# Patient Record
Sex: FEMALE | Race: BLACK OR AFRICAN AMERICAN | Employment: OTHER
[De-identification: names, ages, dates, MRNs, and addresses within clinical notes are randomized per-mention and may not be internally consistent; named-entity substitution may affect disease eponyms.]

---

## 2017-01-10 ENCOUNTER — TELEPHONE (OUTPATIENT)
Dept: INTERNAL MEDICINE | Facility: CLINIC | Age: 57
End: 2017-01-10

## 2017-02-14 ENCOUNTER — OFFICE VISIT (OUTPATIENT)
Dept: INTERNAL MEDICINE | Facility: CLINIC | Age: 57
End: 2017-02-14

## 2017-02-14 ENCOUNTER — HOSPITAL ENCOUNTER (OUTPATIENT)
Age: 57
Discharge: HOME | End: 2017-02-14
Payer: MEDICARE

## 2017-02-14 VITALS
WEIGHT: 293 LBS | BODY MASS INDEX: 41.95 KG/M2 | DIASTOLIC BLOOD PRESSURE: 72 MMHG | HEIGHT: 70 IN | SYSTOLIC BLOOD PRESSURE: 136 MMHG | HEART RATE: 80 BPM

## 2017-02-14 DIAGNOSIS — I10 HYPERTENSION, ESSENTIAL, BENIGN: ICD-10-CM

## 2017-02-14 DIAGNOSIS — M48.061 SPINAL STENOSIS OF LUMBAR REGION: ICD-10-CM

## 2017-02-14 DIAGNOSIS — M48.061 SPINAL STENOSIS AT L4-L5 LEVEL: ICD-10-CM

## 2017-02-14 DIAGNOSIS — E78.5 DYSLIPIDEMIA: ICD-10-CM

## 2017-02-14 DIAGNOSIS — E78.00 PURE HYPERCHOLESTEROLEMIA: Primary | ICD-10-CM

## 2017-02-14 PROCEDURE — 99213 OFFICE O/P EST LOW 20 MIN: CPT | Performed by: INTERNAL MEDICINE

## 2017-02-14 PROCEDURE — G8427 DOCREV CUR MEDS BY ELIG CLIN: HCPCS | Performed by: INTERNAL MEDICINE

## 2017-02-14 PROCEDURE — G8417 CALC BMI ABV UP PARAM F/U: HCPCS | Performed by: INTERNAL MEDICINE

## 2017-02-14 PROCEDURE — 1036F TOBACCO NON-USER: CPT | Performed by: INTERNAL MEDICINE

## 2017-02-14 PROCEDURE — 3014F SCREEN MAMMO DOC REV: CPT | Performed by: INTERNAL MEDICINE

## 2017-02-14 PROCEDURE — G8484 FLU IMMUNIZE NO ADMIN: HCPCS | Performed by: INTERNAL MEDICINE

## 2017-02-14 PROCEDURE — 99212 OFFICE O/P EST SF 10 MIN: CPT | Performed by: INTERNAL MEDICINE

## 2017-02-14 PROCEDURE — 3017F COLORECTAL CA SCREEN DOC REV: CPT | Performed by: INTERNAL MEDICINE

## 2017-02-14 RX ORDER — HYDROCODONE BITARTRATE AND ACETAMINOPHEN 5; 325 MG/1; MG/1
1 TABLET ORAL 2 TIMES DAILY
Qty: 10 TABLET | Refills: 0 | Status: SHIPPED | OUTPATIENT
Start: 2017-02-14 | End: 2017-05-04 | Stop reason: SDUPTHER

## 2017-02-14 RX ORDER — HYDROCHLOROTHIAZIDE 25 MG/1
25 TABLET ORAL DAILY
Qty: 30 TABLET | Refills: 5 | Status: SHIPPED | OUTPATIENT
Start: 2017-02-14 | End: 2017-05-04 | Stop reason: SDUPTHER

## 2017-02-14 RX ORDER — WATER / MINERAL OIL / WHITE PETROLATUM 16 OZ
CREAM TOPICAL
Qty: 1 PACKAGE | Refills: 1 | Status: SHIPPED | OUTPATIENT
Start: 2017-02-14 | End: 2017-05-04 | Stop reason: ALTCHOICE

## 2017-02-14 RX ORDER — HYDROXYZINE HYDROCHLORIDE 25 MG/1
25 TABLET, FILM COATED ORAL EVERY 8 HOURS PRN
Qty: 12 TABLET | Refills: 0 | Status: SHIPPED | OUTPATIENT
Start: 2017-02-14 | End: 2017-05-04 | Stop reason: SDUPTHER

## 2017-02-14 RX ORDER — AMOXICILLIN 250 MG
2 CAPSULE ORAL DAILY
Qty: 60 TABLET | Refills: 2 | Status: SHIPPED | OUTPATIENT
Start: 2017-02-14 | End: 2017-05-04 | Stop reason: SDUPTHER

## 2017-02-14 RX ORDER — ATORVASTATIN CALCIUM 80 MG/1
80 TABLET, FILM COATED ORAL DAILY
Qty: 30 TABLET | Refills: 5 | Status: SHIPPED | OUTPATIENT
Start: 2017-02-14 | End: 2017-05-04 | Stop reason: SDUPTHER

## 2017-02-14 RX ORDER — IBUPROFEN 600 MG/1
600 TABLET ORAL EVERY 8 HOURS PRN
Qty: 120 TABLET | Refills: 3 | Status: SHIPPED | OUTPATIENT
Start: 2017-02-14 | End: 2017-05-04 | Stop reason: SDUPTHER

## 2017-02-14 RX ORDER — AMLODIPINE BESYLATE 5 MG/1
5 TABLET ORAL DAILY
Qty: 30 TABLET | Refills: 5 | Status: SHIPPED | OUTPATIENT
Start: 2017-02-14 | End: 2017-05-04 | Stop reason: SDUPTHER

## 2017-02-14 RX ORDER — ASPIRIN 81 MG/1
81 TABLET ORAL DAILY
Qty: 30 TABLET | Refills: 11 | Status: SHIPPED | OUTPATIENT
Start: 2017-02-14 | End: 2017-05-04 | Stop reason: SDUPTHER

## 2017-02-14 ASSESSMENT — ENCOUNTER SYMPTOMS
GASTROINTESTINAL NEGATIVE: 1
EYES NEGATIVE: 1
RESPIRATORY NEGATIVE: 1
ALLERGIC/IMMUNOLOGIC NEGATIVE: 1

## 2017-02-15 ENCOUNTER — CARE COORDINATION (OUTPATIENT)
Dept: CARE COORDINATION | Facility: CLINIC | Age: 57
End: 2017-02-15

## 2017-03-15 ENCOUNTER — TELEPHONE (OUTPATIENT)
Dept: INTERNAL MEDICINE | Age: 57
End: 2017-03-15

## 2017-03-15 DIAGNOSIS — N30.00 ACUTE CYSTITIS WITHOUT HEMATURIA: Primary | ICD-10-CM

## 2017-03-16 ENCOUNTER — HOSPITAL ENCOUNTER (OUTPATIENT)
Age: 57
Setting detail: SPECIMEN
Discharge: HOME | End: 2017-03-16
Payer: MEDICARE

## 2017-03-16 DIAGNOSIS — N30.00 ACUTE CYSTITIS WITHOUT HEMATURIA: ICD-10-CM

## 2017-03-16 LAB
-: NORMAL
AMORPHOUS: NORMAL
BACTERIA: NORMAL
BILIRUBIN URINE: NEGATIVE
CASTS UA: NORMAL /LPF (ref 0–8)
COLOR: YELLOW
COMMENT UA: ABNORMAL
CRYSTALS, UA: NORMAL /HPF
EPITHELIAL CELLS UA: NORMAL /HPF (ref 0–5)
GLUCOSE URINE: NEGATIVE
KETONES, URINE: NEGATIVE
LEUKOCYTE ESTERASE, URINE: ABNORMAL
MUCUS: NORMAL
NITRITE, URINE: NEGATIVE
OTHER OBSERVATIONS UA: NORMAL
PH UA: 5.5 (ref 5–8)
PROTEIN UA: NEGATIVE
RBC UA: NORMAL /HPF (ref 0–4)
RENAL EPITHELIAL, UA: NORMAL /HPF
SPECIFIC GRAVITY UA: 1.01 (ref 1–1.03)
TRICHOMONAS: NORMAL
TURBIDITY: CLEAR
URINE HGB: NEGATIVE
UROBILINOGEN, URINE: NORMAL
WBC UA: NORMAL /HPF (ref 0–5)
YEAST: NORMAL

## 2017-03-17 ENCOUNTER — HOSPITAL ENCOUNTER (EMERGENCY)
Age: 57
Discharge: HOME | End: 2017-03-17
Attending: EMERGENCY MEDICINE
Payer: MEDICARE

## 2017-03-17 VITALS
WEIGHT: 293 LBS | TEMPERATURE: 97.2 F | DIASTOLIC BLOOD PRESSURE: 88 MMHG | HEART RATE: 84 BPM | HEIGHT: 70 IN | SYSTOLIC BLOOD PRESSURE: 153 MMHG | OXYGEN SATURATION: 100 % | BODY MASS INDEX: 41.95 KG/M2 | RESPIRATION RATE: 16 BRPM

## 2017-03-17 DIAGNOSIS — N30.00 ACUTE CYSTITIS WITHOUT HEMATURIA: Primary | ICD-10-CM

## 2017-03-17 LAB
-: ABNORMAL
AMORPHOUS: ABNORMAL
BACTERIA: ABNORMAL
BILIRUBIN URINE: NEGATIVE
CASTS UA: ABNORMAL /LPF (ref 0–2)
COLOR: YELLOW
CRYSTALS, UA: ABNORMAL /HPF
EPITHELIAL CELLS UA: ABNORMAL /HPF (ref 0–5)
GLUCOSE URINE: NEGATIVE
KETONES, URINE: NEGATIVE
LEUKOCYTE ESTERASE, URINE: ABNORMAL
MUCUS: ABNORMAL
NITRITE, URINE: NEGATIVE
OTHER OBSERVATIONS UA: ABNORMAL
PH UA: 5.5 (ref 5–8)
PROTEIN UA: NEGATIVE
RBC UA: ABNORMAL /HPF (ref 0–2)
RENAL EPITHELIAL, UA: ABNORMAL /HPF
SPECIFIC GRAVITY UA: 1.03 (ref 1–1.03)
TRICHOMONAS: ABNORMAL
TURBIDITY: ABNORMAL
URINE HGB: ABNORMAL
UROBILINOGEN, URINE: NORMAL
WBC UA: ABNORMAL /HPF (ref 0–5)
YEAST: ABNORMAL

## 2017-03-17 PROCEDURE — G0382 LEV 3 HOSP TYPE B ED VISIT: HCPCS

## 2017-03-17 PROCEDURE — 81001 URINALYSIS AUTO W/SCOPE: CPT

## 2017-03-17 RX ORDER — NITROFURANTOIN 25; 75 MG/1; MG/1
100 CAPSULE ORAL 2 TIMES DAILY
Qty: 14 CAPSULE | Refills: 0 | Status: SHIPPED | OUTPATIENT
Start: 2017-03-17 | End: 2017-03-21

## 2017-03-17 ASSESSMENT — ENCOUNTER SYMPTOMS
ABDOMINAL DISTENTION: 0
DIARRHEA: 0
ABDOMINAL PAIN: 0
VOMITING: 0
COLOR CHANGE: 0
BACK PAIN: 0
SHORTNESS OF BREATH: 0
NAUSEA: 0
EYE PAIN: 0

## 2017-03-17 ASSESSMENT — PAIN DESCRIPTION - LOCATION: LOCATION: ABDOMEN

## 2017-03-17 ASSESSMENT — PAIN DESCRIPTION - PAIN TYPE: TYPE: ACUTE PAIN

## 2017-03-17 ASSESSMENT — PAIN SCALES - GENERAL: PAINLEVEL_OUTOF10: 8

## 2017-03-19 LAB
CULTURE: NORMAL
Lab: NORMAL
SPECIMEN DESCRIPTION: NORMAL
STATUS: NORMAL

## 2017-03-20 ENCOUNTER — TELEPHONE (OUTPATIENT)
Dept: INTERNAL MEDICINE | Age: 57
End: 2017-03-20

## 2017-04-05 ENCOUNTER — TELEPHONE (OUTPATIENT)
Dept: INTERNAL MEDICINE | Age: 57
End: 2017-04-05

## 2017-05-02 ENCOUNTER — HOSPITAL ENCOUNTER (EMERGENCY)
Age: 57
Discharge: HOME | End: 2017-05-02
Attending: EMERGENCY MEDICINE
Payer: MEDICARE

## 2017-05-02 VITALS
HEIGHT: 70 IN | WEIGHT: 293 LBS | BODY MASS INDEX: 41.95 KG/M2 | HEART RATE: 99 BPM | OXYGEN SATURATION: 96 % | DIASTOLIC BLOOD PRESSURE: 99 MMHG | RESPIRATION RATE: 18 BRPM | TEMPERATURE: 97.5 F | SYSTOLIC BLOOD PRESSURE: 184 MMHG

## 2017-05-02 DIAGNOSIS — R30.0 DYSURIA: Primary | ICD-10-CM

## 2017-05-02 LAB
-: NORMAL
AMORPHOUS: NORMAL
BACTERIA: NORMAL
BILIRUBIN URINE: NEGATIVE
CASTS UA: NORMAL /LPF (ref 0–8)
COLOR: YELLOW
COMMENT UA: ABNORMAL
CRYSTALS, UA: NORMAL /HPF
EPITHELIAL CELLS UA: NORMAL /HPF (ref 0–5)
GLUCOSE URINE: NEGATIVE
KETONES, URINE: NEGATIVE
LEUKOCYTE ESTERASE, URINE: ABNORMAL
MUCUS: NORMAL
NITRITE, URINE: NEGATIVE
OTHER OBSERVATIONS UA: NORMAL
PH UA: 7.5 (ref 5–8)
PROTEIN UA: NEGATIVE
RBC UA: NORMAL /HPF (ref 0–4)
RENAL EPITHELIAL, UA: NORMAL /HPF
SPECIFIC GRAVITY UA: 1.02 (ref 1–1.03)
TRICHOMONAS: NORMAL
TURBIDITY: CLEAR
URINE HGB: NEGATIVE
UROBILINOGEN, URINE: NORMAL
WBC UA: NORMAL /HPF (ref 0–5)
YEAST: NORMAL

## 2017-05-02 PROCEDURE — 81001 URINALYSIS AUTO W/SCOPE: CPT

## 2017-05-02 PROCEDURE — 99283 EMERGENCY DEPT VISIT LOW MDM: CPT

## 2017-05-02 PROCEDURE — 87086 URINE CULTURE/COLONY COUNT: CPT

## 2017-05-02 RX ORDER — NITROFURANTOIN 25; 75 MG/1; MG/1
100 CAPSULE ORAL 2 TIMES DAILY
Qty: 7 CAPSULE | Refills: 0 | Status: SHIPPED | OUTPATIENT
Start: 2017-05-02 | End: 2017-05-06

## 2017-05-02 ASSESSMENT — ENCOUNTER SYMPTOMS
COUGH: 0
COLOR CHANGE: 0
BACK PAIN: 0
NAUSEA: 0
VOMITING: 0
WHEEZING: 0

## 2017-05-02 ASSESSMENT — PAIN DESCRIPTION - FREQUENCY: FREQUENCY: CONTINUOUS

## 2017-05-02 ASSESSMENT — PAIN DESCRIPTION - DESCRIPTORS: DESCRIPTORS: ACHING;BURNING

## 2017-05-02 ASSESSMENT — PAIN DESCRIPTION - LOCATION: LOCATION: GROIN

## 2017-05-02 ASSESSMENT — PAIN SCALES - GENERAL: PAINLEVEL_OUTOF10: 6

## 2017-05-02 ASSESSMENT — PAIN DESCRIPTION - PAIN TYPE: TYPE: ACUTE PAIN

## 2017-05-03 ENCOUNTER — TELEPHONE (OUTPATIENT)
Dept: INTERNAL MEDICINE | Age: 57
End: 2017-05-03

## 2017-05-03 LAB
CULTURE: NORMAL
CULTURE: NORMAL
Lab: NORMAL
SPECIMEN DESCRIPTION: NORMAL
STATUS: NORMAL

## 2017-05-04 ENCOUNTER — OFFICE VISIT (OUTPATIENT)
Dept: INTERNAL MEDICINE | Age: 57
End: 2017-05-04
Payer: MEDICARE

## 2017-05-04 VITALS
WEIGHT: 293 LBS | SYSTOLIC BLOOD PRESSURE: 143 MMHG | BODY MASS INDEX: 41.95 KG/M2 | HEIGHT: 70 IN | HEART RATE: 74 BPM | DIASTOLIC BLOOD PRESSURE: 74 MMHG

## 2017-05-04 DIAGNOSIS — E78.5 DYSLIPIDEMIA: ICD-10-CM

## 2017-05-04 DIAGNOSIS — E78.00 PURE HYPERCHOLESTEROLEMIA: Primary | ICD-10-CM

## 2017-05-04 DIAGNOSIS — I10 HYPERTENSION, ESSENTIAL, BENIGN: ICD-10-CM

## 2017-05-04 DIAGNOSIS — N39.0 URINARY TRACT INFECTION WITHOUT HEMATURIA, SITE UNSPECIFIED: ICD-10-CM

## 2017-05-04 DIAGNOSIS — M48.061 SPINAL STENOSIS OF LUMBAR REGION: ICD-10-CM

## 2017-05-04 PROCEDURE — 3014F SCREEN MAMMO DOC REV: CPT | Performed by: INTERNAL MEDICINE

## 2017-05-04 PROCEDURE — G8427 DOCREV CUR MEDS BY ELIG CLIN: HCPCS | Performed by: INTERNAL MEDICINE

## 2017-05-04 PROCEDURE — 99213 OFFICE O/P EST LOW 20 MIN: CPT | Performed by: INTERNAL MEDICINE

## 2017-05-04 PROCEDURE — 1036F TOBACCO NON-USER: CPT | Performed by: INTERNAL MEDICINE

## 2017-05-04 PROCEDURE — G8417 CALC BMI ABV UP PARAM F/U: HCPCS | Performed by: INTERNAL MEDICINE

## 2017-05-04 PROCEDURE — 3017F COLORECTAL CA SCREEN DOC REV: CPT | Performed by: INTERNAL MEDICINE

## 2017-05-04 RX ORDER — IBUPROFEN 600 MG/1
600 TABLET ORAL EVERY 8 HOURS PRN
Qty: 120 TABLET | Refills: 3 | Status: SHIPPED | OUTPATIENT
Start: 2017-05-04 | End: 2017-11-01 | Stop reason: SDUPTHER

## 2017-05-04 RX ORDER — AMLODIPINE BESYLATE 10 MG/1
10 TABLET ORAL DAILY
Qty: 30 TABLET | Refills: 11 | Status: SHIPPED | OUTPATIENT
Start: 2017-05-04 | End: 2017-11-01 | Stop reason: SDUPTHER

## 2017-05-04 RX ORDER — HYDROCHLOROTHIAZIDE 25 MG/1
25 TABLET ORAL DAILY
Qty: 30 TABLET | Refills: 5 | Status: SHIPPED | OUTPATIENT
Start: 2017-05-04 | End: 2017-11-01 | Stop reason: SDUPTHER

## 2017-05-04 RX ORDER — HYDROXYZINE HYDROCHLORIDE 25 MG/1
25 TABLET, FILM COATED ORAL EVERY 8 HOURS PRN
Qty: 12 TABLET | Refills: 0 | Status: SHIPPED | OUTPATIENT
Start: 2017-05-04 | End: 2017-11-01 | Stop reason: SDUPTHER

## 2017-05-04 RX ORDER — AMOXICILLIN 250 MG
2 CAPSULE ORAL DAILY
Qty: 60 TABLET | Refills: 2 | Status: SHIPPED | OUTPATIENT
Start: 2017-05-04 | End: 2017-11-01 | Stop reason: SDUPTHER

## 2017-05-04 RX ORDER — ATORVASTATIN CALCIUM 80 MG/1
80 TABLET, FILM COATED ORAL DAILY
Qty: 30 TABLET | Refills: 5 | Status: SHIPPED | OUTPATIENT
Start: 2017-05-04 | End: 2017-05-25 | Stop reason: ALTCHOICE

## 2017-05-04 RX ORDER — HYDROCODONE BITARTRATE AND ACETAMINOPHEN 5; 325 MG/1; MG/1
1 TABLET ORAL 2 TIMES DAILY
Qty: 10 TABLET | Refills: 0 | Status: ON HOLD | OUTPATIENT
Start: 2017-05-04 | End: 2017-05-19

## 2017-05-04 RX ORDER — ASPIRIN 81 MG/1
81 TABLET ORAL DAILY
Qty: 30 TABLET | Refills: 11 | Status: SHIPPED | OUTPATIENT
Start: 2017-05-04 | End: 2017-11-01 | Stop reason: SDUPTHER

## 2017-05-04 ASSESSMENT — ENCOUNTER SYMPTOMS
GASTROINTESTINAL NEGATIVE: 1
RESPIRATORY NEGATIVE: 1
EYES NEGATIVE: 1

## 2017-05-16 ENCOUNTER — TELEPHONE (OUTPATIENT)
Dept: INTERNAL MEDICINE | Age: 57
End: 2017-05-16

## 2017-05-18 ENCOUNTER — HOSPITAL ENCOUNTER (OUTPATIENT)
Age: 57
Setting detail: OBSERVATION
Discharge: HOME | End: 2017-05-19
Attending: EMERGENCY MEDICINE | Admitting: EMERGENCY MEDICINE
Payer: MEDICARE

## 2017-05-18 DIAGNOSIS — M79.89 LEG SWELLING: Primary | ICD-10-CM

## 2017-05-18 LAB
ABSOLUTE EOS #: 0.1 K/UL (ref 0–0.4)
ABSOLUTE LYMPH #: 2 K/UL (ref 1–4.8)
ABSOLUTE MONO #: 0.2 K/UL (ref 0.1–1.2)
ANION GAP SERPL CALCULATED.3IONS-SCNC: 14 MMOL/L (ref 9–17)
BASOPHILS # BLD: 1 %
BASOPHILS ABSOLUTE: 0 K/UL (ref 0–0.2)
BUN BLDV-MCNC: 11 MG/DL (ref 6–20)
BUN/CREAT BLD: NORMAL (ref 9–20)
CALCIUM SERPL-MCNC: 10.1 MG/DL (ref 8.6–10.4)
CHLORIDE BLD-SCNC: 99 MMOL/L (ref 98–107)
CO2: 23 MMOL/L (ref 20–31)
CREAT SERPL-MCNC: 0.55 MG/DL (ref 0.5–0.9)
DIFFERENTIAL TYPE: ABNORMAL
EOSINOPHILS RELATIVE PERCENT: 2 %
GFR AFRICAN AMERICAN: >60 ML/MIN
GFR NON-AFRICAN AMERICAN: >60 ML/MIN
GFR SERPL CREATININE-BSD FRML MDRD: NORMAL ML/MIN/{1.73_M2}
GFR SERPL CREATININE-BSD FRML MDRD: NORMAL ML/MIN/{1.73_M2}
GLUCOSE BLD-MCNC: 78 MG/DL (ref 70–99)
HCT VFR BLD CALC: 38.7 % (ref 36–46)
HEMOGLOBIN: 12.9 G/DL (ref 12–16)
LYMPHOCYTES # BLD: 50 %
MCH RBC QN AUTO: 29.6 PG (ref 26–34)
MCHC RBC AUTO-ENTMCNC: 33.4 G/DL (ref 31–37)
MCV RBC AUTO: 88.6 FL (ref 80–100)
MONOCYTES # BLD: 6 %
PDW BLD-RTO: 15.6 % (ref 12.5–15.4)
PLATELET # BLD: 238 K/UL (ref 140–450)
PLATELET ESTIMATE: ABNORMAL
PMV BLD AUTO: 9.5 FL (ref 6–12)
POTASSIUM SERPL-SCNC: 3.7 MMOL/L (ref 3.7–5.3)
RBC # BLD: 4.37 M/UL (ref 4–5.2)
RBC # BLD: ABNORMAL 10*6/UL
SEG NEUTROPHILS: 41 %
SEGMENTED NEUTROPHILS ABSOLUTE COUNT: 1.7 K/UL (ref 1.8–7.7)
SODIUM BLD-SCNC: 136 MMOL/L (ref 135–144)
WBC # BLD: 4 K/UL (ref 3.5–11)
WBC # BLD: ABNORMAL 10*3/UL

## 2017-05-18 PROCEDURE — 80048 BASIC METABOLIC PNL TOTAL CA: CPT

## 2017-05-18 PROCEDURE — 6370000000 HC RX 637 (ALT 250 FOR IP): Performed by: EMERGENCY MEDICINE

## 2017-05-18 PROCEDURE — 96372 THER/PROPH/DIAG INJ SC/IM: CPT

## 2017-05-18 PROCEDURE — 99285 EMERGENCY DEPT VISIT HI MDM: CPT

## 2017-05-18 PROCEDURE — 6360000002 HC RX W HCPCS: Performed by: EMERGENCY MEDICINE

## 2017-05-18 PROCEDURE — G0378 HOSPITAL OBSERVATION PER HR: HCPCS

## 2017-05-18 PROCEDURE — 85025 COMPLETE CBC W/AUTO DIFF WBC: CPT

## 2017-05-18 PROCEDURE — 93005 ELECTROCARDIOGRAM TRACING: CPT

## 2017-05-18 RX ORDER — SODIUM CHLORIDE 0.9 % (FLUSH) 0.9 %
10 SYRINGE (ML) INJECTION PRN
Status: DISCONTINUED | OUTPATIENT
Start: 2017-05-18 | End: 2017-05-19 | Stop reason: HOSPADM

## 2017-05-18 RX ORDER — ATORVASTATIN CALCIUM 80 MG/1
80 TABLET, FILM COATED ORAL DAILY
Status: DISCONTINUED | OUTPATIENT
Start: 2017-05-19 | End: 2017-05-19 | Stop reason: HOSPADM

## 2017-05-18 RX ORDER — HYDROCHLOROTHIAZIDE 25 MG/1
25 TABLET ORAL DAILY
Status: DISCONTINUED | OUTPATIENT
Start: 2017-05-19 | End: 2017-05-19 | Stop reason: HOSPADM

## 2017-05-18 RX ORDER — OXYCODONE HYDROCHLORIDE AND ACETAMINOPHEN 5; 325 MG/1; MG/1
1 TABLET ORAL ONCE
Status: COMPLETED | OUTPATIENT
Start: 2017-05-18 | End: 2017-05-18

## 2017-05-18 RX ORDER — HYDROXYZINE HYDROCHLORIDE 25 MG/1
25 TABLET, FILM COATED ORAL EVERY 8 HOURS PRN
Status: DISCONTINUED | OUTPATIENT
Start: 2017-05-18 | End: 2017-05-19 | Stop reason: HOSPADM

## 2017-05-18 RX ORDER — SENNA AND DOCUSATE SODIUM 50; 8.6 MG/1; MG/1
2 TABLET, FILM COATED ORAL DAILY
Status: DISCONTINUED | OUTPATIENT
Start: 2017-05-19 | End: 2017-05-19 | Stop reason: HOSPADM

## 2017-05-18 RX ORDER — SODIUM CHLORIDE 0.9 % (FLUSH) 0.9 %
10 SYRINGE (ML) INJECTION EVERY 12 HOURS SCHEDULED
Status: DISCONTINUED | OUTPATIENT
Start: 2017-05-18 | End: 2017-05-19 | Stop reason: HOSPADM

## 2017-05-18 RX ORDER — ASPIRIN 81 MG/1
81 TABLET ORAL DAILY
Status: DISCONTINUED | OUTPATIENT
Start: 2017-05-19 | End: 2017-05-19 | Stop reason: HOSPADM

## 2017-05-18 RX ORDER — ACETAMINOPHEN 325 MG/1
650 TABLET ORAL EVERY 4 HOURS PRN
Status: DISCONTINUED | OUTPATIENT
Start: 2017-05-18 | End: 2017-05-19 | Stop reason: HOSPADM

## 2017-05-18 RX ORDER — AMLODIPINE BESYLATE 10 MG/1
10 TABLET ORAL DAILY
Status: DISCONTINUED | OUTPATIENT
Start: 2017-05-19 | End: 2017-05-19 | Stop reason: HOSPADM

## 2017-05-18 RX ADMIN — ENOXAPARIN SODIUM 135 MG: 150 INJECTION SUBCUTANEOUS at 01:35

## 2017-05-18 RX ADMIN — OXYCODONE HYDROCHLORIDE AND ACETAMINOPHEN 1 TABLET: 5; 325 TABLET ORAL at 22:03

## 2017-05-18 ASSESSMENT — PAIN SCALES - GENERAL: PAINLEVEL_OUTOF10: 8

## 2017-05-19 VITALS
WEIGHT: 293 LBS | BODY MASS INDEX: 41.95 KG/M2 | TEMPERATURE: 97.3 F | HEIGHT: 70 IN | RESPIRATION RATE: 17 BRPM | DIASTOLIC BLOOD PRESSURE: 81 MMHG | SYSTOLIC BLOOD PRESSURE: 139 MMHG | HEART RATE: 94 BPM | OXYGEN SATURATION: 99 %

## 2017-05-19 LAB
EKG ATRIAL RATE: 83 BPM
EKG ATRIAL RATE: 85 BPM
EKG P AXIS: 54 DEGREES
EKG P AXIS: 56 DEGREES
EKG P-R INTERVAL: 172 MS
EKG P-R INTERVAL: 200 MS
EKG Q-T INTERVAL: 402 MS
EKG Q-T INTERVAL: 422 MS
EKG QRS DURATION: 90 MS
EKG QRS DURATION: 92 MS
EKG QTC CALCULATION (BAZETT): 472 MS
EKG QTC CALCULATION (BAZETT): 502 MS
EKG R AXIS: -10 DEGREES
EKG R AXIS: 7 DEGREES
EKG T AXIS: 41 DEGREES
EKG T AXIS: 57 DEGREES
EKG VENTRICULAR RATE: 83 BPM
EKG VENTRICULAR RATE: 85 BPM

## 2017-05-19 PROCEDURE — 2580000003 HC RX 258: Performed by: EMERGENCY MEDICINE

## 2017-05-19 PROCEDURE — 93970 EXTREMITY STUDY: CPT

## 2017-05-19 PROCEDURE — G0378 HOSPITAL OBSERVATION PER HR: HCPCS

## 2017-05-19 PROCEDURE — 93005 ELECTROCARDIOGRAM TRACING: CPT

## 2017-05-19 PROCEDURE — 6370000000 HC RX 637 (ALT 250 FOR IP): Performed by: EMERGENCY MEDICINE

## 2017-05-19 RX ORDER — HYDROCODONE BITARTRATE AND ACETAMINOPHEN 5; 325 MG/1; MG/1
1 TABLET ORAL EVERY 6 HOURS PRN
Qty: 12 TABLET | Refills: 0 | Status: SHIPPED | OUTPATIENT
Start: 2017-05-19 | End: 2017-11-01 | Stop reason: SDUPTHER

## 2017-05-19 RX ADMIN — HYDROCHLOROTHIAZIDE 25 MG: 25 TABLET ORAL at 08:40

## 2017-05-19 RX ADMIN — ASPIRIN 81 MG: 81 TABLET, COATED ORAL at 08:40

## 2017-05-19 RX ADMIN — DOCUSATE SODIUM -SENNOSIDES 2 TABLET: 50; 8.6 TABLET, COATED ORAL at 08:40

## 2017-05-19 RX ADMIN — SODIUM CHLORIDE, PRESERVATIVE FREE 10 ML: 5 INJECTION INTRAVENOUS at 08:42

## 2017-05-19 RX ADMIN — AMLODIPINE BESYLATE 10 MG: 10 TABLET ORAL at 08:40

## 2017-05-19 ASSESSMENT — ENCOUNTER SYMPTOMS
BLOOD IN STOOL: 0
RHINORRHEA: 0
VOMITING: 0
PHOTOPHOBIA: 0
NAUSEA: 0
EYE PAIN: 0
ABDOMINAL PAIN: 0
SORE THROAT: 0
DIARRHEA: 0
SHORTNESS OF BREATH: 0
COUGH: 0

## 2017-05-20 ENCOUNTER — CARE COORDINATION (OUTPATIENT)
Dept: CASE MANAGEMENT | Age: 57
End: 2017-05-20

## 2017-05-25 ENCOUNTER — OFFICE VISIT (OUTPATIENT)
Dept: UROLOGY | Age: 57
End: 2017-05-25
Payer: MEDICARE

## 2017-05-25 VITALS
WEIGHT: 293 LBS | TEMPERATURE: 98 F | DIASTOLIC BLOOD PRESSURE: 91 MMHG | HEART RATE: 87 BPM | SYSTOLIC BLOOD PRESSURE: 144 MMHG | BODY MASS INDEX: 41.95 KG/M2 | HEIGHT: 70 IN

## 2017-05-25 DIAGNOSIS — N39.41 URGE INCONTINENCE: Primary | ICD-10-CM

## 2017-05-25 DIAGNOSIS — R30.0 DYSURIA: ICD-10-CM

## 2017-05-25 DIAGNOSIS — N39.0 URINARY TRACT INFECTION, SITE UNSPECIFIED: Primary | ICD-10-CM

## 2017-05-25 PROCEDURE — 3014F SCREEN MAMMO DOC REV: CPT | Performed by: SPECIALIST

## 2017-05-25 PROCEDURE — G8417 CALC BMI ABV UP PARAM F/U: HCPCS | Performed by: SPECIALIST

## 2017-05-25 PROCEDURE — 99213 OFFICE O/P EST LOW 20 MIN: CPT

## 2017-05-25 PROCEDURE — 99204 OFFICE O/P NEW MOD 45 MIN: CPT | Performed by: SPECIALIST

## 2017-05-25 PROCEDURE — 3017F COLORECTAL CA SCREEN DOC REV: CPT | Performed by: SPECIALIST

## 2017-05-25 PROCEDURE — 1036F TOBACCO NON-USER: CPT | Performed by: SPECIALIST

## 2017-05-25 PROCEDURE — 51798 US URINE CAPACITY MEASURE: CPT | Performed by: SPECIALIST

## 2017-05-25 PROCEDURE — G8427 DOCREV CUR MEDS BY ELIG CLIN: HCPCS | Performed by: SPECIALIST

## 2017-06-05 ENCOUNTER — HOSPITAL ENCOUNTER (OUTPATIENT)
Dept: PHYSICAL THERAPY | Age: 57
Setting detail: THERAPIES SERIES
Discharge: HOME | End: 2017-06-05
Payer: MEDICARE

## 2017-06-05 ASSESSMENT — PAIN SCALES - GENERAL: PAINLEVEL_OUTOF10: 9

## 2017-06-05 ASSESSMENT — PAIN DESCRIPTION - LOCATION: LOCATION: LEG

## 2017-06-05 ASSESSMENT — PAIN DESCRIPTION - PAIN TYPE: TYPE: CHRONIC PAIN

## 2017-06-05 ASSESSMENT — PAIN DESCRIPTION - ORIENTATION: ORIENTATION: LEFT;RIGHT

## 2017-06-07 PROCEDURE — G8978 MOBILITY CURRENT STATUS: HCPCS

## 2017-06-07 PROCEDURE — G8979 MOBILITY GOAL STATUS: HCPCS

## 2017-06-07 ASSESSMENT — PAIN DESCRIPTION - PAIN TYPE: TYPE: CHRONIC PAIN

## 2017-06-07 ASSESSMENT — PAIN DESCRIPTION - ORIENTATION: ORIENTATION: RIGHT;LEFT

## 2017-06-07 ASSESSMENT — PAIN DESCRIPTION - LOCATION: LOCATION: LEG

## 2017-06-07 ASSESSMENT — PAIN SCALES - GENERAL: PAINLEVEL_OUTOF10: 9

## 2017-06-12 ENCOUNTER — HOSPITAL ENCOUNTER (OUTPATIENT)
Dept: PHYSICAL THERAPY | Age: 57
Setting detail: THERAPIES SERIES
Discharge: HOME | End: 2017-06-12
Payer: MEDICARE

## 2017-06-12 PROCEDURE — 97113 AQUATIC THERAPY/EXERCISES: CPT

## 2017-06-12 ASSESSMENT — PAIN DESCRIPTION - ORIENTATION: ORIENTATION: LOWER

## 2017-06-12 ASSESSMENT — PAIN SCALES - GENERAL: PAINLEVEL_OUTOF10: 9

## 2017-06-12 ASSESSMENT — PAIN DESCRIPTION - LOCATION: LOCATION: BACK

## 2017-06-12 ASSESSMENT — PAIN DESCRIPTION - PAIN TYPE: TYPE: CHRONIC PAIN

## 2017-06-15 ENCOUNTER — HOSPITAL ENCOUNTER (OUTPATIENT)
Dept: PHYSICAL THERAPY | Age: 57
Setting detail: THERAPIES SERIES
Discharge: HOME | End: 2017-06-15
Payer: MEDICARE

## 2017-06-15 PROCEDURE — 97113 AQUATIC THERAPY/EXERCISES: CPT

## 2017-06-15 ASSESSMENT — PAIN SCALES - GENERAL: PAINLEVEL_OUTOF10: 8

## 2017-06-15 ASSESSMENT — PAIN DESCRIPTION - LOCATION: LOCATION: BACK

## 2017-06-15 ASSESSMENT — PAIN DESCRIPTION - PAIN TYPE: TYPE: CHRONIC PAIN

## 2017-06-15 ASSESSMENT — PAIN DESCRIPTION - ORIENTATION: ORIENTATION: LOWER

## 2017-06-15 ASSESSMENT — PAIN DESCRIPTION - DESCRIPTORS: DESCRIPTORS: ACHING;NUMBNESS;TIGHTNESS

## 2017-06-19 ENCOUNTER — HOSPITAL ENCOUNTER (OUTPATIENT)
Dept: PHYSICAL THERAPY | Age: 57
Setting detail: THERAPIES SERIES
Discharge: HOME | End: 2017-06-19
Payer: MEDICARE

## 2017-06-19 PROCEDURE — 97113 AQUATIC THERAPY/EXERCISES: CPT

## 2017-06-19 ASSESSMENT — PAIN DESCRIPTION - ORIENTATION: ORIENTATION: LOWER

## 2017-06-19 ASSESSMENT — PAIN DESCRIPTION - LOCATION: LOCATION: BACK

## 2017-06-19 ASSESSMENT — PAIN SCALES - GENERAL: PAINLEVEL_OUTOF10: 8

## 2017-06-19 ASSESSMENT — PAIN DESCRIPTION - PAIN TYPE: TYPE: CHRONIC PAIN

## 2017-06-20 ENCOUNTER — APPOINTMENT (OUTPATIENT)
Dept: PHYSICAL THERAPY | Age: 57
End: 2017-06-20
Payer: MEDICARE

## 2017-06-22 ENCOUNTER — HOSPITAL ENCOUNTER (OUTPATIENT)
Dept: PHYSICAL THERAPY | Age: 57
Setting detail: THERAPIES SERIES
Discharge: HOME | End: 2017-06-22
Payer: MEDICARE

## 2017-06-22 PROCEDURE — 97113 AQUATIC THERAPY/EXERCISES: CPT

## 2017-06-22 ASSESSMENT — PAIN DESCRIPTION - DESCRIPTORS: DESCRIPTORS: ACHING;CONSTANT;DISCOMFORT

## 2017-06-22 ASSESSMENT — PAIN DESCRIPTION - PAIN TYPE: TYPE: CHRONIC PAIN

## 2017-06-22 ASSESSMENT — PAIN SCALES - GENERAL: PAINLEVEL_OUTOF10: 8

## 2017-06-22 ASSESSMENT — PAIN DESCRIPTION - LOCATION: LOCATION: BACK

## 2017-06-22 ASSESSMENT — PAIN DESCRIPTION - ORIENTATION: ORIENTATION: LOWER

## 2017-06-26 ENCOUNTER — HOSPITAL ENCOUNTER (OUTPATIENT)
Dept: PHYSICAL THERAPY | Age: 57
Setting detail: THERAPIES SERIES
Discharge: HOME | End: 2017-06-26
Payer: MEDICARE

## 2017-06-29 ENCOUNTER — HOSPITAL ENCOUNTER (OUTPATIENT)
Dept: PHYSICAL THERAPY | Age: 57
Setting detail: THERAPIES SERIES
Discharge: HOME | End: 2017-06-29
Payer: MEDICARE

## 2017-09-06 ENCOUNTER — HOSPITAL ENCOUNTER (OUTPATIENT)
Dept: PHYSICAL THERAPY | Age: 57
Setting detail: THERAPIES SERIES
Discharge: HOME | End: 2017-09-06
Payer: MEDICARE

## 2017-09-06 PROCEDURE — 97162 PT EVAL MOD COMPLEX 30 MIN: CPT

## 2017-09-06 PROCEDURE — G8978 MOBILITY CURRENT STATUS: HCPCS

## 2017-09-06 PROCEDURE — G8979 MOBILITY GOAL STATUS: HCPCS

## 2017-09-06 ASSESSMENT — PAIN SCALES - GENERAL: PAINLEVEL_OUTOF10: 9

## 2017-09-06 ASSESSMENT — PAIN DESCRIPTION - LOCATION: LOCATION: LEG;BACK

## 2017-09-06 ASSESSMENT — PAIN DESCRIPTION - DESCRIPTORS: DESCRIPTORS: CONSTANT;STABBING

## 2017-09-06 ASSESSMENT — PAIN DESCRIPTION - PAIN TYPE: TYPE: CHRONIC PAIN

## 2017-09-06 ASSESSMENT — PAIN DESCRIPTION - ORIENTATION: ORIENTATION: LEFT;RIGHT;LOWER

## 2017-09-11 ENCOUNTER — HOSPITAL ENCOUNTER (OUTPATIENT)
Dept: PHYSICAL THERAPY | Age: 57
Setting detail: THERAPIES SERIES
End: 2017-09-11
Payer: MEDICARE

## 2017-09-11 ENCOUNTER — APPOINTMENT (OUTPATIENT)
Dept: PHYSICAL THERAPY | Age: 57
End: 2017-09-11
Payer: MEDICARE

## 2017-09-11 NOTE — PROGRESS NOTES
Physical Therapy  [address]   Outpatient Physical Therapy  Cancel/No Show Note    Date: 17    Patient Name: Melquiades Foster        MRN: [id number]  Account: [de-identified] : 1960      General Information:  Referral Date : 17  Diagnosis: lumbar disc disease  Onset Date: 16  PT Insurance Information: Medicare and Medicaid  Total # of Visits Approved: 12  Total # of Visits to Date: 1  Plan of Care/Certification Expiration Date: 10/04/17  No Show: 0  Canceled Appointment: 1      Comments: PATIENT CANCELLED AQUATICS THIS WEEK PER ; NO REASON GIVEN    Radha Eason PTA

## 2017-09-14 ENCOUNTER — APPOINTMENT (OUTPATIENT)
Dept: PHYSICAL THERAPY | Age: 57
End: 2017-09-14
Payer: MEDICARE

## 2017-09-18 ENCOUNTER — HOSPITAL ENCOUNTER (OUTPATIENT)
Dept: PHYSICAL THERAPY | Age: 57
Setting detail: THERAPIES SERIES
Discharge: HOME | End: 2017-09-18
Payer: MEDICARE

## 2017-09-19 ENCOUNTER — TELEPHONE (OUTPATIENT)
Dept: ADMINISTRATIVE | Age: 57
End: 2017-09-19

## 2017-09-25 ENCOUNTER — HOSPITAL ENCOUNTER (OUTPATIENT)
Dept: PHYSICAL THERAPY | Age: 57
Setting detail: THERAPIES SERIES
Discharge: HOME | End: 2017-09-25
Payer: MEDICARE

## 2017-09-25 PROCEDURE — 97113 AQUATIC THERAPY/EXERCISES: CPT

## 2017-09-25 ASSESSMENT — PAIN DESCRIPTION - LOCATION: LOCATION: BACK;LEG

## 2017-09-25 ASSESSMENT — PAIN SCALES - GENERAL: PAINLEVEL_OUTOF10: 8

## 2017-09-25 ASSESSMENT — PAIN DESCRIPTION - ORIENTATION: ORIENTATION: LOWER;LEFT;RIGHT

## 2017-09-25 ASSESSMENT — PAIN DESCRIPTION - PAIN TYPE: TYPE: CHRONIC PAIN

## 2017-09-25 ASSESSMENT — PAIN DESCRIPTION - DESCRIPTORS: DESCRIPTORS: CONSTANT;STABBING

## 2017-09-28 ENCOUNTER — APPOINTMENT (OUTPATIENT)
Dept: PHYSICAL THERAPY | Age: 57
End: 2017-09-28
Payer: MEDICARE

## 2017-09-28 ENCOUNTER — HOSPITAL ENCOUNTER (OUTPATIENT)
Dept: PHYSICAL THERAPY | Age: 57
Setting detail: THERAPIES SERIES
Discharge: HOME | End: 2017-09-28
Payer: MEDICARE

## 2017-09-28 PROCEDURE — 97113 AQUATIC THERAPY/EXERCISES: CPT

## 2017-09-28 ASSESSMENT — PAIN DESCRIPTION - ORIENTATION: ORIENTATION: LOWER;LEFT;RIGHT

## 2017-09-28 ASSESSMENT — PAIN DESCRIPTION - PAIN TYPE: TYPE: CHRONIC PAIN

## 2017-09-28 ASSESSMENT — PAIN DESCRIPTION - DESCRIPTORS: DESCRIPTORS: CONSTANT;STABBING

## 2017-09-28 ASSESSMENT — PAIN SCALES - GENERAL: PAINLEVEL_OUTOF10: 9

## 2017-09-28 ASSESSMENT — PAIN DESCRIPTION - LOCATION: LOCATION: BACK;LEG

## 2017-10-02 ENCOUNTER — HOSPITAL ENCOUNTER (OUTPATIENT)
Dept: PHYSICAL THERAPY | Age: 57
Setting detail: THERAPIES SERIES
Discharge: HOME | End: 2017-10-02
Payer: MEDICARE

## 2017-10-02 NOTE — PROGRESS NOTES
Physical Therapy  [address]   Outpatient Physical Therapy  Cancel/No Show Note    Date: 10/2/17    Patient Name: Christopher Oneil        MRN: [id number]  Account: [de-identified] : 1960      General Information:     Onset Date: 16  PT Insurance Information: Medicare and Medicaid  Total # of Visits Approved: 12  Total # of Visits to Date: 3  Plan of Care/Certification Expiration Date: 10/04/17  No Show: 1  Canceled Appointment: 3    Comments: Patient canceled her aquatics session for today's date- patient is ill    Electronically signed by Brian Najjar, PTA on 10/2/2017 at 5:44 PM

## 2017-10-03 ENCOUNTER — APPOINTMENT (OUTPATIENT)
Dept: PHYSICAL THERAPY | Age: 57
End: 2017-10-03
Payer: MEDICARE

## 2017-10-05 ENCOUNTER — HOSPITAL ENCOUNTER (OUTPATIENT)
Dept: PHYSICAL THERAPY | Age: 57
Setting detail: THERAPIES SERIES
Discharge: HOME | End: 2017-10-05
Payer: MEDICARE

## 2017-10-05 PROCEDURE — 97113 AQUATIC THERAPY/EXERCISES: CPT

## 2017-10-05 ASSESSMENT — PAIN DESCRIPTION - ORIENTATION: ORIENTATION: LOWER;LEFT;RIGHT

## 2017-10-05 ASSESSMENT — PAIN SCALES - GENERAL: PAINLEVEL_OUTOF10: 9

## 2017-10-05 ASSESSMENT — PAIN DESCRIPTION - LOCATION: LOCATION: BACK;LEG

## 2017-10-05 ASSESSMENT — PAIN DESCRIPTION - PAIN TYPE: TYPE: CHRONIC PAIN

## 2017-10-05 NOTE — PROGRESS NOTES
Physical Therapy  [address]   Outpatient Physical Therapy  [address] [facility]  Phone: [phone]  Fax: [phone]  Daily Progress Note    Date: 10/5/17    Patient Name: Melquiades Foster        MRN: [id number]  Account: [de-identified] : 1960      General Information:  Chart Reviewed: Yes  Response To Previous Treatment: Patient with no complaints from previous session. Family / Caregiver Present: No  Referral Date : 17  Diagnosis: lumbar disc disease  Follows Commands: Within Functional Limits  Onset Date: 16  PT Insurance Information: Medicare and Medicaid  Total # of Visits Approved: 12  Total # of Visits to Date: 4  No Show: 1  Canceled Appointment: 3    Subjective:  Subjective: Patient with complaints of increased pain and tightness across her lower back and in (B) LE's- (Left side more severe than right side)     Pain:  Patient Currently in Pain: Yes  Pain Assessment: 0-10  Pain Level: 9  Pain Type: Chronic pain  Pain Location: Back;Leg  Pain Orientation: Lower;Left;Right (Ride side not as severe as left)     Objective:      [facility] Exercise Log  Aquatic, Hip & DLS Program- Phase 1    Date of Eval:    2017                        Primary PT: BALDO  Diagnosis:  LBP  Things to Focus On (goals): POSTURE, WALKING TIME, CORE STRENGTH  Surgical Precautions:  Medical Precautions:  [] C-9 dates  [] Occ Med   [x] Medicare       Date 9/25/17 9/28/17 10/5/17     Visit # 2/12 3/12 4/12     Walk F/L/R 2 laps ea 2 laps ea 2 laps ea     Marching 2 laps 2 laps 2 laps     Squats 10x5\" 15x5\" 15x5\"     Step-Ups F/L  Low 15x Low 15x     Heel-toe raises 10x Hurts ankles  DC -- -- --   SLR F/L/R 10x 15x 15x     Knee/Flex/Ext 10x 15x 15x     F/L Lunges        Kickboard Ex. Small Small Small     Iso Abd. 10x5\" 10x5\" 10x5\"     Push-pull 10x 15x 15x     Paddling 10x 15x 15x             UE Ex:        Horiz Abd/Add   10x     Alt.  Flex/Ext   10x Push downs   10x     IR/ER (wipers)   10x     Abd/Add   10x             Deep water: Add    Cycling        Jacks        Cross-country                Stretches        Achllies 2x20\" 2x20\" 2x20\"     Hamstring 2x20\" 2x20\" 2x20\"             Cool down 2 laps 2 laps 2 laps     Pain Rating 8 9 9       Assessment:  Assessment: BACK WITH B LE RADICULOPATHY  REQUIRES PT FOLLOW UP: Yes  Activity Tolerance: Patient Tolerated treatment well  Comments: Despite high pain level, patient able to complete aquatic program per log without complaints of increased symptoms and asks to \"be pushed. \"   Added UE exercises in chest deep water challenging core strength     Plan:  Plan: Continue with current plan  Comment: Add deep water exercises as indicated per log as tolerated    Therapy Time:  Time In: 1444  Time Out: [address] [facility]  Minutes: 46  Timed Code Treatment Minutes: 32 Minutes (2 Aqua)    Electronically signed by Anastacia Carmona PTA on 10/5/2017 at 4:55 PM

## 2017-10-09 ENCOUNTER — HOSPITAL ENCOUNTER (OUTPATIENT)
Dept: PHYSICAL THERAPY | Age: 57
Setting detail: THERAPIES SERIES
Discharge: HOME | End: 2017-10-09
Payer: MEDICARE

## 2017-10-09 PROCEDURE — 97113 AQUATIC THERAPY/EXERCISES: CPT

## 2017-10-09 ASSESSMENT — PAIN DESCRIPTION - PAIN TYPE: TYPE: CHRONIC PAIN

## 2017-10-09 ASSESSMENT — PAIN DESCRIPTION - LOCATION: LOCATION: BACK;LEG

## 2017-10-09 ASSESSMENT — PAIN SCALES - GENERAL: PAINLEVEL_OUTOF10: 8

## 2017-10-09 ASSESSMENT — PAIN DESCRIPTION - ORIENTATION: ORIENTATION: LOWER;LEFT;RIGHT

## 2017-10-09 NOTE — PROGRESS NOTES
Physical Therapy  [address]   Outpatient Physical Therapy  [address] [facility]  Phone: [phone]  Fax: [phone]  Daily Progress Note    Date: 10/9/17    Patient Name: Jocelyne Nogueira        MRN: [id number]  Account: [de-identified] : 1960      General Information:  Chart Reviewed: Yes  Response To Previous Treatment: Patient with no complaints from previous session. Family / Caregiver Present: No  Referral Date : 17  Diagnosis: lumbar disc disease  Follows Commands: Within Functional Limits  Onset Date: 16  PT Insurance Information: Medicare and Medicaid  Total # of Visits Approved: 12  Total # of Visits to Date: 5  No Show: 1  Canceled Appointment: 3    Subjective:  Subjective: Patient with continued complaints of increased pain and tightness across her lower back and in (B) LE's- (Left side more severe than right side)     Pain:  Patient Currently in Pain: Yes  Pain Assessment: 0-10  Pain Level: 8  Pain Type: Chronic pain  Pain Location: Back;Leg  Pain Orientation: Lower;Left;Right (Ride sided not as severe)    Objective:      [address] [facility] Exercise Log  Aquatic, Hip & DLS Program- Phase 1    Date of Eval:    2017                        Primary PT: BALDO  Diagnosis:  LBP  Things to Focus On (goals): POSTURE, WALKING TIME, CORE STRENGTH  Surgical Precautions:  Medical Precautions:  [] C-9 dates  [] Occ Med   [x] Medicare       Date 9/25/17 9/28/17 10/5/17 10/9/17    Visit # 2/12 3/12 4/12 5/12    Walk F/L/R 2 laps ea 2 laps ea 2 laps ea 2 laps ea    Marching 2 laps 2 laps 2 laps 2 laps    Squats 10x5\" 15x5\" 15x5\" 15x5\"    Step-Ups F/L  Low 15x Low 15x Low 15x    Heel-toe raises 10x Hurts ankles  DC -- -- --   SLR F/L/R 10x 15x 15x 15x    Knee/Flex/Ext 10x 15x 15x 15x    F/L Lunges        Kickboard Ex.  Small Small Small Small    Iso Abd. 10x5\" 10x5\" 10x5\" 10x5\"    Push-pull 10x 15x 15x 15x    Paddling 10x 15x 15x 15x            UE Ex:        Horiz Abd/Add   10x 10x    Alt.  Flex/Ext   10x 10x    Push downs   10x 10x    IR/ER (wipers)   10x 10x    Abd/Add   10x 10x            Deep water:    1 noodle    Cycling    2 minutes    Jacks        Cross-country                Stretches        Achllies 2x20\" 2x20\" 2x20\" 2x20\"    Hamstring 2x20\" 2x20\" 2x20\" 2x20\"            Cool down 2 laps 2 laps 2 laps 2 laps    Pain Rating 8 9 9 8      Assessment:  Assessment: BACK WITH WITH B LE RADICULOPATHY  REQUIRES PT FOLLOW UP: Yes  Activity Tolerance: Patient Tolerated treatment well  Comments: Added two minutes of deep water cycling    Plan:  Plan: Continue with current plan    Therapy Time:  Time In: 3753  Time Out: 1737  Minutes: 63  Timed Code Treatment Minutes: 20 Minutes    Treatment Charges: Minutes Units   []  Ultrasound     []  Electrical-Stim     []  Iontophoresis     []  Traction     []  Massage       []  Eval     []  Gait     []  Ther Exercise       []  Manual Therapy       []  Ther Activities       [x]  Aquatics 20 1   []  Neuro Re-Ed       []  Other       Total Treatment Time:        Electronically signed by Nury Monk PTA on 10/9/2017 at 6:00 PM

## 2017-10-10 ENCOUNTER — APPOINTMENT (OUTPATIENT)
Dept: PHYSICAL THERAPY | Age: 57
End: 2017-10-10
Payer: MEDICARE

## 2017-10-16 ENCOUNTER — HOSPITAL ENCOUNTER (OUTPATIENT)
Dept: PHYSICAL THERAPY | Age: 57
Setting detail: THERAPIES SERIES
Discharge: HOME | End: 2017-10-16
Payer: MEDICARE

## 2017-10-17 ENCOUNTER — APPOINTMENT (OUTPATIENT)
Dept: PHYSICAL THERAPY | Age: 57
End: 2017-10-17
Payer: MEDICARE

## 2017-10-19 ENCOUNTER — HOSPITAL ENCOUNTER (OUTPATIENT)
Dept: PHYSICAL THERAPY | Age: 57
Setting detail: THERAPIES SERIES
Discharge: HOME | End: 2017-10-19
Payer: MEDICARE

## 2017-10-19 PROCEDURE — 97113 AQUATIC THERAPY/EXERCISES: CPT

## 2017-10-19 ASSESSMENT — PAIN DESCRIPTION - PAIN TYPE: TYPE: CHRONIC PAIN

## 2017-10-19 ASSESSMENT — PAIN DESCRIPTION - ORIENTATION: ORIENTATION: LOWER;RIGHT;LEFT

## 2017-10-19 ASSESSMENT — PAIN SCALES - GENERAL: PAINLEVEL_OUTOF10: 9

## 2017-10-19 ASSESSMENT — PAIN DESCRIPTION - LOCATION: LOCATION: BACK;LEG

## 2017-10-19 NOTE — PROGRESS NOTES
Physical Therapy  [address]   Outpatient Physical Therapy  [address] [facility]  Phone: [phone]  Fax: [phone]  Daily Progress Note    Date: 10/19/17    Patient Name: Shane John        MRN: [id number]  Account: [de-identified] : 1960      General Information:  Chart Reviewed: Yes  Response To Previous Treatment: Patient with no complaints from previous session. Family / Caregiver Present: No  Referral Date : 17  Diagnosis: lumbar disc disease  Follows Commands: Within Functional Limits  Onset Date: 16  PT Insurance Information: Medicare and Medicaid  Total # of Visits Approved: 12  Total # of Visits to Date: 6  No Show: 1  Canceled Appointment: 4    Subjective:  Subjective: Reports that she has been recently able to feel a \"bulge\" in her lower back (first time feeling this sensation)- most prevalent when side lying and lying supine. Also with complaints of (B) LE's from the groin to the feet feeling numb and \"full, like there is no circulation. \"       Pain:  Patient Currently in Pain: Yes  Pain Assessment: 0-10  Pain Level: 9  Pain Type: Chronic pain  Pain Location: Back;Leg  Pain Orientation: Lower;Right;Left (Left > Right)       Objective:      [address] [facility] Exercise Log  Aquatic, Hip & DLS Program- Phase 1    Date of Eval:    2017                        Primary PT: BALDO  Diagnosis:  LBP  Things to Focus On (goals): POSTURE, WALKING TIME, CORE STRENGTH  Surgical Precautions:  Medical Precautions:  [] C-9 dates  [] Occ Med   [x] Medicare       Date 9/28/17 10/5/17 10/9/17 10/19/17    Visit # 3/12 4/12 5/12 6/12    Walk F/L/R 2 laps ea 2 laps ea 2 laps ea 2 laps ea    Marching 2 laps 2 laps 2 laps 2 laps    Squats 15x5\" 15x5\" 15x5\" 15x5\"    Step-Ups F/L Low 15x Low 15x Low 15x Low 15x Tall   Heel-toe raises Hurts ankles  DC -- -- --    SLR F/L/R 15x 15x 15x 15x    Knee/Flex/Ext 15x 15x 15x 15x    F/L Lunges Kickboard Ex. Small Small Small Small    Iso Abd. 10x5\" 10x5\" 10x5\" 10x5\"    Push-pull 15x 15x 15x 15x    Paddling 15x 15x 15x 15x            UE Ex:        Horiz Abd/Add  10x 10x 10x    Alt.  Flex/Ext  10x 10x 10x    Push downs  10x 10x 10x    IR/ER (wipers)  10x 10x 10x    Abd/Add  10x 10x 10x            Deep water:   1 noodle 1 Noodle    Cycling   2 minutes 2 minutes    Jacks    1 minute    Cross-country                Stretches        Achllies 2x20\" 2x20\" 2x20\" 2x20\"    Hamstring 2x20\" 2x20\" 2x20\" 2x20\"            Cool down 2 laps 2 laps 2 laps 2 laps     Pain Rating 9 9 8 9      Comment:  Comments: Patient to see a surgeon tomorrow RE: back pain    Assessment:  Treatment Diagnosis: LUMBAR DEGENERATIVE CHANGES RESULTING IN DECREASED FUNCTIONAL MOBILITY  Prognosis: Fair  REQUIRES PT FOLLOW UP: Yes  Activity Tolerance: Patient Tolerated treatment well  Comments: Added one minute of deep water Jacks    Plan:  Plan: Continue with current plan  Progress to using tall box with step-ups    Therapy Time:  Time In: 1447  Time Out: 1545  Minutes: 58  Timed Code Treatment Minutes: 25 Minutes    Treatment Charges: Minutes Units   []  Ultrasound     []  Electrical-Stim     []  Iontophoresis     []  Traction     []  Massage       []  Eval     []  Gait     []  Ther Exercise       []  Manual Therapy       []  Ther Activities       [x]  Aquatics 25 2   []  Neuro Re-Ed       []  Other       Total Treatment Time:        Electronically signed by Demarcus FriALIX cortes on 10/19/2017 at 4:59 PM

## 2017-10-23 ENCOUNTER — HOSPITAL ENCOUNTER (OUTPATIENT)
Dept: PHYSICAL THERAPY | Age: 57
Setting detail: THERAPIES SERIES
Discharge: HOME | End: 2017-10-23
Payer: MEDICARE

## 2017-10-24 ENCOUNTER — APPOINTMENT (OUTPATIENT)
Dept: PHYSICAL THERAPY | Age: 57
End: 2017-10-24
Payer: MEDICARE

## 2017-10-26 ENCOUNTER — HOSPITAL ENCOUNTER (OUTPATIENT)
Dept: PHYSICAL THERAPY | Age: 57
Setting detail: THERAPIES SERIES
Discharge: HOME | End: 2017-10-26
Payer: MEDICARE

## 2017-10-26 PROCEDURE — 97113 AQUATIC THERAPY/EXERCISES: CPT

## 2017-10-26 ASSESSMENT — PAIN DESCRIPTION - LOCATION: LOCATION: BACK

## 2017-10-26 ASSESSMENT — PAIN DESCRIPTION - PAIN TYPE: TYPE: CHRONIC PAIN

## 2017-10-26 ASSESSMENT — PAIN SCALES - GENERAL: PAINLEVEL_OUTOF10: 8

## 2017-10-26 NOTE — PROGRESS NOTES
Physical Therapy  [address]   Outpatient Physical Therapy  [address] [facility]  Phone: [phone]  Fax: [phone]  Daily Progress Note    Date: 10/26/17    Patient Name: Christopher Ramirez        MRN: [id number]  Account: [de-identified] : 1960      General Information:  Chart Reviewed: Yes  Response To Previous Treatment: Patient with no complaints from previous session. Family / Caregiver Present: No  Referral Date : 17  Diagnosis: lumbar disc disease  Follows Commands: Within Functional Limits  Onset Date: 16  PT Insurance Information: Medicare and Medicaid  Total # of Visits Approved: 12  Total # of Visits to Date: 7  No Show: 1  Canceled Appointment: 5    Subjective:  Subjective: Reports feeling pretty good today, \"It is not too bad. It is about an 8, not a 9 or 10 like it usually is. \"  Patient saw Juan Casas on 10/20/17 and reports that she is not going to have any more surgeries, is to continue with Physical Therapy and begin going to a Pain Clinic      Pain:  Patient Currently in Pain: Yes  Pain Assessment: 0-10  Pain Level: 8  Pain Type: Chronic pain  Pain Location: Back    Objective:    [address] [facility] Exercise Log  Aquatic, Hip & DLS Program- Phase 1    Date of Eval:    2017                        Primary PT: BALDO  Diagnosis:  LBP  Things to Focus On (goals): POSTURE, WALKING TIME, CORE STRENGTH  Surgical Precautions:  Medical Precautions:  [] C-9 dates  [] Occ Med   [x] Medicare       Date 9/28/17 10/5/17 10/9/17 10/19/17 10/26/17   Visit # 3/12 4/12 5/12 6/12 7/12   Walk F/L/R 2 laps ea 2 laps ea 2 laps ea 2 laps ea 2 laps ea   Marching 2 laps 2 laps 2 laps 2 laps 2 laps    Squats 15x5\" 15x5\" 15x5\" 15x5\" 15x5\"   Step-Ups F/L Low 15x Low 15x Low 15x Low 15x Low 15x   Heel-toe raises Hurts ankles  DC -- -- -- --   SLR F/L/R 15x 15x 15x 15x 15x   Knee/Flex/Ext 15x 15x 15x 15x 15x   F/L Lunges        Kickboard Ex.  Small Small Small Small Medium   Iso Abd. 10x5\" 10x5\" 10x5\" 10x5\" 10x5\"   Push-pull 15x 15x 15x 15x 15x   Paddling 15x 15x 15x 15x 15x           UE Ex:        Horiz Abd/Add  10x 10x 10x 10x   Alt. Flex/Ext  10x 10x 10x 10x   Push downs  10x 10x 10x 10x   IR/ER (wipers)  10x 10x 10x 10x   Abd/Add  10x 10x 10x 10x           Deep water:   1 noodle 1 Noodle 1 Noodle   Cycling   2 minutes 2 minutes 2 minutes   Jacks     1 minute   Cross-country    1 minute 1 minute           Stretches        Achllies 2x20\" 2x20\" 2x20\" 2x20\" 2x20\"   Hamstring 2x20\" 2x20\" 2x20\" 2x20\" 2x20\"           Cool down 2 laps 2 laps 2 laps 2 laps  2 laps   Pain Rating 9 9 8 9 8     Assessment:  Assessment: BACK WITH B LE RADICULOPATHY  Treatment Diagnosis: LUMBAR DEGENERATIVE CHANGES RESULTING IN DECREASED FUNCTIONAL MOBILITY  Prognosis: Fair  REQUIRES PT FOLLOW UP: Yes  Activity Tolerance: Patient Tolerated treatment well  Comments: Added one minute of deep water Cross country.   Upon arrival to [place], patient appears to be standing more erect using her Rollator walker compared to usual.    Plan:  Plan: Continue with current plan (Add lunges)  Comment: Patient scheduled for re-eval with PT next Wednesday, 11/1/17    Therapy Time:  Time In: 9618  Time Out: 3462  Minutes: 55  Timed Code Treatment Minutes: 26 Minutes    Treatment Charges: Minutes Units   []  Ultrasound     []  Electrical-Stim     []  Iontophoresis     []  Traction     []  Massage       []  Eval     []  Gait     []  Ther Exercise       []  Manual Therapy       []  Ther Activities       [x]  Aquatics 26 2   []  Neuro Re-Ed       []  Other       Total Treatment Time:        Electronically signed by Alex Philippe PTA on 10/26/2017 at 3:51 PM

## 2017-11-01 ENCOUNTER — HOSPITAL ENCOUNTER (OUTPATIENT)
Dept: PHYSICAL THERAPY | Age: 57
Setting detail: THERAPIES SERIES
Discharge: HOME | End: 2017-11-01
Payer: MEDICARE

## 2017-11-01 ENCOUNTER — HOSPITAL ENCOUNTER (OUTPATIENT)
Age: 57
Setting detail: SPECIMEN
Discharge: HOME | End: 2017-11-01
Payer: MEDICARE

## 2017-11-01 ENCOUNTER — OFFICE VISIT (OUTPATIENT)
Dept: INTERNAL MEDICINE | Age: 57
End: 2017-11-01
Payer: MEDICARE

## 2017-11-01 VITALS
SYSTOLIC BLOOD PRESSURE: 126 MMHG | DIASTOLIC BLOOD PRESSURE: 73 MMHG | HEART RATE: 68 BPM | HEIGHT: 70 IN | WEIGHT: 293 LBS | BODY MASS INDEX: 41.95 KG/M2

## 2017-11-01 DIAGNOSIS — M48.061 SPINAL STENOSIS OF LUMBAR REGION, UNSPECIFIED WHETHER NEUROGENIC CLAUDICATION PRESENT: ICD-10-CM

## 2017-11-01 DIAGNOSIS — E66.01 MORBID OBESITY WITH BMI OF 45.0-49.9, ADULT (HCC): ICD-10-CM

## 2017-11-01 DIAGNOSIS — I10 HYPERTENSION, ESSENTIAL, BENIGN: Primary | ICD-10-CM

## 2017-11-01 DIAGNOSIS — N30.00 ACUTE CYSTITIS WITHOUT HEMATURIA: ICD-10-CM

## 2017-11-01 PROCEDURE — G8484 FLU IMMUNIZE NO ADMIN: HCPCS | Performed by: INTERNAL MEDICINE

## 2017-11-01 PROCEDURE — 97113 AQUATIC THERAPY/EXERCISES: CPT

## 2017-11-01 PROCEDURE — 1036F TOBACCO NON-USER: CPT | Performed by: INTERNAL MEDICINE

## 2017-11-01 PROCEDURE — G8417 CALC BMI ABV UP PARAM F/U: HCPCS | Performed by: INTERNAL MEDICINE

## 2017-11-01 PROCEDURE — 99213 OFFICE O/P EST LOW 20 MIN: CPT | Performed by: INTERNAL MEDICINE

## 2017-11-01 PROCEDURE — 99212 OFFICE O/P EST SF 10 MIN: CPT

## 2017-11-01 PROCEDURE — 3014F SCREEN MAMMO DOC REV: CPT | Performed by: INTERNAL MEDICINE

## 2017-11-01 PROCEDURE — G8427 DOCREV CUR MEDS BY ELIG CLIN: HCPCS | Performed by: INTERNAL MEDICINE

## 2017-11-01 PROCEDURE — G8979 MOBILITY GOAL STATUS: HCPCS

## 2017-11-01 PROCEDURE — G8978 MOBILITY CURRENT STATUS: HCPCS

## 2017-11-01 PROCEDURE — 3017F COLORECTAL CA SCREEN DOC REV: CPT | Performed by: INTERNAL MEDICINE

## 2017-11-01 RX ORDER — AMLODIPINE BESYLATE 10 MG/1
10 TABLET ORAL DAILY
Qty: 30 TABLET | Refills: 11 | Status: SHIPPED | OUTPATIENT
Start: 2017-11-01 | End: 2018-04-03

## 2017-11-01 RX ORDER — AMOXICILLIN 250 MG
2 CAPSULE ORAL DAILY
Qty: 60 TABLET | Refills: 2 | Status: SHIPPED | OUTPATIENT
Start: 2017-11-01 | End: 2018-05-22 | Stop reason: SDUPTHER

## 2017-11-01 RX ORDER — HYDROXYZINE HYDROCHLORIDE 25 MG/1
25 TABLET, FILM COATED ORAL EVERY 8 HOURS PRN
Qty: 12 TABLET | Refills: 2 | Status: SHIPPED | OUTPATIENT
Start: 2017-11-01 | End: 2018-04-09

## 2017-11-01 RX ORDER — HYDROCODONE BITARTRATE AND ACETAMINOPHEN 5; 325 MG/1; MG/1
1 TABLET ORAL EVERY 6 HOURS PRN
Qty: 10 TABLET | Refills: 0 | Status: SHIPPED | OUTPATIENT
Start: 2017-11-01 | End: 2018-04-09

## 2017-11-01 RX ORDER — HYDROCHLOROTHIAZIDE 25 MG/1
25 TABLET ORAL DAILY
Qty: 30 TABLET | Refills: 5 | Status: SHIPPED | OUTPATIENT
Start: 2017-11-01 | End: 2018-04-09 | Stop reason: SDUPTHER

## 2017-11-01 RX ORDER — ASPIRIN 81 MG/1
81 TABLET ORAL DAILY
Qty: 30 TABLET | Refills: 11 | Status: SHIPPED | OUTPATIENT
Start: 2017-11-01 | End: 2018-10-23 | Stop reason: DRUGHIGH

## 2017-11-01 RX ORDER — IBUPROFEN 600 MG/1
600 TABLET ORAL EVERY 8 HOURS PRN
Qty: 120 TABLET | Refills: 3 | Status: SHIPPED | OUTPATIENT
Start: 2017-11-01 | End: 2018-05-02

## 2017-11-01 ASSESSMENT — ENCOUNTER SYMPTOMS
GASTROINTESTINAL NEGATIVE: 1
RESPIRATORY NEGATIVE: 1
BACK PAIN: 1
EYES NEGATIVE: 1
ALLERGIC/IMMUNOLOGIC NEGATIVE: 1

## 2017-11-01 ASSESSMENT — PAIN DESCRIPTION - ORIENTATION: ORIENTATION: LOWER;LEFT;RIGHT

## 2017-11-01 ASSESSMENT — PAIN SCALES - GENERAL: PAINLEVEL_OUTOF10: 9

## 2017-11-01 ASSESSMENT — PAIN DESCRIPTION - LOCATION: LOCATION: BACK

## 2017-11-01 ASSESSMENT — PAIN DESCRIPTION - PAIN TYPE: TYPE: CHRONIC PAIN

## 2017-11-01 NOTE — PROGRESS NOTES
Visit Information    Have you changed or started any medications since your last visit including any over-the-counter medicines, vitamins, or herbal medicines? yes - advil   Are you having any side effects from any of your medications? -  no  Have you stopped taking any of your medications? Is so, why? -  no    Have you seen any other physician or provider since your last visit? Yes - Records Obtained  Have you had any other diagnostic tests since your last visit? No  Have you been seen in the emergency room and/or had an admission to a hospital since we last saw you? No  Have you had your routine dental cleaning in the past 6 months? no    Have you activated your [employer] account? If not, what are your barriers?  No: pt declined     Patient Care Team:  María Fortune MD as PCP - Mirela Cui MD as PCP - S Attributed Provider  Juan M Ortega MD (Dermatology)  True Gtz (Neurosurgery)    Medical History Review  Past Medical, Family, and Social History reviewed and does contribute to the patient presenting condition    Health Maintenance   Topic Date Due    Flu vaccine (1) 09/01/2017    Cervical cancer screen  10/14/2017    Colon Cancer Screen FIT/FOBT  10/21/2017    DTaP/Tdap/Td vaccine (1 - Tdap) 02/14/2018 (Originally 2/11/1978)    Hepatitis C screen  02/14/2018 (Originally 1960)    HIV screen  02/14/2018 (Originally 2/11/1974)    Breast cancer screen  12/04/2017    Lipid screen  07/06/2021
Anemia           Plan:      Urine culture  Med list  Ret 3 mo

## 2017-11-01 NOTE — PROGRESS NOTES
Physical Therapy  [address]   Outpatient Physical Therapy  [address] [facility]. [address]  Phone: [phone]  Fax: [phone]  Daily Progress Note    Date: 17    Patient Name: Aneta Uribe        MRN: [id number]  Account: [de-identified] : 1960      General Information:  Chart Reviewed: Yes  Response To Previous Treatment: Patient with no complaints from previous session. Family / Caregiver Present: No  Referral Date : 17  Diagnosis: lumbar disc disease  Follows Commands: Within Functional Limits  Onset Date: 16  PT Insurance Information: Medicare and Medicaid  Total # of Visits Approved: 12  Total # of Visits to Date: 8  No Show: 1  Canceled Appointment: 5    Subjective:  Subjective: Patient with complaints of increased pain across her lower back with left sided symptoms more prevalent than right sided symptoms    Pain:  Patient Currently in Pain: Yes  Pain Assessment: 0-10  Pain Level: 9  Pain Type: Chronic pain  Pain Location: Back  Pain Orientation: Lower;Left;Right (Left > Right)     Objective:    [address] [facility] Exercise Log  Aquatic, Hip & DLS Program- Phase 1    Date of Eval:    2017                        Primary PT: BALDO  Diagnosis:  LBP  Things to Focus On (goals): POSTURE, WALKING TIME, CORE STRENGTH  Surgical Precautions:  Medical Precautions:  [] C-9 dates  [] Occ Med   [x] Medicare       Date 10/5/17 10/9/17 10/19/17 10/26/17 11/1/17   Visit # 12    Walk F/L/R 2 laps ea 2 laps ea 2 laps ea 2 laps ea 2 laps ea   Marching 2 laps 2 laps 2 laps 2 laps  2 laps   Squats 15x5\" 15x5\" 15x5\" 15x5\" 15x5\"   Step-Ups F/L Low 15x Low 15x Low 15x Low 15x Low 15x   Heel-toe raises -- -- -- -- --   SLR F/L/R 15x 15x 15x 15x 15x   Knee/Flex/Ext 15x 15x 15x 15x 15x   F/L Lunges        Kickboard Ex.  Small Small Small Medium Large   Iso Abd. 10x5\" 10x5\" 10x5\" 10x5\" 10x5\"   Push-pull 15x 15x 15x 15x 15x   Paddling 15x 15x

## 2017-11-02 LAB
CULTURE: NORMAL
CULTURE: NORMAL
Lab: NORMAL
SPECIMEN DESCRIPTION: NORMAL
STATUS: NORMAL

## 2017-11-06 ENCOUNTER — APPOINTMENT (OUTPATIENT)
Dept: PHYSICAL THERAPY | Age: 57
End: 2017-11-06
Payer: MEDICARE

## 2017-11-06 ENCOUNTER — HOSPITAL ENCOUNTER (OUTPATIENT)
Dept: PHYSICAL THERAPY | Age: 57
Setting detail: THERAPIES SERIES
Discharge: HOME | End: 2017-11-06
Payer: MEDICARE

## 2017-11-06 PROCEDURE — 97113 AQUATIC THERAPY/EXERCISES: CPT

## 2017-11-06 ASSESSMENT — PAIN DESCRIPTION - ORIENTATION: ORIENTATION: LOWER;LEFT;RIGHT

## 2017-11-06 ASSESSMENT — PAIN SCALES - GENERAL: PAINLEVEL_OUTOF10: 9

## 2017-11-06 ASSESSMENT — PAIN DESCRIPTION - LOCATION: LOCATION: BACK

## 2017-11-06 ASSESSMENT — PAIN DESCRIPTION - PAIN TYPE: TYPE: CHRONIC PAIN

## 2017-11-09 ENCOUNTER — APPOINTMENT (OUTPATIENT)
Dept: PHYSICAL THERAPY | Age: 57
End: 2017-11-09
Payer: MEDICARE

## 2017-11-09 ENCOUNTER — HOSPITAL ENCOUNTER (OUTPATIENT)
Dept: PHYSICAL THERAPY | Age: 57
Setting detail: THERAPIES SERIES
Discharge: HOME | End: 2017-11-09
Payer: MEDICARE

## 2017-11-13 ENCOUNTER — HOSPITAL ENCOUNTER (OUTPATIENT)
Dept: PHYSICAL THERAPY | Age: 57
Setting detail: THERAPIES SERIES
Discharge: HOME | End: 2017-11-13
Payer: MEDICARE

## 2017-11-13 ENCOUNTER — APPOINTMENT (OUTPATIENT)
Dept: PHYSICAL THERAPY | Age: 57
End: 2017-11-13
Payer: MEDICARE

## 2017-11-13 PROCEDURE — 97113 AQUATIC THERAPY/EXERCISES: CPT

## 2017-11-13 ASSESSMENT — PAIN SCALES - GENERAL: PAINLEVEL_OUTOF10: 8

## 2017-11-13 ASSESSMENT — PAIN DESCRIPTION - PAIN TYPE: TYPE: CHRONIC PAIN

## 2017-11-13 ASSESSMENT — PAIN DESCRIPTION - LOCATION: LOCATION: BACK

## 2017-11-13 ASSESSMENT — PAIN DESCRIPTION - ORIENTATION: ORIENTATION: LOWER;LEFT;RIGHT

## 2017-11-13 NOTE — PROGRESS NOTES
15x 15x 15x 10x   Paddling 15x 15x 15x 15x 15x           UE Ex:        Horiz Abd/Add 10x 10x 10x 10x 10x   Alt.  Flex/Ext 10x 10x 10x 10x 10x   Push downs 10x 10x 10x 10x 10x   IR/ER (wipers) 10x 10x 10x 10x    Abd/Add 10x 10x 10x 10x 10x           Deep water: 1 Noodle 1 Noodle 1 Noodle 1 Noodle 1 Noodle   Cycling 2 minutes 2 minutes 2 minutes 3 minutes 3 minutes   Jacks  1 minute 1 minute 3 minutes 3 minutes   Cross-country 1 minute 1 minute 1 minute 3 minutes 3 minutes           Stretches        Achllies 2x20\" 2x20\" 2x20\" 2x20\" 2x20\"   Hamstring 2x20\" 2x20\" 2x20\" 2x20\" 2x20\"           Cool down 2 laps  2 laps 2 laps 2 laps 2 laps   Pain Rating 9 8 9 9 8     Assessment:  Assessment: BACK WITH B LE RADICULOPATHY  Treatment Diagnosis: LUMBAR DEGENERATIVE CHANGES RESULTING IN DECREASED FUNCTIONAL MOBILITY  Prognosis: Fair  REQUIRES PT FOLLOW UP: Yes  Activity Tolerance: Patient Tolerated treatment well  Comments: Tall box used for forward and lateral step-ups    Plan:  Plan: Continue with current plan  Comment: PT re-eval next visit    Therapy Time:  Time In: 9417  Time Out: 1540  Minutes: 65  Timed Code Treatment Minutes: 24 Minutes    Treatment Charges: Minutes Units   []  Ultrasound     []  Electrical-Stim     []  Iontophoresis     []  Traction     []  Massage       []  Eval     []  Gait     []  Ther Exercise       []  Manual Therapy       []  Ther Activities       [x]  Aquatics 24 2   []  Neuro Re-Ed       []  Other       Total Treatment Time: 24 2     Electronically signed by Ingris Enirquez PTA on 11/13/2017 at 6:39 PM

## 2017-11-16 ENCOUNTER — HOSPITAL ENCOUNTER (OUTPATIENT)
Dept: PHYSICAL THERAPY | Age: 57
Setting detail: THERAPIES SERIES
End: 2017-11-16
Payer: MEDICARE

## 2017-11-16 ENCOUNTER — HOSPITAL ENCOUNTER (OUTPATIENT)
Dept: PHYSICAL THERAPY | Age: 57
Setting detail: THERAPIES SERIES
Discharge: HOME | End: 2017-11-16
Payer: MEDICARE

## 2017-11-16 ENCOUNTER — APPOINTMENT (OUTPATIENT)
Dept: PHYSICAL THERAPY | Age: 57
End: 2017-11-16
Payer: MEDICARE

## 2017-11-16 PROCEDURE — 97113 AQUATIC THERAPY/EXERCISES: CPT

## 2017-11-16 ASSESSMENT — PAIN DESCRIPTION - ORIENTATION: ORIENTATION: LOWER

## 2017-11-16 ASSESSMENT — PAIN DESCRIPTION - LOCATION: LOCATION: BACK

## 2017-11-16 ASSESSMENT — PAIN SCALES - GENERAL: PAINLEVEL_OUTOF10: 8

## 2017-11-16 ASSESSMENT — PAIN DESCRIPTION - PAIN TYPE: TYPE: CHRONIC PAIN

## 2017-11-16 NOTE — PROGRESS NOTES
3'              Stretches        Achllies 2x20\" 2x20\"      Hamstring 2x20\" 2x20\"              Cool down 2 laps 2 Laps      Pain Rating 8 8        Assessment: Body structures, Functions, Activity limitations: Decreased functional mobility ; Decreased ROM; Decreased balance  Assessment: BACK WITH B LE RADICULOPATHY  Treatment Diagnosis: LUMBAR DEGENERATIVE CHANGES RESULTING IN DECREASED FUNCTIONAL MOBILITY  Prognosis: Fair  REQUIRES PT FOLLOW UP: Yes  Activity Tolerance: Patient Tolerated treatment well  Comments: Increased reps with exercise per log; emphasis on posture and core stability along with technique of all exercise    Plan:  Plan: Continue with current plan  Comment: Add lunges    Therapy Time:  Time In: 1430  Time Out: 1523  Minutes: 53  Timed Code Treatment Minutes: 40 Minutes    Treatment Charges: Minutes Units   []  Ultrasound     []  Electrical-Stim     []  Iontophoresis     []  Traction     []  Massage       []  Eval     []  Gait     []  Ther Exercise       []  Manual Therapy       []  Ther Activities       [x]  Aquatics 40 3   []  Neuro Re-Ed       []  Other       Total Treatment Time: 40 3       Gautam Machuca, ALIX

## 2017-11-20 ENCOUNTER — HOSPITAL ENCOUNTER (OUTPATIENT)
Dept: PHYSICAL THERAPY | Age: 57
Setting detail: THERAPIES SERIES
Discharge: HOME | End: 2017-11-20
Payer: MEDICARE

## 2017-11-20 NOTE — PROGRESS NOTES
Physical Therapy  [address]   Outpatient Physical Therapy  Cancel/No Show Note    Date: 17    Patient Name: Christopher Oneil        MRN: [id number]  Account: [de-identified] : 1960      General Information:  Onset Date: 16  PT Insurance Information: Medicare and Medicaid  Total # of Visits Approved: 22  Total # of Visits to Date: 11  Plan of Care/Certification Expiration Date: 17  No Show: 1  Canceled Appointment: 7    Comments: Patient canceled aquatics session for today's date per the - no reason given    Electronically signed by Brian Najjar, PTA on 2017 at 3:20 PM

## 2017-11-27 ENCOUNTER — HOSPITAL ENCOUNTER (OUTPATIENT)
Dept: PHYSICAL THERAPY | Age: 57
Setting detail: THERAPIES SERIES
Discharge: HOME | End: 2017-11-27
Payer: MEDICARE

## 2017-11-27 PROCEDURE — 97113 AQUATIC THERAPY/EXERCISES: CPT

## 2017-11-27 ASSESSMENT — PAIN DESCRIPTION - LOCATION: LOCATION: BACK

## 2017-11-27 ASSESSMENT — PAIN DESCRIPTION - PAIN TYPE: TYPE: CHRONIC PAIN

## 2017-11-27 ASSESSMENT — PAIN DESCRIPTION - ORIENTATION: ORIENTATION: LOWER

## 2017-11-27 ASSESSMENT — PAIN SCALES - GENERAL: PAINLEVEL_OUTOF10: 10

## 2017-11-27 NOTE — PROGRESS NOTES
[address]   Outpatient Physical Therapy  [address] [facility]  Phone: [phone]  Fax: [phone]  Daily Progress Note    Date: 17    Patient Name: Logan Ge        MRN: [id number]  Account: [de-identified] : 1960      General Information:  Referral Date : 17  Diagnosis: lumbar disc disease  Onset Date: 16  PT Insurance Information: Medicare and Medicaid  Total # of Visits Approved: 22  Total # of Visits to Date: 12  Plan of Care/Certification Expiration Date: 17  No Show: 1  Canceled Appointment: 7  Progress Note Counter: 4/10 G-Codes; Re-eval done 17    Subjective:  Subjective: Very sore today- can tell she has not been to pool in awhile- Holiday last week. Entire L LE is painful. Also notes increased pain/soreness after last visit- feels tall box with step ups was too much     Pain:  Patient Currently in Pain: Yes  Pain Assessment: 0-10  Pain Level: 10  Pain Type: Chronic pain  Pain Location: Back  Pain Orientation: Lower  Pain Radiating Towards: Buttock- down B LE's to feet L>R       Objective:  [address] [facility] Exercise Log  Aquatic, Hip & DLS Program- Phase 1    Date of Eval:    2017                        Primary PT: BALDO  Diagnosis:  LBP  Things to Focus On (goals): POSTURE, WALKING TIME, CORE STRENGTH  Surgical Precautions:  Medical Precautions:  [] C-9 dates  [] Occ Med   [x] Medicare     Date 17     Visit # 10/12 11/22 12/22     Walk F/L/R 2 laps ea 2 Laps 2 Laps     Marching 2 laps 2 Laps 2 Laps     Squats 15x5\" 15x5\" 15x5\"     Step-Ups F/L Tall 10x Tall 15x Low 10x     SLR F/L/R 15x 15x 15x     Knee/Flex/Ext 15x 15x 15x     F/L Lunges    Add    Kickboard Ex. Large Lg Lg     Iso Abd. 10x5\" 10x5\" 15x5\"     Push-pull 10x 10x 15x     Paddling 15x 10x 15x             UE Ex:        Horiz Abd/Add 10x 15x 15x     Alt.  Flex/Ext 10x 15x 15x     Push downs 10x 15x 15x     IR/ER (myranda)

## 2017-11-30 ENCOUNTER — HOSPITAL ENCOUNTER (OUTPATIENT)
Dept: PHYSICAL THERAPY | Age: 57
Setting detail: THERAPIES SERIES
End: 2017-11-30
Payer: MEDICARE

## 2017-11-30 ENCOUNTER — HOSPITAL ENCOUNTER (OUTPATIENT)
Dept: PHYSICAL THERAPY | Age: 57
Setting detail: THERAPIES SERIES
Discharge: HOME | End: 2017-11-30
Payer: MEDICARE

## 2017-12-13 ENCOUNTER — HOSPITAL ENCOUNTER (OUTPATIENT)
Dept: PHYSICAL THERAPY | Age: 57
Setting detail: THERAPIES SERIES
Discharge: HOME | End: 2017-12-13
Payer: MEDICARE

## 2017-12-13 NOTE — PROGRESS NOTES
[address]   Outpatient Physical Therapy  Cancel/No Show Note    Date: 17    Patient Name: Logan Ge        MRN: [id number]  Account: [de-identified] : 1960      General Information:  Referral Date : (P) 17  Diagnosis: (P) lumbar disc disease  Onset Date: (P) 16  PT Insurance Information: (P) Medicare and Medicaid  Total # of Visits Approved: (P) 22  Plan of Care/Certification Expiration Date: (P) 17  No Show: (P) 1  Canceled Appointment: (P) 9 (Patient called to cancel no reason given)  Progress Note Counter: (P) 4/10 G-Codes;  Re-eval done 17      Anil Diaz, PT

## 2017-12-27 ENCOUNTER — HOSPITAL ENCOUNTER (OUTPATIENT)
Dept: PHYSICAL THERAPY | Age: 57
Setting detail: THERAPIES SERIES
Discharge: HOME | End: 2017-12-27
Payer: MEDICARE

## 2018-04-03 ENCOUNTER — HOSPITAL ENCOUNTER (OUTPATIENT)
Dept: PAIN MANAGEMENT | Age: 58
Discharge: HOME | End: 2018-04-03
Payer: MEDICARE

## 2018-04-03 VITALS
SYSTOLIC BLOOD PRESSURE: 144 MMHG | RESPIRATION RATE: 14 BRPM | BODY MASS INDEX: 41.95 KG/M2 | DIASTOLIC BLOOD PRESSURE: 100 MMHG | HEIGHT: 70 IN | HEART RATE: 85 BPM | TEMPERATURE: 98.6 F | WEIGHT: 293 LBS

## 2018-04-03 DIAGNOSIS — M96.1 FAILED BACK SYNDROME: Primary | Chronic | ICD-10-CM

## 2018-04-03 DIAGNOSIS — M48.061 SPINAL STENOSIS AT L4-L5 LEVEL: ICD-10-CM

## 2018-04-03 DIAGNOSIS — E66.01 MORBID OBESITY WITH BMI OF 50.0-59.9, ADULT (HCC): Chronic | ICD-10-CM

## 2018-04-03 PROCEDURE — 99203 OFFICE O/P NEW LOW 30 MIN: CPT

## 2018-04-03 PROCEDURE — 80307 DRUG TEST PRSMV CHEM ANLYZR: CPT

## 2018-04-03 PROCEDURE — 99214 OFFICE O/P EST MOD 30 MIN: CPT | Performed by: ANESTHESIOLOGY

## 2018-04-03 RX ORDER — OXYCODONE HYDROCHLORIDE AND ACETAMINOPHEN 5; 325 MG/1; MG/1
1 TABLET ORAL DAILY PRN
Qty: 30 TABLET | Refills: 0 | Status: SHIPPED | OUTPATIENT
Start: 2018-04-03 | End: 2018-05-02 | Stop reason: SDUPTHER

## 2018-04-03 ASSESSMENT — ENCOUNTER SYMPTOMS
HEARTBURN: 0
SHORTNESS OF BREATH: 0
NAUSEA: 0
DOUBLE VISION: 0
BLURRED VISION: 0
COUGH: 0

## 2018-04-03 ASSESSMENT — PAIN SCALES - GENERAL: PAINLEVEL_OUTOF10: 9

## 2018-04-03 ASSESSMENT — PAIN DESCRIPTION - LOCATION: LOCATION: LEG

## 2018-04-03 ASSESSMENT — PAIN DESCRIPTION - PAIN TYPE: TYPE: CHRONIC PAIN

## 2018-04-03 ASSESSMENT — PAIN DESCRIPTION - ORIENTATION: ORIENTATION: LEFT;RIGHT

## 2018-04-07 LAB
6-ACETYLMORPHINE, UR: NOT DETECTED
7-AMINOCLONAZEPAM, URINE: NOT DETECTED
ALPHA-OH-ALPRAZ, URINE: NOT DETECTED
ALPRAZOLAM, URINE: NOT DETECTED
AMPHETAMINES, URINE: NOT DETECTED
BARBITURATES, URINE: NOT DETECTED
BENZOYLECGONINE, UR: NOT DETECTED
BUPRENORPHINE URINE: NOT DETECTED
CARISOPRODOL, UR: NOT DETECTED
CLONAZEPAM, URINE: NOT DETECTED
CODEINE, URINE: NOT DETECTED
CREATININE URINE: 130.5 MG/DL (ref 20–400)
DIAZEPAM, URINE: NOT DETECTED
DRUGS EXPECTED, UR: NORMAL
EER HI RES INTERP UR: NORMAL
ETHYL GLUCURONIDE UR: NOT DETECTED
FENTANYL URINE: NOT DETECTED
HYDROCODONE, URINE: NOT DETECTED
HYDROMORPHONE, URINE: NOT DETECTED
LORAZEPAM, URINE: NOT DETECTED
MARIJUANA METAB, UR: NOT DETECTED
MDA, UR: NOT DETECTED
MDEA, EVE, UR: NOT DETECTED
MDMA URINE: NOT DETECTED
MEPERIDINE METAB, UR: NOT DETECTED
METHADONE, URINE: NOT DETECTED
METHAMPHETAMINE, URINE: NOT DETECTED
METHYLPHENIDATE: NOT DETECTED
MIDAZOLAM, URINE: NOT DETECTED
MORPHINE URINE: NOT DETECTED
NORBUPRENORPHINE, URINE: NOT DETECTED
NORDIAZEPAM, URINE: NOT DETECTED
NORFENTANYL, URINE: NOT DETECTED
NORHYDROCODONE, URINE: NOT DETECTED
NOROXYCODONE, URINE: NOT DETECTED
NOROXYMORPHONE, URINE: NOT DETECTED
OXAZEPAM, URINE: NOT DETECTED
OXYCODONE URINE: NOT DETECTED
OXYMORPHONE, URINE: NOT DETECTED
PAIN MANAGEMENT DRUG PANEL INTERP, URINE: NORMAL
PAIN MGT DRUG PANEL, HI RES, UR: NORMAL
PCP,URINE: NOT DETECTED
PHENTERMINE, UR: NOT DETECTED
PROPOXYPHENE, URINE: NOT DETECTED
TAPENTADOL, URINE: NOT DETECTED
TAPENTADOL-O-SULFATE, URINE: NOT DETECTED
TEMAZEPAM, URINE: NOT DETECTED
TRAMADOL, URINE: NOT DETECTED
ZOLPIDEM, URINE: NOT DETECTED

## 2018-04-09 ENCOUNTER — OFFICE VISIT (OUTPATIENT)
Dept: INTERNAL MEDICINE | Age: 58
End: 2018-04-09
Payer: MEDICARE

## 2018-04-09 VITALS
HEIGHT: 70 IN | SYSTOLIC BLOOD PRESSURE: 139 MMHG | BODY MASS INDEX: 41.95 KG/M2 | WEIGHT: 293 LBS | DIASTOLIC BLOOD PRESSURE: 83 MMHG | HEART RATE: 79 BPM

## 2018-04-09 DIAGNOSIS — Z11.59 NEED FOR HEPATITIS C SCREENING TEST: ICD-10-CM

## 2018-04-09 DIAGNOSIS — E66.01 MORBID OBESITY DUE TO EXCESS CALORIES (HCC): Chronic | ICD-10-CM

## 2018-04-09 DIAGNOSIS — Z12.31 ENCOUNTER FOR SCREENING MAMMOGRAM FOR BREAST CANCER: ICD-10-CM

## 2018-04-09 DIAGNOSIS — I10 HYPERTENSION, ESSENTIAL, BENIGN: Primary | ICD-10-CM

## 2018-04-09 DIAGNOSIS — E66.01 MORBID OBESITY WITH BMI OF 45.0-49.9, ADULT (HCC): ICD-10-CM

## 2018-04-09 DIAGNOSIS — Z11.4 SCREENING FOR HIV (HUMAN IMMUNODEFICIENCY VIRUS): ICD-10-CM

## 2018-04-09 DIAGNOSIS — M51.16 LUMBAR DISC HERNIATION WITH RADICULOPATHY: ICD-10-CM

## 2018-04-09 DIAGNOSIS — Z23 NEED FOR PROPHYLACTIC VACCINATION AGAINST DIPHTHERIA-TETANUS-PERTUSSIS (DTP): ICD-10-CM

## 2018-04-09 PROBLEM — M96.1 FAILED BACK SYNDROME: Chronic | Status: RESOLVED | Noted: 2018-04-03 | Resolved: 2018-04-09

## 2018-04-09 PROCEDURE — G8427 DOCREV CUR MEDS BY ELIG CLIN: HCPCS | Performed by: INTERNAL MEDICINE

## 2018-04-09 PROCEDURE — 99214 OFFICE O/P EST MOD 30 MIN: CPT | Performed by: INTERNAL MEDICINE

## 2018-04-09 PROCEDURE — 3017F COLORECTAL CA SCREEN DOC REV: CPT | Performed by: INTERNAL MEDICINE

## 2018-04-09 PROCEDURE — G8417 CALC BMI ABV UP PARAM F/U: HCPCS | Performed by: INTERNAL MEDICINE

## 2018-04-09 PROCEDURE — 3014F SCREEN MAMMO DOC REV: CPT | Performed by: INTERNAL MEDICINE

## 2018-04-09 PROCEDURE — 1036F TOBACCO NON-USER: CPT | Performed by: INTERNAL MEDICINE

## 2018-04-09 RX ORDER — HYDROCHLOROTHIAZIDE 25 MG/1
25 TABLET ORAL DAILY
Qty: 30 TABLET | Refills: 5 | Status: SHIPPED | OUTPATIENT
Start: 2018-04-09 | End: 2018-10-23 | Stop reason: SDUPTHER

## 2018-04-09 ASSESSMENT — PATIENT HEALTH QUESTIONNAIRE - PHQ9
SUM OF ALL RESPONSES TO PHQ QUESTIONS 1-9: 0
2. FEELING DOWN, DEPRESSED OR HOPELESS: 0
1. LITTLE INTEREST OR PLEASURE IN DOING THINGS: 0
SUM OF ALL RESPONSES TO PHQ9 QUESTIONS 1 & 2: 0

## 2018-04-09 ASSESSMENT — ENCOUNTER SYMPTOMS
BLURRED VISION: 0
HEARTBURN: 0
HEMOPTYSIS: 0
DOUBLE VISION: 0
ABDOMINAL PAIN: 0
NAUSEA: 0
COUGH: 0

## 2018-04-10 ENCOUNTER — HOSPITAL ENCOUNTER (OUTPATIENT)
Dept: MRI IMAGING | Age: 58
Discharge: HOME | End: 2018-04-12
Payer: MEDICARE

## 2018-04-10 DIAGNOSIS — M96.1 FAILED BACK SYNDROME: Chronic | ICD-10-CM

## 2018-04-10 LAB
GFR NON-AFRICAN AMERICAN: 58 ML/MIN
GFR SERPL CREATININE-BSD FRML MDRD: >60 ML/MIN
GFR SERPL CREATININE-BSD FRML MDRD: ABNORMAL ML/MIN/{1.73_M2}
POC CREATININE: 0.98 MG/DL (ref 0.51–1.19)

## 2018-04-10 PROCEDURE — 72158 MRI LUMBAR SPINE W/O & W/DYE: CPT

## 2018-04-10 PROCEDURE — 82565 ASSAY OF CREATININE: CPT

## 2018-04-10 PROCEDURE — A9579 GAD-BASE MR CONTRAST NOS,1ML: HCPCS | Performed by: ANESTHESIOLOGY

## 2018-04-10 PROCEDURE — 6360000004 HC RX CONTRAST MEDICATION: Performed by: ANESTHESIOLOGY

## 2018-04-10 RX ORDER — SODIUM CHLORIDE 0.9 % (FLUSH) 0.9 %
10 SYRINGE (ML) INJECTION PRN
Status: DISCONTINUED | OUTPATIENT
Start: 2018-04-10 | End: 2018-04-13 | Stop reason: HOSPADM

## 2018-04-10 RX ADMIN — GADOTERIDOL 20 ML: 279.3 INJECTION, SOLUTION INTRAVENOUS at 09:15

## 2018-04-26 ENCOUNTER — HOSPITAL ENCOUNTER (OUTPATIENT)
Dept: GENERAL RADIOLOGY | Age: 58
Discharge: HOME | End: 2018-04-28
Payer: MEDICARE

## 2018-04-26 ENCOUNTER — HOSPITAL ENCOUNTER (OUTPATIENT)
Age: 58
Discharge: HOME | End: 2018-04-28
Payer: MEDICARE

## 2018-04-26 ENCOUNTER — HOSPITAL ENCOUNTER (OUTPATIENT)
Age: 58
Discharge: HOME | End: 2018-04-26
Payer: MEDICARE

## 2018-04-26 DIAGNOSIS — M96.1 FAILED BACK SYNDROME: Chronic | ICD-10-CM

## 2018-04-26 DIAGNOSIS — Z11.59 NEED FOR HEPATITIS C SCREENING TEST: ICD-10-CM

## 2018-04-26 DIAGNOSIS — Z11.4 SCREENING FOR HIV (HUMAN IMMUNODEFICIENCY VIRUS): ICD-10-CM

## 2018-04-26 DIAGNOSIS — I10 HYPERTENSION, ESSENTIAL, BENIGN: ICD-10-CM

## 2018-04-26 LAB
ANION GAP SERPL CALCULATED.3IONS-SCNC: 13 MMOL/L (ref 9–17)
BUN BLDV-MCNC: 16 MG/DL (ref 6–20)
BUN/CREAT BLD: NORMAL (ref 9–20)
CALCIUM SERPL-MCNC: 9.4 MG/DL (ref 8.6–10.4)
CHLORIDE BLD-SCNC: 104 MMOL/L (ref 98–107)
CO2: 25 MMOL/L (ref 20–31)
CREAT SERPL-MCNC: 0.57 MG/DL (ref 0.5–0.9)
GFR AFRICAN AMERICAN: >60 ML/MIN
GFR NON-AFRICAN AMERICAN: >60 ML/MIN
GFR SERPL CREATININE-BSD FRML MDRD: NORMAL ML/MIN/{1.73_M2}
GFR SERPL CREATININE-BSD FRML MDRD: NORMAL ML/MIN/{1.73_M2}
GLUCOSE BLD-MCNC: 82 MG/DL (ref 70–99)
HIV AG/AB: NONREACTIVE
POTASSIUM SERPL-SCNC: 3.8 MMOL/L (ref 3.7–5.3)
SODIUM BLD-SCNC: 142 MMOL/L (ref 135–144)

## 2018-04-26 PROCEDURE — 80048 BASIC METABOLIC PNL TOTAL CA: CPT

## 2018-04-26 PROCEDURE — 72100 X-RAY EXAM L-S SPINE 2/3 VWS: CPT

## 2018-04-26 PROCEDURE — 87902 NFCT AGT GNTYP ALYS HEP C: CPT

## 2018-04-26 PROCEDURE — 87389 HIV-1 AG W/HIV-1&-2 AB AG IA: CPT

## 2018-05-01 LAB
HCV QUANTITATIVE: NORMAL
HEPATITIS C GENOTYPE: NORMAL

## 2018-05-02 ENCOUNTER — HOSPITAL ENCOUNTER (OUTPATIENT)
Dept: PAIN MANAGEMENT | Age: 58
Discharge: HOME | End: 2018-05-02
Payer: MEDICARE

## 2018-05-02 VITALS
TEMPERATURE: 97.9 F | HEART RATE: 78 BPM | SYSTOLIC BLOOD PRESSURE: 133 MMHG | DIASTOLIC BLOOD PRESSURE: 77 MMHG | RESPIRATION RATE: 14 BRPM

## 2018-05-02 DIAGNOSIS — Z51.81 MEDICATION MONITORING ENCOUNTER: Chronic | ICD-10-CM

## 2018-05-02 DIAGNOSIS — M96.1 FAILED BACK SYNDROME: Chronic | ICD-10-CM

## 2018-05-02 DIAGNOSIS — M48.061 SPINAL STENOSIS AT L4-L5 LEVEL: Primary | ICD-10-CM

## 2018-05-02 PROCEDURE — 99213 OFFICE O/P EST LOW 20 MIN: CPT | Performed by: NURSE PRACTITIONER

## 2018-05-02 PROCEDURE — 99214 OFFICE O/P EST MOD 30 MIN: CPT

## 2018-05-02 RX ORDER — OXYCODONE HYDROCHLORIDE AND ACETAMINOPHEN 5; 325 MG/1; MG/1
1 TABLET ORAL DAILY PRN
Qty: 30 TABLET | Refills: 0 | Status: SHIPPED | OUTPATIENT
Start: 2018-05-03 | End: 2018-05-22 | Stop reason: DRUGHIGH

## 2018-05-02 ASSESSMENT — ENCOUNTER SYMPTOMS
BOWEL INCONTINENCE: 0
COUGH: 0
CONSTIPATION: 0
SHORTNESS OF BREATH: 0
BACK PAIN: 1

## 2018-05-07 ENCOUNTER — HOSPITAL ENCOUNTER (OUTPATIENT)
Dept: PHYSICAL THERAPY | Age: 58
Setting detail: THERAPIES SERIES
Discharge: HOME | End: 2018-05-07
Payer: MEDICARE

## 2018-05-07 PROCEDURE — G8978 MOBILITY CURRENT STATUS: HCPCS

## 2018-05-07 PROCEDURE — 97162 PT EVAL MOD COMPLEX 30 MIN: CPT

## 2018-05-07 PROCEDURE — G8979 MOBILITY GOAL STATUS: HCPCS

## 2018-05-07 PROCEDURE — 97110 THERAPEUTIC EXERCISES: CPT

## 2018-05-07 ASSESSMENT — PAIN DESCRIPTION - PAIN TYPE: TYPE: CHRONIC PAIN

## 2018-05-07 ASSESSMENT — PAIN DESCRIPTION - ONSET: ONSET: ON-GOING

## 2018-05-07 ASSESSMENT — PAIN DESCRIPTION - ORIENTATION: ORIENTATION: RIGHT;LEFT;LOWER

## 2018-05-07 ASSESSMENT — PAIN DESCRIPTION - LOCATION: LOCATION: BACK;BUTTOCKS;FOOT

## 2018-05-07 ASSESSMENT — PAIN DESCRIPTION - FREQUENCY: FREQUENCY: CONTINUOUS

## 2018-05-07 ASSESSMENT — PAIN DESCRIPTION - PROGRESSION: CLINICAL_PROGRESSION: GRADUALLY WORSENING

## 2018-05-07 ASSESSMENT — PAIN SCALES - GENERAL: PAINLEVEL_OUTOF10: 9

## 2018-05-15 ENCOUNTER — HOSPITAL ENCOUNTER (OUTPATIENT)
Dept: PHYSICAL THERAPY | Age: 58
Setting detail: THERAPIES SERIES
Discharge: HOME | End: 2018-05-15
Payer: MEDICARE

## 2018-05-15 PROCEDURE — 97113 AQUATIC THERAPY/EXERCISES: CPT

## 2018-05-15 ASSESSMENT — PAIN DESCRIPTION - PAIN TYPE: TYPE: CHRONIC PAIN

## 2018-05-15 ASSESSMENT — PAIN DESCRIPTION - LOCATION: LOCATION: BACK

## 2018-05-15 ASSESSMENT — PAIN DESCRIPTION - ORIENTATION: ORIENTATION: LOWER;LEFT

## 2018-05-15 ASSESSMENT — PAIN SCALES - GENERAL: PAINLEVEL_OUTOF10: 10

## 2018-05-17 ENCOUNTER — APPOINTMENT (OUTPATIENT)
Dept: PHYSICAL THERAPY | Age: 58
End: 2018-05-17
Payer: MEDICARE

## 2018-05-18 ENCOUNTER — HOSPITAL ENCOUNTER (OUTPATIENT)
Dept: PHYSICAL THERAPY | Age: 58
Setting detail: THERAPIES SERIES
Discharge: HOME | End: 2018-05-18
Payer: MEDICARE

## 2018-05-18 PROCEDURE — 97113 AQUATIC THERAPY/EXERCISES: CPT

## 2018-05-18 ASSESSMENT — PAIN DESCRIPTION - LOCATION: LOCATION: BACK

## 2018-05-18 ASSESSMENT — PAIN DESCRIPTION - PAIN TYPE: TYPE: CHRONIC PAIN

## 2018-05-18 ASSESSMENT — PAIN DESCRIPTION - FREQUENCY: FREQUENCY: CONTINUOUS

## 2018-05-18 ASSESSMENT — PAIN DESCRIPTION - DESCRIPTORS: DESCRIPTORS: CONSTANT;ACHING

## 2018-05-18 ASSESSMENT — PAIN DESCRIPTION - ORIENTATION: ORIENTATION: LOWER;LEFT

## 2018-05-18 ASSESSMENT — PAIN SCALES - GENERAL: PAINLEVEL_OUTOF10: 9

## 2018-05-21 ENCOUNTER — HOSPITAL ENCOUNTER (OUTPATIENT)
Dept: PHYSICAL THERAPY | Age: 58
Setting detail: THERAPIES SERIES
Discharge: HOME | End: 2018-05-21
Payer: MEDICARE

## 2018-05-21 PROCEDURE — 97113 AQUATIC THERAPY/EXERCISES: CPT

## 2018-05-21 ASSESSMENT — PAIN DESCRIPTION - LOCATION: LOCATION: BACK

## 2018-05-21 ASSESSMENT — PAIN DESCRIPTION - PAIN TYPE: TYPE: CHRONIC PAIN

## 2018-05-21 ASSESSMENT — PAIN DESCRIPTION - ORIENTATION: ORIENTATION: LOWER

## 2018-05-22 ENCOUNTER — HOSPITAL ENCOUNTER (OUTPATIENT)
Dept: PAIN MANAGEMENT | Age: 58
Discharge: HOME | End: 2018-05-22
Payer: MEDICARE

## 2018-05-22 VITALS
SYSTOLIC BLOOD PRESSURE: 136 MMHG | TEMPERATURE: 97.7 F | RESPIRATION RATE: 16 BRPM | HEIGHT: 70 IN | HEART RATE: 75 BPM | DIASTOLIC BLOOD PRESSURE: 92 MMHG | WEIGHT: 293 LBS | BODY MASS INDEX: 41.95 KG/M2

## 2018-05-22 DIAGNOSIS — M96.1 POSTLAMINECTOMY SYNDROME, LUMBAR REGION: Primary | ICD-10-CM

## 2018-05-22 DIAGNOSIS — M48.061 SPINAL STENOSIS OF LUMBAR REGION, UNSPECIFIED WHETHER NEUROGENIC CLAUDICATION PRESENT: ICD-10-CM

## 2018-05-22 PROCEDURE — 99213 OFFICE O/P EST LOW 20 MIN: CPT | Performed by: ANESTHESIOLOGY

## 2018-05-22 PROCEDURE — 99214 OFFICE O/P EST MOD 30 MIN: CPT

## 2018-05-22 PROCEDURE — 99213 OFFICE O/P EST LOW 20 MIN: CPT

## 2018-05-22 RX ORDER — OXYCODONE HYDROCHLORIDE AND ACETAMINOPHEN 5; 325 MG/1; MG/1
1 TABLET ORAL 2 TIMES DAILY PRN
COMMUNITY
End: 2018-05-22 | Stop reason: SDUPTHER

## 2018-05-22 RX ORDER — OXYCODONE HYDROCHLORIDE AND ACETAMINOPHEN 5; 325 MG/1; MG/1
1 TABLET ORAL 2 TIMES DAILY PRN
Qty: 60 TABLET | Refills: 0 | Status: SHIPPED | OUTPATIENT
Start: 2018-05-26 | End: 2018-06-20 | Stop reason: ALTCHOICE

## 2018-05-22 RX ORDER — AMOXICILLIN 250 MG
2 CAPSULE ORAL DAILY
Qty: 60 TABLET | Refills: 2 | Status: SHIPPED | OUTPATIENT
Start: 2018-05-22 | End: 2018-10-23 | Stop reason: ALTCHOICE

## 2018-05-22 ASSESSMENT — PAIN DESCRIPTION - PAIN TYPE: TYPE: CHRONIC PAIN

## 2018-05-22 ASSESSMENT — PAIN DESCRIPTION - FREQUENCY: FREQUENCY: CONTINUOUS

## 2018-05-22 ASSESSMENT — PAIN DESCRIPTION - LOCATION: LOCATION: BACK;LEG

## 2018-05-22 ASSESSMENT — PAIN DESCRIPTION - ONSET: ONSET: PROGRESSIVE

## 2018-05-22 ASSESSMENT — PAIN SCALES - GENERAL: PAINLEVEL_OUTOF10: 10

## 2018-05-22 ASSESSMENT — PAIN DESCRIPTION - ORIENTATION: ORIENTATION: RIGHT;LEFT

## 2018-05-22 ASSESSMENT — PAIN DESCRIPTION - PROGRESSION: CLINICAL_PROGRESSION: GRADUALLY WORSENING

## 2018-05-24 ENCOUNTER — HOSPITAL ENCOUNTER (OUTPATIENT)
Dept: PHYSICAL THERAPY | Age: 58
Setting detail: THERAPIES SERIES
Discharge: HOME | End: 2018-05-24
Payer: MEDICARE

## 2018-05-24 PROCEDURE — 97113 AQUATIC THERAPY/EXERCISES: CPT

## 2018-05-24 ASSESSMENT — PAIN DESCRIPTION - ORIENTATION: ORIENTATION: LOWER

## 2018-05-24 ASSESSMENT — PAIN DESCRIPTION - PAIN TYPE: TYPE: CHRONIC PAIN

## 2018-05-24 ASSESSMENT — PAIN SCALES - GENERAL: PAINLEVEL_OUTOF10: 9

## 2018-05-24 ASSESSMENT — PAIN DESCRIPTION - LOCATION: LOCATION: BACK

## 2018-05-29 ENCOUNTER — HOSPITAL ENCOUNTER (OUTPATIENT)
Dept: PHYSICAL THERAPY | Age: 58
Setting detail: THERAPIES SERIES
Discharge: HOME | End: 2018-05-29
Payer: MEDICARE

## 2018-05-29 PROCEDURE — G8978 MOBILITY CURRENT STATUS: HCPCS

## 2018-05-29 PROCEDURE — G8979 MOBILITY GOAL STATUS: HCPCS

## 2018-05-29 PROCEDURE — 97110 THERAPEUTIC EXERCISES: CPT

## 2018-05-29 ASSESSMENT — PAIN DESCRIPTION - DESCRIPTORS: DESCRIPTORS: CONSTANT;ACHING;TIGHTNESS

## 2018-05-29 ASSESSMENT — PAIN SCALES - GENERAL: PAINLEVEL_OUTOF10: 9

## 2018-05-29 ASSESSMENT — PAIN DESCRIPTION - ORIENTATION: ORIENTATION: LOWER

## 2018-05-29 ASSESSMENT — PAIN DESCRIPTION - LOCATION: LOCATION: BACK

## 2018-05-29 ASSESSMENT — PAIN DESCRIPTION - PROGRESSION: CLINICAL_PROGRESSION: GRADUALLY IMPROVING

## 2018-05-29 ASSESSMENT — PAIN DESCRIPTION - PAIN TYPE: TYPE: CHRONIC PAIN

## 2018-06-04 ENCOUNTER — APPOINTMENT (OUTPATIENT)
Dept: PHYSICAL THERAPY | Age: 58
End: 2018-06-04
Payer: MEDICARE

## 2018-06-04 ENCOUNTER — HOSPITAL ENCOUNTER (OUTPATIENT)
Dept: PHYSICAL THERAPY | Age: 58
Setting detail: THERAPIES SERIES
Discharge: HOME | End: 2018-06-04
Payer: MEDICARE

## 2018-06-04 PROCEDURE — 97113 AQUATIC THERAPY/EXERCISES: CPT

## 2018-06-04 ASSESSMENT — PAIN SCALES - GENERAL: PAINLEVEL_OUTOF10: 9

## 2018-06-04 ASSESSMENT — PAIN DESCRIPTION - PAIN TYPE: TYPE: CHRONIC PAIN

## 2018-06-04 ASSESSMENT — PAIN DESCRIPTION - LOCATION: LOCATION: BACK

## 2018-06-04 ASSESSMENT — PAIN DESCRIPTION - ORIENTATION: ORIENTATION: LOWER

## 2018-06-07 ENCOUNTER — APPOINTMENT (OUTPATIENT)
Dept: PHYSICAL THERAPY | Age: 58
End: 2018-06-07
Payer: MEDICARE

## 2018-06-07 ENCOUNTER — HOSPITAL ENCOUNTER (OUTPATIENT)
Dept: PHYSICAL THERAPY | Age: 58
Setting detail: THERAPIES SERIES
Discharge: HOME | End: 2018-06-07
Payer: MEDICARE

## 2018-06-11 ENCOUNTER — APPOINTMENT (OUTPATIENT)
Dept: PHYSICAL THERAPY | Age: 58
End: 2018-06-11
Payer: MEDICARE

## 2018-06-11 ENCOUNTER — HOSPITAL ENCOUNTER (OUTPATIENT)
Dept: PHYSICAL THERAPY | Age: 58
Setting detail: THERAPIES SERIES
Discharge: HOME | End: 2018-06-11
Payer: MEDICARE

## 2018-06-11 PROCEDURE — 97113 AQUATIC THERAPY/EXERCISES: CPT

## 2018-06-11 ASSESSMENT — PAIN DESCRIPTION - LOCATION: LOCATION: BACK

## 2018-06-11 ASSESSMENT — PAIN DESCRIPTION - PAIN TYPE: TYPE: CHRONIC PAIN

## 2018-06-11 ASSESSMENT — PAIN DESCRIPTION - ORIENTATION: ORIENTATION: LOWER

## 2018-06-11 ASSESSMENT — PAIN SCALES - GENERAL: PAINLEVEL_OUTOF10: 9

## 2018-06-12 ENCOUNTER — TELEPHONE (OUTPATIENT)
Dept: INTERNAL MEDICINE | Age: 58
End: 2018-06-12

## 2018-06-14 ENCOUNTER — HOSPITAL ENCOUNTER (OUTPATIENT)
Dept: PHYSICAL THERAPY | Age: 58
Setting detail: THERAPIES SERIES
Discharge: HOME | End: 2018-06-14
Payer: MEDICARE

## 2018-06-14 ENCOUNTER — APPOINTMENT (OUTPATIENT)
Dept: PHYSICAL THERAPY | Age: 58
End: 2018-06-14
Payer: MEDICARE

## 2018-06-18 ENCOUNTER — HOSPITAL ENCOUNTER (OUTPATIENT)
Dept: PHYSICAL THERAPY | Age: 58
Setting detail: THERAPIES SERIES
Discharge: HOME | End: 2018-06-18
Payer: MEDICARE

## 2018-06-18 ENCOUNTER — APPOINTMENT (OUTPATIENT)
Dept: PHYSICAL THERAPY | Age: 58
End: 2018-06-18
Payer: MEDICARE

## 2018-06-18 PROCEDURE — 97113 AQUATIC THERAPY/EXERCISES: CPT

## 2018-06-18 ASSESSMENT — PAIN DESCRIPTION - PAIN TYPE: TYPE: CHRONIC PAIN

## 2018-06-18 ASSESSMENT — PAIN DESCRIPTION - LOCATION: LOCATION: BACK

## 2018-06-18 ASSESSMENT — PAIN SCALES - GENERAL: PAINLEVEL_OUTOF10: 8

## 2018-06-18 ASSESSMENT — PAIN DESCRIPTION - ORIENTATION: ORIENTATION: LOWER

## 2018-06-20 ENCOUNTER — HOSPITAL ENCOUNTER (OUTPATIENT)
Dept: PAIN MANAGEMENT | Age: 58
Discharge: HOME | End: 2018-06-20
Payer: MEDICARE

## 2018-06-20 VITALS
SYSTOLIC BLOOD PRESSURE: 175 MMHG | HEART RATE: 80 BPM | OXYGEN SATURATION: 98 % | DIASTOLIC BLOOD PRESSURE: 77 MMHG | WEIGHT: 293 LBS | BODY MASS INDEX: 41.95 KG/M2 | TEMPERATURE: 96.8 F | HEIGHT: 70 IN | RESPIRATION RATE: 18 BRPM

## 2018-06-20 DIAGNOSIS — M48.061 SPINAL STENOSIS AT L4-L5 LEVEL: ICD-10-CM

## 2018-06-20 DIAGNOSIS — M48.061 SPINAL STENOSIS, LUMBAR REGION, WITHOUT NEUROGENIC CLAUDICATION: Primary | ICD-10-CM

## 2018-06-20 PROCEDURE — 99214 OFFICE O/P EST MOD 30 MIN: CPT | Performed by: ANESTHESIOLOGY

## 2018-06-20 PROCEDURE — 99214 OFFICE O/P EST MOD 30 MIN: CPT

## 2018-06-20 RX ORDER — TRAMADOL HYDROCHLORIDE 50 MG/1
50 TABLET ORAL EVERY 8 HOURS PRN
COMMUNITY
Start: 2018-06-20 | End: 2018-06-20 | Stop reason: SDUPTHER

## 2018-06-20 RX ORDER — TIZANIDINE 4 MG/1
4 TABLET ORAL NIGHTLY PRN
Qty: 30 TABLET | Refills: 0 | Status: SHIPPED | OUTPATIENT
Start: 2018-06-20 | End: 2018-07-25 | Stop reason: SDUPTHER

## 2018-06-20 RX ORDER — TRAMADOL HYDROCHLORIDE 50 MG/1
50 TABLET ORAL EVERY 8 HOURS PRN
Qty: 90 TABLET | Refills: 0 | Status: SHIPPED | OUTPATIENT
Start: 2018-06-20 | End: 2018-07-25 | Stop reason: SDUPTHER

## 2018-06-20 RX ORDER — GABAPENTIN 300 MG/1
300 CAPSULE ORAL 3 TIMES DAILY
Qty: 90 CAPSULE | Refills: 0 | Status: SHIPPED | OUTPATIENT
Start: 2018-06-20 | End: 2018-07-25

## 2018-06-20 RX ORDER — GABAPENTIN 300 MG/1
300 CAPSULE ORAL 3 TIMES DAILY
COMMUNITY
Start: 2018-06-20 | End: 2018-06-20 | Stop reason: SDUPTHER

## 2018-06-20 RX ORDER — TIZANIDINE 4 MG/1
4 TABLET ORAL NIGHTLY
COMMUNITY
End: 2018-06-20 | Stop reason: SDUPTHER

## 2018-06-20 ASSESSMENT — PAIN DESCRIPTION - ONSET: ONSET: ON-GOING

## 2018-06-20 ASSESSMENT — PAIN DESCRIPTION - ORIENTATION: ORIENTATION: RIGHT;LEFT

## 2018-06-20 ASSESSMENT — PAIN DESCRIPTION - FREQUENCY: FREQUENCY: CONTINUOUS

## 2018-06-20 ASSESSMENT — PAIN DESCRIPTION - LOCATION: LOCATION: BUTTOCKS;LEG;FOOT

## 2018-06-20 ASSESSMENT — PAIN DESCRIPTION - PROGRESSION: CLINICAL_PROGRESSION: GRADUALLY WORSENING

## 2018-06-20 ASSESSMENT — PAIN DESCRIPTION - PAIN TYPE: TYPE: CHRONIC PAIN

## 2018-06-20 ASSESSMENT — PAIN SCALES - GENERAL: PAINLEVEL_OUTOF10: 10

## 2018-06-21 ENCOUNTER — HOSPITAL ENCOUNTER (OUTPATIENT)
Dept: PHYSICAL THERAPY | Age: 58
Setting detail: THERAPIES SERIES
End: 2018-06-21
Payer: MEDICARE

## 2018-06-21 ENCOUNTER — HOSPITAL ENCOUNTER (OUTPATIENT)
Dept: PHYSICAL THERAPY | Age: 58
Setting detail: THERAPIES SERIES
Discharge: HOME | End: 2018-06-21
Payer: MEDICARE

## 2018-06-21 PROBLEM — Q66.50 CONGENITAL PES PLANUS: Status: RESOLVED | Noted: 2018-06-21 | Resolved: 2018-06-21

## 2018-06-21 PROBLEM — M25.562 CHRONIC PAIN OF LEFT KNEE: Status: RESOLVED | Noted: 2018-06-21 | Resolved: 2018-06-21

## 2018-06-21 PROBLEM — E66.01 MORBID OBESITY WITH BMI OF 50.0-59.9, ADULT (HCC): Status: ACTIVE | Noted: 2018-04-03

## 2018-06-21 PROBLEM — N28.1 ACQUIRED CYSTIC KIDNEY DISEASE: Status: ACTIVE | Noted: 2018-06-21

## 2018-06-21 PROBLEM — G89.29 CHRONIC PAIN OF LEFT KNEE: Status: RESOLVED | Noted: 2018-06-21 | Resolved: 2018-06-21

## 2018-06-21 PROBLEM — M25.562 CHRONIC PAIN OF LEFT KNEE: Status: ACTIVE | Noted: 2018-06-21

## 2018-06-21 PROBLEM — M96.1 FAILED BACK SYNDROME: Chronic | Status: RESOLVED | Noted: 2018-04-03 | Resolved: 2018-06-21

## 2018-06-21 PROBLEM — N28.1 ACQUIRED CYSTIC KIDNEY DISEASE: Status: RESOLVED | Noted: 2018-06-21 | Resolved: 2018-06-21

## 2018-06-21 PROBLEM — Q66.50 CONGENITAL PES PLANUS: Status: ACTIVE | Noted: 2018-06-21

## 2018-06-21 PROBLEM — G89.29 CHRONIC PAIN OF LEFT KNEE: Status: ACTIVE | Noted: 2018-06-21

## 2018-06-21 PROBLEM — J45.909 ASTHMA: Status: ACTIVE | Noted: 2017-01-04

## 2018-06-21 PROBLEM — J45.909 ASTHMA: Status: RESOLVED | Noted: 2017-01-04 | Resolved: 2018-06-21

## 2018-06-21 PROBLEM — M50.30 DEGENERATION OF CERVICAL INTERVERTEBRAL DISC: Status: ACTIVE | Noted: 2018-06-21

## 2018-06-21 PROBLEM — M50.30 DEGENERATION OF CERVICAL INTERVERTEBRAL DISC: Status: RESOLVED | Noted: 2018-06-21 | Resolved: 2018-06-21

## 2018-06-21 PROBLEM — Z51.81 MEDICATION MONITORING ENCOUNTER: Chronic | Status: RESOLVED | Noted: 2018-05-02 | Resolved: 2018-06-21

## 2018-06-21 ASSESSMENT — PAIN DESCRIPTION - PAIN TYPE: TYPE: CHRONIC PAIN

## 2018-06-21 ASSESSMENT — PAIN SCALES - GENERAL: PAINLEVEL_OUTOF10: 10

## 2018-06-21 ASSESSMENT — PAIN DESCRIPTION - LOCATION: LOCATION: BACK

## 2018-06-21 ASSESSMENT — PAIN DESCRIPTION - ORIENTATION: ORIENTATION: LOWER

## 2018-06-25 ENCOUNTER — APPOINTMENT (OUTPATIENT)
Dept: PHYSICAL THERAPY | Age: 58
End: 2018-06-25
Payer: MEDICARE

## 2018-06-25 ENCOUNTER — HOSPITAL ENCOUNTER (OUTPATIENT)
Dept: PHYSICAL THERAPY | Age: 58
Setting detail: THERAPIES SERIES
Discharge: HOME | End: 2018-06-25
Payer: MEDICARE

## 2018-06-25 PROCEDURE — 97113 AQUATIC THERAPY/EXERCISES: CPT

## 2018-06-25 ASSESSMENT — PAIN DESCRIPTION - PAIN TYPE: TYPE: CHRONIC PAIN

## 2018-06-25 ASSESSMENT — PAIN DESCRIPTION - LOCATION: LOCATION: LEG

## 2018-06-25 ASSESSMENT — PAIN DESCRIPTION - ORIENTATION: ORIENTATION: LEFT

## 2018-06-25 ASSESSMENT — PAIN SCALES - GENERAL: PAINLEVEL_OUTOF10: 9

## 2018-06-28 ENCOUNTER — HOSPITAL ENCOUNTER (OUTPATIENT)
Dept: PHYSICAL THERAPY | Age: 58
Setting detail: THERAPIES SERIES
Discharge: HOME | End: 2018-06-28
Payer: MEDICARE

## 2018-06-28 ENCOUNTER — APPOINTMENT (OUTPATIENT)
Dept: PHYSICAL THERAPY | Age: 58
End: 2018-06-28
Payer: MEDICARE

## 2018-06-28 PROCEDURE — G8979 MOBILITY GOAL STATUS: HCPCS

## 2018-06-28 PROCEDURE — G0283 ELEC STIM OTHER THAN WOUND: HCPCS

## 2018-06-28 PROCEDURE — 97110 THERAPEUTIC EXERCISES: CPT

## 2018-06-28 PROCEDURE — G8978 MOBILITY CURRENT STATUS: HCPCS

## 2018-06-28 ASSESSMENT — PAIN DESCRIPTION - PROGRESSION: CLINICAL_PROGRESSION: GRADUALLY IMPROVING

## 2018-06-28 ASSESSMENT — PAIN DESCRIPTION - PAIN TYPE: TYPE: CHRONIC PAIN

## 2018-06-28 ASSESSMENT — PAIN SCALES - GENERAL: PAINLEVEL_OUTOF10: 9

## 2018-06-28 ASSESSMENT — PAIN DESCRIPTION - ORIENTATION: ORIENTATION: LEFT

## 2018-06-28 ASSESSMENT — PAIN DESCRIPTION - LOCATION: LOCATION: BACK;LEG

## 2018-07-02 ENCOUNTER — HOSPITAL ENCOUNTER (OUTPATIENT)
Dept: PHYSICAL THERAPY | Age: 58
Setting detail: THERAPIES SERIES
Discharge: HOME | End: 2018-07-02
Payer: MEDICARE

## 2018-07-02 NOTE — PROGRESS NOTES
Physical Therapy  [address] [place] [address]   Outpatient Physical Therapy  Cancel/No Show Note    Date: 18    Patient Name: Jacqui Perera        MRN: [id number]  Account: [de-identified] : 1960      General Information:  Referring Practitioner: Dr. Lisa Holland  Referral Date : 18  Diagnosis: Degenerative disc disease Lumbar/ thoracic  Follows Commands: Within Functional Limits  Onset Date: 16  PT Insurance Information: Medicare  Total # of Visits Approved: 12  Total # of Visits to Date: 11  No Show: 0  Canceled Appointment: 2        Comments: Patient cancelled appointment today due to being too sore.     Francois Sheppard, PTA

## 2018-07-09 ENCOUNTER — HOSPITAL ENCOUNTER (OUTPATIENT)
Dept: PHYSICAL THERAPY | Age: 58
Setting detail: THERAPIES SERIES
Discharge: HOME | End: 2018-07-09
Payer: MEDICARE

## 2018-07-09 PROCEDURE — 97113 AQUATIC THERAPY/EXERCISES: CPT

## 2018-07-09 ASSESSMENT — PAIN SCALES - GENERAL: PAINLEVEL_OUTOF10: 9

## 2018-07-09 ASSESSMENT — PAIN DESCRIPTION - LOCATION: LOCATION: BACK;LEG

## 2018-07-09 ASSESSMENT — PAIN DESCRIPTION - PAIN TYPE: TYPE: CHRONIC PAIN

## 2018-07-09 ASSESSMENT — PAIN DESCRIPTION - ORIENTATION: ORIENTATION: LEFT

## 2018-07-09 NOTE — PROGRESS NOTES
Physical Therapy  [address] [facility]   Outpatient Physical Therapy  [address] [facility]. [address]  Phone: [phone]  Fax: [phone]  Daily Progress Note    Date: 18    Patient Name: Anna Pierce        MRN: [id number]  Account: [de-identified] : 1960      General Information:  Chart Reviewed: Yes  Response To Previous Treatment: Patient with no complaints from previous session. Referring Practitioner: Dr. Mohan Villanueva  Referral Date : 18  Diagnosis: Degenerative disc disease Lumbar/ thoracic  Follows Commands: Within Functional Limits  Onset Date: 16  PT Insurance Information: Medicare  Total # of Visits Approved: 12  Total # of Visits to Date: 12  No Show: 1  Canceled Appointment: 2    Subjective:  Subjective: No new symptoms reported this date. Continues to have pain across her lower back, with symptoms going down (B) LE's, worse on the left. Still with difficulty walking, standing, and bending. Pain:  Patient Currently in Pain: Yes  Pain Assessment: 0-10  Pain Level: 9  Pain Type: Chronic pain  Pain Location: Back;Leg  Pain Orientation: Left ((R) )       Objective:  [facility] Exercise Log  Aquatic, Hip & DLS Program- Phase 1     Date of Eval:   18                             Primary PT:GIOVANI  Diagnosis:DDD of lumbar/ thoracic  Things to Focus On (goals):  Improve gait/ mobility/ strength  Surgical Precautions:Multiple back procedures  Medical Precautions:  [] C-9 dates  [] Occ Med   [x] Medicare         Date 18   Visit #     Walk F/L/R  2 Laps 2 Laps 2 Laps 2 laps ea 2 laps ea   Marching  2 Laps 2 Laps 2 Laps 2 laps  2 laps   Squats 12x5\"  15x5\" 15x5\" 15x5\" 15x5\"   Step-Ups F/L Low 12x  Low 12x Low 15x Low 15x ea Low 15x ea   F Step Down  Low 10x Low 15x Low 15x Low 15x   Heel-toe raises  12x 15x 15x 15x 15x   SLR F/L/R  12x 15x 15x 15x ea 15x ea Knee/Flex/Ext  12x 15x 15x 15x 15x   F/L Lunges         Kickboard Ex.  Med Med Med Medium Medium   Iso Abd.  12x5\" 12x5\" 10x5\" 10x5\" 10x5\"   Push-pull  12x 12x 15x 15x 15x   Paddling  12x 12x 15x 15x 15x             UE Format     Count/Time Count/Time   Horiz Abd/Add 12x 15x 1' 1' (17) 1' (17)   IR/ER  12x 15x 1' 1' 1'   Alt Flex/Ext  12x 15x 1' 1' (17) 1' (17)   Alt Press Down  12x 15x 1' 1' 1'   Abd/Add  12x 15x 1' 1' (20) 1' (17)             Stretches         Achllies 2x20\"  2x20\" 2x20\" 2x20\" 2x20\"   Hamstring  2x20\" 2x20\" 2x20\" 2x20\" 2x20\"             Deep Water 1 Noodle  1 Noodle 1 Noodle 1 Noodle 1 Noodle   Hang         Cycling  3' 3' 3' 3 minutes 3 minutes   Mayra Rom  1' 1' 1' 1 minute 1 minute   X-Country  1' 1' 1' 1 minute 1 minute             Breast Stroke  2 Laps 2 Laps 2 Laps 2 laps 2 laps   Pain Rating  9 9 8 9 (L) & 6 (R) 9 (L) & 6 (R)       Comment:  Comments: Patient brought in new script (to ) for 2-3 visits a week for 4-6 weeks    Assessment: Body structures, Functions, Activity limitations: Decreased functional mobility ; Decreased ADL status; Decreased ROM; Decreased strength  Treatment Diagnosis: Pain, difficulty walking, decreased ROM, strength and function of low back  Prognosis: Good  REQUIRES PT FOLLOW UP: Yes  Activity Tolerance: Patient Tolerated treatment well    Plan:  Plan: Continue with current plan    Therapy Time:  Time In: 1300  Time Out: 1350  Minutes: 50  Timed Code Treatment Minutes: 35 Minutes    Treatment Charges: Minutes Units   []  Ultrasound     []  Electrical-Stim     []  Iontophoresis     []  Traction     []  Massage       []  Eval     []  Gait     []  Vasopneumatic Device     []  Ther Exercise       []  Manual Therapy       []  Ther Activities       [x]  Aquatics 35 2   []  Neuro Re-Ed       []  Other       Total Treatment Time: 35 2     Electronically signed by Shonda Oates PTA on 7/9/2018 at 4:00 PM

## 2018-07-12 ENCOUNTER — APPOINTMENT (OUTPATIENT)
Dept: PHYSICAL THERAPY | Age: 58
End: 2018-07-12
Payer: MEDICARE

## 2018-07-12 ENCOUNTER — HOSPITAL ENCOUNTER (OUTPATIENT)
Dept: PHYSICAL THERAPY | Age: 58
Setting detail: THERAPIES SERIES
Discharge: HOME | End: 2018-07-12
Payer: MEDICARE

## 2018-07-12 PROCEDURE — 97113 AQUATIC THERAPY/EXERCISES: CPT

## 2018-07-12 ASSESSMENT — PAIN DESCRIPTION - LOCATION: LOCATION: BACK;LEG

## 2018-07-12 ASSESSMENT — PAIN DESCRIPTION - PAIN TYPE: TYPE: CHRONIC PAIN

## 2018-07-12 ASSESSMENT — PAIN DESCRIPTION - ORIENTATION: ORIENTATION: LEFT

## 2018-07-12 ASSESSMENT — PAIN SCALES - GENERAL: PAINLEVEL_OUTOF10: 8

## 2018-07-12 NOTE — PROGRESS NOTES
Physical Therapy  [address] [place] [address]   Outpatient Physical Therapy  Cancel/No Show Note    Date: 18    Patient Name: Chase Palacios        MRN: [id number]  Account: [de-identified] : 1960      General Information:  Chart Reviewed: Yes  Response To Previous Treatment: Patient with no complaints from previous session.   Referring Practitioner: Dr. Afia Hutchinson  Referral Date : 18  Diagnosis: Degenerative disc disease Lumbar/ thoracic  Follows Commands: Within Functional Limits  Onset Date: 16  PT Insurance Information: Medicare  Total # of Visits to Date: 13  No Show: 1  Canceled Appointment: 2    Subjective: Decreased pain noted this date   Comments: Patient brought in new script (to ) for 2-3 vists a week for 4-6 weeks

## 2018-07-16 ENCOUNTER — HOSPITAL ENCOUNTER (OUTPATIENT)
Dept: PHYSICAL THERAPY | Age: 58
Setting detail: THERAPIES SERIES
Discharge: HOME | End: 2018-07-16
Payer: MEDICARE

## 2018-07-16 NOTE — PROGRESS NOTES
[address]   Outpatient Physical Therapy  Cancel/No Show Note    Date: 18    Patient Name: Fabiola Balderas        MRN: [id number]  Account: [de-identified] : 1960      General Information:  Referring Practitioner: Dr. Bobo Ernandez  Referral Date : 18  Diagnosis: Degenerative disc disease Lumbar/ thoracic  Onset Date: 16  PT Insurance Information: Medicare  Total # of Visits Approved: 12 (ok for another 12 vs)  Total # of Visits to Date: 13  No Show: 1  Canceled Appointment: 3    Subjective: Canceled today, upset why she's scheduled in the gym, just wants pool only.         Anton Gil PT Electronically signed by Anton Gil PT on 2018 at 4:16 PM

## 2018-07-19 ENCOUNTER — HOSPITAL ENCOUNTER (OUTPATIENT)
Dept: PHYSICAL THERAPY | Age: 58
Setting detail: THERAPIES SERIES
Discharge: HOME | End: 2018-07-19
Payer: MEDICARE

## 2018-07-19 PROCEDURE — 97113 AQUATIC THERAPY/EXERCISES: CPT

## 2018-07-19 ASSESSMENT — PAIN DESCRIPTION - PAIN TYPE: TYPE: CHRONIC PAIN

## 2018-07-19 ASSESSMENT — PAIN SCALES - GENERAL: PAINLEVEL_OUTOF10: 8

## 2018-07-19 ASSESSMENT — PAIN DESCRIPTION - ORIENTATION: ORIENTATION: LOWER;LEFT

## 2018-07-19 ASSESSMENT — PAIN DESCRIPTION - LOCATION: LOCATION: BACK;LEG

## 2018-07-19 NOTE — PROGRESS NOTES
Total Treatment Time: 32 2     Electronically signed by Lissy Dominguez PTA on 7/19/2018 at 2:59 PM

## 2018-07-25 ENCOUNTER — HOSPITAL ENCOUNTER (OUTPATIENT)
Dept: PAIN MANAGEMENT | Age: 58
Discharge: HOME | End: 2018-07-25
Payer: MEDICARE

## 2018-07-25 VITALS
HEART RATE: 87 BPM | RESPIRATION RATE: 18 BRPM | TEMPERATURE: 98 F | SYSTOLIC BLOOD PRESSURE: 161 MMHG | DIASTOLIC BLOOD PRESSURE: 100 MMHG

## 2018-07-25 DIAGNOSIS — M48.061 SPINAL STENOSIS, LUMBAR REGION, WITHOUT NEUROGENIC CLAUDICATION: Primary | ICD-10-CM

## 2018-07-25 DIAGNOSIS — M48.061 SPINAL STENOSIS AT L4-L5 LEVEL: ICD-10-CM

## 2018-07-25 PROCEDURE — 99214 OFFICE O/P EST MOD 30 MIN: CPT

## 2018-07-25 PROCEDURE — 99214 OFFICE O/P EST MOD 30 MIN: CPT | Performed by: NURSE PRACTITIONER

## 2018-07-25 RX ORDER — TRAMADOL HYDROCHLORIDE 50 MG/1
50 TABLET ORAL EVERY 8 HOURS PRN
Qty: 90 TABLET | Refills: 0 | Status: SHIPPED | OUTPATIENT
Start: 2018-07-31 | End: 2018-08-30

## 2018-07-25 RX ORDER — TIZANIDINE 4 MG/1
4 TABLET ORAL NIGHTLY PRN
Qty: 30 TABLET | Refills: 0 | Status: SHIPPED | OUTPATIENT
Start: 2018-07-25 | End: 2019-02-07 | Stop reason: SDUPTHER

## 2018-07-25 RX ORDER — TRAMADOL HYDROCHLORIDE 50 MG/1
50 TABLET ORAL EVERY 8 HOURS PRN
COMMUNITY
End: 2018-10-23 | Stop reason: ALTCHOICE

## 2018-07-25 ASSESSMENT — PAIN SCALES - GENERAL: PAINLEVEL_OUTOF10: 10

## 2018-07-25 ASSESSMENT — ENCOUNTER SYMPTOMS
COUGH: 0
SHORTNESS OF BREATH: 0
CONSTIPATION: 0
BACK PAIN: 1

## 2018-07-25 ASSESSMENT — PAIN DESCRIPTION - ONSET: ONSET: ON-GOING

## 2018-07-25 ASSESSMENT — PAIN DESCRIPTION - ORIENTATION: ORIENTATION: RIGHT;LEFT

## 2018-07-25 ASSESSMENT — PAIN DESCRIPTION - PAIN TYPE: TYPE: CHRONIC PAIN

## 2018-07-25 ASSESSMENT — PAIN DESCRIPTION - LOCATION: LOCATION: LEG

## 2018-07-25 ASSESSMENT — PAIN DESCRIPTION - PROGRESSION: CLINICAL_PROGRESSION: GRADUALLY WORSENING

## 2018-07-25 ASSESSMENT — PAIN DESCRIPTION - DESCRIPTORS: DESCRIPTORS: NUMBNESS;TIGHTNESS

## 2018-07-25 ASSESSMENT — PAIN DESCRIPTION - FREQUENCY: FREQUENCY: CONTINUOUS

## 2018-07-25 NOTE — PROGRESS NOTES
3    Family History   Problem Relation Age of Onset    Ovarian Cancer Mother     Heart Disease Father     Hypertension Father        Social History     Social History    Marital status: Single     Spouse name: N/A    Number of children: N/A    Years of education: N/A     Occupational History    Not on file. Social History Main Topics    Smoking status: Former Smoker     Years: 30.00     Types: Cigarettes    Smokeless tobacco: Never Used    Alcohol use No    Drug use: No    Sexual activity: No     Other Topics Concern    Not on file     Social History Narrative    No narrative on file       Review of Systems:  Review of Systems   Constitution: Negative for chills and fever. Cardiovascular: Negative for chest pain and irregular heartbeat. Respiratory: Negative for cough and shortness of breath. Musculoskeletal: Positive for back pain. Gastrointestinal: Negative for constipation. Neurological: Negative for disturbances in coordination and loss of balance. Physical Exam:  BP (!) 161/100   Pulse 87   Temp 98 °F (36.7 °C)   Resp 18   LMP 09/07/2007     Physical Exam   Constitutional: She is oriented to person, place, and time and well-developed, well-nourished, and in no distress. HENT:   Head: Normocephalic. Eyes: EOM are normal.   Neck: Normal range of motion. Pulmonary/Chest: Effort normal.   Musculoskeletal: Normal range of motion. Lumbar back: She exhibits tenderness and pain. Neurological: She is alert and oriented to person, place, and time. Skin: Skin is warm and dry. Psychiatric: Affect normal.       Record/Diagnostics Review:    MRI Lumbar 2018 -     1. Extensive postsurgical changes of the lower thoracic and lumbar spine.   2. Interval development of a 9 mm focal right paracentral disc protrusion at  L5-S1 severely effacing the right lateral recess, posteriorly displacing the  right S1 nerve roots and resulting in moderate to severe spinal

## 2018-07-30 ENCOUNTER — HOSPITAL ENCOUNTER (OUTPATIENT)
Dept: PHYSICAL THERAPY | Age: 58
Setting detail: THERAPIES SERIES
Discharge: HOME | End: 2018-07-30
Payer: MEDICARE

## 2018-07-30 PROCEDURE — 97113 AQUATIC THERAPY/EXERCISES: CPT

## 2018-07-30 ASSESSMENT — PAIN DESCRIPTION - ORIENTATION
ORIENTATION_2: LEFT
ORIENTATION: LOWER

## 2018-07-30 ASSESSMENT — PAIN DESCRIPTION - PAIN TYPE
TYPE: CHRONIC PAIN
TYPE_2: CHRONIC PAIN

## 2018-07-30 ASSESSMENT — PAIN DESCRIPTION - INTENSITY: RATING_2: 9

## 2018-07-30 ASSESSMENT — PAIN SCALES - GENERAL: PAINLEVEL_OUTOF10: 8

## 2018-07-30 ASSESSMENT — PAIN DESCRIPTION - LOCATION
LOCATION: BACK
LOCATION_2: LEG

## 2018-07-30 NOTE — PROGRESS NOTES
Physical Therapy  [facility]   Outpatient Physical Therapy  [address] [facility]. [address]  Phone: [phone]  Fax: [phone]  Daily Progress Note    Date: 18    Patient Name: Brinda Núñez        MRN: [id number]  Account: [de-identified] : 1960      General Information:  Chart Reviewed: Yes  Response To Previous Treatment: Patient with no complaints from previous session. Referring Practitioner: Dr. Rasheeda Weems  Referral Date : 18  Diagnosis: Degenerative disc disease Lumbar/ thoracic  Follows Commands: Within Functional Limits  Onset Date: 16  PT Insurance Information: Medicare  Total # of Visits Approved: 24  Total # of Visits to Date: 15  No Show: 2  Canceled Appointment: 3    Subjective:  Subjective: States pain is \"not too bad\" today. Reports missing aquatics last week due to having a UTI, causing increased LE swelling and increased difficulty with ambulation. Pain:  Patient Currently in Pain: Yes  Pain Assessment: 0-10  Pain Level: 8  Pain Type: Chronic pain  Pain Location: Back  Pain Orientation: Lower  Multiple Pain Sites: Yes  Pain Rating 2: 9 (8.5-9)  Pain Type 2: Chronic Pain  Pain Location 2: Leg  Pain Orientation 2: Left (7 (R))    Objective:  [address]  [facility] Exercise Log  Aquatic, Hip & DLS Program- Phase 1     Date of Eval:   18                             Primary PT:GIOVANI  Diagnosis:DDD of lumbar/ thoracic  Things to Focus On (goals):  Improve gait/ mobility/ strength  Surgical Precautions:Multiple back procedures  Medical Precautions:  [] C-9 dates  [] Occ Med   [x] Medicare         Date 18   Visit # 12/12 13/24 14/24 15/24   Walk F/L/R 2 laps ea 2 laps ea 2 laps ea 2 laps ea   Marching  2 laps 2 laps 2 laps 2 laps   Squats 15x5\" 15x5\" 15x5\" 15x5\"   Step-Ups F/L Low 15x ea Low 15x ea Low 15x ea Low 15x ea   F Step Down Low 15x Low 15x Low 15x Low 15x   Heel-toe raises 15x 15x 15x 15x   SLR F/L/R 15x ea 15x ea 15x ea 15x ea   Knee/Flex/Ext 15x 15x 15x 15x   F/L Lunges   Low 10x  *Need rev. Low 10x  *Review   Kickboard Ex. Medium Medium Medium Medium   Iso Abd. 10x5\" 10x5\" 10x5\" 10x5\"   Push-pull 15x 15x 15x 15x   Paddling 15x 15x 15x 15x           UE Format Count/Time Count/Time Count/Time Count/Time   Horiz Abd/Add 1' (17) 1' (19) 1 minute 1' (19)   IR/ER 1' 1' 1 minute 1 '   Alt Flex/Ext 1' (17) 1' (25) 1 minute 1' (22)   Alt Press Down 1' 1' 1 minute 1'   Abd/Add 1' (17) 1' (20) 1 minute 1 minute           Stretches       Achllies 2x20\" 2x20\" 2x20\" 2x20\"   Hamstring 2x20\" 2x20\" 2x20\" 2x20\"           Deep Water 1 Noodle 1 Noodle 1 Noodle 1 Noodle   Hang       Cycling 3 minutes 3 minutes 3 minutes 3 minutes   Jacks 1 minute 1 minute 1 minute 1 minute   X-Country 1 minute 1 minute 1 minute 1 minute           Breast Stroke 2 laps 2 laps 2 laps 2 laps   Pain Rating 9 (L) & 6 (R) 8(L) & 6 (R) 8 (L) & 6 (R) 8.5-9 (L)   & 7 (R)     Assessment: Body structures, Functions, Activity limitations: Decreased functional mobility ; Decreased ADL status; Decreased ROM; Decreased strength  Treatment Diagnosis: Pain, difficulty walking, decreased ROM, strength and function of low back  Prognosis: Good  REQUIRES PT FOLLOW UP: Yes  Activity Tolerance: Patient Tolerated treatment well    Plan:  Plan: Continue with current plan (Increase WBing with exercises in pool as able)    Therapy Time:  Time In: 1305  Time Out: 1352  Minutes: 47  Timed Code Treatment Minutes: 29 Minutes    Treatment Charges: Minutes Units   []  Ultrasound     []  Electrical-Stim     []  Iontophoresis     []  Traction     []  Massage       []  Eval     []  Gait     []  Vasopneumatic Device     []  Ther Exercise       []  Manual Therapy       []  Ther Activities       [x]  Aquatics 29 2   []  Neuro Re-Ed       []  Other       Total Treatment Time: 29 2     Electronically signed by Marcia Cole PTA on 7/30/2018 at 2:14 PM

## 2018-08-06 ENCOUNTER — HOSPITAL ENCOUNTER (OUTPATIENT)
Dept: PHYSICAL THERAPY | Age: 58
Setting detail: THERAPIES SERIES
Discharge: HOME | End: 2018-08-06
Payer: MEDICARE

## 2018-08-06 PROCEDURE — 97113 AQUATIC THERAPY/EXERCISES: CPT

## 2018-08-06 ASSESSMENT — PAIN DESCRIPTION - INTENSITY: RATING_2: 9

## 2018-08-06 ASSESSMENT — PAIN DESCRIPTION - ORIENTATION
ORIENTATION_2: LEFT
ORIENTATION: LOWER

## 2018-08-06 ASSESSMENT — PAIN DESCRIPTION - PAIN TYPE
TYPE_2: CHRONIC PAIN
TYPE: CHRONIC PAIN

## 2018-08-06 ASSESSMENT — PAIN DESCRIPTION - DESCRIPTORS: DESCRIPTORS: DISCOMFORT;TIGHTNESS

## 2018-08-06 ASSESSMENT — PAIN SCALES - GENERAL: PAINLEVEL_OUTOF10: 3

## 2018-08-06 ASSESSMENT — PAIN DESCRIPTION - LOCATION
LOCATION: BACK
LOCATION_2: LEG

## 2018-08-09 ENCOUNTER — HOSPITAL ENCOUNTER (OUTPATIENT)
Dept: PHYSICAL THERAPY | Age: 58
Setting detail: THERAPIES SERIES
Discharge: HOME | End: 2018-08-09
Payer: MEDICARE

## 2018-08-09 PROCEDURE — 97113 AQUATIC THERAPY/EXERCISES: CPT

## 2018-08-09 ASSESSMENT — PAIN DESCRIPTION - INTENSITY: RATING_2: 6

## 2018-08-09 ASSESSMENT — PAIN SCALES - GENERAL: PAINLEVEL_OUTOF10: 8

## 2018-08-09 ASSESSMENT — PAIN DESCRIPTION - ORIENTATION
ORIENTATION_2: RIGHT
ORIENTATION: LEFT

## 2018-08-09 ASSESSMENT — PAIN DESCRIPTION - PAIN TYPE
TYPE_2: CHRONIC PAIN
TYPE: CHRONIC PAIN

## 2018-08-09 ASSESSMENT — PAIN DESCRIPTION - LOCATION
LOCATION: LEG
LOCATION_2: LEG

## 2018-08-09 NOTE — PROGRESS NOTES
Paddling 15x 15x 15x              UE Format Count/Time Count/Time Count/Time     Horiz Abd/Add 1' (19) 1' (19) 1' (21)     IR/ER 1 ' 1'  1'     Alt Flex/Ext 1' (25) 1' (26) 1' (19)     Alt Press Down 1' 1' 1'     Abd/Add 1 minute 1' (23) 1' (  )              Stretches        Achllies 2x20\" 2x20\" 2x20\"     Hamstring 2x20\" 2x20\" 2x20\"              Deep Water 1 Noodle 1 Noodle 1 Noodle     Hang        Cycling 3 minutes 3 minutes 2'     Jacks 1 minute 1 minute 2'     X-Country 1 minute 1 minute 2'              Breast Stroke 2 laps 2 laps 2 Laps     Pain Rating 8.5-9 (L)   & 7 (R) 9/10(L)   7/10 (R) 6-8         Assessment: Body structures, Functions, Activity limitations: Decreased functional mobility ; Decreased ADL status; Decreased ROM; Decreased strength  Treatment Diagnosis: Pain, difficulty walking, decreased ROM, strength and function of low back  Prognosis: Good  REQUIRES PT FOLLOW UP: Yes  Activity Tolerance: Patient Tolerated treatment well  Comments: Increased reps and speed to tolerance; encouraged technique and proper posture throughout exercise    Plan:  Plan: Continue with current plan (Add ankle weights to LE exercise)    Therapy Time:  Time In: 1404  Time Out: 1459  Minutes: 55  Timed Code Treatment Minutes: 39 Minutes    Treatment Charges: Minutes Units   []  Ultrasound     []  Electrical-Stim     []  Iontophoresis     []  Traction     []  Massage       []  Eval     []  Gait     []  Vasopneumatic Device     []  Ther Exercise       []  Manual Therapy       []  Ther Activities       [x]  Aquatics 39 3   []  Neuro Re-Ed       []  Other       [facility]

## 2018-08-13 ENCOUNTER — HOSPITAL ENCOUNTER (OUTPATIENT)
Dept: PHYSICAL THERAPY | Age: 58
Setting detail: THERAPIES SERIES
Discharge: HOME | End: 2018-08-13
Payer: MEDICARE

## 2018-08-13 PROCEDURE — 97113 AQUATIC THERAPY/EXERCISES: CPT

## 2018-08-13 ASSESSMENT — PAIN DESCRIPTION - LOCATION
LOCATION: LEG
LOCATION_2: LEG

## 2018-08-13 ASSESSMENT — PAIN DESCRIPTION - PAIN TYPE
TYPE: CHRONIC PAIN
TYPE_2: CHRONIC PAIN

## 2018-08-13 ASSESSMENT — PAIN SCALES - GENERAL: PAINLEVEL_OUTOF10: 8

## 2018-08-13 ASSESSMENT — PAIN DESCRIPTION - INTENSITY: RATING_2: 6

## 2018-08-13 ASSESSMENT — PAIN DESCRIPTION - ORIENTATION
ORIENTATION_2: RIGHT
ORIENTATION: LEFT

## 2018-08-16 ENCOUNTER — HOSPITAL ENCOUNTER (OUTPATIENT)
Dept: PHYSICAL THERAPY | Age: 58
Setting detail: THERAPIES SERIES
Discharge: HOME | End: 2018-08-16
Payer: MEDICARE

## 2018-08-23 ENCOUNTER — HOSPITAL ENCOUNTER (OUTPATIENT)
Dept: PHYSICAL THERAPY | Age: 58
Setting detail: THERAPIES SERIES
End: 2018-08-23
Payer: MEDICARE

## 2018-08-23 ENCOUNTER — HOSPITAL ENCOUNTER (OUTPATIENT)
Dept: PHYSICAL THERAPY | Age: 58
Setting detail: THERAPIES SERIES
Discharge: HOME | End: 2018-08-23
Payer: MEDICARE

## 2018-08-23 NOTE — PROGRESS NOTES
Physical Therapy  [address]    Outpatient Physical Therapy  Cancel/No Show Note    Date: 18    Patient Name: Dick Moran        MRN: [id number]  Account: [de-identified] : 1960      General Information:  Referring Practitioner: Dr. Kay Eastman  Referral Date : 18  Diagnosis: Degenerative disc disease Lumbar/ thoracic  Onset Date: 16  PT Insurance Information: Medicare  Total # of Visits Approved: 24  Total # of Visits to Date: 18  No Show: 4  Canceled Appointment: 4  Progress Note Counter: 7/10 G-Codes;  Re-eval done 18      Comments: CANCELLED ALL FUTURE VISITS DUE TO FAMILY EMERGENCY PER FRONT [address] [facility]

## 2018-09-18 ENCOUNTER — HOSPITAL ENCOUNTER (OUTPATIENT)
Dept: PAIN MANAGEMENT | Age: 58
Discharge: HOME | End: 2018-09-18
Payer: MEDICARE

## 2018-09-18 VITALS
SYSTOLIC BLOOD PRESSURE: 137 MMHG | RESPIRATION RATE: 18 BRPM | BODY MASS INDEX: 41.95 KG/M2 | HEART RATE: 78 BPM | TEMPERATURE: 97.5 F | DIASTOLIC BLOOD PRESSURE: 60 MMHG | WEIGHT: 293 LBS | HEIGHT: 70 IN

## 2018-09-18 DIAGNOSIS — M96.1 FAILED BACK SURGICAL SYNDROME: Chronic | ICD-10-CM

## 2018-09-18 DIAGNOSIS — E66.01 MORBID OBESITY WITH BMI OF 50.0-59.9, ADULT (HCC): Primary | ICD-10-CM

## 2018-09-18 PROCEDURE — 99214 OFFICE O/P EST MOD 30 MIN: CPT

## 2018-09-18 PROCEDURE — 99214 OFFICE O/P EST MOD 30 MIN: CPT | Performed by: ANESTHESIOLOGY

## 2018-09-18 RX ORDER — OXYCODONE HYDROCHLORIDE AND ACETAMINOPHEN 5; 325 MG/1; MG/1
1 TABLET ORAL EVERY 4 HOURS PRN
Qty: 30 TABLET | Refills: 0 | Status: SHIPPED | OUTPATIENT
Start: 2018-09-18 | End: 2018-10-18

## 2018-09-18 RX ORDER — OXYCODONE HYDROCHLORIDE AND ACETAMINOPHEN 5; 325 MG/1; MG/1
1 TABLET ORAL EVERY 4 HOURS PRN
COMMUNITY
End: 2018-09-18 | Stop reason: SDUPTHER

## 2018-09-18 ASSESSMENT — ENCOUNTER SYMPTOMS
COUGH: 0
CONSTIPATION: 0
SHORTNESS OF BREATH: 1
EYES NEGATIVE: 1

## 2018-09-18 ASSESSMENT — PAIN SCALES - GENERAL: PAINLEVEL_OUTOF10: 10

## 2018-09-18 ASSESSMENT — PAIN DESCRIPTION - ORIENTATION: ORIENTATION: LEFT;RIGHT

## 2018-09-18 ASSESSMENT — PAIN DESCRIPTION - ONSET: ONSET: ON-GOING

## 2018-09-18 ASSESSMENT — PAIN DESCRIPTION - PAIN TYPE: TYPE: CHRONIC PAIN

## 2018-09-18 ASSESSMENT — PAIN DESCRIPTION - FREQUENCY: FREQUENCY: CONTINUOUS

## 2018-09-18 ASSESSMENT — PAIN DESCRIPTION - PROGRESSION: CLINICAL_PROGRESSION: GRADUALLY WORSENING

## 2018-09-18 NOTE — PROGRESS NOTES
tablet by mouth daily 30 tablet 5    aspirin EC 81 MG EC tablet Take 1 tablet by mouth daily (Patient taking differently: Take 81 mg by mouth daily ) 30 tablet 11    Multiple Vitamin (THERAPEUTIC MULTIVITAMIN PO) Take 1 tablet by mouth      vitamin C (VITAMIN C) 500 MG tablet Take 1 tablet by mouth daily 30 tablet 3     No current facility-administered medications for this encounter. Allergies  Lisinopril; Azithromycin; Ciprofloxacin; Keflex [cephalexin]; Penicillins; Aztreonam; and Bactrim [sulfamethoxazole-trimethoprim]    Family History  family history includes Heart Disease in her father; Hypertension in her father; Ovarian Cancer in her mother. Social History  Social History     Social History    Marital status: Single     Spouse name: N/A    Number of children: N/A    Years of education: N/A     Social History Main Topics    Smoking status: Former Smoker     Years: 30.00     Types: Cigarettes    Smokeless tobacco: Never Used    Alcohol use No    Drug use: No    Sexual activity: No     Other Topics Concern    None     Social History Narrative    None      reports that she does not use drugs. REVIEW OF SYSTEMS:  Review of Systems   Constitutional: Negative for chills and fever. HENT: Negative. Eyes: Negative. Respiratory: Positive for shortness of breath. Negative for cough. Cardiovascular: Negative for chest pain. Gastrointestinal: Negative for constipation. Neurological: Negative for headaches. Endo/Heme/Allergies: Does not bruise/bleed easily. Objective:  Vital signs: (most recent): Blood pressure 137/60, pulse 78, temperature 97.5 °F (36.4 °C), temperature source Oral, resp. rate 18, height 5' 10\" (1.778 m), weight (!) 330 lb (149.7 kg), last menstrual period 09/07/2007, currently breastfeeding. No fever. Assessment & Plan     This is a 48-year-old woman with morbid obesity, BMI more than 50.   She is seen  for a history of lower back pain with findings include multilevel postoperative changes in his spine with degenerative changes and facet arthropathy at multiple levels  Disc herniation at L5-S1 level predominantly right side with displacement of right S1 nerve root  Left-sided L3-L4 level foraminal narrowing    EMG nerve conduction study reviewed and discussed with patient    I will recommend to try lumbar epidural steroid injection  Will order oxycodone 5 mg when necessary for pain maximum 1 tablet a day  This will be an interim treatment until we are able to manage pain with interventional approach    If epidural injection fails to alleviate the pain patient will be a candidate for trial of spinal cord stimulation      Controlled Substances Monitoring:     RX Monitoring 9/18/2018   Attestation The Prescription Monitoring Report for this patient was reviewed today. Documentation No signs of potential drug abuse or diversion identified. ;Possible medication side effects, risk of tolerance/dependence & alternative treatments discussed. Medication Contracts Existing medication contract. This note was created using voice recognition software. There may be inaccuracies of transcription  that are inadvertently overlooked prior to the signature. There is any questions about the transcription please contact me.

## 2018-09-20 ENCOUNTER — HOSPITAL ENCOUNTER (OUTPATIENT)
Dept: PHYSICAL THERAPY | Age: 58
Setting detail: THERAPIES SERIES
End: 2018-09-20
Payer: MEDICARE

## 2018-09-20 ENCOUNTER — APPOINTMENT (OUTPATIENT)
Dept: PHYSICAL THERAPY | Age: 58
End: 2018-09-20
Payer: MEDICARE

## 2018-09-28 ENCOUNTER — HOSPITAL ENCOUNTER (OUTPATIENT)
Dept: PHYSICAL THERAPY | Age: 58
Setting detail: THERAPIES SERIES
Discharge: HOME | End: 2018-09-28
Payer: MEDICARE

## 2018-09-28 PROCEDURE — 97162 PT EVAL MOD COMPLEX 30 MIN: CPT

## 2018-09-28 PROCEDURE — G0283 ELEC STIM OTHER THAN WOUND: HCPCS

## 2018-09-28 PROCEDURE — G8978 MOBILITY CURRENT STATUS: HCPCS

## 2018-09-28 PROCEDURE — G8979 MOBILITY GOAL STATUS: HCPCS

## 2018-09-28 ASSESSMENT — PAIN SCALES - GENERAL: PAINLEVEL_OUTOF10: 10

## 2018-09-28 ASSESSMENT — PAIN DESCRIPTION - PAIN TYPE: TYPE: CHRONIC PAIN

## 2018-09-28 ASSESSMENT — PAIN DESCRIPTION - FREQUENCY: FREQUENCY: CONTINUOUS

## 2018-09-28 ASSESSMENT — PAIN DESCRIPTION - ORIENTATION: ORIENTATION: RIGHT;LEFT;LOWER

## 2018-09-28 ASSESSMENT — PAIN DESCRIPTION - PROGRESSION: CLINICAL_PROGRESSION: GRADUALLY WORSENING

## 2018-09-28 ASSESSMENT — PAIN DESCRIPTION - LOCATION: LOCATION: BACK;HIP;LEG

## 2018-09-28 NOTE — PROGRESS NOTES
History  Social/Functional History  Lives With: Spouse  Type of Home: Apartment (handicapped accessibility)  Home Access: Ramped entrance  [address]: Grab bars in shower; Shower chair  Home Equipment: Roll About  ADL Assistance: Independent (takes longer to complete task)  Homemaking Responsibilities: Yes (able to do chores but has to sit down for break)  Active : Yes  Occupation: On disability  Objective   AROM RLE (degrees)  R SLR: 0 /40   R Hip Flexion 0-125: 0 / 90  AROM LLE (degrees)  L SLR: 0 / 30 with pain  L Hip Flexion 0-125: 0 / 80  Spine  Lumbar: Flexion: 50   Extension: 24) Sidebend: R 34 L 30     Strength RLE  Strength RLE: WFL  Strength LLE  L Hip Flexion: 3/5;3+/5  L Hip ABduction: 3+/5;4-/5  L Knee Flexion: 4/5  L Knee Extension: 4/5;3+/5  L Ankle Dorsiflexion: 4/5;4+/5  Strength Other  Other: Lower abdominals 3-/5    Bed mobility  Rolling to Left: Independent  Rolling to Right: Independent  Supine to Sit: Independent  Sit to Supine: Independent     Ambulation 1  Surface: level tile  Device: Rollator  Assistance: Independent  Quality of Gait: Good gait pattern with rollator, difficulty with getting up/down from chair, noted slight intoeing on R foot  Comments: Timed up and go - 26 sec - with rollator     Exercises  Exercise 10: Supine, Heat/ IFC, quadpolar, B LB, 15'   Assessment   Conditions Requiring Skilled Therapeutic Intervention  Body structures, Functions, Activity limitations: Decreased functional mobility ; Decreased ADL status; Decreased ROM; Decreased strength;Decreased balance  Assessment: Other problems: Pain, difficulty walking. Noted loss of balance backward when evaluating trunk ROM. Will benefit from Aquatics initially, progressing to landbased therapy. Reported relief from heat/ IFC after treatment.   Treatment Diagnosis: Pain, difficulty walking, decreased ROM, strength and function of low back  Prognosis: Good  Decision Making: Medium Complexity  History: Multiple back surgeries, L knee problem  Exam: Pain, palpation, ROM, timed up and go , gait, MMT, bed mobility, provocative tests, OPTIMAL  Clinical Presentation: EVOLVING  Patient Education: Discussed  treatment plan and insurance limitations. Reviewed HEP and pool regulations. Barriers to Learning: None  REQUIRES PT FOLLOW UP: Yes  Treatment Initiated : Heat/ IFC, 15'  Discharge Recommendations: Continue to assess pending progress  Activity Tolerance  Activity Tolerance: Patient limited by pain   Plan   Plan  Times per week: Script: 2-3x/wk for 4-6 weeks, prefers 2x/wk  Specific instructions for Next Treatment: Initiate Aquatics  Current Treatment Recommendations: Strengthening, ROM, Balance Training, Gait Training, Patient/Caregiver Education & Training, Modalities, Home Exercise Program, Aquatics  Plan Comment: Initiate Aquatics, progressing to landbased treatment as tolerated. G-Code  PT G-Codes  Functional Assessment Tool Used: Timed up and go  Score: 26 sec = 40-60% impaired  Functional Limitation: Mobility: Walking and moving around  Mobility: Walking and Moving Around Current Status (): At least 40 percent but less than 60 percent impaired, limited or restricted  Mobility: Walking and Moving Around Goal Status (): At least 1 percent but less than 20 percent impaired, limited or restricted  Goals  Short term goals  Time Frame for Short term goals: 6 visits  Short term goal 1: Decrease pain to 5-6/10 to be able to tolerate daily chores with less difficulty   Short term goal 2: Improve ROM by 5-10 degrees to improve changes in position  Short term goal 4:  Independent with HEP   Long term goals  Time Frame for Long term goals : 12 visits  Long term goal 1: Improve strength of abdominals and LLE by 1/2 grade to be able to tolerate prolonged standing/ walking   Long term goal 2: Improve timed up and go by 10 sec to decrease fall risk   Long term goal 3: Improve function in OPTIMAL: 8/3 from 10/3,  for balancing (4), walking long distance (5),bending/ stoping (4); 3/3 = best score  Patient Goals   Patient goals : \" less pain, improve mobility and stability \"     Therapy Time   Individual Concurrent Group Co-treatment   Time In 1015         Time Out 1115         Minutes 60         Timed Code Treatment Minutes: 5 Minutes     Treatment Charges: Minutes Units   []  Ultrasound     [x]  Electrical-Stim/ heat 15 1   []  Iontophoresis     []  Traction     []  Massage       [x]  Eval 40 1   []  Gait     [x]  Ther Exercise 5  0    []  Manual Therapy       []  Ther Activities       []  Aquatics     []  Vasopneumatic Device     []  Neuro Re-Ed       []  Other       Total Treatment Time: 61 2        Denise Khoury PT Electronically signed by Tammy Cheema PT on 9/28/2018 at 2:54 PM       Physician Signature:________________________________Date:__________________  By signing above, I have reviewed this plan of care and certify a need for medically   necessary rehabilitation services

## 2018-09-28 NOTE — PROGRESS NOTES
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 Iso Abd. Push-pull        Paddling                Balance                        Stretches        Achllies        Hamstring                                .                 Pain Rating

## 2018-10-01 ENCOUNTER — HOSPITAL ENCOUNTER (OUTPATIENT)
Dept: PHYSICAL THERAPY | Age: 58
Setting detail: THERAPIES SERIES
Discharge: HOME | End: 2018-10-01
Payer: MEDICARE

## 2018-10-03 ENCOUNTER — HOSPITAL ENCOUNTER (OUTPATIENT)
Dept: PHYSICAL THERAPY | Age: 58
Setting detail: THERAPIES SERIES
Discharge: HOME | End: 2018-10-03
Payer: MEDICARE

## 2018-10-03 PROCEDURE — 97113 AQUATIC THERAPY/EXERCISES: CPT

## 2018-10-03 ASSESSMENT — PAIN DESCRIPTION - LOCATION: LOCATION: LEG

## 2018-10-03 ASSESSMENT — PAIN DESCRIPTION - DIRECTION: RADIATING_TOWARDS: LEFT > RIGHT

## 2018-10-03 ASSESSMENT — PAIN DESCRIPTION - ORIENTATION: ORIENTATION: LEFT;RIGHT

## 2018-10-03 ASSESSMENT — PAIN SCALES - GENERAL: PAINLEVEL_OUTOF10: 9

## 2018-10-03 ASSESSMENT — PAIN DESCRIPTION - PAIN TYPE: TYPE: CHRONIC PAIN

## 2018-10-08 ENCOUNTER — HOSPITAL ENCOUNTER (OUTPATIENT)
Dept: PHYSICAL THERAPY | Age: 58
Setting detail: THERAPIES SERIES
Discharge: HOME | End: 2018-10-08
Payer: MEDICARE

## 2018-10-15 ENCOUNTER — HOSPITAL ENCOUNTER (OUTPATIENT)
Dept: PHYSICAL THERAPY | Age: 58
Setting detail: THERAPIES SERIES
Discharge: HOME | End: 2018-10-15
Payer: MEDICARE

## 2018-10-15 PROCEDURE — 97113 AQUATIC THERAPY/EXERCISES: CPT

## 2018-10-15 ASSESSMENT — PAIN DESCRIPTION - LOCATION: LOCATION: LEG

## 2018-10-15 ASSESSMENT — PAIN DESCRIPTION - PAIN TYPE: TYPE: CHRONIC PAIN

## 2018-10-15 ASSESSMENT — PAIN DESCRIPTION - DESCRIPTORS: DESCRIPTORS: CONSTANT

## 2018-10-15 ASSESSMENT — PAIN SCALES - GENERAL: PAINLEVEL_OUTOF10: 9

## 2018-10-15 ASSESSMENT — PAIN DESCRIPTION - ORIENTATION: ORIENTATION: LEFT;RIGHT

## 2018-10-15 ASSESSMENT — PAIN DESCRIPTION - DIRECTION: RADIATING_TOWARDS: LEFT > RIGHT

## 2018-10-18 ENCOUNTER — HOSPITAL ENCOUNTER (OUTPATIENT)
Dept: PHYSICAL THERAPY | Age: 58
Setting detail: THERAPIES SERIES
End: 2018-10-18
Payer: MEDICARE

## 2018-10-23 ENCOUNTER — APPOINTMENT (OUTPATIENT)
Dept: PHYSICAL THERAPY | Age: 58
End: 2018-10-23
Payer: MEDICARE

## 2018-10-23 ENCOUNTER — OFFICE VISIT (OUTPATIENT)
Dept: INTERNAL MEDICINE | Age: 58
End: 2018-10-23
Payer: MEDICARE

## 2018-10-23 ENCOUNTER — HOSPITAL ENCOUNTER (OUTPATIENT)
Age: 58
Setting detail: SPECIMEN
Discharge: HOME | End: 2018-10-23
Payer: MEDICARE

## 2018-10-23 VITALS
BODY MASS INDEX: 41.95 KG/M2 | OXYGEN SATURATION: 99 % | SYSTOLIC BLOOD PRESSURE: 118 MMHG | HEART RATE: 71 BPM | HEIGHT: 70 IN | WEIGHT: 293 LBS | DIASTOLIC BLOOD PRESSURE: 70 MMHG

## 2018-10-23 DIAGNOSIS — I10 ESSENTIAL HYPERTENSION: ICD-10-CM

## 2018-10-23 DIAGNOSIS — M96.1 FAILED BACK SURGICAL SYNDROME: Chronic | ICD-10-CM

## 2018-10-23 DIAGNOSIS — E78.00 PURE HYPERCHOLESTEROLEMIA: ICD-10-CM

## 2018-10-23 DIAGNOSIS — Z12.11 SCREENING FOR COLON CANCER: ICD-10-CM

## 2018-10-23 DIAGNOSIS — E66.01 MORBID OBESITY DUE TO EXCESS CALORIES (HCC): Chronic | ICD-10-CM

## 2018-10-23 DIAGNOSIS — I10 ESSENTIAL HYPERTENSION: Primary | ICD-10-CM

## 2018-10-23 DIAGNOSIS — E53.8 VITAMIN B12 DEFICIENCY: ICD-10-CM

## 2018-10-23 DIAGNOSIS — Z12.39 BREAST CANCER SCREENING: ICD-10-CM

## 2018-10-23 DIAGNOSIS — R09.89 BILATERAL CAROTID BRUITS: ICD-10-CM

## 2018-10-23 DIAGNOSIS — I87.2 VENOUS INSUFFICIENCY OF BOTH LOWER EXTREMITIES: ICD-10-CM

## 2018-10-23 DIAGNOSIS — R73.02 IGT (IMPAIRED GLUCOSE TOLERANCE): ICD-10-CM

## 2018-10-23 LAB
ALBUMIN SERPL-MCNC: 4.3 G/DL (ref 3.5–5.2)
ALBUMIN/GLOBULIN RATIO: 1.3 (ref 1–2.5)
ALP BLD-CCNC: 117 U/L (ref 35–104)
ALT SERPL-CCNC: 21 U/L (ref 5–33)
ANION GAP SERPL CALCULATED.3IONS-SCNC: 15 MMOL/L (ref 9–17)
AST SERPL-CCNC: 24 U/L
BILIRUB SERPL-MCNC: 0.18 MG/DL (ref 0.3–1.2)
BILIRUBIN DIRECT: <0.08 MG/DL
BILIRUBIN, INDIRECT: ABNORMAL MG/DL (ref 0–1)
BUN BLDV-MCNC: 13 MG/DL (ref 6–20)
BUN/CREAT BLD: NORMAL (ref 9–20)
CALCIUM SERPL-MCNC: 10.1 MG/DL (ref 8.6–10.4)
CHLORIDE BLD-SCNC: 105 MMOL/L (ref 98–107)
CHOLESTEROL/HDL RATIO: 3.3
CHOLESTEROL: 336 MG/DL
CO2: 23 MMOL/L (ref 20–31)
CREAT SERPL-MCNC: 0.73 MG/DL (ref 0.5–0.9)
FOLATE: 12.2 NG/ML
GFR AFRICAN AMERICAN: >60 ML/MIN
GFR NON-AFRICAN AMERICAN: >60 ML/MIN
GFR SERPL CREATININE-BSD FRML MDRD: NORMAL ML/MIN/{1.73_M2}
GFR SERPL CREATININE-BSD FRML MDRD: NORMAL ML/MIN/{1.73_M2}
GLOBULIN: ABNORMAL G/DL (ref 1.5–3.8)
GLUCOSE BLD-MCNC: 89 MG/DL (ref 70–99)
HCT VFR BLD CALC: 41.4 % (ref 36.3–47.1)
HDLC SERPL-MCNC: 102 MG/DL
HEMOGLOBIN: 13.2 G/DL (ref 11.9–15.1)
LDL CHOLESTEROL: 222 MG/DL (ref 0–130)
MCH RBC QN AUTO: 30.2 PG (ref 25.2–33.5)
MCHC RBC AUTO-ENTMCNC: 31.9 G/DL (ref 28.4–34.8)
MCV RBC AUTO: 94.7 FL (ref 82.6–102.9)
NRBC AUTOMATED: 0 PER 100 WBC
PDW BLD-RTO: 14.7 % (ref 11.8–14.4)
PLATELET # BLD: 254 K/UL (ref 138–453)
PMV BLD AUTO: 11.5 FL (ref 8.1–13.5)
POTASSIUM SERPL-SCNC: 4.3 MMOL/L (ref 3.7–5.3)
RBC # BLD: 4.37 M/UL (ref 3.95–5.11)
SODIUM BLD-SCNC: 143 MMOL/L (ref 135–144)
TOTAL PROTEIN: 7.6 G/DL (ref 6.4–8.3)
TRIGL SERPL-MCNC: 59 MG/DL
TSH SERPL DL<=0.05 MIU/L-ACNC: 2.17 MIU/L (ref 0.3–5)
VITAMIN B-12: 637 PG/ML (ref 232–1245)
VLDLC SERPL CALC-MCNC: ABNORMAL MG/DL (ref 1–30)
WBC # BLD: 4.3 K/UL (ref 3.5–11.3)

## 2018-10-23 PROCEDURE — 99214 OFFICE O/P EST MOD 30 MIN: CPT | Performed by: INTERNAL MEDICINE

## 2018-10-23 PROCEDURE — 3017F COLORECTAL CA SCREEN DOC REV: CPT | Performed by: INTERNAL MEDICINE

## 2018-10-23 PROCEDURE — 80076 HEPATIC FUNCTION PANEL: CPT

## 2018-10-23 PROCEDURE — 80048 BASIC METABOLIC PNL TOTAL CA: CPT

## 2018-10-23 PROCEDURE — 82746 ASSAY OF FOLIC ACID SERUM: CPT

## 2018-10-23 PROCEDURE — 80061 LIPID PANEL: CPT

## 2018-10-23 PROCEDURE — 85027 COMPLETE CBC AUTOMATED: CPT

## 2018-10-23 PROCEDURE — 1036F TOBACCO NON-USER: CPT | Performed by: INTERNAL MEDICINE

## 2018-10-23 PROCEDURE — 99211 OFF/OP EST MAY X REQ PHY/QHP: CPT | Performed by: INTERNAL MEDICINE

## 2018-10-23 PROCEDURE — G8417 CALC BMI ABV UP PARAM F/U: HCPCS | Performed by: INTERNAL MEDICINE

## 2018-10-23 PROCEDURE — G8484 FLU IMMUNIZE NO ADMIN: HCPCS | Performed by: INTERNAL MEDICINE

## 2018-10-23 PROCEDURE — 36415 COLL VENOUS BLD VENIPUNCTURE: CPT

## 2018-10-23 PROCEDURE — G8427 DOCREV CUR MEDS BY ELIG CLIN: HCPCS | Performed by: INTERNAL MEDICINE

## 2018-10-23 PROCEDURE — 82607 VITAMIN B-12: CPT

## 2018-10-23 PROCEDURE — 84443 ASSAY THYROID STIM HORMONE: CPT

## 2018-10-23 PROCEDURE — 83036 HEMOGLOBIN GLYCOSYLATED A1C: CPT

## 2018-10-23 RX ORDER — OXYCODONE HYDROCHLORIDE AND ACETAMINOPHEN 5; 325 MG/1; MG/1
1 TABLET ORAL EVERY 4 HOURS PRN
COMMUNITY
End: 2018-10-31 | Stop reason: SDUPTHER

## 2018-10-23 RX ORDER — HYDROCHLOROTHIAZIDE 25 MG/1
25 TABLET ORAL DAILY
Qty: 30 TABLET | Refills: 5 | Status: SHIPPED | OUTPATIENT
Start: 2018-10-23 | End: 2019-02-26 | Stop reason: SDUPTHER

## 2018-10-23 RX ORDER — TIZANIDINE 4 MG/1
4 TABLET ORAL EVERY 6 HOURS PRN
COMMUNITY
End: 2019-02-07 | Stop reason: ALTCHOICE

## 2018-10-23 RX ORDER — IBUPROFEN 600 MG/1
600 TABLET ORAL EVERY 6 HOURS PRN
COMMUNITY
End: 2018-10-31 | Stop reason: ALTCHOICE

## 2018-10-23 ASSESSMENT — ENCOUNTER SYMPTOMS: BACK PAIN: 1

## 2018-10-23 NOTE — PATIENT INSTRUCTIONS
Return To Clinic Thursday 1/31/19 @ 1:00. After Visit Summary  given and reviewed. It is very important for your care that you keep your appointment. If for some reason you are unable to keep your appointment it is equally important that you call our office at [phone] to cancel your appointment and reschedule. Failure to do so may result in your termination from our practice.     -Bloodwork orders given to patient, they will have them done before their next visit. -Mammogram ordered for patient--scheduling information given to patient to call and set up an appt  -iFit test and instructions given to pt, pt will bring test back to office once completed. --Alfredo Floyd

## 2018-10-23 NOTE — PROGRESS NOTES
[facility]    Progress Note    Date of patient's visit: 10/23/2018    Patient's Name:  Rochelle Quiñones    YOB: 1960            Patient Care Team:  Bobby Silver MD as PCP - General (Internal Medicine)  Nadine Diego MD as PCP - S Attributed Provider  Christopher Scott MD (Dermatology)  Galina Tompkins (Neurosurgery)    REASON FOR VISIT: Routine outpatient follow     Chief Complaint   Patient presents with    Hypertension    Leg Pain     Pt c/o having bilateral leg pain more in the L than the R     Other     Pt establish care at [facility] in [place] was seen twice but states that she is not going to be seen there anymore. Nadine Diego MD   [facility] Maintenance     pt refused vaccines, mammogram reprinted          HISTORY OF PRESENT ILLNESS:    History was obtained from the patient. Rochelle Quiñones is a 60 y.o. is here for Follow-up and medication refills. She saw Jensen Comer in April but then transfered care to family medicine but back now to our clinic. She has history of hypertension, hyperlipidemia, morbid obesity, chronic low back pain and chronic lower extremity edema. She's had venous ablation surgery done in the past.  She is following up with [place] clinic and with Dr. Dudley Ceja office. She was also noted to have carotid bruits in the past but the carotid ultrasound earlier this year was negative. She says she continues to have a lot of pain in her legs and numbness. She is scheduled to have another venous ultrasound and arterial scan done this month though in the past all her scans have been negative. She is not a smoker. She has chronic low back pain. She's had 3 spinal surgeries in [place]. Currently she follows up with pain management. She also follows up with NS at [facility]. She has redo surgery for thoracolumbar decompression for thoracic myelopathy.  She goes to PT>    She has !  +--------------------------------------++--------+-----+----+--------+-----+  ! Ankle PT                              !!157     !0.93 !    !153     !0.91 !  +--------------------------------------++--------+-----+----+--------+-----+  ! Ankle DP                              !!171     !1.01 !    !175     !1.04 !  +--------------------------------------++--------+-----+----+--------+-----+  ! Great Toe                             !!141     !0.83 !    !134     !0.79 !  +--------------------------------------++--------+-----+----+--------+-----+      - Brachial Pressure:Right: 153. Left:169.      - MELONIE:Right: 1.01. Left: 1.04. MRI Lumbar spine  Impression   1. Extensive postsurgical changes of the lower thoracic and lumbar spine. 2. Interval development of a 9 mm focal right paracentral disc protrusion at   L5-S1 severely effacing the right lateral recess, posteriorly displacing the   right S1 nerve roots and resulting in moderate to severe spinal canal   stenosis. 3. Additional multilevel degenerative changes of the lower thoracic and   lumbar spine, as described above. 4. Redemonstration of focal myelomalacia within the lower thoracic spinal   cord at T11-12, unchanged from the previous exam.           Echo 20015  CONCLUSIONS    Summary  Technically very difficult and somewhat limited study due to body habitus. Global left ventricular systolic function is normal.  Mild left ventricular hypertrophy. Estimated ejection fraction is 50-55%. No significant valvular regurgitation or stenosis seen.     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 HENT: Negative for congestion, rhinorrhea and sinus pain. Respiratory: Negative for cough, shortness of breath and wheezing. Cardiovascular: Positive for leg swelling. Negative for chest pain and palpitations. Gastrointestinal: Negative for abdominal pain, blood in stool, constipation and nausea. Endocrine: Negative for polydipsia and polyuria. Genitourinary: Negative for dysuria, hematuria and urgency. Musculoskeletal: Positive for arthralgias and back pain. Negative for gait problem and myalgias. Allergic/Immunologic: Negative for environmental allergies and immunocompromised state. Neurological: Positive for numbness. Negative for dizziness, weakness, light-headedness and headaches. Hematological: Negative for adenopathy. Does not bruise/bleed easily. PHYSICAL EXAM:     Vitals:    10/23/18 1519   BP: 118/70   Site: Right Upper Arm   Position: Sitting   Cuff Size: Large Adult   Pulse: 71   SpO2: 99%   Weight: (!) 341 lb (154.7 kg)   Height: 5' 10\" (1.778 m)     Body mass index is 48.93 kg/m². BP Readings from Last 3 Encounters:   10/23/18 118/70   09/18/18 137/60   07/25/18 (!) 161/100        Wt Readings from Last 3 Encounters:   10/23/18 (!) 341 lb (154.7 kg)   09/18/18 (!) 330 lb (149.7 kg)   06/20/18 (!) 330 lb (149.7 kg)       Physical Exam      HENT: Normocephalic, Atraumatic, Neck- Normal range of motion, No tenderness, Supple. Eyes:  PERRL, EOMI, Conjunctiva normal, No discharge. Respiratory:  Normal breath sounds, No respiratory distress, No wheezin  Cardiovascular:  Normal heart rate, Normal rhythm, No murmurs  GI:  Bowel sounds normal, Soft, No tenderness  :   No CVA tenderness. Musculoskeletal:  Intact distal pulses, 1+ edema, No tenderness  Integument:  Warm, Dry, No erythema, No rash. Lymphatic:  No lymphadenopathy noted.    Neurologic:  Alert & oriented x 3, Normal motor function, Normal sensory function, No

## 2018-10-24 ENCOUNTER — HOSPITAL ENCOUNTER (OUTPATIENT)
Dept: PHYSICAL THERAPY | Age: 58
Setting detail: THERAPIES SERIES
Discharge: HOME | End: 2018-10-24
Payer: MEDICARE

## 2018-10-24 LAB
ESTIMATED AVERAGE GLUCOSE: 123 MG/DL
HBA1C MFR BLD: 5.9 % (ref 4–6)

## 2018-10-24 PROCEDURE — 97113 AQUATIC THERAPY/EXERCISES: CPT

## 2018-10-24 ASSESSMENT — PAIN SCALES - GENERAL: PAINLEVEL_OUTOF10: 9

## 2018-10-24 ASSESSMENT — PAIN DESCRIPTION - DESCRIPTORS: DESCRIPTORS: CONSTANT

## 2018-10-24 ASSESSMENT — PAIN DESCRIPTION - PAIN TYPE: TYPE: CHRONIC PAIN

## 2018-10-24 ASSESSMENT — PAIN DESCRIPTION - LOCATION: LOCATION: LEG

## 2018-10-24 ASSESSMENT — PAIN DESCRIPTION - ORIENTATION: ORIENTATION: LEFT;RIGHT

## 2018-10-25 DIAGNOSIS — E78.00 PURE HYPERCHOLESTEROLEMIA: Primary | ICD-10-CM

## 2018-10-25 RX ORDER — LOVASTATIN 20 MG/1
20 TABLET ORAL DAILY
Qty: 30 TABLET | Refills: 2 | Status: SHIPPED | OUTPATIENT
Start: 2018-10-25 | End: 2019-02-26 | Stop reason: SDUPTHER

## 2018-10-28 ASSESSMENT — ENCOUNTER SYMPTOMS
ABDOMINAL PAIN: 0
COUGH: 0
WHEEZING: 0
CONSTIPATION: 0
NAUSEA: 0
SHORTNESS OF BREATH: 0
BLOOD IN STOOL: 0
SINUS PAIN: 0
RHINORRHEA: 0

## 2018-10-29 ENCOUNTER — HOSPITAL ENCOUNTER (OUTPATIENT)
Dept: PHYSICAL THERAPY | Age: 58
Setting detail: THERAPIES SERIES
Discharge: HOME | End: 2018-10-29
Payer: MEDICARE

## 2018-10-29 PROCEDURE — 97113 AQUATIC THERAPY/EXERCISES: CPT

## 2018-10-29 ASSESSMENT — PAIN DESCRIPTION - DESCRIPTORS: DESCRIPTORS: CONSTANT;TIGHTNESS

## 2018-10-29 ASSESSMENT — PAIN SCALES - GENERAL: PAINLEVEL_OUTOF10: 8

## 2018-10-29 ASSESSMENT — PAIN DESCRIPTION - ORIENTATION: ORIENTATION: LEFT;RIGHT

## 2018-10-29 ASSESSMENT — PAIN DESCRIPTION - PAIN TYPE: TYPE: CHRONIC PAIN

## 2018-10-29 ASSESSMENT — PAIN DESCRIPTION - LOCATION: LOCATION: LEG

## 2018-10-31 ENCOUNTER — HOSPITAL ENCOUNTER (OUTPATIENT)
Dept: PAIN MANAGEMENT | Age: 58
Discharge: HOME | End: 2018-10-31
Payer: MEDICARE

## 2018-10-31 DIAGNOSIS — M48.061 SPINAL STENOSIS, LUMBAR REGION, WITHOUT NEUROGENIC CLAUDICATION: Primary | ICD-10-CM

## 2018-10-31 DIAGNOSIS — M48.061 SPINAL STENOSIS AT L4-L5 LEVEL: ICD-10-CM

## 2018-10-31 DIAGNOSIS — M96.1 FAILED BACK SURGICAL SYNDROME: ICD-10-CM

## 2018-10-31 PROCEDURE — 99214 OFFICE O/P EST MOD 30 MIN: CPT

## 2018-10-31 PROCEDURE — 99214 OFFICE O/P EST MOD 30 MIN: CPT | Performed by: NURSE PRACTITIONER

## 2018-10-31 RX ORDER — OXYCODONE HYDROCHLORIDE AND ACETAMINOPHEN 5; 325 MG/1; MG/1
1 TABLET ORAL DAILY PRN
Qty: 30 TABLET | Refills: 0 | Status: SHIPPED | OUTPATIENT
Start: 2018-10-31 | End: 2019-02-05 | Stop reason: SDUPTHER

## 2018-10-31 RX ORDER — AMITRIPTYLINE HYDROCHLORIDE 10 MG/1
10 TABLET, FILM COATED ORAL NIGHTLY
Qty: 30 TABLET | Refills: 0 | Status: SHIPPED | OUTPATIENT
Start: 2018-10-31 | End: 2019-04-23 | Stop reason: ALTCHOICE

## 2018-10-31 RX ORDER — MELOXICAM 15 MG/1
15 TABLET ORAL DAILY
Qty: 30 TABLET | Refills: 3 | Status: SHIPPED | OUTPATIENT
Start: 2018-10-31 | End: 2019-02-05 | Stop reason: SINTOL

## 2018-10-31 ASSESSMENT — ENCOUNTER SYMPTOMS
COUGH: 0
BACK PAIN: 1
CONSTIPATION: 0
SHORTNESS OF BREATH: 0

## 2018-11-01 ENCOUNTER — HOSPITAL ENCOUNTER (OUTPATIENT)
Dept: PHYSICAL THERAPY | Age: 58
Setting detail: THERAPIES SERIES
Discharge: HOME | End: 2018-11-01
Payer: MEDICARE

## 2018-11-01 PROCEDURE — 97113 AQUATIC THERAPY/EXERCISES: CPT

## 2018-11-01 ASSESSMENT — PAIN DESCRIPTION - PAIN TYPE: TYPE: CHRONIC PAIN

## 2018-11-01 ASSESSMENT — PAIN DESCRIPTION - LOCATION: LOCATION: LEG

## 2018-11-01 ASSESSMENT — PAIN DESCRIPTION - ORIENTATION: ORIENTATION: LEFT

## 2018-11-01 ASSESSMENT — PAIN SCALES - GENERAL: PAINLEVEL_OUTOF10: 8

## 2018-11-01 NOTE — PROGRESS NOTES
Stretches        Achllies 2x20\" 2x20\"      Hamstring 2x20\" 2x20\"              Cool down 2 laps  2 Laps      Pain Rating L LE: 8  R LE: 6 6-8        Comment:  Comments: Patient 12' late for appt    Assessment:  Assessment: Other problems: Pain, difficulty walking. Noted loss of balance backward when evaluating trunk ROM. Will benefit from Aquatics initially, progressing to landbased therapy. Reported relief from heat/ IFC after treatment.   Treatment Diagnosis: Pain, difficulty walking, decreased ROM, strength and function of low back  Prognosis: Good  REQUIRES PT FOLLOW UP: Yes  Activity Tolerance: Patient Tolerated treatment well;Patient limited by pain  Comments: Increased reps and speed to tolerance; emphasis on technique with all exercise    Plan:  Plan: Continue with current plan (Add lunges and progress deep water)    Therapy Time:  Time In: 1316  Time Out: 1400  Minutes: 44  Timed Code Treatment Minutes: 40 Minutes    Treatment Charges: Minutes Units   []  Ultrasound     []  Electrical-Stim     []  Iontophoresis     []  Traction     []  Massage       []  Eval     []  Gait     []  Vasopneumatic Device     []  Ther Exercise       []  Manual Therapy       []  Ther Activities       [x]  Aquatics 40 3   []  Neuro Re-Ed       []  Other       Total Treatment Time: [facility], PTA

## 2018-11-05 ENCOUNTER — HOSPITAL ENCOUNTER (OUTPATIENT)
Dept: PHYSICAL THERAPY | Age: 58
Setting detail: THERAPIES SERIES
Discharge: HOME | End: 2018-11-05
Payer: MEDICARE

## 2018-11-06 DIAGNOSIS — M25.562 ACUTE PAIN OF LEFT KNEE: Primary | ICD-10-CM

## 2018-11-07 RX ORDER — HYDROGEN PEROXIDE 2.65 ML/100ML
LIQUID ORAL; TOPICAL
Qty: 30 TABLET | Refills: 11 | Status: SHIPPED | OUTPATIENT
Start: 2018-11-07 | End: 2019-02-05 | Stop reason: DRUGHIGH

## 2018-11-08 ENCOUNTER — HOSPITAL ENCOUNTER (OUTPATIENT)
Dept: PHYSICAL THERAPY | Age: 58
Setting detail: THERAPIES SERIES
Discharge: HOME | End: 2018-11-08
Payer: MEDICARE

## 2018-11-08 PROCEDURE — 97113 AQUATIC THERAPY/EXERCISES: CPT

## 2018-11-08 PROCEDURE — G8979 MOBILITY GOAL STATUS: HCPCS

## 2018-11-08 PROCEDURE — 97110 THERAPEUTIC EXERCISES: CPT

## 2018-11-08 PROCEDURE — G8978 MOBILITY CURRENT STATUS: HCPCS

## 2018-11-08 ASSESSMENT — PAIN DESCRIPTION - PAIN TYPE
TYPE: CHRONIC PAIN
TYPE: CHRONIC PAIN

## 2018-11-08 ASSESSMENT — PAIN DESCRIPTION - ORIENTATION
ORIENTATION: LEFT
ORIENTATION: LEFT;RIGHT

## 2018-11-08 ASSESSMENT — PAIN DESCRIPTION - DESCRIPTORS: DESCRIPTORS: TINGLING;TIGHTNESS;NUMBNESS

## 2018-11-08 ASSESSMENT — PAIN DESCRIPTION - PROGRESSION: CLINICAL_PROGRESSION: GRADUALLY IMPROVING

## 2018-11-08 ASSESSMENT — PAIN DESCRIPTION - LOCATION: LOCATION: LEG

## 2018-11-08 ASSESSMENT — PAIN SCALES - GENERAL
PAINLEVEL_OUTOF10: 8
PAINLEVEL_OUTOF10: 8

## 2018-11-12 ENCOUNTER — HOSPITAL ENCOUNTER (OUTPATIENT)
Dept: PHYSICAL THERAPY | Age: 58
Setting detail: THERAPIES SERIES
Discharge: HOME | End: 2018-11-12
Payer: MEDICARE

## 2018-11-12 ENCOUNTER — TELEPHONE (OUTPATIENT)
Dept: INTERNAL MEDICINE | Age: 58
End: 2018-11-12

## 2018-11-12 NOTE — TELEPHONE ENCOUNTER
Pt given OC-Light Hemoccult test kit 10/23/18; it has not been returned within  2 weeks. PC to pt; Pt states needs another test and would like one mailed to her. Parish Roldan

## 2018-11-12 NOTE — LETTER
[address]  [address] [facility] [address] [facility]  Phone: [phone]  Fax: [phone]    Araseli Adame MD        November 30, 2018    [address]  [address] [facility]  [address]      Dear Dee:    During your last appointment with us on 10/23/18 with Dr. Luis Ramírez, you were given the OC-Light hemoccult test kit to provide a sample of your stool in lieu of scheduling a colonoscopy. The kit has not yet been returned to us. Please follow the instructions on the envelope provided to you with the kit and return it to the clinic either by mail or in person at your earliest convenience. We are on the 2nd floor. Keep in mind that once a sample has been collected, it is good for up to 15 days in room temperature, or 30 days refrigerated. If you have any questions or concerns, please don't hesitate to call.     Sincerely,        Araseli Adame MD

## 2018-11-12 NOTE — PROGRESS NOTES
Physical Therapy  [facility]   Outpatient Physical Therapy  Cancel/No Show Note    Date: 18    Patient Name: Anisa Martinez        MRN: [id number]  Account: [de-identified] : 1960      General Information:  Referring Practitioner: Dr. Rajendra Osborn  Referral Date : 18  Diagnosis: Lumbar disc herniation with radiculopathy / lumbar disc disease  Onset Date: 16  PT Insurance Information: Medicare  Total # of Visits Approved: 12  Total # of Visits to Date: 7  No Show: 0  Canceled Appointment: 5      Comments: Canceled aquatics session for today's date per the - no reason given    Electronically signed by Rosalee Liang PTA on 2018 at 1:40 PM

## 2018-11-19 ENCOUNTER — APPOINTMENT (OUTPATIENT)
Dept: PHYSICAL THERAPY | Age: 58
End: 2018-11-19
Payer: MEDICARE

## 2018-11-21 ENCOUNTER — HOSPITAL ENCOUNTER (OUTPATIENT)
Dept: PHYSICAL THERAPY | Age: 58
Setting detail: THERAPIES SERIES
Discharge: HOME | End: 2018-11-21
Payer: MEDICARE

## 2018-11-21 PROCEDURE — 97113 AQUATIC THERAPY/EXERCISES: CPT

## 2018-11-21 ASSESSMENT — PAIN DESCRIPTION - LOCATION: LOCATION: LEG;BUTTOCKS;HIP

## 2018-11-21 ASSESSMENT — PAIN DESCRIPTION - ORIENTATION: ORIENTATION: LEFT

## 2018-11-21 ASSESSMENT — PAIN SCALES - GENERAL: PAINLEVEL_OUTOF10: 9

## 2018-11-21 ASSESSMENT — PAIN DESCRIPTION - PAIN TYPE: TYPE: CHRONIC PAIN

## 2018-11-26 ENCOUNTER — HOSPITAL ENCOUNTER (OUTPATIENT)
Dept: PHYSICAL THERAPY | Age: 58
Setting detail: THERAPIES SERIES
Discharge: HOME | End: 2018-11-26
Payer: MEDICARE

## 2018-11-27 ENCOUNTER — TELEPHONE (OUTPATIENT)
Dept: INTERNAL MEDICINE | Age: 58
End: 2018-11-27

## 2018-11-29 ENCOUNTER — APPOINTMENT (OUTPATIENT)
Dept: PHYSICAL THERAPY | Age: 58
End: 2018-11-29
Payer: MEDICARE

## 2018-12-07 ENCOUNTER — OFFICE VISIT (OUTPATIENT)
Dept: ORTHOPEDIC SURGERY | Age: 58
End: 2018-12-07
Payer: MEDICARE

## 2018-12-07 VITALS — BODY MASS INDEX: 41.95 KG/M2 | WEIGHT: 293 LBS | HEIGHT: 70 IN

## 2018-12-07 DIAGNOSIS — M17.12 ARTHRITIS OF LEFT KNEE: Primary | ICD-10-CM

## 2018-12-07 PROCEDURE — G8427 DOCREV CUR MEDS BY ELIG CLIN: HCPCS | Performed by: ORTHOPAEDIC SURGERY

## 2018-12-07 PROCEDURE — 3017F COLORECTAL CA SCREEN DOC REV: CPT | Performed by: ORTHOPAEDIC SURGERY

## 2018-12-07 PROCEDURE — G8417 CALC BMI ABV UP PARAM F/U: HCPCS | Performed by: ORTHOPAEDIC SURGERY

## 2018-12-07 PROCEDURE — 20610 DRAIN/INJ JOINT/BURSA W/O US: CPT | Performed by: ORTHOPAEDIC SURGERY

## 2018-12-07 PROCEDURE — 1036F TOBACCO NON-USER: CPT | Performed by: ORTHOPAEDIC SURGERY

## 2018-12-07 PROCEDURE — 99203 OFFICE O/P NEW LOW 30 MIN: CPT | Performed by: ORTHOPAEDIC SURGERY

## 2018-12-07 PROCEDURE — G8484 FLU IMMUNIZE NO ADMIN: HCPCS | Performed by: ORTHOPAEDIC SURGERY

## 2018-12-07 RX ORDER — MELOXICAM 15 MG/1
15 TABLET ORAL DAILY
Qty: 30 TABLET | Refills: 3 | Status: CANCELLED | OUTPATIENT
Start: 2018-12-07

## 2018-12-07 ASSESSMENT — ENCOUNTER SYMPTOMS
CONSTIPATION: 0
NAUSEA: 0
COUGH: 0
DIARRHEA: 0

## 2018-12-07 NOTE — PROGRESS NOTES
made by me. I have also reviewed documentation completed by clinical staff.     Maite Alexander DO, [facility]  12/16/2018 4:32 PM    This note is created with the assistance of a speech recognition program.  While intending to generate a document that actually reflects the content of the visit, the document can still have some errors including those of syntax and sound a like substitutions which may escape proof reading.  In such instances, actual meaning can be extrapolated by contextual diversion      Electronically signed by Yovanny Fitch DO, FAOAO on 12/16/2018 at 4:31 PM

## 2018-12-17 ENCOUNTER — HOSPITAL ENCOUNTER (OUTPATIENT)
Dept: PHYSICAL THERAPY | Age: 58
Setting detail: THERAPIES SERIES
Discharge: HOME | End: 2018-12-17
Payer: MEDICARE

## 2018-12-19 ENCOUNTER — HOSPITAL ENCOUNTER (OUTPATIENT)
Dept: PHYSICAL THERAPY | Age: 58
Setting detail: THERAPIES SERIES
Discharge: HOME | End: 2018-12-19
Payer: MEDICARE

## 2019-01-03 RX ORDER — IBUPROFEN 600 MG/1
TABLET ORAL
Qty: 120 TABLET | Refills: 3 | Status: SHIPPED | OUTPATIENT
Start: 2019-01-03 | End: 2019-02-05 | Stop reason: ALTCHOICE

## 2019-01-25 ENCOUNTER — HOSPITAL ENCOUNTER (OUTPATIENT)
Age: 59
Setting detail: SPECIMEN
Discharge: HOME | End: 2019-01-25
Payer: MEDICARE

## 2019-01-30 DIAGNOSIS — Z12.11 SCREENING FOR COLON CANCER: ICD-10-CM

## 2019-01-30 LAB
CONTROL: NORMAL
HEMOCCULT STL QL: NORMAL

## 2019-01-30 PROCEDURE — 82274 ASSAY TEST FOR BLOOD FECAL: CPT

## 2019-02-05 ENCOUNTER — HOSPITAL ENCOUNTER (OUTPATIENT)
Dept: PAIN MANAGEMENT | Age: 59
Discharge: HOME | End: 2019-02-05
Payer: MEDICARE

## 2019-02-05 VITALS
SYSTOLIC BLOOD PRESSURE: 128 MMHG | DIASTOLIC BLOOD PRESSURE: 6 MMHG | RESPIRATION RATE: 18 BRPM | TEMPERATURE: 97.8 F | HEART RATE: 76 BPM

## 2019-02-05 DIAGNOSIS — M48.061 SPINAL STENOSIS AT L4-L5 LEVEL: ICD-10-CM

## 2019-02-05 DIAGNOSIS — M48.061 SPINAL STENOSIS, LUMBAR REGION, WITHOUT NEUROGENIC CLAUDICATION: ICD-10-CM

## 2019-02-05 DIAGNOSIS — Z51.81 MEDICATION MONITORING ENCOUNTER: Chronic | ICD-10-CM

## 2019-02-05 DIAGNOSIS — M96.1 FAILED BACK SURGICAL SYNDROME: Primary | ICD-10-CM

## 2019-02-05 PROCEDURE — 99214 OFFICE O/P EST MOD 30 MIN: CPT | Performed by: NURSE PRACTITIONER

## 2019-02-05 PROCEDURE — 99215 OFFICE O/P EST HI 40 MIN: CPT

## 2019-02-05 PROCEDURE — 80307 DRUG TEST PRSMV CHEM ANLYZR: CPT

## 2019-02-05 RX ORDER — CELECOXIB 100 MG/1
100 CAPSULE ORAL DAILY
Qty: 60 CAPSULE | Refills: 0 | Status: SHIPPED | OUTPATIENT
Start: 2019-02-05 | End: 2019-04-23 | Stop reason: ALTCHOICE

## 2019-02-05 RX ORDER — OXYCODONE HYDROCHLORIDE AND ACETAMINOPHEN 5; 325 MG/1; MG/1
1 TABLET ORAL DAILY PRN
Qty: 30 TABLET | Refills: 0 | Status: SHIPPED | OUTPATIENT
Start: 2019-02-05 | End: 2019-02-26 | Stop reason: SDUPTHER

## 2019-02-05 ASSESSMENT — ENCOUNTER SYMPTOMS
CONSTIPATION: 1
SHORTNESS OF BREATH: 0
BACK PAIN: 1
COUGH: 0

## 2019-02-07 RX ORDER — TIZANIDINE 4 MG/1
4 TABLET ORAL NIGHTLY PRN
Qty: 30 TABLET | Refills: 0 | Status: SHIPPED | OUTPATIENT
Start: 2019-02-07 | End: 2019-04-23 | Stop reason: SDUPTHER

## 2019-02-09 LAB
6-ACETYLMORPHINE, UR: NOT DETECTED
7-AMINOCLONAZEPAM, URINE: NOT DETECTED
ALPHA-OH-ALPRAZ, URINE: NOT DETECTED
ALPRAZOLAM, URINE: NOT DETECTED
AMPHETAMINES, URINE: NOT DETECTED
BARBITURATES, URINE: NOT DETECTED
BENZOYLECGONINE, UR: NOT DETECTED
BUPRENORPHINE URINE: NOT DETECTED
CARISOPRODOL, UR: NOT DETECTED
CLONAZEPAM, URINE: NOT DETECTED
CODEINE, URINE: NOT DETECTED
CREATININE URINE: 216.2 MG/DL (ref 20–400)
DIAZEPAM, URINE: NOT DETECTED
EER PAIN MGT DRUG PANEL, HIGH RES/EMIT U: NORMAL
ETHYL GLUCURONIDE UR: NOT DETECTED
FENTANYL URINE: NOT DETECTED
HYDROCODONE, URINE: NOT DETECTED
HYDROMORPHONE, URINE: NOT DETECTED
LORAZEPAM, URINE: NOT DETECTED
MARIJUANA METAB, UR: NOT DETECTED
MDA, UR: NOT DETECTED
MDEA, EVE, UR: NOT DETECTED
MDMA URINE: NOT DETECTED
MEPERIDINE METAB, UR: NOT DETECTED
METHADONE, URINE: NOT DETECTED
METHAMPHETAMINE, URINE: NOT DETECTED
METHYLPHENIDATE: NOT DETECTED
MIDAZOLAM, URINE: NOT DETECTED
MORPHINE URINE: NOT DETECTED
NORBUPRENORPHINE, URINE: NOT DETECTED
NORDIAZEPAM, URINE: NOT DETECTED
NORFENTANYL, URINE: NOT DETECTED
NORHYDROCODONE, URINE: NOT DETECTED
NOROXYCODONE, URINE: PRESENT
NOROXYMORPHONE, URINE: NOT DETECTED
OXAZEPAM, URINE: NOT DETECTED
OXYCODONE URINE: PRESENT
OXYMORPHONE, URINE: NOT DETECTED
PAIN MGT DRUG PANEL, HI RES, UR: NORMAL
PCP,URINE: NOT DETECTED
PHENTERMINE, UR: NOT DETECTED
PROPOXYPHENE, URINE: NOT DETECTED
TAPENTADOL, URINE: NOT DETECTED
TAPENTADOL-O-SULFATE, URINE: NOT DETECTED
TEMAZEPAM, URINE: NOT DETECTED
TRAMADOL, URINE: NOT DETECTED
ZOLPIDEM, URINE: NOT DETECTED

## 2019-02-11 ENCOUNTER — APPOINTMENT (OUTPATIENT)
Dept: PHYSICAL THERAPY | Age: 59
End: 2019-02-11
Payer: MEDICARE

## 2019-02-11 ENCOUNTER — HOSPITAL ENCOUNTER (OUTPATIENT)
Dept: PHYSICAL THERAPY | Age: 59
Setting detail: THERAPIES SERIES
End: 2019-02-11
Payer: MEDICARE

## 2019-02-19 ENCOUNTER — TELEPHONE (OUTPATIENT)
Dept: INTERNAL MEDICINE | Age: 59
End: 2019-02-19

## 2019-02-26 ENCOUNTER — HOSPITAL ENCOUNTER (OUTPATIENT)
Age: 59
Discharge: HOME | End: 2019-02-26
Payer: MEDICARE

## 2019-02-26 ENCOUNTER — HOSPITAL ENCOUNTER (OUTPATIENT)
Dept: PAIN MANAGEMENT | Age: 59
Discharge: HOME | End: 2019-02-26
Payer: MEDICARE

## 2019-02-26 VITALS
SYSTOLIC BLOOD PRESSURE: 144 MMHG | TEMPERATURE: 97.2 F | DIASTOLIC BLOOD PRESSURE: 83 MMHG | RESPIRATION RATE: 20 BRPM | BODY MASS INDEX: 41.95 KG/M2 | HEIGHT: 70 IN | WEIGHT: 293 LBS | HEART RATE: 88 BPM

## 2019-02-26 DIAGNOSIS — Z01.818 PREOPERATIVE TESTING: ICD-10-CM

## 2019-02-26 DIAGNOSIS — M48.061 SPINAL STENOSIS, LUMBAR REGION, WITHOUT NEUROGENIC CLAUDICATION: ICD-10-CM

## 2019-02-26 DIAGNOSIS — E78.00 PURE HYPERCHOLESTEROLEMIA: ICD-10-CM

## 2019-02-26 DIAGNOSIS — M96.1 FAILED BACK SURGICAL SYNDROME: ICD-10-CM

## 2019-02-26 DIAGNOSIS — M96.1 FAILED BACK SURGICAL SYNDROME: Primary | ICD-10-CM

## 2019-02-26 DIAGNOSIS — M48.061 SPINAL STENOSIS AT L4-L5 LEVEL: ICD-10-CM

## 2019-02-26 DIAGNOSIS — I10 ESSENTIAL HYPERTENSION: ICD-10-CM

## 2019-02-26 LAB
ANION GAP SERPL CALCULATED.3IONS-SCNC: 13 MMOL/L (ref 9–17)
BUN BLDV-MCNC: 19 MG/DL (ref 8–23)
BUN/CREAT BLD: ABNORMAL (ref 9–20)
CALCIUM SERPL-MCNC: 9.5 MG/DL (ref 8.6–10.4)
CHLORIDE BLD-SCNC: 108 MMOL/L (ref 98–107)
CO2: 21 MMOL/L (ref 20–31)
CREAT SERPL-MCNC: 0.65 MG/DL (ref 0.5–0.9)
GFR AFRICAN AMERICAN: >60 ML/MIN
GFR NON-AFRICAN AMERICAN: >60 ML/MIN
GFR SERPL CREATININE-BSD FRML MDRD: ABNORMAL ML/MIN/{1.73_M2}
GFR SERPL CREATININE-BSD FRML MDRD: ABNORMAL ML/MIN/{1.73_M2}
GLUCOSE BLD-MCNC: 88 MG/DL (ref 70–99)
HCT VFR BLD CALC: 40.5 % (ref 36.3–47.1)
HEMOGLOBIN: 13.3 G/DL (ref 11.9–15.1)
INR BLD: 1
MCH RBC QN AUTO: 30.9 PG (ref 25.2–33.5)
MCHC RBC AUTO-ENTMCNC: 32.8 G/DL (ref 28.4–34.8)
MCV RBC AUTO: 94 FL (ref 82.6–102.9)
NRBC AUTOMATED: 0 PER 100 WBC
PDW BLD-RTO: 15 % (ref 11.8–14.4)
PLATELET # BLD: 235 K/UL (ref 138–453)
PMV BLD AUTO: 11.6 FL (ref 8.1–13.5)
POTASSIUM SERPL-SCNC: 3.9 MMOL/L (ref 3.7–5.3)
PROTHROMBIN TIME: 10.2 SEC (ref 9–12)
RBC # BLD: 4.31 M/UL (ref 3.95–5.11)
SODIUM BLD-SCNC: 142 MMOL/L (ref 135–144)
WBC # BLD: 5 K/UL (ref 3.5–11.3)

## 2019-02-26 PROCEDURE — 85027 COMPLETE CBC AUTOMATED: CPT

## 2019-02-26 PROCEDURE — 36415 COLL VENOUS BLD VENIPUNCTURE: CPT

## 2019-02-26 PROCEDURE — 80048 BASIC METABOLIC PNL TOTAL CA: CPT

## 2019-02-26 PROCEDURE — 99214 OFFICE O/P EST MOD 30 MIN: CPT

## 2019-02-26 PROCEDURE — 99215 OFFICE O/P EST HI 40 MIN: CPT | Performed by: ANESTHESIOLOGY

## 2019-02-26 PROCEDURE — 85610 PROTHROMBIN TIME: CPT

## 2019-02-26 PROCEDURE — 81003 URINALYSIS AUTO W/O SCOPE: CPT

## 2019-02-26 RX ORDER — HYDROCHLOROTHIAZIDE 25 MG/1
25 TABLET ORAL DAILY
Qty: 30 TABLET | Refills: 5 | Status: SHIPPED | OUTPATIENT
Start: 2019-02-26 | End: 2019-05-01 | Stop reason: SDUPTHER

## 2019-02-26 RX ORDER — OXYCODONE HYDROCHLORIDE AND ACETAMINOPHEN 5; 325 MG/1; MG/1
1 TABLET ORAL DAILY PRN
Qty: 30 TABLET | Refills: 0 | Status: SHIPPED | OUTPATIENT
Start: 2019-03-07 | End: 2019-02-26 | Stop reason: SDUPTHER

## 2019-02-26 RX ORDER — LOVASTATIN 20 MG/1
20 TABLET ORAL DAILY
Qty: 30 TABLET | Refills: 2 | Status: SHIPPED | OUTPATIENT
Start: 2019-02-26 | End: 2019-05-01 | Stop reason: SINTOL

## 2019-02-26 RX ORDER — OXYCODONE HYDROCHLORIDE AND ACETAMINOPHEN 5; 325 MG/1; MG/1
1 TABLET ORAL DAILY PRN
Qty: 30 TABLET | Refills: 0 | Status: SHIPPED | OUTPATIENT
Start: 2019-03-07 | End: 2019-04-23 | Stop reason: SDUPTHER

## 2019-02-26 ASSESSMENT — PAIN DESCRIPTION - ONSET: ONSET: PROGRESSIVE

## 2019-02-26 ASSESSMENT — PAIN DESCRIPTION - FREQUENCY: FREQUENCY: CONTINUOUS

## 2019-02-26 ASSESSMENT — PAIN DESCRIPTION - PROGRESSION: CLINICAL_PROGRESSION: GRADUALLY WORSENING

## 2019-02-26 ASSESSMENT — PAIN SCALES - GENERAL: PAINLEVEL_OUTOF10: 10

## 2019-02-26 ASSESSMENT — PAIN DESCRIPTION - ORIENTATION: ORIENTATION: RIGHT;LEFT

## 2019-02-26 ASSESSMENT — PAIN DESCRIPTION - LOCATION: LOCATION: LEG;BUTTOCKS

## 2019-02-26 ASSESSMENT — PAIN DESCRIPTION - PAIN TYPE: TYPE: CHRONIC PAIN

## 2019-02-26 ASSESSMENT — ENCOUNTER SYMPTOMS: BACK PAIN: 1

## 2019-02-27 ENCOUNTER — HOSPITAL ENCOUNTER (OUTPATIENT)
Dept: PHYSICAL THERAPY | Age: 59
Setting detail: THERAPIES SERIES
Discharge: HOME | End: 2019-02-27
Payer: MEDICARE

## 2019-02-27 ENCOUNTER — APPOINTMENT (OUTPATIENT)
Dept: PHYSICAL THERAPY | Age: 59
End: 2019-02-27
Payer: MEDICARE

## 2019-02-27 LAB
BILIRUBIN URINE: NEGATIVE
COLOR: YELLOW
COMMENT UA: NORMAL
GLUCOSE URINE: NEGATIVE
KETONES, URINE: NEGATIVE
LEUKOCYTE ESTERASE, URINE: NEGATIVE
NITRITE, URINE: NEGATIVE
PH UA: 5.5 (ref 5–8)
PROTEIN UA: NEGATIVE
SPECIFIC GRAVITY UA: 1.01 (ref 1–1.03)
TURBIDITY: CLEAR
URINE HGB: NEGATIVE
UROBILINOGEN, URINE: NORMAL

## 2019-02-27 PROCEDURE — 97113 AQUATIC THERAPY/EXERCISES: CPT

## 2019-02-27 PROCEDURE — 97162 PT EVAL MOD COMPLEX 30 MIN: CPT

## 2019-02-27 ASSESSMENT — PAIN SCALES - GENERAL
PAINLEVEL_OUTOF10: 10
PAINLEVEL_OUTOF10: 10

## 2019-02-27 ASSESSMENT — PAIN DESCRIPTION - ORIENTATION
ORIENTATION: LEFT
ORIENTATION: LOWER;LEFT

## 2019-02-27 ASSESSMENT — PAIN DESCRIPTION - DESCRIPTORS: DESCRIPTORS: CONSTANT

## 2019-02-27 ASSESSMENT — PAIN DESCRIPTION - PAIN TYPE
TYPE: CHRONIC PAIN
TYPE: CHRONIC PAIN

## 2019-02-27 ASSESSMENT — PAIN DESCRIPTION - PROGRESSION: CLINICAL_PROGRESSION: GRADUALLY WORSENING

## 2019-02-27 ASSESSMENT — PAIN DESCRIPTION - ONSET: ONSET: ON-GOING

## 2019-02-27 ASSESSMENT — PAIN DESCRIPTION - LOCATION
LOCATION: BACK;LEG
LOCATION: BACK;HIP;LEG

## 2019-02-27 ASSESSMENT — PAIN DESCRIPTION - FREQUENCY: FREQUENCY: CONTINUOUS

## 2019-03-04 ENCOUNTER — HOSPITAL ENCOUNTER (OUTPATIENT)
Dept: PHYSICAL THERAPY | Age: 59
Setting detail: THERAPIES SERIES
Discharge: HOME | End: 2019-03-04
Payer: MEDICARE

## 2019-03-07 ENCOUNTER — HOSPITAL ENCOUNTER (OUTPATIENT)
Dept: PHYSICAL THERAPY | Age: 59
Setting detail: THERAPIES SERIES
Discharge: HOME | End: 2019-03-07
Payer: MEDICARE

## 2019-03-07 PROCEDURE — 97113 AQUATIC THERAPY/EXERCISES: CPT

## 2019-03-07 ASSESSMENT — PAIN DESCRIPTION - PAIN TYPE: TYPE: CHRONIC PAIN

## 2019-03-07 ASSESSMENT — PAIN DESCRIPTION - LOCATION: LOCATION: LEG

## 2019-03-07 ASSESSMENT — PAIN DESCRIPTION - ORIENTATION: ORIENTATION: LEFT

## 2019-03-07 ASSESSMENT — PAIN DESCRIPTION - DESCRIPTORS: DESCRIPTORS: CONSTANT

## 2019-03-07 ASSESSMENT — PAIN SCALES - GENERAL: PAINLEVEL_OUTOF10: 10

## 2019-03-14 ENCOUNTER — HOSPITAL ENCOUNTER (OUTPATIENT)
Dept: PHYSICAL THERAPY | Age: 59
Setting detail: THERAPIES SERIES
Discharge: HOME | End: 2019-03-14
Payer: MEDICARE

## 2019-03-28 ENCOUNTER — TELEPHONE (OUTPATIENT)
Dept: INTERNAL MEDICINE | Age: 59
End: 2019-03-28

## 2019-03-28 DIAGNOSIS — M48.061 SPINAL STENOSIS AT L4-L5 LEVEL: ICD-10-CM

## 2019-03-28 DIAGNOSIS — E66.01 MORBID OBESITY DUE TO EXCESS CALORIES (HCC): Primary | Chronic | ICD-10-CM

## 2019-04-03 ENCOUNTER — HOSPITAL ENCOUNTER (OUTPATIENT)
Dept: PHYSICAL THERAPY | Age: 59
Setting detail: THERAPIES SERIES
Discharge: HOME | End: 2019-04-03
Payer: MEDICARE

## 2019-04-03 PROCEDURE — 97113 AQUATIC THERAPY/EXERCISES: CPT

## 2019-04-03 ASSESSMENT — PAIN SCALES - GENERAL: PAINLEVEL_OUTOF10: 10

## 2019-04-03 ASSESSMENT — PAIN DESCRIPTION - ORIENTATION: ORIENTATION: LEFT;RIGHT

## 2019-04-03 ASSESSMENT — PAIN DESCRIPTION - PAIN TYPE: TYPE: CHRONIC PAIN

## 2019-04-03 ASSESSMENT — PAIN DESCRIPTION - LOCATION: LOCATION: KNEE

## 2019-04-03 NOTE — PROGRESS NOTES
12x     IR/ER  12x 12x     Push Downs  12x 12x             Deep Water  1 Noodle 2 Noodle     Cycling  2 minutes 2 minutes     Jacks  2 minutes 2 minutes                     Stretches        Achllies 2x20\" 2x20\" 2x20\"     Hamstring 2x20\" 2x20\" 2x20\"             Cool down 2 laps 2 laps 2 laps     Pain Rating 10 L LE  7 R LE 10 L LE  6 R LE 10 L LE  7 R LE       Comment:  Comments: Return to Aquatics- patient's last aquatics session was 3/7/19 due to having a \"bad cold\"     Assessment: Body structures, Functions, Activity limitations: Decreased functional mobility ; Decreased ADL status; Decreased ROM; Decreased strength;Decreased balance; Increased Pain  Treatment Diagnosis: Pain, difficulty walking, decreased ROM, strength and function of low back  Prognosis: Good  REQUIRES PT FOLLOW UP: Yes  Discharge Recommendations: Continue to assess pending progress  Activity Tolerance: Patient Tolerated treatment well;Patient limited by pain  Comments: Resumed aquatics program per log without issue    Plan:  Plan: Continue with current plan    Therapy Time:  Time In: 1420  Time Out: 1500  Minutes: 40  Timed Code Treatment Minutes: 25 Minutes    Treatment Charges: Minutes Units   []  Ultrasound     []  Electrical-Stim     []  Iontophoresis     []  Traction     []  Massage       []  Eval     []  Gait     []  Vasopneumatic Device     []  Ther Exercise       []  Manual Therapy       []  Ther Activities       [x]  Aquatics 25 2   []  Neuro Re-Ed       []  Other       Total Treatment Time: 25 2     Electronically signed by Asif Saxena PTA on 4/3/2019 at 6:43 PM

## 2019-04-04 ENCOUNTER — HOSPITAL ENCOUNTER (EMERGENCY)
Age: 59
Discharge: HOME | End: 2019-04-04
Attending: EMERGENCY MEDICINE
Payer: MEDICARE

## 2019-04-04 VITALS
HEART RATE: 88 BPM | WEIGHT: 293 LBS | BODY MASS INDEX: 46.63 KG/M2 | OXYGEN SATURATION: 98 % | RESPIRATION RATE: 18 BRPM | SYSTOLIC BLOOD PRESSURE: 143 MMHG | TEMPERATURE: 98 F | DIASTOLIC BLOOD PRESSURE: 102 MMHG

## 2019-04-04 DIAGNOSIS — N39.0 URINARY TRACT INFECTION WITHOUT HEMATURIA, SITE UNSPECIFIED: Primary | ICD-10-CM

## 2019-04-04 DIAGNOSIS — R01.1 HEART MURMUR: ICD-10-CM

## 2019-04-04 LAB
-: ABNORMAL
AMORPHOUS: ABNORMAL
BACTERIA: ABNORMAL
BILIRUBIN URINE: ABNORMAL
CASTS UA: ABNORMAL /LPF (ref 0–2)
COLOR: ABNORMAL
COMMENT UA: ABNORMAL
CRYSTALS, UA: ABNORMAL /HPF
CRYSTALS, UA: ABNORMAL /HPF
EPITHELIAL CELLS UA: ABNORMAL /HPF (ref 0–5)
GLUCOSE URINE: NEGATIVE
KETONES, URINE: ABNORMAL
LEUKOCYTE ESTERASE, URINE: ABNORMAL
MUCUS: ABNORMAL
NITRITE, URINE: POSITIVE
OTHER OBSERVATIONS UA: ABNORMAL
PH UA: 5 (ref 5–8)
PROTEIN UA: NEGATIVE
RBC UA: ABNORMAL /HPF (ref 0–2)
RENAL EPITHELIAL, UA: ABNORMAL /HPF
SPECIFIC GRAVITY UA: 1.03 (ref 1–1.03)
TRICHOMONAS: ABNORMAL
TURBIDITY: ABNORMAL
URINE HGB: NEGATIVE
UROBILINOGEN, URINE: NORMAL
WBC UA: ABNORMAL /HPF (ref 0–5)
YEAST: ABNORMAL

## 2019-04-04 PROCEDURE — 87077 CULTURE AEROBIC IDENTIFY: CPT

## 2019-04-04 PROCEDURE — 87186 SC STD MICRODIL/AGAR DIL: CPT

## 2019-04-04 PROCEDURE — 99283 EMERGENCY DEPT VISIT LOW MDM: CPT

## 2019-04-04 PROCEDURE — 87086 URINE CULTURE/COLONY COUNT: CPT

## 2019-04-04 PROCEDURE — 81001 URINALYSIS AUTO W/SCOPE: CPT

## 2019-04-04 RX ORDER — NITROFURANTOIN 25; 75 MG/1; MG/1
100 CAPSULE ORAL 2 TIMES DAILY
Qty: 10 CAPSULE | Refills: 0 | Status: SHIPPED | OUTPATIENT
Start: 2019-04-04 | End: 2019-04-09

## 2019-04-04 ASSESSMENT — ENCOUNTER SYMPTOMS
NAUSEA: 0
VOMITING: 0
ABDOMINAL PAIN: 0
DIARRHEA: 0
SHORTNESS OF BREATH: 0
BACK PAIN: 1
COUGH: 0
CHEST TIGHTNESS: 0

## 2019-04-04 NOTE — ED PROVIDER NOTES
History    Not on file   Social Needs    Financial resource strain: Not on file    Food insecurity:     Worry: Not on file     Inability: Not on file    Transportation needs:     Medical: Not on file     Non-medical: Not on file   Tobacco Use    Smoking status: Former Smoker     Packs/day: 0.10     Years: 30.00     Pack years: 3.00     Types: Cigarettes     Last attempt to quit: 10/23/1988     Years since quittin.4    Smokeless tobacco: Never Used   Substance and Sexual Activity    Alcohol use: No     Alcohol/week: 0.0 oz    Drug use: No    Sexual activity: Never   Lifestyle    Physical activity:     Days per week: Not on file     Minutes per session: Not on file    Stress: Not on file   Relationships    Social connections:     Talks on phone: Not on file     Gets together: Not on file     Attends [employer] service: Not on file     Active member of club or organization: Not on file     Attends meetings of clubs or organizations: Not on file     Relationship status: Not on file    Intimate partner violence:     Fear of current or ex partner: Not on file     Emotionally abused: Not on file     Physically abused: Not on file     Forced sexual activity: Not on file   Other Topics Concern    Not on file   Social History Narrative    Not on file       Patient advised to stop smoking or to avoid tobacco use. Family History   Problem Relation Age of Onset    Ovarian Cancer Mother     Heart Disease Father     Hypertension Father         Allergies:  Lisinopril; Azithromycin; Ciprofloxacin; Keflex [cephalexin]; Mobic [meloxicam]; Penicillins; Aztreonam; and Bactrim [sulfamethoxazole-trimethoprim]    HomeMedications:  Prior to Admission medications    Medication Sig Start Date End Date Taking?  Authorizing Provider   nitrofurantoin, macrocrystal-monohydrate, (MACROBID) 100 MG capsule Take 1 capsule by mouth 2 times daily for 5 days 19 Yes Pepe Chan, DO   lovastatin (MEVACOR) 20 MG tablet Take 1 tablet by mouth daily 2/26/19  Yes Elina Shabazz MD   hydrochlorothiazide (HYDRODIURIL) 25 MG tablet Take 1 tablet by mouth daily 2/26/19  Yes Elina Shabazz MD   oxyCODONE-acetaminophen (PERCOCET) 5-325 MG per tablet Take 1 tablet by mouth daily as needed for Pain for up to 30 days. . 3/7/19 4/6/19 Yes Eduardo Cervantes MD   amitriptyline (ELAVIL) 10 MG tablet Take 1 tablet by mouth nightly 10/31/18 4/4/19 Yes KELLI An - CNP   aspirin 81 MG tablet Take 162 mg by mouth daily   Yes Historical Provider, MD   Multiple Vitamin (THERAPEUTIC MULTIVITAMIN PO) Take 1 tablet by mouth   Yes Historical Provider, MD   vitamin C (VITAMIN C) 500 MG tablet Take 1 tablet by mouth daily 11/4/15  Yes Joel Davis MD   Handicap [place] [address] [facility] by Does not apply route Requires a disability parking placard for the following reasons:  She cannot walk 200 feet without stopping to rest.  Duration of need: 5 year 3/29/19   Matheus Lucio MD   celecoxib (CELEBREX) 100 MG capsule Take 1 capsule by mouth daily 2/5/19   Sunday Vega APRN - CNP       REVIEW OF SYSTEMS    (2-9 systems for level 4, 10 or more for level 5)      Review of Systems   Constitutional: Negative for chills and fever. Respiratory: Negative for cough, chest tightness and shortness of breath. Cardiovascular: Negative for chest pain and leg swelling. Gastrointestinal: Negative for abdominal pain, diarrhea, nausea and vomiting. Genitourinary: Positive for dysuria, frequency and urgency. Negative for hematuria, vaginal bleeding, vaginal discharge and vaginal pain. Musculoskeletal: Positive for back pain. Negative for arthralgias. Skin: Negative for rash and wound. Allergic/Immunologic: Negative for food allergies and immunocompromised state. Neurological: Negative for dizziness, syncope, weakness and headaches. Psychiatric/Behavioral: Negative for suicidal ideas. The patient is not nervous/anxious.         PHYSICAL EXAM (up to 7 for level 4, 8or more for level 5)      INITIAL VITALS:   BP (!) 143/102   Pulse 88   Temp 98 °F (36.7 °C) (Oral)   Resp 18   Wt (!) 325 lb (147.4 kg)   LMP 09/07/2007   SpO2 98%   BMI 46.63 kg/m²     Physical Exam   Constitutional: She is oriented to person, place, and time. She appears well-developed and well-nourished. No distress. HENT:   Head: Normocephalic and atraumatic. Right Ear: External ear normal.   Left Ear: External ear normal.   Eyes: Conjunctivae are normal. Right eye exhibits no discharge. Left eye exhibits no discharge. Neck: Normal range of motion. Neck supple. No JVD present. No tracheal deviation present. Cardiovascular: Normal rate and regular rhythm. Exam reveals no gallop and no friction rub. Murmur heard. Pulmonary/Chest: Effort normal and breath sounds normal. No stridor. No respiratory distress. She exhibits no tenderness. Abdominal: Soft. Bowel sounds are normal. She exhibits no distension and no mass. There is no tenderness. Musculoskeletal: Normal range of motion. She exhibits no edema, tenderness or deformity. Neurological: She is alert and oriented to person, place, and time. Skin: Skin is warm, dry and intact. No rash noted. She is not diaphoretic. Psychiatric: She has a normal mood and affect. Her speech is normal. Judgment normal.   Nursing note and vitals reviewed.       DIFFERENTIAL  DIAGNOSIS     PLAN (LABS / IMAGING / EKG):  Orders Placed This Encounter   Procedures    Urine Culture    Urinalysis Reflex to Culture    Microscopic Urinalysis       MEDICATIONS ORDERED:  Orders Placed This Encounter   Medications    nitrofurantoin, macrocrystal-monohydrate, (MACROBID) 100 MG capsule     Sig: Take 1 capsule by mouth 2 times daily for 5 days     Dispense:  10 capsule     Refill:  0       DIAGNOSTIC RESULTS / EMERGENCY DEPARTMENT COURSE / MDM     LABS:  Results for orders placed or performed during the hospital encounter of 04/04/19 Urinalysis Reflex to Culture   Result Value Ref Range    Color, UA DARK YELLOW (A) YELLOW    Turbidity UA CLOUDY (A) CLEAR    Glucose, Ur NEGATIVE NEGATIVE    Bilirubin Urine NEGATIVE  Verified by ictotest. (A) NEGATIVE    Ketones, Urine TRACE (A) NEGATIVE    Specific Gravity, UA 1.029 1.005 - 1.030    Urine Hgb NEGATIVE NEGATIVE    pH, UA 5.0 5.0 - 8.0    Protein, UA NEGATIVE NEGATIVE    Urobilinogen, Urine Normal Normal    Nitrite, Urine POSITIVE (A) NEGATIVE    Leukocyte Esterase, Urine TRACE (A) NEGATIVE    Urinalysis Comments Culture ordered based on defined criteria. Microscopic Urinalysis   Result Value Ref Range    -          WBC, UA 2 TO 5 0 - 5 /HPF    RBC, UA None 0 - 2 /HPF    Casts UA NOT REPORTED 0 - 2 /LPF    Crystals UA MANY (A) None /HPF    Crystals UA CALCIUM OXALATE (A) None /HPF    Epithelial Cells UA 0 TO 2 0 - 5 /HPF    Renal Epithelial, Urine NOT REPORTED 0 /HPF    Bacteria, UA NOT REPORTED None    Mucus, UA 3+ (A) None    Trichomonas, UA NOT REPORTED None    Amorphous, UA NOT REPORTED None    Other Observations UA NOT REPORTED NOT REQ. Yeast, UA NOT REPORTED None       IMPRESSION: Patient is returning tract infection, given patient's complaints, previous episodes of UTI. Low suspicion for pyelonephritis given lack of CVA tenderness, low suspicion for acute intra-abdominal process given benign abdominal exam.  Patient also has new onset murmur Y for follow-up with her primary care physician for this. Plan for urinalysis, antibiotics if indicated and discharge. RADIOLOGY:  No results found. EKG  None    All EKG's are interpreted by the [facility] Physician who either signs or Co-signs this chart in the absence of a cardiologist.    [address] [place] [address]:  Patient seen and evaluated by myself and attending. Urinalysis concerning for urinary tract infection, we'll start on Macrobid given patient's multiple allergies.   We'll have her follow-up with her PCP for new

## 2019-04-04 NOTE — ED PROVIDER NOTES
physical exam, and MDM). Additional findings are as noted. For APC cases I have personally evaluated and examined the patient in conjunction with the APC and agree with the treatment plan and disposition of the patient as recorded by the APC.     Shannon Obrien MD  Attending Emergency  Physician        Deyanira Bro MD  04/04/19 [id number]

## 2019-04-06 LAB
CULTURE: ABNORMAL
Lab: ABNORMAL
SPECIMEN DESCRIPTION: ABNORMAL

## 2019-04-08 ENCOUNTER — TELEPHONE (OUTPATIENT)
Dept: PHARMACY | Age: 59
End: 2019-04-08

## 2019-04-08 ENCOUNTER — HOSPITAL ENCOUNTER (OUTPATIENT)
Dept: PHYSICAL THERAPY | Age: 59
Setting detail: THERAPIES SERIES
Discharge: HOME | End: 2019-04-08
Payer: MEDICARE

## 2019-04-08 NOTE — PROGRESS NOTES
At the time of Richard Rubio visit to [facility] on 4/4/2019, a urine culture was obtained. It was positive for Acinetobacter Lwoffi. Spoke to patient at 1:30 pm on 4/8/2019 regarding the need to stop taking the nitrofurantoin due to inappropriate therapy and start taking ciprofloxacin 250 mg BID for 7 days. Medication called into [address] [place] [address] pharmacy on behalf of Dr. Attila De La Cruz. Instructed patient to  prescription and start taking new antibiotic.   Douglas Carlos, Pharm D Candidate, 4/8/2019 3:12 PM

## 2019-04-08 NOTE — PROGRESS NOTES
Physical Therapy  [address]   Outpatient Physical Therapy  Cancel/No Show Note    Date: 19    Patient Name: Justo Whitfield        MRN: [id number]  Account: [de-identified] : 1960      General Information:  Referring Practitioner: Dr. Yair Zamudio  Referral Date : 19  Diagnosis: Low back pain  Onset Date: 16  PT Insurance Information: Medicare/ Medicaid  Total # of Visits Approved: 12  Total # of Visits to Date: 3  No Show: 4  Canceled Appointment: 3        Comments: CANCELLED AQUATICS DUE TO UTI    Diann Pimentel, PTA

## 2019-04-15 ENCOUNTER — HOSPITAL ENCOUNTER (EMERGENCY)
Age: 59
Discharge: LEFT BEFORE DISCHARGE | End: 2019-04-15
Attending: EMERGENCY MEDICINE
Payer: MEDICARE

## 2019-04-15 VITALS
HEART RATE: 79 BPM | DIASTOLIC BLOOD PRESSURE: 79 MMHG | OXYGEN SATURATION: 100 % | RESPIRATION RATE: 18 BRPM | SYSTOLIC BLOOD PRESSURE: 160 MMHG | TEMPERATURE: 97 F | BODY MASS INDEX: 41.02 KG/M2 | WEIGHT: 293 LBS | HEIGHT: 71 IN

## 2019-04-15 DIAGNOSIS — R30.0 DYSURIA: Primary | ICD-10-CM

## 2019-04-15 LAB
-: NORMAL
AMORPHOUS: NORMAL
BACTERIA: NORMAL
BILIRUBIN URINE: NEGATIVE
CASTS UA: NORMAL /LPF (ref 0–8)
COLOR: YELLOW
CRYSTALS, UA: NORMAL /HPF
EPITHELIAL CELLS UA: NORMAL /HPF (ref 0–5)
GLUCOSE URINE: NEGATIVE
KETONES, URINE: NEGATIVE
LEUKOCYTE ESTERASE, URINE: NEGATIVE
MUCUS: NORMAL
NITRITE, URINE: NEGATIVE
OTHER OBSERVATIONS UA: NORMAL
PH UA: 5 (ref 5–8)
PROTEIN UA: NEGATIVE
RBC UA: NORMAL /HPF (ref 0–4)
RENAL EPITHELIAL, UA: NORMAL /HPF
SPECIFIC GRAVITY UA: 1.02 (ref 1–1.03)
TRICHOMONAS: NORMAL
TURBIDITY: CLEAR
URINE HGB: NEGATIVE
UROBILINOGEN, URINE: NORMAL
WBC UA: NORMAL /HPF (ref 0–5)
YEAST: NORMAL

## 2019-04-15 PROCEDURE — 99283 EMERGENCY DEPT VISIT LOW MDM: CPT

## 2019-04-15 PROCEDURE — 81001 URINALYSIS AUTO W/SCOPE: CPT

## 2019-04-15 ASSESSMENT — PAIN DESCRIPTION - PAIN TYPE: TYPE: ACUTE PAIN

## 2019-04-15 ASSESSMENT — PAIN DESCRIPTION - LOCATION: LOCATION: BACK

## 2019-04-15 ASSESSMENT — ENCOUNTER SYMPTOMS
SHORTNESS OF BREATH: 0
ABDOMINAL PAIN: 0

## 2019-04-15 ASSESSMENT — PAIN SCALES - GENERAL: PAINLEVEL_OUTOF10: 6

## 2019-04-15 NOTE — ED PROVIDER NOTES
PATIENT PRESENTING INDICATING SHE HAD BEEN CONTACTED BY MEDICAL CARE PROVIDER ADVISING HER TO COME TO ED FOR REEVALUATION. RECENTLY DX'ED/TX''ED FOR UTI WITH RX FOR NITROFURANTOIN. ADVISED THAT URINE CULTURE INDICATED BACTERIA NOT SUSCEPTIBLE TO NITROFURANTOIN. PATIENT DENIES HEMATURIA, FLANK PAIN, FEVER, CHILLS, URINARY FREQUENCY, VAG DISCHARGE. REPORTS SHE TOOK HER MEDICATION AS RX'ED. REVIEW OF RECORD INDICATED GROWTH OF ACINETOBACTER WITHOUT TESTING OF NITROFURANTOIN SUSCEPTIBILITY. PATIENT REPORTEDLY DENIED ANY VULVOVAGINAL COMPLAINTS AND DECLINED EVALUATION FOR SAME. UA UNREMARKABLE. PATIENT NOT IN EXAM ROOM.    PATIENT WAS NOT EVALUATED BY Anton Mcintyre MD  04/15/19 [id number]

## 2019-04-15 NOTE — ED PROVIDER NOTES
[facility]  ED  Emergency Department Encounter  Emergency Medicine Resident     Patient Name: Dara Mendez  MRN: [id number]  [place]: 1960  Date of evaluation: 4/15/19  PCP:  Dagoberto Griffith MD    [address] [place] [address]       Chief Complaint   Patient presents with    Urinary Tract Infection       HISTORY OF PRESENT ILLNESS  (Location/Symptom, Timing/Onset, Context/Setting, Quality, Duration, Modifying Factors, Severity.)      Dara Mendez is a 60 y.o. female who presents with resolving dysuria. Patient was diagnosed with a UTI on 4/4/2019 and took a full course of nitrofurantoin. Patient reports that she had very bad urgency and frequency prior to taking his course of nitrofurantoin, and these symptoms have completely resolved. Patient reports she still has very mild dysuria left. Patient was called by a doctor and was told that her UTI was not sensistive to nitrofurantoin based on the culture and sensitivities, therefore she was started on cipro after finishing her course of nitrofurantoin. Patient reports she has had some tingling in her legs from the cipro therefore she stopped taking the abx. Her doctor told her to come to the ED today for IV abx for treatment of her UTI since she had to stop the cipro. PAST MEDICAL / SURGICAL / SOCIAL / FAMILY HISTORY      has a past medical history of Acquired cystic kidney disease, Anemia, Arthritis, Asthma, Bilateral leg and foot pain, Degeneration of cervical intervertebral disc, Depression, Failed back syndrome, History of recurrent UTI (urinary tract infection), Hyperlipidemia, Hypertension, Lumbar disc herniation with radiculopathy, Obesity, Spinal stenosis, and Wound infection after surgery. has a past surgical history that includes Foot surgery (Bilateral); Gallbladder surgery; Lumbar spine surgery (1986); Lumbar spine surgery (4/2012); Nerve Block (10/26/2015); Nerve Block (12/22/15);  Spine surgery (07/24/2016); back surgery; and Provider   Handicap Placard MISC by Does not apply route Requires a disability parking placard for the following reasons:  She cannot walk 200 feet without stopping to rest.  Duration of need: 5 year 3/29/19   Anastasia Wakefield MD   lovastatin (MEVACOR) 20 MG tablet Take 1 tablet by mouth daily 2/26/19   Dagoberto Griffith MD   hydrochlorothiazide (HYDRODIURIL) 25 MG tablet Take 1 tablet by mouth daily 2/26/19   Dagoberto Griffith MD   celecoxib (CELEBREX) 100 MG capsule Take 1 capsule by mouth daily 2/5/19   KELLI Ramsey - CNP   amitriptyline (ELAVIL) 10 MG tablet Take 1 tablet by mouth nightly 10/31/18 4/4/19  KELLI Packer CNP   aspirin 81 MG tablet Take 162 mg by mouth daily    Historical Provider, MD   Multiple Vitamin (THERAPEUTIC MULTIVITAMIN PO) Take 1 tablet by mouth    Historical Provider, MD   vitamin C (VITAMIN C) 500 MG tablet Take 1 tablet by mouth daily 11/4/15   Aleksandr Granados MD       REVIEW OF SYSTEMS    (2-9 systems for level 4, 10 or more for level 5)      Review of Systems   Constitutional: Negative for chills and fever. Respiratory: Negative for shortness of breath. Cardiovascular: Negative for chest pain. Gastrointestinal: Negative for abdominal pain. Genitourinary: Positive for dysuria (improved compared to prior). Negative for frequency and urgency. PHYSICAL EXAM   (up to 7 for level 4, 8 or more for level 5)      INITIAL VITALS:   BP (!) 160/79   Pulse 79   Temp 97 °F (36.1 °C) (Oral)   Resp 18   Ht 5' 11\" (1.803 m)   Wt (!) 350 lb (158.8 kg)   LMP 09/07/2007   SpO2 100%   BMI 48.82 kg/m²     Physical Exam   Constitutional: She is oriented to person, place, and time. She appears well-developed and well-nourished. No distress. HENT:   Head: Normocephalic and atraumatic. Eyes: Right eye exhibits no discharge. Left eye exhibits no discharge. Neck: No tracheal deviation present. Cardiovascular: Normal rate, regular rhythm and normal heart sounds. Pulmonary/Chest: Effort normal and breath sounds normal. No stridor. No respiratory distress. Musculoskeletal: Normal range of motion. She exhibits no deformity. Neurological: She is alert and oriented to person, place, and time. Skin: Skin is warm and dry. She is not diaphoretic. Psychiatric: She has a normal mood and affect. Her behavior is normal.       WORK-UP     PLAN (LABS / IMAGING /EKG):  Orders Placed This Encounter   Procedures    Urinalysis with Microscopic       MEDICATIONS ORDERED:  No orders of the defined types were placed in this encounter. DIAGNOSTIC RESULTS / EMERGENCY DEPARTMENT COURSE /MDM / DIFFERENTIAL DIAGNOSIS     LABS:  Results for orders placed or performed during the hospital encounter of 04/15/19   Urinalysis with Microscopic   Result Value Ref Range    Color, UA YELLOW YELLOW    Turbidity UA CLEAR CLEAR    Glucose, Ur NEGATIVE NEGATIVE    Bilirubin Urine NEGATIVE NEGATIVE    Ketones, Urine NEGATIVE NEGATIVE    Specific Gravity, UA 1.023 1.005 - 1.030    Urine Hgb NEGATIVE NEGATIVE    pH, UA 5.0 5.0 - 8.0    Protein, UA NEGATIVE NEGATIVE    Urobilinogen, Urine Normal Normal    Nitrite, Urine NEGATIVE NEGATIVE    Leukocyte Esterase, Urine NEGATIVE NEGATIVE    -          WBC, UA None 0 - 5 /HPF    RBC, UA 2 TO 5 0 - 4 /HPF    Casts UA  0 - 8 /LPF    Crystals UA NOT REPORTED None /HPF    Epithelial Cells UA 0 TO 2 0 - 5 /HPF    Renal Epithelial, Urine NOT REPORTED 0 /HPF    Bacteria, UA NOT REPORTED None    Mucus, UA NOT REPORTED None    Trichomonas, UA NOT REPORTED None    Amorphous, UA NOT REPORTED None    Other Observations UA NOT REPORTED NOT REQ.     Yeast, UA NOT REPORTED None       RADIOLOGY:  None    EKG  None    All EKG's are interpreted by the Emergency Department Physician who either signs or Co-signs this chart in the absence of a cardiologist.    DIFFERENTIAL DIAGNOSIS:  UTI, yeast infection, BV    EMERGENCY DEPARTMENT COURSE & MDM:  60 y.o. female presents with a

## 2019-04-15 NOTE — ED NOTES
Pt with c/o urinary urgency and dysuria. Pt with recent UTI on 4/4 and was given nitrofurantoin and cipro. Pt still having symptoms. Pt ambulatory to er room 6 with walker.       Rohini Holland RN  04/15/19 2108

## 2019-04-22 ENCOUNTER — HOSPITAL ENCOUNTER (OUTPATIENT)
Dept: PHYSICAL THERAPY | Age: 59
Setting detail: THERAPIES SERIES
Discharge: HOME | End: 2019-04-22
Payer: MEDICARE

## 2019-04-22 PROCEDURE — 97113 AQUATIC THERAPY/EXERCISES: CPT

## 2019-04-22 ASSESSMENT — PAIN SCALES - GENERAL: PAINLEVEL_OUTOF10: 9

## 2019-04-22 ASSESSMENT — PAIN DESCRIPTION - LOCATION: LOCATION: KNEE;LEG

## 2019-04-22 ASSESSMENT — PAIN DESCRIPTION - ORIENTATION: ORIENTATION: LEFT;RIGHT

## 2019-04-22 ASSESSMENT — PAIN DESCRIPTION - PAIN TYPE: TYPE: CHRONIC PAIN

## 2019-04-22 NOTE — PROGRESS NOTES
Physical Therapy  [address]   Outpatient Physical Therapy  [address]  Phone: [phone]  Fax: [phone]  Daily Progress Note    Date: 19    Patient Name: Nilo Jo        MRN: [id number]  Account: [de-identified] : 1960      General Information:  Chart Reviewed: Yes  Patient assessed for rehabilitation services?: Yes  Response To Previous Treatment: Patient with no complaints from previous session. Family / Caregiver Present: No  Referring Practitioner: Dr. Gabrielle Silva  Referral Date : 19  Diagnosis: Low back pain  Follows Commands: Within Functional Limits  Onset Date: 16  PT Insurance Information: Medicare/ Medicaid  Total # of Visits Approved: 12  Total # of Visits to Date: 4  No Show: 4  Canceled Appointment: 3    Subjective:  Subjective: Continues with pain in B knees. LB feels okay     Pain:  Patient Currently in Pain: Yes  Pain Assessment: 0-10  Pain Level: 9  Pain Type: Chronic pain  Pain Location: Knee;Leg  Pain Orientation: Left;Right(6/10 R knee)       Objective:  [facility] Exercise Log  Aquatic, Hip & DLS Program- Phase 1    Date of Eval:       19                         Primary PT:GIOVANI  Diagnosis: Low back pain  Things to Focus On (goals): Improve mobility, strength of LLE  Surgical Precautions:  Medical Precautions:  [] C-9 dates  [] Occ Med   [x] Medicare     Date 2/27/19 3/7/19 4/3/19 4/22/19    Visit # 1/12 2/12 3/12 4/12    Walk F/L/R 2 laps ea 2 laps ea 2 Laps ea 2 laps ea    Marching 2 laps 2 laps 2 Laps 2 Laps    Squats 10x5\" 12x5\" 12x5\" 12x5\"    Step-Ups F/L        Heel-toe raises 10x 12x 12x 12x    SLR F/L/R 10x ea 12x ea 12x ea 12x ea    Knee/Flex/Ext 10x 12x 12x 12x    F/L Lunges        Kickboard Ex.  Large Large Large Large    Iso Abd. 10x5\" 12x5\" 12x5\" 12x5\"    Push-pull 10x 12x 12x 12x    Paddling 10x 12x 12x 12x            UE EX        Alt Flex/Ext  12x 12x 12x ABD/ADD  12x 12x 12x    Horiz ABD/ADD  12x 12x 12x    IR/ER  12x 12x 12x    Push Downs  12x 12x 12x            Deep Water  1 Noodle 2 Noodle 2 Noodle    Cycling  2 minutes 2 minutes 2 minutes    Jacks  2 minutes 2 minutes 2 minutes                    Stretches        Achllies 2x20\" 2x20\" 2x20\" 2x20\"    Hamstring 2x20\" 2x20\" 2x20\" 2x20\"            Cool down 2 laps 2 laps 2 laps 2 Laps    Pain Rating 10 L LE  7 R LE 10 L LE  6 R LE 10 L LE  7 R LE 9 L LE  6 R LE      Comment:  Comments: Return to aquatics after missing aquatics for 2 weeks due to having a UTI and being on antibiotics    Assessment: Body structures, Functions, Activity limitations: Decreased functional mobility ; Decreased ADL status; Decreased ROM; Decreased strength;Decreased balance; Increased Pain  Treatment Diagnosis: Pain, difficulty walking, decreased ROM, strength and function of low back  Prognosis: Good  REQUIRES PT FOLLOW UP: Yes  Discharge Recommendations: Continue to assess pending progress  Activity Tolerance: Patient Tolerated treatment well;Patient limited by pain    Plan:  Plan: Continue with current plan    Therapy Time:  Time In: 1530  Time Out: [address]  Minutes: 34  Timed Code Treatment Minutes: 25 Minutes    Treatment Charges: Minutes Units   []  Ultrasound     []  Electrical-Stim     []  Iontophoresis     []  Traction     []  Massage       []  Eval     []  Gait     []  Vasopneumatic Device     []  Ther Exercise       []  Manual Therapy       []  Ther Activities       [x]  Aquatics 25 2   []  Neuro Re-Ed       []  Other       Total Treatment Time: 25 2     Electronically signed by Angella Devlin PTA on 4/22/2019 at 6:48 PM

## 2019-04-23 ENCOUNTER — HOSPITAL ENCOUNTER (OUTPATIENT)
Dept: PAIN MANAGEMENT | Age: 59
Discharge: HOME | End: 2019-04-23
Payer: MEDICARE

## 2019-04-23 VITALS
TEMPERATURE: 98.1 F | HEART RATE: 77 BPM | RESPIRATION RATE: 16 BRPM | DIASTOLIC BLOOD PRESSURE: 86 MMHG | SYSTOLIC BLOOD PRESSURE: 137 MMHG

## 2019-04-23 DIAGNOSIS — M96.1 FAILED BACK SURGICAL SYNDROME: Chronic | ICD-10-CM

## 2019-04-23 DIAGNOSIS — M48.061 SPINAL STENOSIS, LUMBAR REGION, WITHOUT NEUROGENIC CLAUDICATION: ICD-10-CM

## 2019-04-23 DIAGNOSIS — M48.061 SPINAL STENOSIS AT L4-L5 LEVEL: ICD-10-CM

## 2019-04-23 DIAGNOSIS — Z51.81 MEDICATION MONITORING ENCOUNTER: Chronic | ICD-10-CM

## 2019-04-23 DIAGNOSIS — E66.01 MORBID OBESITY DUE TO EXCESS CALORIES (HCC): Primary | Chronic | ICD-10-CM

## 2019-04-23 PROCEDURE — 99214 OFFICE O/P EST MOD 30 MIN: CPT

## 2019-04-23 PROCEDURE — 99214 OFFICE O/P EST MOD 30 MIN: CPT | Performed by: NURSE PRACTITIONER

## 2019-04-23 RX ORDER — TIZANIDINE 4 MG/1
4 TABLET ORAL NIGHTLY PRN
Qty: 30 TABLET | Refills: 0 | Status: SHIPPED | OUTPATIENT
Start: 2019-04-23 | End: 2019-06-11 | Stop reason: SDUPTHER

## 2019-04-23 RX ORDER — DULOXETIN HYDROCHLORIDE 30 MG/1
30 CAPSULE, DELAYED RELEASE ORAL DAILY
COMMUNITY
End: 2019-04-23 | Stop reason: SDUPTHER

## 2019-04-23 RX ORDER — OXYCODONE HYDROCHLORIDE AND ACETAMINOPHEN 5; 325 MG/1; MG/1
1 TABLET ORAL DAILY PRN
Qty: 30 TABLET | Refills: 0 | Status: SHIPPED | OUTPATIENT
Start: 2019-04-23 | End: 2019-06-11 | Stop reason: SDUPTHER

## 2019-04-23 RX ORDER — DULOXETIN HYDROCHLORIDE 30 MG/1
30 CAPSULE, DELAYED RELEASE ORAL DAILY
Qty: 30 CAPSULE | Refills: 0 | Status: SHIPPED | OUTPATIENT
Start: 2019-04-23 | End: 2019-06-11 | Stop reason: SDUPTHER

## 2019-04-23 ASSESSMENT — ENCOUNTER SYMPTOMS
COUGH: 0
CONSTIPATION: 0
BACK PAIN: 1
SHORTNESS OF BREATH: 0

## 2019-04-23 NOTE — PROGRESS NOTES
Patient is here today to review medication contract. Chief Complaint: back pain    PMH: Patient has had back pain for many years with no known injury. She had three lumbar surgeries but pain continues. She had an ADELITA about three years ago with no benefit. She finished aquatic PT with moderate benefit. She states her pain was not well managed on tramadol and Dr. Ryan Garcia switched her back to percocet 5 mg QD. Pt c/o BLE pain and was told it is related to back, arterial and venous testing was negative. She has stopped taking gabapentin due to weight gain. Started Elavil and this did not help her pain. She is requesting Cymbalta because her sister is taking it and has had relief. Back Pain   This is a chronic problem. The current episode started more than 1 year ago. The problem occurs constantly. The problem is unchanged. The pain is present in the gluteal. The quality of the pain is described as burning (tight numb tingling). The pain radiates to the left foot and right thigh. The pain is at a severity of 9/10. The pain is severe. The pain is the same all the time. The symptoms are aggravated by sitting and bending (walking). Stiffness is present all day. Pertinent negatives include no chest pain or fever. Risk factors include history of osteoporosis. She has tried analgesics, muscle relaxant and heat (aquatics) for the symptoms. The treatment provided moderate relief. Patient denies any new neurological symptoms. No bowel or bladder incontinence, no weakness, and no falling. Pill count: appropriate    Morphine equivalent:7.5    Controlled Substances Monitoring:     RX Monitoring 4/23/2019   Attestation The Prescription Monitoring Report for this patient was reviewed today. Chronic Pain Routine Monitoring Possible medication side effects, risk of tolerance/dependence & alternative treatments discussed. ;Obtaining appropriate analgesic effect of treatment. ;No signs of potential drug abuse or diversion identified: otherwise, see note documentation       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    Anemia 10/5/2016    Arthritis     Asthma 1/4/2017    Bilateral leg and foot pain 02/2019    left much worse    Degeneration of cervical intervertebral disc 6/21/2018    Depression 2/2/2016    Failed back syndrome 4/3/2018    History of recurrent UTI (urinary tract infection)     Hyperlipidemia     Hypertension     Lumbar disc herniation with radiculopathy 6/4/2013    Obesity     Spinal stenosis     Wound infection after surgery        Past Surgical History:   Procedure Laterality Date    BACK SURGERY      FOOT SURGERY Bilateral     heel spurs, plantar fascia release    GALLBLADDER SURGERY      LUMBAR SPINE SURGERY  1986    [facility] LUMBAR SPINE SURGERY  4/2012    Rhina Kendall   [facility] NERVE BLOCK  10/26/2015    caudal# 1 decadron 7.5 mg    NERVE BLOCK  12/22/15    caudal #2  celestone 9mg    SPINE SURGERY  07/24/2016    3 places     VEIN SURGERY Bilateral 03/2017       Allergies   Allergen Reactions    Lisinopril Hives and Anaphylaxis     seizures  seizures  seizures    Azithromycin Swelling    Ciprofloxacin Swelling    Keflex [Cephalexin] Swelling    Mobic [Meloxicam]      Muscle cramping/spasm    Penicillins Other (See Comments)    Aztreonam Swelling and Rash    Bactrim [Sulfamethoxazole-Trimethoprim] Rash         Current Outpatient Medications:     lovastatin (MEVACOR) 20 MG tablet, Take 1 tablet by mouth daily, Disp: 30 tablet, Rfl: 2    hydrochlorothiazide (HYDRODIURIL) 25 MG tablet, Take 1 tablet by mouth daily, Disp: 30 tablet, Rfl: 5    celecoxib (CELEBREX) 100 MG capsule, Take 1 capsule by mouth daily, Disp: 60 capsule, Rfl: 0    amitriptyline (ELAVIL) 10 MG tablet, Take 1 tablet by mouth nightly, Disp: 30 tablet, Rfl: 0    aspirin 81 MG tablet, Take 162 mg by mouth daily, Disp: , Rfl:     Multiple Vitamin (THERAPEUTIC MULTIVITAMIN PO), Take 1 tablet by mouth, Disp: , Rfl:     vitamin C (VITAMIN C) 500 MG tablet, Take 1 tablet by mouth daily, Disp: 30 tablet, Rfl: 3    Handicap Placard MISC, by Does not apply route Requires a disability parking placard for the following reasons: She cannot walk 200 feet without stopping to rest. Duration of need: 5 year, Disp: 1 each, Rfl: 0    Family History   Problem Relation Age of Onset    Ovarian Cancer Mother     Heart Disease Father     Hypertension Father        Social History     Socioeconomic History    Marital status: Single     Spouse name: Not on file    Number of children: Not on file    Years of education: Not on file    Highest education level: Not on file   Occupational History    Not on file   Social Needs    Financial resource strain: Not on file    Food insecurity:     Worry: Not on file     Inability: Not on file    Transportation needs:     Medical: Not on file     Non-medical: Not on file   Tobacco Use    Smoking status: Former Smoker     Packs/day: 0.10     Years: 30.00     Pack years: 3.00     Types: Cigarettes     Last attempt to quit: 10/23/1988     Years since quittin.5    Smokeless tobacco: Never Used   Substance and Sexual Activity    Alcohol use: No     Alcohol/week: 0.0 oz    Drug use: No    Sexual activity: Never   Lifestyle    Physical activity:     Days per week: Not on file     Minutes per session: Not on file    Stress: Not on file   Relationships    Social connections:     Talks on phone: Not on file     Gets together: Not on file     Attends Congregational service: Not on file     Active member of club or organization: Not on file     Attends meetings of clubs or organizations: Not on file     Relationship status: Not on file    Intimate partner violence:     Fear of current or ex partner: Not on file     Emotionally abused: Not on file     Physically abused: Not on file     Forced sexual activity: Not on file   Other Topics Concern    Not on file

## 2019-04-25 ENCOUNTER — HOSPITAL ENCOUNTER (OUTPATIENT)
Dept: PHYSICAL THERAPY | Age: 59
Setting detail: THERAPIES SERIES
Discharge: HOME | End: 2019-04-25
Payer: MEDICARE

## 2019-04-25 PROCEDURE — 97113 AQUATIC THERAPY/EXERCISES: CPT

## 2019-04-25 ASSESSMENT — PAIN DESCRIPTION - INTENSITY: RATING_2: 6

## 2019-04-25 ASSESSMENT — PAIN DESCRIPTION - ORIENTATION
ORIENTATION: LEFT
ORIENTATION_2: RIGHT

## 2019-04-25 ASSESSMENT — PAIN DESCRIPTION - LOCATION
LOCATION_2: LEG
LOCATION: LEG

## 2019-04-25 ASSESSMENT — PAIN DESCRIPTION - PAIN TYPE
TYPE: CHRONIC PAIN
TYPE_2: CHRONIC PAIN

## 2019-04-25 ASSESSMENT — PAIN SCALES - GENERAL: PAINLEVEL_OUTOF10: 8

## 2019-04-25 NOTE — PROGRESS NOTES
[address] [place] [address]   Outpatient Physical Therapy  [address] [place] [address]. [address]  Phone: [phone]  Fax: [phone]  Daily Progress Note    Date: 19    Patient Name: Claire Carvajal        MRN: [id number]  Account: [de-identified] : 1960      General Information:  Chart Reviewed: Yes  Patient assessed for rehabilitation services?: Yes  Response To Previous Treatment: Patient with no complaints from previous session. Family / Caregiver Present: No  Referring Practitioner: Dr. Torrie Treadwell  Referral Date : 19  Diagnosis: Low back pain  Follows Commands: Within Functional Limits  Onset Date: 16  PT Insurance Information: Medicare/ Medicaid  Total # of Visits Approved: 12  Total # of Visits to Date: 5  No Show: 4  Canceled Appointment: 3    Subjective:  Subjective: Pain in B LE from Buttocks to toes, 8/10 on L and 6/10 on R     Pain:  Patient Currently in Pain: Yes  Pain Assessment: 0-10  Pain Level: 8  Pain Type: Chronic pain  Pain Location: Leg  Pain Orientation: Left  Pain Rating 2: 6  Pain Type 2: Chronic Pain  Pain Location 2: Leg  Pain Orientation 2: Right    Objective:      Date 2/27/19 3/7/19 4/3/19 4/22/19   4/25/19   Visit # 1/12 2/12 3/12 4/12    5/12   Walk F/L/R 2 laps ea 2 laps ea 2 Laps ea 2 laps ea   2 laps ea   Marching 2 laps 2 laps 2 Laps 2 Laps   2 laps   Squats 10x5\" 12x5\" 12x5\" 12x5\"    12x5\"   Step-Ups F/L             Heel-toe raises 10x 12x 12x 12x     12x   SLR F/L/R 10x ea 12x ea 12x ea 12x ea  12x ea   Knee/Flex/Ext 10x 12x 12x 12x  12x   F/L Lunges             Kickboard Ex.  Large Large Large Large  Large   Iso Abd. 10x5\" 12x5\" 12x5\" 12x5\"  12x5\"   Push-pull 10x 12x 12x 12x  12x   Paddling 10x 12x 12x 12x  12x                 UE EX             Alt Flex/Ext   12x 12x 12x  12x   ABD/ADD   12x 12x 12x  12x   Horiz ABD/ADD   12x 12x 12x  12x   IR/ER   12x 12x 12x  12x   Push Downs   12x 12x 12x  12x                 Deep Water   1 Noodle 2 Noodle 2 Noodle

## 2019-04-29 ENCOUNTER — APPOINTMENT (OUTPATIENT)
Dept: PHYSICAL THERAPY | Age: 59
End: 2019-04-29
Payer: MEDICARE

## 2019-04-30 ENCOUNTER — HOSPITAL ENCOUNTER (OUTPATIENT)
Dept: PHYSICAL THERAPY | Age: 59
Setting detail: THERAPIES SERIES
Discharge: HOME | End: 2019-04-30
Payer: MEDICARE

## 2019-04-30 PROCEDURE — 97113 AQUATIC THERAPY/EXERCISES: CPT

## 2019-04-30 ASSESSMENT — PAIN DESCRIPTION - LOCATION: LOCATION: LEG

## 2019-04-30 ASSESSMENT — PAIN DESCRIPTION - ORIENTATION: ORIENTATION: LEFT

## 2019-04-30 ASSESSMENT — PAIN DESCRIPTION - PAIN TYPE: TYPE: CHRONIC PAIN

## 2019-04-30 ASSESSMENT — PAIN SCALES - GENERAL: PAINLEVEL_OUTOF10: 8

## 2019-04-30 NOTE — PROGRESS NOTES
[address]   Outpatient Physical Therapy  [address] [facility]. [address]  Phone: [phone]  Fax: [phone]  Daily Progress Note    Date: 19    Patient Name: Pat Irby        MRN: [id number]  Account: [de-identified] : 1960      General Information:  Referring Practitioner: Dr. Juan Nuñez  Referral Date : 19  Diagnosis: Low back pain  Onset Date: 16  PT Insurance Information: Medicare/ Medicaid  Total # of Visits Approved: 12  Total # of Visits to Date: 6  No Show: 4  Canceled Appointment: 3    Subjective:  Subjective: States the pool has helped her feel looser. Can tell when she is away from pool     Pain:  Patient Currently in Pain: Yes  Pain Assessment: 0-10  Pain Level: 8  Pain Type: Chronic pain  Pain Location: Leg  Pain Orientation: Left(610 R LE)  Pain Radiating Towards: From hip to ankle/foot       Objective:     [facility] Exercise Log  Aquatic, Hip & DLS Program- Phase 1     Date of Eval:       19                         Primary PT:GIOVANI  Diagnosis: Low back pain  Things to Focus On (goals): Improve mobility, strength of LLE  Surgical Precautions:  Medical Precautions:  [] C-9 dates  [] Occ Med   [x] Medicare         Date 19     Visit #      Walk F/L/R 2 laps ea   2 laps ea 2 Laps     Marching 2 Laps   2 laps 2 Laps     Squats 12x5\"    12x5\" 12x5\"     Step-Ups F/L/R     Low 10x     Heel-toe raises 12x     12x 12x     SLR F/L/R 12x ea  12x ea 12x     Knee/Flex/Ext 12x  12x 12x     F/L Lunges          Kickboard Ex.  Large  Large Lg     Iso Abd. 12x5\"  12x5\" 12x5\"     Push-pull 12x  12x 12x     Paddling 12x  12x 12x                UE EX     2 Small Balls     Alt Flex/Ext 12x  12x 12x     ABD/ADD 12x  12x 12x     Horiz ABD/ADD 12x  12x 12x     IR/ER 12x  12x 12x     Push Downs 12x  12x 12x                Deep Water 2 Noodle  2 Noodle 2 Noodles     Cycling 2 minutes  2 minutes 2'     Jacks 2 minutes  2 minutes 2'     X-Country     1'                Stretches          Achllies 2x20\"  2x20\" 2x20\"     Hamstring 2x20\"  2x20\" 2x20\"                Cool down 2 Laps  2 Laps 2 Laps Breast Stroke     Pain Rating 9 L LE  6 R LE 8 L LE  6 R LE 8 L LE 6 R LE         Assessment: Body structures, Functions, Activity limitations: Decreased functional mobility ; Decreased ADL status; Decreased ROM; Decreased strength;Decreased balance; Increased Pain  Treatment Diagnosis: Pain, difficulty walking, decreased ROM, strength and function of low back  Prognosis: Good  REQUIRES PT FOLLOW UP: Yes  Activity Tolerance: Patient Tolerated treatment well;Patient limited by pain  Comments: Added buoyancy with UE format to challenge stabilizers and also added step ups without issue.  Also progressed deep water aerobics    Plan:  Plan: Continue with current plan(PT re-assess next visit)    Therapy Time:  Time In: 1407  Time Out: 1447  Minutes: 40  Timed Code Treatment Minutes: 40 Minutes    Treatment Charges: Minutes Units   []  Ultrasound     []  Electrical-Stim     []  Iontophoresis     []  Traction     []  Massage       []  Eval     []  Gait     []  Vasopneumatic Device     []  Ther Exercise       []  Manual Therapy       []  Ther Activities       [x]  Aquatics 40 3   []  Neuro Re-Ed       []  Other       Total Treatment Time: 40 3       Dayne Marc PTA

## 2019-05-01 ENCOUNTER — OFFICE VISIT (OUTPATIENT)
Dept: INTERNAL MEDICINE | Age: 59
End: 2019-05-01
Payer: MEDICARE

## 2019-05-01 VITALS
DIASTOLIC BLOOD PRESSURE: 81 MMHG | HEIGHT: 71 IN | SYSTOLIC BLOOD PRESSURE: 136 MMHG | WEIGHT: 293 LBS | HEART RATE: 78 BPM | BODY MASS INDEX: 41.02 KG/M2

## 2019-05-01 DIAGNOSIS — I87.2 VENOUS INSUFFICIENCY OF BOTH LOWER EXTREMITIES: ICD-10-CM

## 2019-05-01 DIAGNOSIS — M48.061 SPINAL STENOSIS, LUMBAR REGION, WITHOUT NEUROGENIC CLAUDICATION: ICD-10-CM

## 2019-05-01 DIAGNOSIS — I10 ESSENTIAL HYPERTENSION: Primary | ICD-10-CM

## 2019-05-01 DIAGNOSIS — R01.1 HEART MURMUR: ICD-10-CM

## 2019-05-01 DIAGNOSIS — E66.01 MORBID OBESITY DUE TO EXCESS CALORIES (HCC): ICD-10-CM

## 2019-05-01 DIAGNOSIS — R73.02 IGT (IMPAIRED GLUCOSE TOLERANCE): ICD-10-CM

## 2019-05-01 DIAGNOSIS — E78.00 PURE HYPERCHOLESTEROLEMIA: ICD-10-CM

## 2019-05-01 PROCEDURE — 99211 OFF/OP EST MAY X REQ PHY/QHP: CPT | Performed by: INTERNAL MEDICINE

## 2019-05-01 PROCEDURE — G8427 DOCREV CUR MEDS BY ELIG CLIN: HCPCS | Performed by: INTERNAL MEDICINE

## 2019-05-01 PROCEDURE — 99213 OFFICE O/P EST LOW 20 MIN: CPT | Performed by: INTERNAL MEDICINE

## 2019-05-01 PROCEDURE — G8417 CALC BMI ABV UP PARAM F/U: HCPCS | Performed by: INTERNAL MEDICINE

## 2019-05-01 PROCEDURE — 1036F TOBACCO NON-USER: CPT | Performed by: INTERNAL MEDICINE

## 2019-05-01 PROCEDURE — 3017F COLORECTAL CA SCREEN DOC REV: CPT | Performed by: INTERNAL MEDICINE

## 2019-05-01 RX ORDER — OXYCODONE HYDROCHLORIDE AND ACETAMINOPHEN 5; 325 MG/1; MG/1
1 TABLET ORAL DAILY PRN
Qty: 30 TABLET | Refills: 0 | Status: CANCELLED | OUTPATIENT
Start: 2019-05-01 | End: 2019-05-31

## 2019-05-01 RX ORDER — DULOXETIN HYDROCHLORIDE 30 MG/1
30 CAPSULE, DELAYED RELEASE ORAL DAILY
Qty: 30 CAPSULE | Refills: 3 | Status: CANCELLED | OUTPATIENT
Start: 2019-05-01 | End: 2019-05-31

## 2019-05-01 RX ORDER — HYDROCHLOROTHIAZIDE 25 MG/1
25 TABLET ORAL DAILY
Qty: 30 TABLET | Refills: 3 | Status: SHIPPED | OUTPATIENT
Start: 2019-05-01 | End: 2020-03-09

## 2019-05-01 RX ORDER — ASCORBIC ACID 500 MG
500 TABLET ORAL DAILY
Qty: 30 TABLET | Refills: 3 | Status: SHIPPED | OUTPATIENT
Start: 2019-05-01

## 2019-05-01 RX ORDER — ATORVASTATIN CALCIUM 40 MG/1
40 TABLET, FILM COATED ORAL DAILY
Qty: 30 TABLET | Refills: 3 | Status: ON HOLD | OUTPATIENT
Start: 2019-05-01 | End: 2020-01-12

## 2019-05-01 RX ORDER — TIZANIDINE 4 MG/1
4 TABLET ORAL NIGHTLY PRN
Qty: 30 TABLET | Refills: 3 | Status: CANCELLED | OUTPATIENT
Start: 2019-05-01 | End: 2019-05-31

## 2019-05-01 ASSESSMENT — PATIENT HEALTH QUESTIONNAIRE - PHQ9
SUM OF ALL RESPONSES TO PHQ QUESTIONS 1-9: 2
1. LITTLE INTEREST OR PLEASURE IN DOING THINGS: 1
SUM OF ALL RESPONSES TO PHQ QUESTIONS 1-9: 2
SUM OF ALL RESPONSES TO PHQ9 QUESTIONS 1 & 2: 2
2. FEELING DOWN, DEPRESSED OR HOPELESS: 1

## 2019-05-01 NOTE — PROGRESS NOTES
Visit Information    Have you changed or started any medications since your last visit including any over-the-counter medicines, vitamins, or herbal medicines? no   Are you having any side effects from any of your medications? -  no  Have you stopped taking any of your medications? Is so, why? -  no    Have you seen any other physician or provider since your last visit? Yes - Records Obtained  Have you had any other diagnostic tests since your last visit? Yes - Records Obtained  Have you been seen in the emergency room and/or had an admission to a hospital since we last saw you? Yes  Have you had your routine dental cleaning in the past 6 months? no    Have you activated your [facility] account? If not, what are your barriers?  No:      Patient Care Team:  Jean Paul Massey MD as PCP - General (Internal Medicine)  Sarthak Irwin MD as PCP - S Attributed Provider  Christophre Valladares MD (Dermatology)  Demario Rivera (Neurosurgery)    Medical History Review  Past Medical, Family, and Social History reviewed and does contribute to the patient presenting condition    Health Maintenance   Topic Date Due    DTaP/Tdap/Td vaccine (1 - Tdap) 02/11/1978    Shingles Vaccine (1 of 2) 02/11/2009    Cervical cancer screen  10/14/2017    Breast cancer screen  12/04/2017    Flu vaccine (Season Ended) 09/01/2019    A1C test (Diabetic or Prediabetic)  10/23/2019    Colon Cancer Screen FIT/FOBT  01/25/2020    Potassium monitoring  02/26/2020    Creatinine monitoring  02/26/2020    Lipid screen  10/23/2023    Hepatitis C screen  Completed    HIV screen  Completed    Pneumococcal 0-64 years Vaccine  Aged Out

## 2019-05-01 NOTE — PROGRESS NOTES
[facility]    Progress Note    Date of patient's visit: 5/1/2019    Patient's Name:  Samina Azevedo    YOB: 1960            Patient Care Team:  Arlen Najera MD as PCP - General (Internal Medicine)  Zully Peña MD as PCP - [facility] Attributed Provider  Ben Snow MD (Dermatology)  Giselle Rae (Neurosurgery)    REASON FOR VISIT: Routine outpatient follow     Chief Complaint   Patient presents with    Hypertension    Health Maintenance     vaccines declined     Heart Murmur     Pt states that she was seen at Er last week for UTI while being check out they told her that she has heart mumur--she would like to discuss this         HISTORY OF PRESENT ILLNESS:    History was obtained from the patient. Samina Azevedo is a 60 y.o. is here for follow-up and medication refills. She has a history of hypertension, hyperlipidemia, morbid obesity, chronic low back pain and lower extremity edema. She also follows up with vascular surgery. She follows up with pain management. She started aqua therapy which is helping a lot. She was recently in the emergency room for possible UTI and she says she was told she may have a heart murmur. An echo in the past has been normal with no valve disorder. She is denying shortness of breath or chest pain or palpitations. She would like another echo done though. She denies history of sleep apnea. Echo 2015  CONCLUSIONS    Summary  Technically very difficult and somewhat limited study due to body habitus. Global left ventricular systolic function is normal.  Mild left ventricular hypertrophy. Estimated ejection fraction is 50-55%. No significant valvular regurgitation or stenosis seen.        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    Obesity     Spinal stenosis     Wound infection after surgery        Past Surgical History:   Procedure Laterality Date    BACK SURGERY      FOOT SURGERY Bilateral     heel spurs, plantar fascia release    GALLBLADDER SURGERY      LUMBAR [facility]INE [facility]  [facility]   Tarri Morales LUMBAR SPINE SURGERY  4/2012    [facility]   Tarri Morales NERVE BLOCK  10/26/2015    caudal# 1 decadron 7.5 mg    NERVE BLOCK  12/22/15    caudal #2  celestone 9mg    SPINE SURGERY  07/24/2016    3 places     VEIN SURGERY Bilateral 03/2017         ALLERGIES      Allergies   Allergen Reactions    Lisinopril Hives and Anaphylaxis     seizures  seizures  seizures    Azithromycin Swelling    Ciprofloxacin Swelling    Keflex [Cephalexin] Swelling    Mobic [Meloxicam]      Muscle cramping/spasm    Penicillins Other (See Comments)    Aztreonam Swelling and Rash    Bactrim [Sulfamethoxazole-Trimethoprim] Rash       MEDICATIONS:      Current Outpatient Medications on File Prior to Visit   Medication Sig Dispense Refill    DULoxetine (CYMBALTA) 30 MG extended release capsule Take 1 capsule by mouth daily 30 capsule 0    oxyCODONE-acetaminophen (PERCOCET) 5-325 MG per tablet Take 1 tablet by mouth daily as needed for Pain for up to 30 days.  30 tablet 0    tiZANidine (ZANAFLEX) 4 MG tablet Take 1 tablet by mouth nightly as needed (MUSCLE SPASMS) 30 tablet 0    naloxegol (MOVANTIK) 25 MG TABS tablet Take 1 tablet by mouth every morning 30 tablet 0    lovastatin (MEVACOR) 20 MG tablet Take 1 tablet by mouth daily 30 tablet 2    hydrochlorothiazide (HYDRODIURIL) 25 MG tablet Take 1 tablet by mouth daily 30 tablet 5    aspirin 81 MG tablet Take 162 mg by mouth daily      Multiple Vitamin (THERAPEUTIC MULTIVITAMIN PO) Take 1 tablet by mouth      vitamin C (VITAMIN C) 500 MG tablet Take 1 tablet by mouth daily 30 tablet 3     No current facility-administered medications on file prior to visit. SOCIAL HISTORY    Reviewed and no change from previous record. Anival Groves  reports that she quit smoking about 30 years ago. Her smoking use included cigarettes. She has a 3.00 pack-year smoking history. She has never used smokeless tobacco.    FAMILY HISTORY:    Reviewed and No change from previous visit    HEALTH MAINTENANCE DUE:      Health Maintenance Due   Topic Date Due    DTaP/Tdap/Td vaccine (1 - Tdap) 02/11/1978    Shingles Vaccine (1 of 2) 02/11/2009    Cervical cancer screen  10/14/2017    Breast cancer screen  12/04/2017       REVIEW OF SYSTEMS:    12 point review of symptoms completed and found to be normal except noted in the HPI    Review of Systems   Constitutional: Negative for chills, fatigue, fever and unexpected weight change. HENT: Negative for congestion, rhinorrhea and sinus pain. Respiratory: Negative for cough, shortness of breath and wheezing. Cardiovascular: Positive for leg swelling. Negative for chest pain and palpitations. Gastrointestinal: Negative for abdominal pain, blood in stool, constipation and nausea. Endocrine: Negative for polydipsia and polyuria. Genitourinary: Negative for dysuria, hematuria and urgency. Musculoskeletal: Positive for arthralgias and back pain. Negative for gait problem and myalgias. Allergic/Immunologic: Negative for environmental allergies and immunocompromised state. Neurological: Positive for numbness. Negative for dizziness, weakness, light-headedness and headaches. Hematological: Negative for adenopathy. Does not bruise/bleed easily.        PHYSICAL EXAM:     Vitals:    05/01/19 1348   BP: 136/81   Site: Left Lower Arm   Position: Sitting   Cuff Size: Small Adult   Pulse: 78   Weight: (!) 335 lb (152 kg)   Height: 5' 10/23/2018      URINEANALYSIS: No results found for: LABURIN  ASSESSMENT AND PLAN:    1. Essential hypertension    - hydrochlorothiazide (HYDRODIURIL) 25 MG tablet; Take 1 tablet by mouth daily  Dispense: 30 tablet; Refill: 3    2. Spinal stenosis, lumbar region, without neurogenic claudication  Continue PT  Weight loss      3. Pure hypercholesterolemia    - atorvastatin (LIPITOR) 40 MG tablet; Take 1 tablet by mouth daily  Dispense: 30 tablet; Refill: 3    4. IGT (impaired glucose tolerance)  monitor    5. Heart murmur    - ECHO Complete 2D W Doppler W Color; Future    6. Venous insufficiency of both lower extremities  Compression socks      7. Morbid obesity due to excess calories ([address]  doet changes          FOLLOW UP AND INSTRUCTIONS:   Return in about 3 months (around 8/1/2019). 1. Marie Jaramillo received counseling on the following healthy behaviors: nutrition, exercise and medication adherence    2. Reviewed prior labs and health maintenance. 3. Discussed use, benefit, and side effects of prescribed medications. Barriers to medication compliance addressed. All patient questions answered. Pt voiced understanding.          Marina Benjamin  Attending Physician, [address] [place] [address], Internal Medicine Residency Program  [address] [place] [address]  5/1/2019, 2:25 PM

## 2019-05-01 NOTE — PATIENT INSTRUCTIONS
-No schedule for PCP-- pt added to wait list for 3 month f/u--will contact pt once schedule is open.    -Your doctor has ordered a ECHO for you, please contact our scheduling department at [phone] to set up an appointment to have this completed before your next visit.     -Your doctor has ordered an Mammogram for you, please contact our scheduling department at [phone] to set up an appointment. --Yosef Robins

## 2019-05-02 ENCOUNTER — APPOINTMENT (OUTPATIENT)
Dept: PHYSICAL THERAPY | Age: 59
End: 2019-05-02
Payer: MEDICARE

## 2019-05-06 ENCOUNTER — APPOINTMENT (OUTPATIENT)
Dept: PHYSICAL THERAPY | Age: 59
End: 2019-05-06
Payer: MEDICARE

## 2019-05-08 ENCOUNTER — HOSPITAL ENCOUNTER (OUTPATIENT)
Dept: PHYSICAL THERAPY | Age: 59
Setting detail: THERAPIES SERIES
Discharge: HOME | End: 2019-05-08
Payer: MEDICARE

## 2019-05-08 PROCEDURE — 97113 AQUATIC THERAPY/EXERCISES: CPT

## 2019-05-08 PROCEDURE — 97110 THERAPEUTIC EXERCISES: CPT

## 2019-05-08 ASSESSMENT — PAIN DESCRIPTION - ORIENTATION
ORIENTATION: LEFT;RIGHT
ORIENTATION: LEFT;RIGHT

## 2019-05-08 ASSESSMENT — PAIN DESCRIPTION - PAIN TYPE
TYPE: CHRONIC PAIN
TYPE: CHRONIC PAIN

## 2019-05-08 ASSESSMENT — PAIN SCALES - GENERAL
PAINLEVEL_OUTOF10: 8
PAINLEVEL_OUTOF10: 8

## 2019-05-08 ASSESSMENT — PAIN DESCRIPTION - LOCATION: LOCATION: BUTTOCKS;HIP;LEG

## 2019-05-08 ASSESSMENT — PAIN DESCRIPTION - PROGRESSION: CLINICAL_PROGRESSION: GRADUALLY IMPROVING

## 2019-05-08 NOTE — PROGRESS NOTES
Physical Therapy  Progress Note  Date: 2019  Patient Name: Thelma Booth  MRN: [id number]     :   1960    Subjective:   General  Referring Practitioner: Dr. Erin Mistry/ Dr. Sumit Montgomery  PT Visit Information  Onset Date: 16  PT Insurance Information: Medicare/ Medicaid  Total # of Visits Approved: 12  Total # of Visits to Date: 7  No Show: 4  Canceled Appointment: 3  Subjective  Subjective: Reports low back feeling slightly better, states unable to come regularly for therapy due to family medical issues  Pain Screening  Patient Currently in Pain: Yes  Pain Assessment  Pain Assessment: 0-10  Pain Level: 8(8/10 - left side; 6/10 - R side)  Pain Type: Chronic pain  Pain Location: Buttocks;Hip;Leg  Pain Orientation: Left;Right  Pain Descriptors: Constant;Tightness;Tingling;Numbness;Burning  Clinical Progression: Gradually improving  Vital Signs  Patient Currently in Pain: Yes       Treatment Activities:    Ambulation 1  Surface: level tile  Device: Rollator  Assistance: Independent  Quality of Gait: Without the rollator, patient with slow aline, knees in genu valgus and noted intoeing of B feet  Comments: Timed up and go - 20 sec - with rollator ( NOW 16 sec ) ; without rollator - 25 sec ( NOW 18 sec )   AROM RLE (degrees)  R SLR: 0 /50 ( NOW 60 )  R Hip Flexion 0-125: 0 / 90  AROM LLE (degrees)  L SLR: 0 / 30 with pain ( NOW 54 )  L Hip Flexion 0-125: 0 / 90  Spine  Lumbar: Flexion: 50 ( NOW 60 )    Extension: 20 ( NOW 30 )   Sidebend: R 40  L 20 ( NOW 30 )  Strength RLE  Strength RLE: WFL  Strength LLE  Comment: L hip extensor 3-/5  L Hip Flexion: 3+/5;4/5  L Hip ABduction: 4/5;3+/5  L Knee Flexion: 4/5  L Knee Extension: 4/5;4+/5  L Ankle Dorsiflexion: 4+/5  Strength Other  Other: Lower abdominals 3-/5    Assessment:   Conditions Requiring Skilled Therapeutic Intervention  Body structures, Functions, Activity limitations: Decreased functional mobility ; Decreased ADL status; Decreased ROM;Decreased strength;Decreased balance; Increased Pain  Assessment: Other problem: Difficulty walking. Noted able to move and change in position with more ease. Improved ROM and strength of BLE's. Meeting 2/4 STG, 0/3 LTG but is making gains. Treatment Diagnosis: Pain, difficulty walking, decreased ROM, strength and function of low back  Prognosis: Good  Patient Education: Reviewed HEP and regular attendance in therapy and progression to landbased therapy  REQUIRES PT FOLLOW UP: Yes    Goals:  Short term goals  Time Frame for Short term goals: 6 visits  Short term goal 1: Decrease pain to 5-6/10 to be able to tolerate daily chores with less difficulty - ONGOING  Short term goal 2: Improve ROM by 5-10 degrees to improve changes in position - MET  Short term goal 3: Improve timed up and go by 5- 10 sec to decrease fall risk - PARTLY MET  Short term goal 4:  Independent with HEP - MET  Long term goals  Time Frame for Long term goals : 12 visits  Long term goal 1: Improve strength of abdominals and LLE by 1/2 grade to be able to tolerate prolonged standing/ walking - PARTLY MET  Long term goal 2: Improve timed up and go by 10 sec to decrease fall risk - PARTLY MET  Long term goal 3: Improve function in OPTIMAL: 6/3 from 11/3,  for balancing (4), walking long distance (4),bending/ stooping (3); 3/3 = best score  - ONGOING ( NOW 10/3 - 4/4/2 RESPECTIVELY )  Patient Goals   Patient goals : \" less pain, improve mobility and  functioning stability \"  Plan:    Plan  Times per week: Script: 2-3x/wk for 4-6 weeks, prefers 2x/wk  Current Treatment Recommendations: Strengthening, Aquatics, Patient/Caregiver Education & Training, ROM, Home Exercise Program, Gait Training  Plan Comment: Progress as tolerated, prefers Aquatics, has 5 remaining visits and reassess if appropriate for discharge or progress to landbased therapy  Timed Code Treatment Minutes: 20 Minutes     Therapy Time   Individual Concurrent Group Co-treatment   Time

## 2019-05-08 NOTE — PROGRESS NOTES
Abd. 12x5\"  12x5\" 12x5\" 13x5\"    Push-pull 12x  12x 12x 13x    Paddling 12x  12x 12x 13x               UE EX     2 Small Balls 2 Small Balls    Alt Flex/Ext 12x  12x 12x 13x    ABD/ADD 12x  12x 12x 13x    Horiz ABD/ADD 12x  12x 12x 13x    IR/ER 12x  12x 12x 13x    Push Downs 12x  12x 12x 13x               Deep Water 2 Noodle  2 Noodle 2 Noodles 2 Noodles    Cycling 2 minutes  2 minutes 2' 2'    Jacks 2 minutes  2 minutes 2' 2'    X-Country     1' 1'               Stretches          Achllies 2x20\"  2x20\" 2x20\" 2x20\"    Hamstring 2x20\"  2x20\" 2x20\" 2x20\"               Cool down 2 Laps  2 Laps 2 Laps Breast Stroke 2 Laps Breast Stroke    Pain Rating 9 L LE  6 R LE 8 L LE  6 R LE 8 L LE 6 R LE 8 L LE  6 R LE      Assessment: Body structures, Functions, Activity limitations: Decreased functional mobility ; Decreased ADL status; Decreased ROM; Decreased strength;Decreased balance; Increased Pain  Treatment Diagnosis: Pain, difficulty walking, decreased ROM, strength and function of low back  Prognosis: Good  REQUIRES PT FOLLOW UP: Yes  Activity Tolerance: Patient Tolerated treatment well;Patient limited by pain    Plan:  Plan: Continue with current plan    Therapy Time:  Time In: 1431  Time Out: 1520  Minutes: 49  Timed Code Treatment Minutes: 24 Minutes    Treatment Charges: Minutes Units   []  Ultrasound     []  Electrical-Stim     []  Iontophoresis     []  Traction     []  Massage       []  Eval     []  Gait     []  Vasopneumatic Device     []  Ther Exercise       []  Manual Therapy       []  Ther Activities       [x]  Aquatics 24 2   []  Neuro Re-Ed       []  Other       Total Treatment Time: 24 2     Electronically signed by Sukhdeep Bernardo PTA on 5/8/2019 at 4:38 PM

## 2019-05-13 ENCOUNTER — HOSPITAL ENCOUNTER (OUTPATIENT)
Dept: PHYSICAL THERAPY | Age: 59
Setting detail: THERAPIES SERIES
Discharge: HOME | End: 2019-05-13
Payer: MEDICARE

## 2019-05-13 NOTE — PROGRESS NOTES
Physical Therapy  [address]   Outpatient Physical Therapy  Cancel/No Show Note    Date: 19    Patient Name: Ariana Garcia        MRN: [id number]  Account: [de-identified] : 1960      General Information:  Referring Practitioner: Dr. Pallavi Mistry/ Dr. Erin Ruiz  Referral Date : 19  Diagnosis: Low back pain  Onset Date: 16  PT Insurance Information: Medicare/ Medicaid  Total # of Visits Approved: 12  Total # of Visits to Date: 7  No Show: 4  Canceled Appointment: 4        Comments: Patient canceled aquatics session for today's date per the  due to transportation issues    Electronically signed by Stuart Sahni PTA on 2019 at 6:42 PM

## 2019-05-16 ENCOUNTER — HOSPITAL ENCOUNTER (OUTPATIENT)
Dept: PHYSICAL THERAPY | Age: 59
Setting detail: THERAPIES SERIES
Discharge: HOME | End: 2019-05-16
Payer: MEDICARE

## 2019-05-16 PROCEDURE — 97113 AQUATIC THERAPY/EXERCISES: CPT

## 2019-05-16 ASSESSMENT — PAIN SCALES - GENERAL: PAINLEVEL_OUTOF10: 8

## 2019-05-16 ASSESSMENT — PAIN DESCRIPTION - ORIENTATION: ORIENTATION: LEFT;RIGHT

## 2019-05-16 ASSESSMENT — PAIN DESCRIPTION - PAIN TYPE: TYPE: CHRONIC PAIN

## 2019-05-16 NOTE — PROGRESS NOTES
Physical Therapy  [address]   Outpatient Physical Therapy  [address] [facility]  Phone: [phone]  Fax: [phone]  Daily Progress Note    Date: 19    Patient Name: Deng Jones        MRN: [id number]  Account: [de-identified] : 1960      General Information:  Chart Reviewed: Yes  Patient assessed for rehabilitation services?: Yes  Response To Previous Treatment: Patient with no complaints from previous session. Family / Caregiver Present: No  Referring Practitioner: Dr. Dulce Mistry/ Dr. Navdeep Villarreal  Referral Date : 19  Diagnosis: Low back pain  Follows Commands: Within Functional Limits  Other (Comment): To see Pain Management 18  Onset Date: 16  PT Insurance Information: Medicare/ Medicaid  Total # of Visits Approved: 12  Total # of Visits to Date: 8  No Show: 4  Canceled Appointment: 4    Subjective:  Subjective: Pain in B LEs (L > R), across her lower back and \"all the way around to both groin areas. \"     Pain:  Patient Currently in Pain: Yes  Pain Assessment: 0-10  Pain Level: 8(8/10 L; 6/10 R)  Pain Type: Chronic pain  Pain Location: Back;Buttocks;Hip;Leg;Knee;Groin  Pain Orientation: Left;Right       Objective:  [address] [facility] Exercise Log  Aquatic, Hip & DLS Program- Phase 1     Date of Eval:       19                         Primary PT:GIOVANI  Diagnosis: Low back pain  Things to Focus On (goals):  Improve mobility, strength of LLE  Surgical Precautions:  Medical Precautions:  [] C-9 dates  [] Occ Med   [x] Medicare         Date 19   Visit #    Walk F/L/R 2 laps ea   2 laps ea 2 Laps 2 Laps ea 2 Laps ea   Marching 2 Laps   2 laps 2 Laps 2 Laps 2 laps   Squats 12x5\"    12x5\" 12x5\" 13x5\" 13x5\"   Step-Ups F/L/R     Low 10x Low 10x ea Low 13x ea   Heel-toe raises 12x     12x 12x 13x 13x   SLR F/L/R 12x ea  12x ea 12x 13x 13x ea   Knee/Flex/Ext 12x

## 2019-05-20 ENCOUNTER — HOSPITAL ENCOUNTER (OUTPATIENT)
Dept: PHYSICAL THERAPY | Age: 59
Setting detail: THERAPIES SERIES
Discharge: HOME | End: 2019-05-20
Payer: MEDICARE

## 2019-05-20 PROCEDURE — 97113 AQUATIC THERAPY/EXERCISES: CPT

## 2019-05-20 ASSESSMENT — PAIN DESCRIPTION - ORIENTATION: ORIENTATION: LEFT;RIGHT

## 2019-05-20 ASSESSMENT — PAIN SCALES - GENERAL: PAINLEVEL_OUTOF10: 8

## 2019-05-20 ASSESSMENT — PAIN DESCRIPTION - PAIN TYPE: TYPE: CHRONIC PAIN

## 2019-05-20 NOTE — PROGRESS NOTES
Physical Therapy  [address]   Outpatient Physical Therapy  [address] [facility]. [address]  Phone: [phone]  Fax: [phone]  Daily Progress Note    Date: 19    Patient Name: Nany Rolle        MRN: [id number]  Account: [de-identified] : 1960      General Information:  Chart Reviewed: Yes  Patient assessed for rehabilitation services?: Yes  Response To Previous Treatment: Patient with no complaints from previous session. Family / Caregiver Present: No  Referring Practitioner: Dr. Valente Mistry/ Dr. Jose C Francois  Referral Date : 19  Diagnosis: Low back pain  Follows Commands: Within Functional Limits  Other (Comment): To see Pain Management 18  Onset Date: 16  PT Insurance Information: Medicare/ Medicaid  Total # of Visits Approved: 12  Total # of Visits to Date: 9  No Show: 4  Canceled Appointment: 4    Subjective:  Subjective: Continues with pain in B LE's. No complaints of lower back pain this date     Pain:  Patient Currently in Pain: Yes  Pain Assessment: 0-10  Pain Level: 8(8.5 L LE/ 6 R LE)  Pain Type: Chronic pain  Pain Location: Buttocks;Hip;Knee;Leg  Pain Orientation: Left;Right(L > R)       Objective:  [address] [facility] Exercise Log  Aquatic, Hip & DLS Program- Phase 1     Date of Eval:       19                         Primary PT:GIOVANI  Diagnosis: Low back pain  Things to Focus On (goals):  Improve mobility, strength of LLE  Surgical Precautions:  Medical Precautions:  [] C-9 dates  [] Occ Med   [x] Medicare         Date   19   Visit #       Walk F/L/R   2 laps ea 2 Laps 2 Laps ea 2 Laps ea 2 laps ea   Marching   2 laps 2 Laps 2 Laps 2 laps 2 Laps   Squats    12x5\" 12x5\" 13x5\" 13x5\" 13x5\"   Step-Ups F/L/R   Low 10x Low 10x ea Low 13x ea Low 13x ea   Heel-toe raises     12x 12x 13x 13x 13x    SLR F/L/R  12x ea 12x 13x 13x ea 13x ea   Knee/Flex/Ext  12x 12x 13x 13x 13x   F/L Lunges         Kickboard Ex.  Large Lg Large Large Large   Iso Abd.  12x5\" 12x5\" 13x5\" 13x5\" 13x5\"   Push-pull  12x 12x 13x 13x 13x   Paddling  12x 12x 13x 13x 13x             UE EX   2 Small Balls 2 Small Balls 2 Small Balls 2 Small Balls   Alt Flex/Ext  12x 12x 13x 13x 13x   ABD/ADD  12x 12x 13x 13x 13x   Horiz ABD/ADD  12x 12x 13x 13x 13x   IR/ER  12x 12x 13x 13x 13x   Push Downs  12x 12x 13x 13x 13x             Deep Water  2 Noodle 2 Noodles 2 Noodles 2 Noodles 2 Noodles   Cycling  2 minutes 2' 2' 2' 2'   Jacks  2 minutes 2' 2' 2' 2'   X-Country   1' 1' 2' 2'             Stretches         Achllies  2x20\" 2x20\" 2x20\" 2x20\" 2x20\"   Hamstring  2x20\" 2x20\" 2x20\" 2x20\" 2x20\"             Cool down  2 Laps 2 Laps Breast Stroke 2 Laps Breast Stroke 2 Laps Breast Stroke 2 Laps Breast Stroke   Pain Rating 8 L LE  6 R LE 8 L LE 6 R LE 8 L LE  6 R LE 8 L LE  6 R LE 8.5 L LE  6 R LE     Assessment: Body structures, Functions, Activity limitations: Decreased functional mobility ; Decreased ADL status; Decreased ROM; Decreased strength;Decreased balance; Increased Pain  Treatment Diagnosis: Pain, difficulty walking, decreased ROM, strength and function of low back  Prognosis: Good  REQUIRES PT FOLLOW UP: Yes  Activity Tolerance: Patient Tolerated treatment well    Plan:  Plan: Continue with current plan    Therapy Time:  Time In: 1409  Time Out: 1456  Minutes: 47  Timed Code Treatment Minutes: 23 Minutes    Treatment Charges: Minutes Units   []  Ultrasound     []  Electrical-Stim     []  Iontophoresis     []  Traction     []  Massage       []  Eval     []  Gait     []  Vasopneumatic Device     []  Ther Exercise       []  Manual Therapy       []  Ther Activities       [x]  Aquatics 23 2   []  Neuro Re-Ed       []  Other       Total Treatment Time: 23 2     Electronically signed by Zenaida Larsen PTA on 5/20/2019 at 4:13 PM

## 2019-05-23 ENCOUNTER — HOSPITAL ENCOUNTER (OUTPATIENT)
Dept: PHYSICAL THERAPY | Age: 59
Setting detail: THERAPIES SERIES
Discharge: HOME | End: 2019-05-23
Payer: MEDICARE

## 2019-05-23 PROCEDURE — 97113 AQUATIC THERAPY/EXERCISES: CPT

## 2019-05-23 PROCEDURE — 97110 THERAPEUTIC EXERCISES: CPT

## 2019-05-23 ASSESSMENT — PAIN DESCRIPTION - LOCATION
LOCATION: LEG;KNEE
LOCATION: LEG;KNEE

## 2019-05-23 ASSESSMENT — PAIN SCALES - GENERAL
PAINLEVEL_OUTOF10: 9
PAINLEVEL_OUTOF10: 8

## 2019-05-23 ASSESSMENT — PAIN DESCRIPTION - PROGRESSION: CLINICAL_PROGRESSION: GRADUALLY IMPROVING

## 2019-05-23 ASSESSMENT — PAIN DESCRIPTION - ORIENTATION
ORIENTATION: RIGHT;LEFT
ORIENTATION: RIGHT;LEFT

## 2019-05-23 ASSESSMENT — PAIN DESCRIPTION - PAIN TYPE
TYPE: CHRONIC PAIN
TYPE: CHRONIC PAIN

## 2019-05-23 NOTE — PROGRESS NOTES
Physical Therapy  [address]   Outpatient Physical Therapy  [address] [facility]. [address]  Phone: [phone]  Fax: [phone]  Daily Progress Note    Date: 19    Patient Name: Karol Orosco        MRN: [id number]  Account: [de-identified] : 1960      General Information:  Chart Reviewed: Yes  Patient assessed for rehabilitation services?: Yes  Response To Previous Treatment: Patient with no complaints from previous session. Family / Caregiver Present: No  Referring Practitioner: Dr. Avinash Mistry/ Dr. Hogan Mail  Referral Date : 19  Diagnosis: Low back pain  Follows Commands: Within Functional Limits  Other (Comment): To see Pain Management 19 (rescheduled from 19)  Onset Date: 16  PT Insurance Information: Medicare/ Medicaid  Total # of Visits Approved: 12  Total # of Visits to Date: 10  No Show: 4  Canceled Appointment: 4    Subjective:  Subjective: No new symptoms/complaints reported this date. Continues with pain in B LEs. Pain:  Patient Currently in Pain: Yes  Pain Assessment: 0-10  Pain Level: 8(8/10 L LE; 6/10 R LE)  Pain Type: Chronic pain  Pain Location: Leg;Knee  Pain Orientation: Right;Left(L > R)       Objective:  [address] [facility] Exercise Log  Aquatic, Hip & DLS Program- Phase 1     Date of Eval:       19                         Primary PT:GIOVANI  Diagnosis: Low back pain  Things to Focus On (goals):  Improve mobility, strength of LLE  Surgical Precautions:  Medical Precautions:  [] C-9 dates  [] Occ Med   [x] Medicare         Date 19   Visit # 7/12 8/12 9/12 10/12   Walk F/L/R 2 Laps ea 2 Laps ea 2 laps ea 2 laps ea   Marching 2 Laps 2 laps 2 Laps 2 laps   Squats 13x5\" 13x5\" 13x5\" 13x5\"   Step-Ups F/L/R Low 10x ea Low 13x ea Low 13x ea Low 13x ea   Heel-toe raises 13x 13x 13x  13x   SLR F/L/R 13x 13x ea 13x ea 13x ea   Knee/Flex/Ext 13x 13x 13x 13x   F/L Lunges       Kickboard

## 2019-05-23 NOTE — PROGRESS NOTES
Physical Therapy  Progress Note  Date: 2019  Patient Name: Garrett David  MRN: [id number]     :   1960    Subjective:   General  Referring Practitioner: Dr. Sulema Mistry/ Dr. Karie Fothergill  PT Visit Information  Onset Date: 16  PT Insurance Information: Medicare/ Medicaid  Total # of Visits Approved: 12  Total # of Visits to Date: 10  No Show: 4  Canceled Appointment: 4  Subjective  Subjective: No low back pain today , pain mostly on the whole LLE with numbness/ tightness  Pain Screening  Patient Currently in Pain: Yes  Pain Assessment  Pain Assessment: 0-10  Pain Level: 9(9/10 LLE; 6/10 RLE)  Pain Type: Chronic pain  Pain Location: Leg;Knee  Pain Orientation: Right;Left  Clinical Progression: Gradually improving  Vital Signs  Patient Currently in Pain: Yes       Treatment Activities:    Ambulation 1  Surface: level tile  Device: Rollator  Assistance: Independent  Quality of Gait: Without the rollator, patient with slow aline, knees in genu valgus and noted intoeing of B feet  Comments: Timed up and go - 20 sec - with rollator ( NOW 15 sec ) ; without rollator - 25 sec ( NOW 18 sec )   AROM RLE (degrees)  R SLR: 0 /50 ( NOW 60 )  AROM LLE (degrees)  L SLR: 0 / 30 with pain ( NOW 50 )  Spine  Lumbar: Flexion: 50 ( NOW 60 )    Extension: 20 ( NOW 30 )   Sidebend: R 40  L 20 ( NOW 30 )  Special Tests: Pain with B SKTC/ SLR, worse on left  ( NOW  ( -  ) low back manuevers )  Strength RLE  Strength RLE: WFL  Strength LLE  Comment: L hip extensor 3-/5 ( NOW 3+/5 in available range )  L Hip Flexion: 4/5  L Hip ABduction: 4/5  L Knee Flexion: 4/5;4+/5  L Knee Extension: 4+/5  L Ankle Dorsiflexion: 4+/5  Strength Other  Other: Lower abdominals 3-/5 ( NOW 3/5 )  Assessment:   Conditions Requiring Skilled Therapeutic Intervention  Body structures, Functions, Activity limitations: Decreased functional mobility ; Decreased ADL status; Decreased ROM; Decreased strength;Decreased balance; Increased Pain  Assessment: Other problem: Difficulty walking. Now able to sit to stand from chair without difficulty, move in bed, roll over without difficulty. Improved strength of LLE, main complaint is still pain on BLE's, worse on L. Meeting 2/4 STG, 0/3 LTG but has made gains towards unmet goals. Treatment Diagnosis: Pain, difficulty walking, decreased ROM, strength and function of low back  Prognosis: Good  Patient Education: Discussed importance of carry over of HEP, reviewed  proper body mechanics  REQUIRES PT FOLLOW UP: Yes    Goals:  Short term goals  Time Frame for Short term goals: 6 visits  Short term goal 1: Decrease pain to 5-6/10 to be able to tolerate daily chores with less difficulty - ONGOING  Short term goal 2: Improve ROM by 5-10 degrees to improve changes in position - MET  Short term goal 3: Improve timed up and go by 5- 10 sec to decrease fall risk - PARTLY MET  Short term goal 4: Independent with HEP - MET  Long term goals  Time Frame for Long term goals : 12 visits  Long term goal 1: Improve strength of abdominals and LLE by 1/2 grade to be able to tolerate prolonged standing/ walking - PARTLY MET  Long term goal 2: Improve timed up and go by 10 sec to decrease fall risk - PARTLY MET  Long term goal 3: Improve function in OPTIMAL: 6/3 from 11/3,  for balancing (4), walking long distance (4),bending/ stooping (3); 3/3 = best score  - PARTLY MET ( NOW 9/3 - 4/3/2 RESPECTIVELY )  Patient Goals   Patient goals : \" less pain, improve mobility and  functioning stability \"    Plan:    Plan  Times per week: Script: 2-3x/wk for 4-6 weeks, prefers 2x/wk  Current Treatment Recommendations: Strengthening, Patient/Caregiver Education & Training, Aquatics, ROM, Gait Training, Home Exercise Program  Plan Comment:  To complete remaining 2 visits in Aquatics and plan for discharge after, continue with HEP and patient has an appointment with Pain Management  Timed Code Treatment Minutes: 15 Minutes     Therapy Time   Individual Concurrent Group Co-treatment   Time In 1345         Time Out 1400         Minutes 15         Timed Code Treatment Minutes: 15 Minutes     Treatment Charges: Minutes Units   []  Ultrasound     []  Electrical-Stim     []  Iontophoresis     []  Traction     []  Massage       []  Eval     []  Gait     [x]  Ther Exercise 15  1    []  Manual Therapy       []  Ther Activities       []  Aquatics     []  Vasopneumatic Device     []  Neuro Re-Ed       []  Other       Total Treatment Time: 13  1       Ma  Reva Sanchez, PT Electronically signed by Sidney Higgins PT on 5/23/2019 at 2:16 PM

## 2019-05-29 ENCOUNTER — TELEPHONE (OUTPATIENT)
Dept: INTERNAL MEDICINE | Age: 59
End: 2019-05-29

## 2019-05-30 ENCOUNTER — HOSPITAL ENCOUNTER (OUTPATIENT)
Dept: PHYSICAL THERAPY | Age: 59
Setting detail: THERAPIES SERIES
Discharge: HOME | End: 2019-05-30
Payer: MEDICARE

## 2019-05-30 PROCEDURE — 97113 AQUATIC THERAPY/EXERCISES: CPT

## 2019-05-30 ASSESSMENT — PAIN DESCRIPTION - ORIENTATION: ORIENTATION: RIGHT;LEFT

## 2019-05-30 ASSESSMENT — PAIN DESCRIPTION - PAIN TYPE: TYPE: CHRONIC PAIN

## 2019-05-30 ASSESSMENT — PAIN DESCRIPTION - LOCATION: LOCATION: LEG;KNEE

## 2019-05-30 ASSESSMENT — PAIN SCALES - GENERAL: PAINLEVEL_OUTOF10: 8

## 2019-05-30 NOTE — TELEPHONE ENCOUNTER
Patient returned call to office. States she plans on going to a new PT but does not know who she will be going to yet.   Advised her to call office when she decides and we can place referral.

## 2019-06-03 ENCOUNTER — HOSPITAL ENCOUNTER (OUTPATIENT)
Dept: PHYSICAL THERAPY | Age: 59
Setting detail: THERAPIES SERIES
Discharge: HOME | End: 2019-06-03
Payer: MEDICARE

## 2019-06-03 PROCEDURE — 97113 AQUATIC THERAPY/EXERCISES: CPT

## 2019-06-03 ASSESSMENT — PAIN DESCRIPTION - ORIENTATION: ORIENTATION: LEFT;RIGHT

## 2019-06-03 ASSESSMENT — PAIN DESCRIPTION - PAIN TYPE: TYPE: CHRONIC PAIN

## 2019-06-03 ASSESSMENT — PAIN SCALES - GENERAL: PAINLEVEL_OUTOF10: 7

## 2019-06-03 ASSESSMENT — PAIN DESCRIPTION - LOCATION: LOCATION: LEG;KNEE

## 2019-06-03 NOTE — PROGRESS NOTES
Physical Therapy  [address]   Outpatient Physical Therapy  [address] [facility]. [address]  Phone: [phone]  Fax: [phone]  Daily Progress Note    Date: 6/3/19    Patient Name: Thelma Booth        MRN: [id number]  Account: [de-identified] : 1960      General Information:  Chart Reviewed: Yes  Patient assessed for rehabilitation services?: Yes  Response To Previous Treatment: Patient with no complaints from previous session. Family / Caregiver Present: No  Referring Practitioner: Dr. Erin Mistry/ Dr. Sumit Montgomery  Referral Date : 19  Diagnosis: Low back pain  Follows Commands: Within Functional Limits  Other (Comment): To see Pain Management 19 (rescheduled from 19)  Onset Date: 16  PT Insurance Information: Medicare/ Medicaid  Total # of Visits Approved: 12  Total # of Visits to Date: 12  No Show: 4  Canceled Appointment: 4    Subjective:  Subjective: Feeling good today. Reports that she is touring the [facility] tomorrow so that she can hopefully continue aquatics program independently     Pain:  Patient Currently in Pain: Yes  Pain Assessment: 0-10  Pain Level: 7(7/10 L LE; 5/10 R LE)  Pain Type: Chronic pain  Pain Location: Leg;Knee  Pain Orientation: Left;Right(L>R)       Objective:  [facility] Exercise Log  Aquatic, Hip & DLS Program- Phase 1     Date of Eval:       19                         Primary PT:GIOVANI  Diagnosis: Low back pain  Things to Focus On (goals):  Improve mobility, strength of LLE  Surgical Precautions:  Medical Precautions:  [] C-9 dates  [] Occ Med   [x] Medicare         Date 5/20/19 5/23/19 5/30/19 6/3/19   Visit # 9/12 10/12 11/12 12/12   Walk F/L/R 2 laps ea 2 laps ea 2 Laps ea 2 laps ea   Marching 2 Laps 2 laps 2 Laps 2 Laps   Squats 13x5\" 13x5\" 13x5\" 13x5\"   Step-Ups F/L/R Low 13x ea Low 13x ea Low 13x ea Low 13x ea   Heel-toe raises 13x  13x 13x 13x    SLR F/L/R 13x ea 13x ea 13x ea 13x ea   Knee/Flex/Ext 13x 13x 13x 13x   F/L Lunges       Kickboard Ex. Large Large Large Large   Iso Abd. 13x5\" 13x5\" 13x5\" 13x5\"   Push-pull 13x 13x 13x 13x   Paddling 13x 13x 13x 13x           UE EX 2 Small Balls 2 Small Balls 2 Small Balls 2 Small Balls   Alt Flex/Ext 13x 13x 13x 13x   ABD/ADD 13x 13x 13x 13x   Horiz ABD/ADD 13x 13x 13x 13x   IR/ER 13x 13x 13x 13x   Push Downs 13x 13x 13x 13x           Deep Water 2 Noodles 2 Noodles 2 Noodles 2 Noodles   Cycling 2' 2' 2' 2'   Jacks 2' 2' 2' 2'   X-Country 2' 2' 2' 2'           Stretches       Achllies 2x20\" 2x20\" 2x20\" 2x20\"   Hamstring 2x20\" 2x20\" 2x20\" 2x20\"           Cool down 2 Laps Breast Stroke 2 laps   Breast Stroke 2 Laps  Breast Stroke 2 Laps Breast Stroke   Pain Rating 8.5 L LE  6 R LE 8 L LE  6 R LE 8 L LE  6 R LE 7 L LE  5 R LE     Assessment: Body structures, Functions, Activity limitations: Decreased functional mobility ; Decreased ADL status; Decreased ROM; Decreased strength;Decreased balance; Increased Pain  Treatment Diagnosis: Pain, difficulty walking, decreased ROM, strength and function of low back  Prognosis: Good  REQUIRES PT FOLLOW UP: Yes  Activity Tolerance: Patient Tolerated treatment well;Patient limited by pain    Plan:  Plan: Discharge    Therapy Time:  Time In: 1400  Time Out: 1451  Minutes: 51  Timed Code Treatment Minutes: 23 Minutes    Treatment Charges: Minutes Units   []  Ultrasound     []  Electrical-Stim     []  Iontophoresis     []  Traction     []  Massage       []  Eval     []  Gait     []  Vasopneumatic Device     []  Ther Exercise       []  Manual Therapy       []  Ther Activities       [x]  Aquatics 23 2   []  Neuro Re-Ed       []  Other       Total Treatment Time: 23 2     Electronically signed by Bertin Abarca PTA on 6/3/2019 at 6:41 PM

## 2019-06-04 ENCOUNTER — HOSPITAL ENCOUNTER (OUTPATIENT)
Dept: NON INVASIVE DIAGNOSTICS | Age: 59
Discharge: HOME | End: 2019-06-04
Payer: MEDICARE

## 2019-06-04 DIAGNOSIS — R01.1 HEART MURMUR: ICD-10-CM

## 2019-06-04 LAB
LV EF: 55 %
LVEF MODALITY: NORMAL

## 2019-06-04 PROCEDURE — 93306 TTE W/DOPPLER COMPLETE: CPT

## 2019-06-07 ENCOUNTER — HOSPITAL ENCOUNTER (OUTPATIENT)
Dept: MAMMOGRAPHY | Age: 59
Discharge: HOME | End: 2019-06-09
Payer: MEDICARE

## 2019-06-07 DIAGNOSIS — Z12.39 BREAST CANCER SCREENING: ICD-10-CM

## 2019-06-07 PROCEDURE — 77063 BREAST TOMOSYNTHESIS BI: CPT

## 2019-06-11 ENCOUNTER — HOSPITAL ENCOUNTER (OUTPATIENT)
Dept: PAIN MANAGEMENT | Age: 59
Discharge: HOME | End: 2019-06-11
Payer: MEDICARE

## 2019-06-11 VITALS — DIASTOLIC BLOOD PRESSURE: 79 MMHG | TEMPERATURE: 98.3 F | HEART RATE: 79 BPM | SYSTOLIC BLOOD PRESSURE: 126 MMHG

## 2019-06-11 DIAGNOSIS — M96.1 FAILED BACK SURGICAL SYNDROME: Chronic | ICD-10-CM

## 2019-06-11 DIAGNOSIS — Z51.81 MEDICATION MONITORING ENCOUNTER: Primary | Chronic | ICD-10-CM

## 2019-06-11 DIAGNOSIS — M48.061 SPINAL STENOSIS, LUMBAR REGION, WITHOUT NEUROGENIC CLAUDICATION: ICD-10-CM

## 2019-06-11 DIAGNOSIS — M48.061 SPINAL STENOSIS AT L4-L5 LEVEL: ICD-10-CM

## 2019-06-11 PROCEDURE — 99214 OFFICE O/P EST MOD 30 MIN: CPT

## 2019-06-11 PROCEDURE — 99213 OFFICE O/P EST LOW 20 MIN: CPT | Performed by: NURSE PRACTITIONER

## 2019-06-11 RX ORDER — DULOXETIN HYDROCHLORIDE 30 MG/1
30 CAPSULE, DELAYED RELEASE ORAL DAILY
Qty: 30 CAPSULE | Refills: 0 | Status: SHIPPED | OUTPATIENT
Start: 2019-06-11 | End: 2019-11-08

## 2019-06-11 RX ORDER — OXYCODONE HYDROCHLORIDE AND ACETAMINOPHEN 5; 325 MG/1; MG/1
1 TABLET ORAL DAILY PRN
Qty: 30 TABLET | Refills: 0 | Status: SHIPPED | OUTPATIENT
Start: 2019-06-11 | End: 2019-07-15 | Stop reason: SDUPTHER

## 2019-06-11 RX ORDER — TIZANIDINE 4 MG/1
4 TABLET ORAL NIGHTLY PRN
Qty: 30 TABLET | Refills: 0 | Status: SHIPPED | OUTPATIENT
Start: 2019-06-11 | End: 2019-07-11

## 2019-06-11 ASSESSMENT — ENCOUNTER SYMPTOMS
SHORTNESS OF BREATH: 0
COUGH: 0
CONSTIPATION: 0
ABDOMINAL PAIN: 0
BACK PAIN: 1

## 2019-06-11 NOTE — PROGRESS NOTES
potential drug abuse or diversion identified. , Assessed functional status., Obtaining appropriate analgesic effect of treatment.  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    Anemia 10/5/2016    Arthritis     Asthma 1/4/2017    Bilateral leg and foot pain 02/2019    left much worse    Degeneration of cervical intervertebral disc 6/21/2018    Depression 2/2/2016    Failed back syndrome 4/3/2018    History of recurrent UTI (urinary tract infection)     Hyperlipidemia     Hypertension     Lumbar disc herniation with radiculopathy 6/4/2013    Obesity     Spinal stenosis     Wound infection after surgery        Past Surgical History:   Procedure Laterality Date    BACK SURGERY      FOOT SURGERY Bilateral     heel spurs, plantar fascia release    GALLBLADDER SURGERY      [facility]UMBAR [facility]  [facility]   Deyvi Carry LUMBAR SPINE SURGERY  4/2012    [facility]   Deyvi Carry NERVE BLOCK  10/26/2015    caudal# 1 decadron 7.5 mg    NERVE BLOCK  12/22/15    caudal #2  celestone 9mg    SPINE SURGERY  07/24/2016    3 places     VEIN SURGERY Bilateral 03/2017       Allergies   Allergen Reactions    Lisinopril Hives and Anaphylaxis     seizures  seizures  seizures    Azithromycin Swelling    Ciprofloxacin Swelling    Keflex [Cephalexin] Swelling    Mobic [Meloxicam]      Muscle cramping/spasm    Penicillins Other (See Comments)    Aztreonam Swelling and Rash    Bactrim [Sulfamethoxazole-Trimethoprim] Rash         Current Outpatient Medications:     hydrochlorothiazide (HYDRODIURIL) 25 MG tablet, Take 1 tablet by mouth daily, Disp: 30 tablet, Rfl: 3    aspirin 81 MG tablet, Take 2 tablets by mouth daily, Disp: 60 tablet, Rfl: 3    ascorbic acid (VITAMIN C) 500 MG tablet, Take 1 tablet by mouth daily, Disp: 30 tablet, Rfl: 3    atorvastatin (LIPITOR) 40 MG tablet, Take 1 tablet by mouth daily, Disp: 30 tablet, Rfl: 3    Multiple Vitamin (THERAPEUTIC MULTIVITAMIN PO), Take 1 tablet by mouth, Disp: , Rfl:     Family History   Problem Relation Age of Onset    Ovarian Cancer Mother     Heart Disease Father     Hypertension Father        Social History     Socioeconomic History    Marital status: Single     Spouse name: Not on file    Number of children: Not on file    Years of education: Not on file    Highest education level: Not on file   Occupational History    Not on file   Social Needs    Financial resource strain: Not on file    Food insecurity:     Worry: Not on file     Inability: Not on file    Transportation needs:     Medical: Not on file     Non-medical: Not on file   Tobacco Use    Smoking status: Former Smoker     Packs/day: 0.10     Years: 30.00     Pack years: 3.00     Types: Cigarettes     Last attempt to quit: 10/23/1988     Years since quittin.6    Smokeless tobacco: Never Used   Substance and Sexual Activity    Alcohol use: No     Alcohol/week: 0.0 oz    Drug use: No    Sexual activity: Never   Lifestyle    Physical activity:     Days per week: Not on file     Minutes per session: Not on file    Stress: Not on file   Relationships    Social connections:     Talks on phone: Not on file     Gets together: Not on file     Attends Sabianism service: Not on file     Active member of club or organization: Not on file     Attends meetings of clubs or organizations: Not on file     Relationship status: Not on file    Intimate partner violence:     Fear of current or ex partner: Not on file     Emotionally abused: Not on file     Physically abused: Not on file     Forced sexual activity: Not on file   Other Topics Concern    Not on file   Social History Narrative    Not on file       Review of Systems:  Review of Systems   Constitution: Negative for chills and fever. Cardiovascular: Negative for chest pain. Respiratory: Negative for cough and shortness of breath. Musculoskeletal: Positive for back pain. Gastrointestinal: Negative for abdominal pain and constipation. Neurological: Positive for numbness, tingling and weakness. Negative for headaches. Physical Exam:  /79   Pulse 79   Temp 98.3 °F (36.8 °C) (Oral)   LMP 09/07/2007     Physical Exam   Constitutional: She is oriented to person, place, and time. Cardiovascular: Normal rate. Pulmonary/Chest: Effort normal.   Musculoskeletal:        Lumbar back: She exhibits decreased range of motion and tenderness. Using rolling walker   Neurological: She is alert and oriented to person, place, and time. Skin: Skin is warm and dry. Record/Diagnostics Review:    Last nuzhat April  and was appropriate     MRI Lumbar 2018 -      1. Extensive postsurgical changes of the lower thoracic and lumbar spine. 2. Interval development of a 9 mm focal right paracentral disc protrusion at  L5-S1 severely effacing the right lateral recess, posteriorly displacing the  right S1 nerve roots and resulting in moderate to severe spinal canal  stenosis. 3. Additional multilevel degenerative changes of the lower thoracic and  lumbar spine, as described above. 4. Redemonstration of focal myelomalacia within the lower thoracic spinal  cord at T11-12, unchanged from the previous exam.       Assessment:  Problem List Items Addressed This Visit     Medication monitoring encounter - Primary (Chronic)    Failed back surgical syndrome (Chronic)             Treatment Plan:  Patient relates current medications are helping the pain. Patient reports taking pain medications as prescribed, denies obtaining medications from different sources and denies use of illegal drugs. Patient denies side effects from medications like nausea, vomiting, constipation or drowsiness. Patient reports current activities of daily living are possible due to medications and would like to continue them.      As always, we encourage daily stretching and strengthening exercises, and recommend minimizing use of pain medications unless patient cannot get through daily activities due to pain. Contract requirements met. Continue opioid therapy.  Script written for perckanwal stewartalta and tizandine  Pt would like future appt to be in [place]

## 2019-06-27 ENCOUNTER — TELEPHONE (OUTPATIENT)
Dept: INTERNAL MEDICINE | Age: 59
End: 2019-06-27

## 2019-06-27 DIAGNOSIS — M48.061 SPINAL STENOSIS, LUMBAR REGION, WITHOUT NEUROGENIC CLAUDICATION: Primary | ICD-10-CM

## 2019-07-03 ENCOUNTER — TELEPHONE (OUTPATIENT)
Dept: INTERNAL MEDICINE | Age: 59
End: 2019-07-03

## 2019-07-08 NOTE — TELEPHONE ENCOUNTER
Echo overall ok. Maybe mild mitral regurgitation but does not look significant.  Follow up in 1 year

## 2019-07-15 DIAGNOSIS — M48.061 SPINAL STENOSIS AT L4-L5 LEVEL: ICD-10-CM

## 2019-07-15 DIAGNOSIS — M48.061 SPINAL STENOSIS, LUMBAR REGION, WITHOUT NEUROGENIC CLAUDICATION: ICD-10-CM

## 2019-07-15 RX ORDER — OXYCODONE HYDROCHLORIDE AND ACETAMINOPHEN 5; 325 MG/1; MG/1
1 TABLET ORAL DAILY PRN
Qty: 10 TABLET | Refills: 0 | Status: SHIPPED | OUTPATIENT
Start: 2019-07-15 | End: 2019-07-25

## 2019-09-09 ENCOUNTER — CARE COORDINATION (OUTPATIENT)
Dept: CARE COORDINATION | Age: 59
End: 2019-09-09

## 2019-09-09 NOTE — LETTER
9/9/2019    Jenny Restrepo  [address] [address]      Dear Jenny Restrepo,    My name is Heloise Hamman and I am a registered nurse who partners with Rylee Miller MD to improve patients' health. Rylee Miller MD believes you would benefit from working with me. As a member of your health care team, I would work with other providers involved in your care, offer education for your specific health conditions, and connect you with additional resources as needed. I will collaborate with Rylee Miller MD to support you in following your treatment plan. The additional support I provide is no additional cost to you. My primary focus is to help you achieve specific goals and improve your health. We are committed to walk with you on this journey and look forward to working with you. Please call me to further discuss your healthcare needs. I am available by phone or for appointments at the office. You can reach me at [phone].     In good health,     Heloise Hamman, RN

## 2019-09-11 ENCOUNTER — CARE COORDINATION (OUTPATIENT)
Dept: CARE COORDINATION | Age: 59
End: 2019-09-11

## 2019-09-11 ASSESSMENT — PATIENT HEALTH QUESTIONNAIRE - PHQ9
SUM OF ALL RESPONSES TO PHQ QUESTIONS 1-9: 2
SUM OF ALL RESPONSES TO PHQ QUESTIONS 1-9: 2

## 2019-09-18 ENCOUNTER — CARE COORDINATION (OUTPATIENT)
Dept: CARE COORDINATION | Age: 59
End: 2019-09-18

## 2019-09-18 NOTE — CARE COORDINATION
Ambulatory Care Coordination Note  9/18/2019  CM Risk Score: 5  Charlson 10 Year Mortality Risk Score: 23%     ACC: [place], MAYRA    Summary Note: spoke with patient, she states that she is sleeping and to call her back later. Will call After 1 PM  Called. LM on VM will attempt contact in 3 days        Care Coordination Interventions    Program Enrollment:  Rising Risk  Referral from Primary Care Provider:  No  Suggested Interventions and Community Resources  Disease Specific Clinic:  In Process (Comment: pain managment)  Physical Therapy: In Process  Registered Dietician: In Process (Comment: weight loss)  Zone Management Tools:  Not Started         Goals Addressed    None         Prior to Admission medications    Medication Sig Start Date End Date Taking?  Authorizing Provider   DULoxetine (CYMBALTA) 30 MG extended release capsule Take 1 capsule by mouth daily 6/11/19 7/11/19  KELLI Owens CNP   hydrochlorothiazide (HYDRODIURIL) 25 MG tablet Take 1 tablet by mouth daily 5/1/19   Royce Stoll MD   aspirin 81 MG tablet Take 2 tablets by mouth daily 5/1/19   Royce Stoll MD   ascorbic acid (VITAMIN C) 500 MG tablet Take 1 tablet by mouth daily 5/1/19   Royce Stoll MD   atorvastatin (LIPITOR) 40 MG tablet Take 1 tablet by mouth daily 5/1/19   Royce Stoll MD   Multiple Vitamin (THERAPEUTIC MULTIVITAMIN PO) Take 1 tablet by mouth    Historical Provider, MD       Future Appointments   Date Time Provider Mari Diop   9/27/2019 11:00 AM Svetlana Ely MD [facility]   9/27/2019  2:20 PM KELLI Owens CNP [facility]   1/6/2020 10:40 AM Jhonny Mauricio MD [facility] [address] [facility]

## 2019-10-03 ENCOUNTER — CARE COORDINATION (OUTPATIENT)
Dept: CARE COORDINATION | Age: 59
End: 2019-10-03

## 2019-10-10 ENCOUNTER — CARE COORDINATION (OUTPATIENT)
Dept: CARE COORDINATION | Age: 59
End: 2019-10-10

## 2019-11-08 ENCOUNTER — OFFICE VISIT (OUTPATIENT)
Dept: PAIN MANAGEMENT | Age: 59
End: 2019-11-08
Payer: MEDICARE

## 2019-11-08 VITALS
SYSTOLIC BLOOD PRESSURE: 178 MMHG | HEART RATE: 83 BPM | HEIGHT: 71 IN | OXYGEN SATURATION: 100 % | DIASTOLIC BLOOD PRESSURE: 100 MMHG | BODY MASS INDEX: 46.72 KG/M2

## 2019-11-08 DIAGNOSIS — M96.1 FAILED BACK SURGICAL SYNDROME: Chronic | ICD-10-CM

## 2019-11-08 DIAGNOSIS — Z51.81 MEDICATION MONITORING ENCOUNTER: Chronic | ICD-10-CM

## 2019-11-08 DIAGNOSIS — M48.061 SPINAL STENOSIS, LUMBAR REGION, WITHOUT NEUROGENIC CLAUDICATION: ICD-10-CM

## 2019-11-08 DIAGNOSIS — M48.061 SPINAL STENOSIS AT L4-L5 LEVEL: ICD-10-CM

## 2019-11-08 PROCEDURE — 99214 OFFICE O/P EST MOD 30 MIN: CPT | Performed by: NURSE PRACTITIONER

## 2019-11-08 RX ORDER — DULOXETINE 40 MG/1
40 CAPSULE, DELAYED RELEASE ORAL DAILY
Qty: 30 CAPSULE | Refills: 0 | Status: SHIPPED | OUTPATIENT
Start: 2019-11-08 | End: 2020-03-06 | Stop reason: SDUPTHER

## 2019-11-08 RX ORDER — IBUPROFEN 600 MG/1
TABLET ORAL
COMMUNITY
Start: 2019-09-19 | End: 2021-01-14 | Stop reason: SDUPTHER

## 2019-11-08 RX ORDER — TIZANIDINE 4 MG/1
4 TABLET ORAL NIGHTLY PRN
Qty: 30 TABLET | Refills: 0 | Status: SHIPPED | OUTPATIENT
Start: 2019-11-08 | End: 2020-03-06 | Stop reason: SDUPTHER

## 2019-11-08 RX ORDER — NALOXEGOL OXALATE 25 MG/1
TABLET, FILM COATED ORAL
COMMUNITY
Start: 2019-09-19 | End: 2019-11-08

## 2019-11-08 RX ORDER — OXYCODONE HYDROCHLORIDE AND ACETAMINOPHEN 5; 325 MG/1; MG/1
1 TABLET ORAL DAILY PRN
Qty: 30 TABLET | Refills: 0 | Status: SHIPPED | OUTPATIENT
Start: 2019-11-08 | End: 2020-03-06 | Stop reason: SDUPTHER

## 2019-11-08 ASSESSMENT — ENCOUNTER SYMPTOMS
BACK PAIN: 1
COUGH: 0
CONSTIPATION: 0
APHONIA: 0
SHORTNESS OF BREATH: 0
RESPIRATORY NEGATIVE: 1

## 2019-11-18 ENCOUNTER — TELEPHONE (OUTPATIENT)
Dept: ORTHOPEDIC SURGERY | Age: 59
End: 2019-11-18

## 2019-11-20 ENCOUNTER — CARE COORDINATION (OUTPATIENT)
Dept: CARE COORDINATION | Age: 59
End: 2019-11-20

## 2019-12-03 ENCOUNTER — CARE COORDINATION (OUTPATIENT)
Dept: CARE COORDINATION | Age: 59
End: 2019-12-03

## 2019-12-30 ENCOUNTER — HOSPITAL ENCOUNTER (EMERGENCY)
Age: 59
Discharge: HOME | End: 2019-12-30
Attending: EMERGENCY MEDICINE
Payer: MEDICARE

## 2019-12-30 VITALS
BODY MASS INDEX: 41.02 KG/M2 | DIASTOLIC BLOOD PRESSURE: 78 MMHG | HEART RATE: 79 BPM | TEMPERATURE: 98.7 F | OXYGEN SATURATION: 96 % | HEIGHT: 71 IN | RESPIRATION RATE: 16 BRPM | SYSTOLIC BLOOD PRESSURE: 149 MMHG | WEIGHT: 293 LBS

## 2019-12-30 LAB
-: ABNORMAL
AMORPHOUS: ABNORMAL
BACTERIA: ABNORMAL
BILIRUBIN URINE: NEGATIVE
CASTS UA: ABNORMAL /LPF (ref 0–2)
CASTS UA: ABNORMAL /LPF (ref 0–2)
COLOR: YELLOW
COMMENT UA: ABNORMAL
CRYSTALS, UA: ABNORMAL /HPF
CRYSTALS, UA: ABNORMAL /HPF
EPITHELIAL CELLS UA: ABNORMAL /HPF (ref 0–5)
GLUCOSE URINE: NEGATIVE
KETONES, URINE: NEGATIVE
LEUKOCYTE ESTERASE, URINE: ABNORMAL
MUCUS: ABNORMAL
NITRITE, URINE: NEGATIVE
OTHER OBSERVATIONS UA: ABNORMAL
PH UA: 5.5 (ref 5–8)
PROTEIN UA: NEGATIVE
RBC UA: ABNORMAL /HPF (ref 0–2)
RENAL EPITHELIAL, UA: ABNORMAL /HPF
SPECIFIC GRAVITY UA: 1.03 (ref 1–1.03)
TRICHOMONAS: ABNORMAL
TURBIDITY: ABNORMAL
URINE HGB: NEGATIVE
UROBILINOGEN, URINE: NORMAL
WBC UA: ABNORMAL /HPF (ref 0–5)
YEAST: ABNORMAL

## 2019-12-30 PROCEDURE — 99283 EMERGENCY DEPT VISIT LOW MDM: CPT

## 2019-12-30 PROCEDURE — 6370000000 HC RX 637 (ALT 250 FOR IP): Performed by: STUDENT IN AN ORGANIZED HEALTH CARE EDUCATION/TRAINING PROGRAM

## 2019-12-30 PROCEDURE — 87086 URINE CULTURE/COLONY COUNT: CPT

## 2019-12-30 PROCEDURE — 81001 URINALYSIS AUTO W/SCOPE: CPT

## 2019-12-30 RX ORDER — CEPHALEXIN 500 MG/1
500 CAPSULE ORAL 2 TIMES DAILY
Qty: 14 CAPSULE | Refills: 0 | Status: ON HOLD | OUTPATIENT
Start: 2019-12-30 | End: 2020-01-12 | Stop reason: SINTOL

## 2019-12-30 RX ORDER — PHENAZOPYRIDINE HYDROCHLORIDE 100 MG/1
200 TABLET, FILM COATED ORAL ONCE
Status: COMPLETED | OUTPATIENT
Start: 2019-12-30 | End: 2019-12-30

## 2019-12-30 RX ORDER — CEPHALEXIN 500 MG/1
500 CAPSULE ORAL ONCE
Status: COMPLETED | OUTPATIENT
Start: 2019-12-30 | End: 2019-12-30

## 2019-12-30 RX ADMIN — CEPHALEXIN 500 MG: 500 CAPSULE ORAL at 22:09

## 2019-12-30 RX ADMIN — PHENAZOPYRIDINE HYDROCHLORIDE 200 MG: 100 TABLET ORAL at 22:09

## 2019-12-30 ASSESSMENT — PAIN SCALES - GENERAL: PAINLEVEL_OUTOF10: 10

## 2019-12-30 ASSESSMENT — PAIN DESCRIPTION - LOCATION: LOCATION: GENERALIZED

## 2019-12-30 ASSESSMENT — PAIN DESCRIPTION - ONSET: ONSET: ON-GOING

## 2019-12-30 ASSESSMENT — PAIN DESCRIPTION - FREQUENCY: FREQUENCY: CONTINUOUS

## 2019-12-30 ASSESSMENT — PAIN DESCRIPTION - PROGRESSION: CLINICAL_PROGRESSION: NOT CHANGED

## 2019-12-31 LAB
CULTURE: NORMAL
Lab: NORMAL
SPECIMEN DESCRIPTION: NORMAL

## 2019-12-31 NOTE — ED PROVIDER NOTES
on file    Highest education level: Not on file   Occupational History    Not on file   Social Needs    Financial resource strain: Not on file    Food insecurity:     Worry: Not on file     Inability: Not on file    Transportation needs:     Medical: Not on file     Non-medical: Not on file   Tobacco Use    Smoking status: Former Smoker     Packs/day: 0.10     Years: 30.00     Pack years: 3.00     Types: Cigarettes     Last attempt to quit: 10/23/1988     Years since quittin.2    Smokeless tobacco: Never Used   Substance and Sexual Activity    Alcohol use: No     Alcohol/week: 0.0 standard drinks    Drug use: No    Sexual activity: Never   Lifestyle    Physical activity:     Days per week: Not on file     Minutes per session: Not on file    Stress: Not on file   Relationships    Social connections:     Talks on phone: Not on file     Gets together: Not on file     Attends Mu-ism service: Not on file     Active member of club or organization: Not on file     Attends meetings of clubs or organizations: Not on file     Relationship status: Not on file    Intimate partner violence:     Fear of current or ex partner: Not on file     Emotionally abused: Not on file     Physically abused: Not on file     Forced sexual activity: Not on file   Other Topics Concern    Not on file   Social History Narrative    Not on file       Family History   Problem Relation Age of Onset    Ovarian Cancer Mother     Heart Disease Father     Hypertension Father         Allergies:  Lisinopril; Azithromycin; Ciprofloxacin; Keflex [cephalexin]; Mobic [meloxicam]; Penicillins; Aztreonam; and Bactrim [sulfamethoxazole-trimethoprim]    Home Medications:  Prior to Admission medications    Medication Sig Start Date End Date Taking?  Authorizing Provider   cephALEXin (KEFLEX) 500 MG capsule Take 1 capsule by mouth 2 times daily 19  Yes Macey Major,    DULoxetine 40 MG CPEP Take 40 mg by mouth daily 19 12/8/19  Samia Good, APRN - CNP   ibuprofen (ADVIL;MOTRIN) 600 MG tablet  9/19/19   Historical Provider, MD   hydrochlorothiazide (HYDRODIURIL) 25 MG tablet Take 1 tablet by mouth daily 5/1/19   Mehul Post MD   aspirin 81 MG tablet Take 2 tablets by mouth daily 5/1/19   Mehul Post MD   ascorbic acid (VITAMIN C) 500 MG tablet Take 1 tablet by mouth daily 5/1/19   Mehul Post MD   atorvastatin (LIPITOR) 40 MG tablet Take 1 tablet by mouth daily  Patient not taking: Reported on 11/8/2019 5/1/19   Mehul Post MD   Multiple Vitamin (THERAPEUTIC MULTIVITAMIN PO) Take 1 tablet by mouth    Historical Provider, MD       REVIEW OFSYSTEMS    (2-9 systems for level 4, 10 or more for level 5)      Constitutional ROS - No recent fevers, No recent chills  Neurological ROS - No Headache, No Syncope  Opthalmologic ROS- No eye pain, No vision changes   ENT ROS - No sore throat, No congestion  Respiratory ROS - No cough, No shortness of breath  Cardiovascular ROS - No chest pain, No palpitations   Gastrointestinal ROS - No abdominal pain, No nausea, No vomiting  Genito-Urinary ROS - + dysuria, Nohematuria  Musculoskeletal ROS - No back pain, No neck pain  Dermatological ROS - No wound, No rash  PHYSICAL EXAM   (up to 7 for level 4, 8 or more forlevel 5)      INITIAL VITALS:   ED Triage Vitals [12/30/19 1940]   BP Temp Temp Source Pulse Resp SpO2 Height Weight   (!) 149/78 98.7 °F (37.1 °C) Oral 79 16 96 % 5' 11\" (1.803 m) (!) 325 lb (147.4 kg)       Physical Exam  Constitutional:       Appearance: She is well-developed. Comments: Obese female   HENT:      Head: Normocephalic and atraumatic. Eyes:      Pupils: Pupils are equal, round, and reactive to light. Neck:      Musculoskeletal: Normal range of motion and neck supple. Cardiovascular:      Rate and Rhythm: Normal rate and regular rhythm. Pulmonary:      Effort: Pulmonary effort is normal. No respiratory distress.       Breath sounds: Normal components:       Result Value    Turbidity UA CLOUDY (*)     Leukocyte Esterase, Urine SMALL (*)     All other components within normal limits   MICROSCOPIC URINALYSIS - Abnormal; Notable for the following components:    Crystals UA FEW (*)     Crystals UA CALCIUM OXALATE (*)     Bacteria, UA FEW (*)     Mucus, UA 2+ (*)     All other components within normal limits   URINE CULTURE         RADIOLOGY:  No results found. EKG  NA     All EKG's are interpreted by the Emergency Department Physicianwho either signs or Co-signs this chart in the absence of a cardiologist.    EMERGENCY DEPARTMENT COURSE:     Patient was found to have a urinary tract infection, her prior culture was sensitive to Keflex and ciprofloxacin. Will attempt to use Keflex and give patient the information about her complicated urinary tract infection and that she may need to follow-up with a primary care physician and infectious disease down the road. PROCEDURES:  None    CONSULTS:  None    CRITICAL CARE:  Please see attending note    FINAL IMPRESSION      1.  Urinary tract infection with hematuria, site unspecified          DISPOSITION / PLAN     DISPOSITION Decision To Discharge 12/30/2019 09:18:54 PM      PATIENT REFERRED TO:  [facility]  [address] [address]  [phone]  Call       Vernona Frankel, MD [address]  [phone]    Call in 1 day  As needed      DISCHARGE MEDICATIONS:  Discharge Medication List as of 12/30/2019  9:55 PM      START taking these medications    Details   cephALEXin (KEFLEX) 500 MG capsule Take 1 capsule by mouth 2 times daily, Disp-14 capsule, R-0Print             Abisai Garza DO  Emergency Medicine Resident    (Please note that portions of this note were completed with a voice recognition program.Efforts were made to edit the dictations but occasionally words are mis-transcribed.)        Abisai Garza

## 2019-12-31 NOTE — ED NOTES
Pt. To ED via self ambulatory with walker  Patient here with c/o possible UTI  Patient states she gets UTIs all the time and is presenting with her typical symptoms; pt. States she has increased frequency in urination, burning, and her legs are swollen  Vitals obtained, call light provided, respirations even and non-labored  Pt.  Denies CP, SOB, N/V  No other need at this time  Will continue to monitor     Kitty Madden RN  12/30/19 2003

## 2020-01-12 ENCOUNTER — HOSPITAL ENCOUNTER (OUTPATIENT)
Age: 60
Setting detail: OBSERVATION
Discharge: HOME | End: 2020-01-13
Attending: EMERGENCY MEDICINE | Admitting: INTERNAL MEDICINE
Payer: MEDICARE

## 2020-01-12 ENCOUNTER — APPOINTMENT (OUTPATIENT)
Dept: GENERAL RADIOLOGY | Age: 60
End: 2020-01-12
Payer: MEDICARE

## 2020-01-12 ENCOUNTER — APPOINTMENT (OUTPATIENT)
Dept: CT IMAGING | Age: 60
End: 2020-01-12
Payer: MEDICARE

## 2020-01-12 PROBLEM — R10.9 FLANK PAIN: Status: ACTIVE | Noted: 2020-01-12

## 2020-01-12 PROBLEM — R29.898 WEAKNESS OF BOTH LOWER EXTREMITIES: Status: ACTIVE | Noted: 2020-01-12

## 2020-01-12 PROBLEM — M79.10 MUSCLE PAIN: Status: ACTIVE | Noted: 2020-01-12

## 2020-01-12 PROBLEM — N39.0 RECURRENT UTI: Status: ACTIVE | Noted: 2020-01-12

## 2020-01-12 LAB
-: ABNORMAL
ABSOLUTE EOS #: 0.06 K/UL (ref 0–0.44)
ABSOLUTE IMMATURE GRANULOCYTE: <0.03 K/UL (ref 0–0.3)
ABSOLUTE LYMPH #: 1.64 K/UL (ref 1.1–3.7)
ABSOLUTE MONO #: 0.36 K/UL (ref 0.1–1.2)
AMORPHOUS: ABNORMAL
ANION GAP SERPL CALCULATED.3IONS-SCNC: 11 MMOL/L (ref 9–17)
BACTERIA: ABNORMAL
BASOPHILS # BLD: 1 % (ref 0–2)
BASOPHILS ABSOLUTE: 0.03 K/UL (ref 0–0.2)
BILIRUBIN URINE: NEGATIVE
BUN BLDV-MCNC: 12 MG/DL (ref 8–23)
BUN/CREAT BLD: ABNORMAL (ref 9–20)
CALCIUM SERPL-MCNC: 9 MG/DL (ref 8.6–10.4)
CASTS UA: ABNORMAL /LPF (ref 0–8)
CHLORIDE BLD-SCNC: 106 MMOL/L (ref 98–107)
CO2: 21 MMOL/L (ref 20–31)
COLOR: YELLOW
COMMENT UA: ABNORMAL
CREAT SERPL-MCNC: 0.53 MG/DL (ref 0.5–0.9)
CRYSTALS, UA: ABNORMAL /HPF
DIFFERENTIAL TYPE: NORMAL
EOSINOPHILS RELATIVE PERCENT: 1 % (ref 1–4)
EPITHELIAL CELLS UA: ABNORMAL /HPF (ref 0–5)
GFR AFRICAN AMERICAN: >60 ML/MIN
GFR NON-AFRICAN AMERICAN: >60 ML/MIN
GFR SERPL CREATININE-BSD FRML MDRD: ABNORMAL ML/MIN/{1.73_M2}
GFR SERPL CREATININE-BSD FRML MDRD: ABNORMAL ML/MIN/{1.73_M2}
GLUCOSE BLD-MCNC: 100 MG/DL (ref 70–99)
GLUCOSE URINE: NEGATIVE
HCT VFR BLD CALC: 37.9 % (ref 36.3–47.1)
HEMOGLOBIN: 12.2 G/DL (ref 11.9–15.1)
IMMATURE GRANULOCYTES: 0 %
KETONES, URINE: NEGATIVE
LEUKOCYTE ESTERASE, URINE: ABNORMAL
LYMPHOCYTES # BLD: 30 % (ref 24–43)
MCH RBC QN AUTO: 30.1 PG (ref 25.2–33.5)
MCHC RBC AUTO-ENTMCNC: 32.2 G/DL (ref 28.4–34.8)
MCV RBC AUTO: 93.6 FL (ref 82.6–102.9)
MONOCYTES # BLD: 7 % (ref 3–12)
MUCUS: ABNORMAL
MYOGLOBIN: 26 NG/ML (ref 25–58)
NITRITE, URINE: POSITIVE
NRBC AUTOMATED: 0 PER 100 WBC
OTHER OBSERVATIONS UA: ABNORMAL
PDW BLD-RTO: 14.2 % (ref 11.8–14.4)
PH UA: 6.5 (ref 5–8)
PLATELET # BLD: 229 K/UL (ref 138–453)
PLATELET ESTIMATE: NORMAL
PMV BLD AUTO: 10.8 FL (ref 8.1–13.5)
POTASSIUM SERPL-SCNC: 3.9 MMOL/L (ref 3.7–5.3)
PROTEIN UA: NEGATIVE
RBC # BLD: 4.05 M/UL (ref 3.95–5.11)
RBC # BLD: NORMAL 10*6/UL
RBC UA: ABNORMAL /HPF (ref 0–4)
RENAL EPITHELIAL, UA: ABNORMAL /HPF
SEG NEUTROPHILS: 61 % (ref 36–65)
SEGMENTED NEUTROPHILS ABSOLUTE COUNT: 3.4 K/UL (ref 1.5–8.1)
SODIUM BLD-SCNC: 138 MMOL/L (ref 135–144)
SPECIFIC GRAVITY UA: 1.01 (ref 1–1.03)
TOTAL CK: 230 U/L (ref 26–192)
TOTAL CK: 241 U/L (ref 26–192)
TRICHOMONAS: ABNORMAL
TROPONIN INTERP: NORMAL
TROPONIN T: NORMAL NG/ML
TROPONIN, HIGH SENSITIVITY: 6 NG/L (ref 0–14)
TURBIDITY: CLEAR
URINE HGB: NEGATIVE
UROBILINOGEN, URINE: NORMAL
WBC # BLD: 5.5 K/UL (ref 3.5–11.3)
WBC # BLD: NORMAL 10*3/UL
WBC UA: ABNORMAL /HPF (ref 0–5)
YEAST: ABNORMAL

## 2020-01-12 PROCEDURE — 80048 BASIC METABOLIC PNL TOTAL CA: CPT

## 2020-01-12 PROCEDURE — 6370000000 HC RX 637 (ALT 250 FOR IP): Performed by: STUDENT IN AN ORGANIZED HEALTH CARE EDUCATION/TRAINING PROGRAM

## 2020-01-12 PROCEDURE — 82550 ASSAY OF CK (CPK): CPT

## 2020-01-12 PROCEDURE — 2580000003 HC RX 258: Performed by: STUDENT IN AN ORGANIZED HEALTH CARE EDUCATION/TRAINING PROGRAM

## 2020-01-12 PROCEDURE — 87077 CULTURE AEROBIC IDENTIFY: CPT

## 2020-01-12 PROCEDURE — G0378 HOSPITAL OBSERVATION PER HR: HCPCS

## 2020-01-12 PROCEDURE — 36415 COLL VENOUS BLD VENIPUNCTURE: CPT

## 2020-01-12 PROCEDURE — 99285 EMERGENCY DEPT VISIT HI MDM: CPT

## 2020-01-12 PROCEDURE — 85025 COMPLETE CBC W/AUTO DIFF WBC: CPT

## 2020-01-12 PROCEDURE — 96372 THER/PROPH/DIAG INJ SC/IM: CPT

## 2020-01-12 PROCEDURE — 74176 CT ABD & PELVIS W/O CONTRAST: CPT

## 2020-01-12 PROCEDURE — 6360000002 HC RX W HCPCS: Performed by: STUDENT IN AN ORGANIZED HEALTH CARE EDUCATION/TRAINING PROGRAM

## 2020-01-12 PROCEDURE — 96374 THER/PROPH/DIAG INJ IV PUSH: CPT

## 2020-01-12 PROCEDURE — 81001 URINALYSIS AUTO W/SCOPE: CPT

## 2020-01-12 PROCEDURE — 6370000000 HC RX 637 (ALT 250 FOR IP): Performed by: NURSE PRACTITIONER

## 2020-01-12 PROCEDURE — 2580000003 HC RX 258: Performed by: NURSE PRACTITIONER

## 2020-01-12 PROCEDURE — 87186 SC STD MICRODIL/AGAR DIL: CPT

## 2020-01-12 PROCEDURE — 83874 ASSAY OF MYOGLOBIN: CPT

## 2020-01-12 PROCEDURE — 87086 URINE CULTURE/COLONY COUNT: CPT

## 2020-01-12 PROCEDURE — 6370000000 HC RX 637 (ALT 250 FOR IP): Performed by: INTERNAL MEDICINE

## 2020-01-12 PROCEDURE — 96375 TX/PRO/DX INJ NEW DRUG ADDON: CPT

## 2020-01-12 PROCEDURE — 6360000002 HC RX W HCPCS: Performed by: NURSE PRACTITIONER

## 2020-01-12 PROCEDURE — 6360000002 HC RX W HCPCS: Performed by: EMERGENCY MEDICINE

## 2020-01-12 PROCEDURE — 71046 X-RAY EXAM CHEST 2 VIEWS: CPT

## 2020-01-12 PROCEDURE — 84484 ASSAY OF TROPONIN QUANT: CPT

## 2020-01-12 PROCEDURE — 93005 ELECTROCARDIOGRAM TRACING: CPT

## 2020-01-12 PROCEDURE — 99219 PR INITIAL OBSERVATION CARE/DAY 50 MINUTES: CPT | Performed by: INTERNAL MEDICINE

## 2020-01-12 RX ORDER — SODIUM CHLORIDE 0.9 % (FLUSH) 0.9 %
10 SYRINGE (ML) INJECTION EVERY 12 HOURS SCHEDULED
Status: DISCONTINUED | OUTPATIENT
Start: 2020-01-12 | End: 2020-01-13 | Stop reason: HOSPADM

## 2020-01-12 RX ORDER — FENTANYL CITRATE 50 UG/ML
25 INJECTION, SOLUTION INTRAMUSCULAR; INTRAVENOUS ONCE
Status: COMPLETED | OUTPATIENT
Start: 2020-01-12 | End: 2020-01-12

## 2020-01-12 RX ORDER — 0.9 % SODIUM CHLORIDE 0.9 %
1000 INTRAVENOUS SOLUTION INTRAVENOUS ONCE
Status: COMPLETED | OUTPATIENT
Start: 2020-01-12 | End: 2020-01-12

## 2020-01-12 RX ORDER — ACETAMINOPHEN 325 MG/1
650 TABLET ORAL EVERY 4 HOURS PRN
Status: DISCONTINUED | OUTPATIENT
Start: 2020-01-12 | End: 2020-01-13 | Stop reason: HOSPADM

## 2020-01-12 RX ORDER — OXYCODONE HYDROCHLORIDE AND ACETAMINOPHEN 5; 325 MG/1; MG/1
TABLET ORAL
Status: DISPENSED
Start: 2020-01-12 | End: 2020-01-12

## 2020-01-12 RX ORDER — ASCORBIC ACID 500 MG
500 TABLET ORAL DAILY
Status: DISCONTINUED | OUTPATIENT
Start: 2020-01-12 | End: 2020-01-13 | Stop reason: HOSPADM

## 2020-01-12 RX ORDER — NITROFURANTOIN 25; 75 MG/1; MG/1
100 CAPSULE ORAL ONCE
Status: COMPLETED | OUTPATIENT
Start: 2020-01-12 | End: 2020-01-12

## 2020-01-12 RX ORDER — OXYCODONE HYDROCHLORIDE AND ACETAMINOPHEN 5; 325 MG/1; MG/1
1 TABLET ORAL EVERY 4 HOURS PRN
Status: DISCONTINUED | OUTPATIENT
Start: 2020-01-12 | End: 2020-01-13 | Stop reason: HOSPADM

## 2020-01-12 RX ORDER — ATORVASTATIN CALCIUM 40 MG/1
40 TABLET, FILM COATED ORAL DAILY
Status: DISCONTINUED | OUTPATIENT
Start: 2020-01-12 | End: 2020-01-13 | Stop reason: HOSPADM

## 2020-01-12 RX ORDER — HYDROCHLOROTHIAZIDE 25 MG/1
25 TABLET ORAL DAILY
Status: DISCONTINUED | OUTPATIENT
Start: 2020-01-12 | End: 2020-01-13 | Stop reason: HOSPADM

## 2020-01-12 RX ORDER — DULOXETIN HYDROCHLORIDE 20 MG/1
40 CAPSULE, DELAYED RELEASE ORAL DAILY
Status: DISCONTINUED | OUTPATIENT
Start: 2020-01-12 | End: 2020-01-13 | Stop reason: HOSPADM

## 2020-01-12 RX ORDER — OXYCODONE HYDROCHLORIDE AND ACETAMINOPHEN 5; 325 MG/1; MG/1
2 TABLET ORAL EVERY 4 HOURS PRN
Status: DISCONTINUED | OUTPATIENT
Start: 2020-01-12 | End: 2020-01-13 | Stop reason: HOSPADM

## 2020-01-12 RX ORDER — DULOXETINE 40 MG/1
40 CAPSULE, DELAYED RELEASE ORAL DAILY
Status: DISCONTINUED | OUTPATIENT
Start: 2020-01-12 | End: 2020-01-12

## 2020-01-12 RX ORDER — ONDANSETRON 2 MG/ML
4 INJECTION INTRAMUSCULAR; INTRAVENOUS EVERY 6 HOURS PRN
Status: DISCONTINUED | OUTPATIENT
Start: 2020-01-12 | End: 2020-01-13 | Stop reason: HOSPADM

## 2020-01-12 RX ORDER — SODIUM CHLORIDE 0.9 % (FLUSH) 0.9 %
10 SYRINGE (ML) INJECTION PRN
Status: DISCONTINUED | OUTPATIENT
Start: 2020-01-12 | End: 2020-01-13 | Stop reason: HOSPADM

## 2020-01-12 RX ORDER — OXYCODONE HYDROCHLORIDE AND ACETAMINOPHEN 5; 325 MG/1; MG/1
1 TABLET ORAL ONCE
Status: COMPLETED | OUTPATIENT
Start: 2020-01-12 | End: 2020-01-12

## 2020-01-12 RX ORDER — CEPHALEXIN 500 MG/1
500 CAPSULE ORAL 2 TIMES DAILY
Status: DISCONTINUED | OUTPATIENT
Start: 2020-01-12 | End: 2020-01-12

## 2020-01-12 RX ORDER — ASPIRIN 325 MG
162 TABLET ORAL DAILY
Status: DISCONTINUED | OUTPATIENT
Start: 2020-01-12 | End: 2020-01-13 | Stop reason: HOSPADM

## 2020-01-12 RX ORDER — KETOROLAC TROMETHAMINE 15 MG/ML
15 INJECTION, SOLUTION INTRAMUSCULAR; INTRAVENOUS ONCE
Status: COMPLETED | OUTPATIENT
Start: 2020-01-12 | End: 2020-01-12

## 2020-01-12 RX ORDER — NITROFURANTOIN 25; 75 MG/1; MG/1
100 CAPSULE ORAL EVERY 12 HOURS SCHEDULED
Status: DISCONTINUED | OUTPATIENT
Start: 2020-01-12 | End: 2020-01-13 | Stop reason: HOSPADM

## 2020-01-12 RX ORDER — MORPHINE SULFATE 4 MG/ML
4 INJECTION, SOLUTION INTRAMUSCULAR; INTRAVENOUS ONCE
Status: COMPLETED | OUTPATIENT
Start: 2020-01-12 | End: 2020-01-12

## 2020-01-12 RX ORDER — SODIUM CHLORIDE 9 MG/ML
INJECTION, SOLUTION INTRAVENOUS CONTINUOUS
Status: DISCONTINUED | OUTPATIENT
Start: 2020-01-12 | End: 2020-01-12

## 2020-01-12 RX ORDER — ONDANSETRON 2 MG/ML
4 INJECTION INTRAMUSCULAR; INTRAVENOUS ONCE
Status: COMPLETED | OUTPATIENT
Start: 2020-01-12 | End: 2020-01-12

## 2020-01-12 RX ADMIN — NITROFURANTOIN (MONOHYDRATE/MACROCRYSTALS) 100 MG: 75; 25 CAPSULE ORAL at 05:40

## 2020-01-12 RX ADMIN — NITROFURANTOIN MONOHYDRATE/MACROCRYSTALLINE 100 MG: 25; 75 CAPSULE ORAL at 11:11

## 2020-01-12 RX ADMIN — ENOXAPARIN SODIUM 40 MG: 40 INJECTION SUBCUTANEOUS at 11:13

## 2020-01-12 RX ADMIN — FENTANYL CITRATE 25 MCG: 50 INJECTION, SOLUTION INTRAMUSCULAR; INTRAVENOUS at 05:39

## 2020-01-12 RX ADMIN — NITROFURANTOIN MONOHYDRATE/MACROCRYSTALLINE 100 MG: 25; 75 CAPSULE ORAL at 19:32

## 2020-01-12 RX ADMIN — DULOXETINE 40 MG: 20 CAPSULE, DELAYED RELEASE ORAL at 11:10

## 2020-01-12 RX ADMIN — ASPIRIN 162 MG: 325 TABLET ORAL at 11:11

## 2020-01-12 RX ADMIN — OXYCODONE HYDROCHLORIDE AND ACETAMINOPHEN 500 MG: 500 TABLET ORAL at 11:13

## 2020-01-12 RX ADMIN — SODIUM CHLORIDE 1000 ML: 9 INJECTION, SOLUTION INTRAVENOUS at 02:11

## 2020-01-12 RX ADMIN — KETOROLAC TROMETHAMINE 15 MG: 15 INJECTION, SOLUTION INTRAMUSCULAR; INTRAVENOUS at 02:11

## 2020-01-12 RX ADMIN — MORPHINE SULFATE 4 MG: 4 INJECTION INTRAVENOUS at 03:23

## 2020-01-12 RX ADMIN — OXYCODONE HYDROCHLORIDE AND ACETAMINOPHEN 1 TABLET: 5; 325 TABLET ORAL at 02:35

## 2020-01-12 RX ADMIN — ONDANSETRON 4 MG: 2 INJECTION INTRAMUSCULAR; INTRAVENOUS at 02:11

## 2020-01-12 RX ADMIN — OXYCODONE HYDROCHLORIDE AND ACETAMINOPHEN 2 TABLET: 5; 325 TABLET ORAL at 12:04

## 2020-01-12 RX ADMIN — HYDROCHLOROTHIAZIDE 25 MG: 25 TABLET ORAL at 11:11

## 2020-01-12 RX ADMIN — SODIUM CHLORIDE: 9 INJECTION, SOLUTION INTRAVENOUS at 09:49

## 2020-01-12 ASSESSMENT — PAIN DESCRIPTION - PROGRESSION
CLINICAL_PROGRESSION: GRADUALLY WORSENING

## 2020-01-12 ASSESSMENT — PAIN DESCRIPTION - DESCRIPTORS: DESCRIPTORS: ACHING

## 2020-01-12 ASSESSMENT — PAIN SCALES - GENERAL
PAINLEVEL_OUTOF10: 10
PAINLEVEL_OUTOF10: 9
PAINLEVEL_OUTOF10: 10
PAINLEVEL_OUTOF10: 9
PAINLEVEL_OUTOF10: 10
PAINLEVEL_OUTOF10: 10
PAINLEVEL_OUTOF10: 9

## 2020-01-12 ASSESSMENT — PAIN DESCRIPTION - FREQUENCY: FREQUENCY: CONTINUOUS

## 2020-01-12 ASSESSMENT — PAIN DESCRIPTION - LOCATION
LOCATION: FLANK
LOCATION: LEG

## 2020-01-12 ASSESSMENT — PAIN DESCRIPTION - ORIENTATION
ORIENTATION: LEFT
ORIENTATION: RIGHT;LEFT

## 2020-01-12 ASSESSMENT — PAIN DESCRIPTION - PAIN TYPE
TYPE: ACUTE PAIN
TYPE: ACUTE PAIN;CHRONIC PAIN

## 2020-01-12 NOTE — H&P
[facility]    HISTORY AND PHYSICAL EXAMINATION            Date:   1/12/2020  Patient name:  Genny Hickman  Date of admission:  1/12/2020 12:38 AM  MRN:   [id number]  Account:  [de-identified]  YOB: 1960  PCP:    Bimal Miguel MD  Room:   04/04  Code Status:    Prior    Chief Complaint:     Chief Complaint   Patient presents with    Flank Pain     left flank pain x a few days       History Obtained From:     patient, electronic medical record    History of Present Illness:     Genny Hickman is a 60 y.o. F with hx of spinal stenosis, HTN, recurrent UTI, multiple antibiotic allergies who presented with flank pain. Patient was initially seen in ER last month with complaints of dysuria and frequency. Was prescribed Keflex at that time. Only took partial course as she stated she developed lower extremity weakness, numbness/tingling. States she has history of chronic weakness from history of spinal stenosis and multiple previous surgeries but symptoms worsened after taking Keflex. In ER CT abdomen done was negative for stones or hydronephrosis. Concern for possible side affect from antibiotics, admitted for further evaluation and pain control.     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    Anemia 10/5/2016    Arthritis     Asthma 1/4/2017    Bilateral leg and foot pain 02/2019    left much worse    Degeneration of cervical intervertebral disc 6/21/2018    Depression 2/2/2016    Failed back syndrome 4/3/2018    History of recurrent UTI (urinary tract infection)     Hyperlipidemia     Hypertension     Lumbar disc herniation with radiculopathy 6/4/2013    Obesity     Spinal stenosis     Wound infection after surgery         Past Surgical History:     Past Surgical History:   Procedure Laterality Date    BACK SURGERY      FOOT SURGERY Bilateral     heel described in HPI. CONSTITUTIONAL:  negative for fevers, chills  HEENT:  negative for vision, hearing changes, runny nose, throat pain  RESPIRATORY:  negative for shortness of breath  CARDIOVASCULAR:  negative for chest pain, palpitations  GASTROINTESTINAL:  negative for nausea, vomiting, abdominal pain   GENITOURINARY:  Positive for burning with urination, frequency   INTEGUMENT:  negative for rash, skin lesions, easy bruising   ALLERGIC/IMMUNOLOGIC:  negative for urticaria, itching  ENDOCRINE:  negative increase in drinking, increase in urination, hot or cold intolerance  MUSCULOSKELETAL:  negative joint pains, muscle aches, swelling of joints  NEUROLOGICAL:  Positive for lower extremity weakness and numbness   BEHAVIOR/PSYCH:  negative for depression, anxiety    Physical Exam:   BP (!) 157/88   Pulse 80   Temp 98.1 °F (36.7 °C) (Oral)   Resp 14   LMP 2007   SpO2 98%   Temp (24hrs), Av.1 °F (36.7 °C), Min:98.1 °F (36.7 °C), Max:98.1 °F (36.7 °C)    No results for input(s): POCGLU in the last 72 hours.   No intake or output data in the 24 hours ending 20 0441    General Appearance: alert, no acute distress  Mental status: oriented to person, place, and time  Head: normocephalic, atraumatic  Eye: no icterus, redness, pupils equal and reactive, extraocular eye movements intact, conjunctiva clear  Ear: normal external ear, no discharge, hearing intact  Nose: no drainage noted  Mouth: mucous membranes moist  Neck: supple, no carotid bruits, thyroid not palpable  Lungs: Bilateral equal air entry, clear to ausculation, no wheezing  Cardiovascular: normal rate, regular rhythm  Abdomen: Soft, nontender, nondistended, normal bowel sounds  Neurologic: bilateral lower extremity weakness, upper extremities strength intact   Skin: No gross lesions, rashes, bruising or bleeding on exposed skin area  Extremities: peripheral pulses palpable  Psych: normal affect    Investigations:      Laboratory Neutrophils 61 36 - 65 %    Lymphocytes 30 24 - 43 %    Monocytes 7 3 - 12 %    Eosinophils % 1 1 - 4 %    Basophils 1 0 - 2 %    Immature Granulocytes 0 0 %    Segs Absolute 3.40 1.50 - 8.10 k/uL    Absolute Lymph # 1.64 1.10 - 3.70 k/uL    Absolute Mono # 0.36 0.10 - 1.20 k/uL    Absolute Eos # 0.06 0.00 - 0.44 k/uL    Basophils Absolute 0.03 0.00 - 0.20 k/uL    Absolute Immature Granulocyte <0.03 0.00 - 0.30 k/uL    WBC Morphology NOT REPORTED     RBC Morphology NOT REPORTED     Platelet Estimate NOT REPORTED    Basic Metabolic Panel w/ Reflex to MG    Collection Time: 01/12/20  2:27 AM   Result Value Ref Range    Glucose 100 (H) 70 - 99 mg/dL    BUN 12 8 - 23 mg/dL    CREATININE 0.53 0.50 - 0.90 mg/dL    Bun/Cre Ratio NOT REPORTED 9 - 20    Calcium 9.0 8.6 - 10.4 mg/dL    Sodium 138 135 - 144 mmol/L    Potassium 3.9 3.7 - 5.3 mmol/L    Chloride 106 98 - 107 mmol/L    CO2 21 20 - 31 mmol/L    Anion Gap 11 9 - 17 mmol/L    GFR Non-African American >60 >60 mL/min    GFR African American >60 >60 mL/min    GFR Comment          GFR Staging NOT REPORTED    CK    Collection Time: 01/12/20  2:27 AM   Result Value Ref Range    Total  (H) 26 - 192 U/L   Troponin    Collection Time: 01/12/20  2:27 AM   Result Value Ref Range    Troponin, High Sensitivity 6 0 - 14 ng/L    Troponin T NOT REPORTED <0.03 ng/mL    Troponin Interp NOT REPORTED        Imaging/Diagnostics:  Ct Abdomen Pelvis Wo Contrast Additional Contrast? Radiologist Recommendation    Result Date: 1/12/2020  No renal ureteral calculi. No obstructive uropathy. No acute inflammatory process in the abdomen or pelvis. Xr Chest Standard (2 Vw)    Result Date: 1/12/2020  No acute cardiopulmonary process.        Assessment :      Hospital Problems           Last Modified POA    * (Principal) Weakness of both lower extremities 1/12/2020 Yes    Spinal stenosis at L4-L5 level 1/12/2020 Yes    Flank pain 1/12/2020 Yes    Recurrent UTI 1/12/2020 Yes          Plan: Patient status observation in the Med/Surge    - Labs and imaging reviewed  - Previous urine culture reviewed - negative for growth   - Follow up repeat urine culture   - Pain control  - Continue selected home medications  - PT/OT evaluation  - NS consult       Consultations:   Sixto Horton MD  1/12/2020  6:33 AM    Copy sent to Dr. Vish Hernandez MD

## 2020-01-12 NOTE — CONSULTS
incontinence   MUSCULOSKELETAL: negative for neck or back pain   NEUROLOGICAL: negative for seizures   PSYCHIATRIC: negative for agitated     Review of systems otherwise negative. PHYSICAL EXAM:       BP (!) 165/82   Pulse 75   Temp 97.9 °F (36.6 °C) (Oral)   Resp 18   Ht 5' 11\" (1.803 m)   Wt (!) 333 lb (151 kg)   LMP 09/07/2007   SpO2 99%   BMI 46.44 kg/m²       CONSTITUTIONAL:  Well developed, well nourished, alert and oriented x 3, in no acute distress. GCS 15, nontoxic. No dysarthria, no aphasia. EOMI.     HEAD:  normocephalic, atraumatic    EYES:  PERRLA, EOMI.   ENT:  moist mucous membranes   NECK:  supple, symmetric, no midline tenderness to palpation    BACK:  without midline tenderness, step-offs or deformities    LUNGS:  Equal air entry bilaterally   CARDIOVASCULAR:  normal s1 / s2   ABDOMEN:  Soft, no rigidity   NEUROLOGIC:  EYE OPENING     Spontaneous - 4 [x]       To voice - 3 []       To pain - 2 []       None - 1 []    VERBAL RESPONSE     Appropriate, oriented - 5 [x]       Dazed or confused - 4 []       Syllables, expletives - 3 []       Grunts - 2 []       None - 1 []    MOTOR RESPONSE     Spontaneous, command - 6 [x]       Localizes pain - 5 []       Withdraws pain - 4 []       Abnormal flexion - 3 []       Abnormal extension - 2 []       None - 1 []            Total GCS: 15    Mental Status:  A & O x3, awake             Cranial Nerves:    cranial nerves II-XII are grossly intact    Motor Exam:    Drift:  absent  Tone:  normal    Motor exam is symmetrical 5 out of 5 all extremities bilaterally    Sensory:    Touch:    Right Upper Extremity:  normal  Left Upper Extremity:  normal  Right Lower Extremity:  normal  Left Lower Extremity:  normal    Deep Tendon Reflexes:    Right Knee:  2+  Left Knee:  2+     SKIN:  no rash      LABS AND IMAGING:     CBC with Differential:    Lab Results   Component Value Date    WBC 5.5 01/12/2020    RBC 4.05 01/12/2020    RBC 3.75 04/10/2012    HGB 12.2 01/12/2020    HCT 37.9 01/12/2020     01/12/2020     04/10/2012    MCV 93.6 01/12/2020    MCH 30.1 01/12/2020    MCHC 32.2 01/12/2020    RDW 14.2 01/12/2020    LYMPHOPCT 30 01/12/2020    MONOPCT 7 01/12/2020    BASOPCT 1 01/12/2020    MONOSABS 0.36 01/12/2020    LYMPHSABS 1.64 01/12/2020    EOSABS 0.06 01/12/2020    BASOSABS 0.03 01/12/2020    DIFFTYPE NOT REPORTED 01/12/2020     BMP:    Lab Results   Component Value Date     01/12/2020    K 3.9 01/12/2020     01/12/2020    CO2 21 01/12/2020    BUN 12 01/12/2020    LABALBU 4.3 10/23/2018    LABALBU 4.6 04/04/2012    CREATININE 0.53 01/12/2020    CALCIUM 9.0 01/12/2020    GFRAA >60 01/12/2020    LABGLOM >60 01/12/2020    GLUCOSE 100 01/12/2020    GLUCOSE 87 04/18/2012       Radiology Review:    CT abd/pelvis  No renal or ureteral calculi.  No obstructive uropathy.       No acute inflammatory process in the abdomen or pelvis.            ASSESSMENT AND PLAN:       Patient Active Problem List   Diagnosis    Morbid obesity due to excess calories (HCC)    Hyperlipemia    Chronic pelvic pain in female    Fatigue    Spinal stenosis at L4-L5 level    Adjustment disorder with mixed anxiety and depressed mood    Rash and nonspecific skin eruption    Anemia    Morbid obesity with BMI of 50.0-59.9, adult ([facility] [address]    Medication monitoring encounter    Spinal stenosis, lumbar region, without neurogenic claudication    Chronic pain of left knee    Angioedema    Congenital pes planus    Failed back surgical syndrome    Venous insufficiency of both lower extremities    Weakness of both lower extremities    Flank pain    Recurrent UTI         A/P:  This is a 60 y.o. female with chronic spinal stenosis with bilateral lower extremity radiculopathy    Patient was seen and examined with Dr. Diana Napoles     - No neurosurgical interventions planned for now  - CTLS recommendations: clear  - HOB: 30 degrees   - Neuro checks per protocol  - Ok to begin prophylactic anticoagulation from neurosurgery stand point. However, we recommend careful evaluation of all other risk factors associated with anticoagulation therapy as applied to this patient's medical condition  - Ok to DC from neurosurgery standpoint  - F/u outpatient with Dr. Catarina Morton    Additional recommendations may follow    Please contact neurosurgery with any changes in patients neurologic status. Thank you for your consult.        Lj Naidu DO    1/12/2020  12:30 PM

## 2020-01-12 NOTE — ED NOTES
Pt rates pain 9/10. States it is much better as it was over a 10 prior to. Pt sitting more comfortably at this time.  Will notify Dr Felipe Graves RN  01/12/20 4107

## 2020-01-12 NOTE — DISCHARGE INSTR - COC
Adjustment disorder with mixed anxiety and depressed mood F43.23    Rash and nonspecific skin eruption R21    Anemia D64.9    Morbid obesity with BMI of 50.0-59.9, adult (HCC) E66.01, Z68.43    Medication monitoring encounter Z51.81    Spinal stenosis, lumbar region, without neurogenic claudication M48.061    Chronic pain of left knee M25.562, G89.29    Angioedema T78. 3XXA    Congenital pes planus Q66.50    Failed back surgical syndrome M96.1    Venous insufficiency of both lower extremities I87.2    Weakness of both lower extremities R29.898    Flank pain R10.9    Recurrent UTI N39.0       Isolation/Infection:   Isolation          No Isolation        Patient Infection Status     None to display          Nurse Assessment:  Last Vital Signs: BP (!) 165/82   Pulse 75   Temp 97.9 °F (36.6 °C) (Oral)   Resp 18   Ht 5' 11\" (1.803 m)   Wt (!) 333 lb (151 kg)   LMP 09/07/2007   SpO2 99%   BMI 46.44 kg/m²     Last documented pain score (0-10 scale): Pain Level: 9  Last Weight:   Wt Readings from Last 1 Encounters:   01/12/20 (!) 333 lb (151 kg)     Mental Status:  {IP PT MENTAL STATUS:20030}    IV Access:  { BETH IV ACCESS:834245717}    Nursing Mobility/ADLs:  Walking   {CHP DME ORGC:929086395}  Transfer  {CHP DME MWQX:524759405}  Bathing  {CHP DME IHMP:520238567}  Dressing  {CHP DME AQMO:703058153}  Toileting  {CHP DME ETIQ:237961592}  Feeding  {CHP DME ZSQH:878897610}  Med Admin  {P DME FQQZ:395312125}  Med Delivery   { BETH MED Delivery:032224377}    Wound Care Documentation and Therapy:  Wound 10/04/16 Other (Comment) Back Lower;Mid (Active)   Number of days: 1621        Elimination:  Continence:   · Bowel: {YES / JH:28659}  · Bladder: {YES / GX:60303}  Urinary Catheter: {Urinary Catheter:464448904}   Colostomy/Ileostomy/Ileal Conduit: {YES / DO:38552}       Date of Last BM: ***  No intake or output data in the 24 hours ending 01/12/20 1442  No intake/output data recorded.     Safety Concerns:

## 2020-01-12 NOTE — ED PROVIDER NOTES
[facility] [address] [facility]  Emergency Department  Emergency Medicine Resident Sign-out     Care of Lea Jolly was assumed from Dr. Jennifer Lerma and is being seen for Flank Pain (left flank pain x a few days)  . The patient's initial evaluation and plan have been discussed with the prior provider who initially evaluated the patient. EMERGENCY DEPARTMENT COURSE / MEDICAL DECISION MAKING:       MEDICATIONS GIVEN:  Orders Placed This Encounter   Medications    0.9 % sodium chloride bolus    ketorolac (TORADOL) injection 15 mg    ondansetron (ZOFRAN) injection 4 mg    oxyCODONE-acetaminophen (PERCOCET) 5-325 MG per tablet 1 tablet    oxyCODONE-acetaminophen (PERCOCET) 5-325 MG per tablet     LUTHER OCONNELL: cabinet override    morphine injection 4 mg    nitrofurantoin (macrocrystal-monohydrate) (MACROBID) capsule 100 mg    fentaNYL (SUBLIMAZE) injection 25 mcg     LABS / RADIOLOGY:     Labs Reviewed   BASIC METABOLIC PANEL W/ REFLEX TO MG FOR LOW K - Abnormal; Notable for the following components:       Result Value    Glucose 100 (*)     All other components within normal limits   URINE RT REFLEX TO CULTURE - Abnormal; Notable for the following components:    Nitrite, Urine POSITIVE (*)     Leukocyte Esterase, Urine SMALL (*)     All other components within normal limits   MICROSCOPIC URINALYSIS - Abnormal; Notable for the following components:    Bacteria, UA MODERATE (*)     All other components within normal limits   CK - Abnormal; Notable for the following components:     Total  (*)     All other components within normal limits   URINE CULTURE   CBC WITH AUTO DIFFERENTIAL   TROPONIN   MYOGLOBIN, SERUM       Ct Abdomen Pelvis Wo Contrast Additional Contrast? Radiologist Recommendation    Result Date: 1/12/2020  EXAMINATION: CT OF THE ABDOMEN AND PELVIS WITHOUT CONTRAST 1/12/2020 1:55 am TECHNIQUE: CT of the abdomen and pelvis was performed without the administration of intravenous contrast. Multiplanar reformatted images are provided for review. Dose modulation, iterative reconstruction, and/or weight based adjustment of the mA/kV was utilized to reduce the radiation dose to as low as reasonably achievable. COMPARISON: CT abdomen and pelvis 5/3/2016 HISTORY: ORDERING SYSTEM PROVIDED HISTORY: concern for stone TECHNOLOGIST PROVIDED HISTORY: concern for stone Reason for Exam: abd/back pain poss stone Acuity: Unknown Type of Exam: Unknown FINDINGS: Lower Chest: Visualized lungs are clear. No basilar pericardial or pleural effusion. Organs: Lack of intravenous contrast limits evaluation of the solid organs and bowel. Liver is normal in morphology. Post cholecystectomy. Spleen, adrenal glands, and pancreas are normal.  Kidneys are symmetric in size. No renal or ureteral calculi. No hydronephrosis. GI/Bowel: Stomach, small bowel, and colon are nondilated. Normal appendix. Colonic diverticulosis without diverticulitis. Pelvis: Urinary bladder and pelvic organs are unremarkable. No free fluid in the pelvis. Peritoneum/Retroperitoneum: Aortoiliac atherosclerotic calcification. Abdominal aorta is normal in caliber. No free fluid in the abdomen. Bones/Soft Tissues: No acute osseous abnormality. Fat-containing umbilical hernia. No renal or ureteral calculi. No obstructive uropathy. No acute inflammatory process in the abdomen or pelvis. Xr Chest Standard (2 Vw)    Result Date: 1/12/2020  EXAMINATION: TWO XRAY VIEWS OF THE CHEST 1/12/2020 1:47 am COMPARISON: Chest radiograph 09/26/2015 HISTORY: ORDERING SYSTEM PROVIDED HISTORY: cough TECHNOLOGIST PROVIDED HISTORY: cough Reason for Exam: back pain and a cough Acuity: Acute FINDINGS: Normal heart size. Normal pulmonary vascularity. There is no focal consolidation. No evidence of pleural effusion or pneumothorax. No acute osseous abnormality. No acute cardiopulmonary process.        RECENT VITALS:     Temp: 98.1 °F (36.7 °C),  Pulse: 80, Resp: 14, BP: (!) 157/88, SpO2: 98 %      This patient is a 60 y.o. Female with flank pain, possible myositis. UTI. Admitted for workup. Given Macrobid. Patient transferred to floor without issue    OUTSTANDING TASKS / RECOMMENDATIONS:    1. Awaiting transfer to floor. FINAL IMPRESSION:     1. Flank pain    2. Elevated CK    3.  Urinary tract infection without hematuria, site unspecified        DISPOSITION:         DISPOSITION:  []  Discharge   []  Transfer -    [x]  Admission -     []  Against Medical Advice   []  Eloped   FOLLOW-UP: MD Vidya [facility]ater [facility] [address] [facility] [address] [facility] [address]: Current Discharge Medication List             Shaista Gomez DO  Emergency Medicine Resident  [address] [facility]        Shaista Gomez, [address] [place] [address]  Resident  01/12/20 2485

## 2020-01-12 NOTE — ED PROVIDER NOTES
FACULTY SIGN-OUT  ADDENDUM     Care of this patient was assumed from Dr Zenaida Fierro. The patient was seen for Flank Pain (left flank pain x a few days)  . The patient's initial evaluation and plan have been discussed with the prior provider who initially evaluated the patient. Nursing Notes, Past Medical Hx, Past Surgical Hx, Social Hx, Allergies, and Family Hx were all reviewed. ED COURSE      The patient was given the following medications:  Orders Placed This Encounter   Medications    0.9 % sodium chloride bolus    ketorolac (TORADOL) injection 15 mg    ondansetron (ZOFRAN) injection 4 mg    oxyCODONE-acetaminophen (PERCOCET) 5-325 MG per tablet 1 tablet    oxyCODONE-acetaminophen (PERCOCET) 5-325 MG per tablet     LUTHER OCONNELL: cabinet override    morphine injection 4 mg    nitrofurantoin (macrocrystal-monohydrate) (MACROBID) capsule 100 mg    fentaNYL (SUBLIMAZE) injection 25 mcg       RECENT VITALS:   Temp: 98.1 °F (36.7 °C), Pulse: 80, Resp: 14, BP: (!) 157/88    MEDICAL DECISION MAKING       Pt with flank pain and leg weakness. Admitted, awaiting bed.       Fay Haq MD  Emergency Medicine Attending        Jian Barton MD  01/12/20 8642

## 2020-01-12 NOTE — ED NOTES
Pt states a few days ago she had right flank pain that is now on the right. Pt reports in creased frequence of urination and burning. Pt states she is also having intense leg pain since she started on keflex as she has a hx of spinal stenosis. Pt alert and oritned. NAD at this time. Even non labored RR.  Will continue to monitor       Penny Wallace RN  01/12/20 9939

## 2020-01-12 NOTE — ED PROVIDER NOTES
normal, no pedal edema, no calf tenderness  SKIN: normal coloration and turgor    DIFFERENTIAL  DIAGNOSIS     PLAN (LABS / IMAGING / EKG):  Orders Placed This Encounter   Procedures    Urine Culture    CT ABDOMEN PELVIS WO CONTRAST Additional Contrast? Radiologist Recommendation    XR CHEST STANDARD (2 VW)    CBC Auto Differential    Basic Metabolic Panel w/ Reflex to MG    Urinalysis Reflex to Culture    Microscopic Urinalysis    CK    Troponin    Basic Metabolic Panel w/ Reflex to MG    CBC auto differential    CK    Myoglobin, Serum    DIET GENERAL;    Vital signs per unit routine    Tobacco cessation education    Up as tolerated    Adv Diet as Fernando (nurse communication)    Telemetry Monitoring    Elevate heels off of bed at all times if patient is not able to move lower extremities    Turn or assist with turn every 2 hours if patient is unable to turn self. Remind patient to turn if necessary.     Inspect skin per unit guidelines    Maintain HOB at the lowest elevation consistent with medical plan of [facility] patient out of bed ASAP and a minimum of 3 times per day    Misc nursing order (specify)    Full Code    Inpatient consult to Hospitalist    Inpatient consult to Neurosurgery    OT eval and treat    PT eval and treat    Initiate Oxygen Therapy Protocol    EKG 12 Lead    Insert peripheral IV    PATIENT STATUS (FROM ED OR OR/PROCEDURAL) Observation       MEDICATIONS ORDERED:  Orders Placed This Encounter   Medications    0.9 % sodium chloride bolus    ketorolac (TORADOL) injection 15 mg    ondansetron (ZOFRAN) injection 4 mg    oxyCODONE-acetaminophen (PERCOCET) 5-325 MG per tablet 1 tablet    oxyCODONE-acetaminophen (PERCOCET) 5-325 MG per tablet     LUTHER OCONNELL: cabinet override    morphine injection 4 mg    nitrofurantoin (macrocrystal-monohydrate) (MACROBID) capsule 100 mg    hydrochlorothiazide (HYDRODIURIL) tablet 25 mg    aspirin tablet 162 mg    vitamin C (ASCORBIC ACID) tablet 500 mg    atorvastatin (LIPITOR) tablet 40 mg    DISCONTD: DULoxetine HCl CPEP 40 mg    DISCONTD: cephALEXin (KEFLEX) capsule 500 mg    sodium chloride flush 0.9 % injection 10 mL    sodium chloride flush 0.9 % injection 10 mL    magnesium hydroxide (MILK OF MAGNESIA) 400 MG/5ML suspension 30 mL    ondansetron (ZOFRAN) injection 4 mg    enoxaparin (LOVENOX) injection 40 mg    DISCONTD: 0.9 % sodium chloride infusion    acetaminophen (TYLENOL) tablet 650 mg    nitrofurantoin (macrocrystal-monohydrate) (MACROBID) capsule 100 mg    fentaNYL (SUBLIMAZE) injection 25 mcg    DULoxetine (CYMBALTA) extended release capsule 40 mg    OR Linked Order Group     oxyCODONE-acetaminophen (PERCOCET) 5-325 MG per tablet 1 tablet     oxyCODONE-acetaminophen (PERCOCET) 5-325 MG per tablet 2 tablet         DIAGNOSTIC RESULTS / EMERGENCY DEPARTMENT COURSE / MDM     LABS:  Results for orders placed or performed during the hospital encounter of 01/12/20   Urine Culture   Result Value Ref Range    Specimen Description . URINE     Special Requests NOT REPORTED     Culture (A)      LACTOSE FERMENTING GRAM NEGATIVE RODS >[id number] CFU/ML   CBC Auto Differential   Result Value Ref Range    WBC 5.5 3.5 - 11.3 k/uL    RBC 4.05 3.95 - 5.11 m/uL    Hemoglobin 12.2 11.9 - 15.1 g/dL    Hematocrit 37.9 36.3 - 47.1 %    MCV 93.6 82.6 - 102.9 fL    MCH 30.1 25.2 - 33.5 pg    MCHC 32.2 28.4 - 34.8 g/dL    RDW 14.2 11.8 - 14.4 %    Platelets 846 396 - 100 k/uL    MPV 10.8 8.1 - 13.5 fL    NRBC Automated 0.0 0.0 per 100 WBC    Differential Type NOT REPORTED     Seg Neutrophils 61 36 - 65 %    Lymphocytes 30 24 - 43 %    Monocytes 7 3 - 12 %    Eosinophils % 1 1 - 4 %    Basophils 1 0 - 2 %    Immature Granulocytes 0 0 %    Segs Absolute 3.40 1.50 - 8.10 k/uL    Absolute Lymph # 1.64 1.10 - 3.70 k/uL    Absolute Mono # 0.36 0.10 - 1.20 k/uL    Absolute Eos # 0.06 0.00 - 0.44 k/uL    Basophils Absolute 0.03 0.00 - 0.20 k/uL    Absolute Immature Granulocyte <0.03 0.00 - 0.30 k/uL    WBC Morphology NOT REPORTED     RBC Morphology NOT REPORTED     Platelet Estimate NOT REPORTED    Basic Metabolic Panel w/ Reflex to MG   Result Value Ref Range    Glucose 100 (H) 70 - 99 mg/dL    BUN 12 8 - 23 mg/dL    CREATININE 0.53 0.50 - 0.90 mg/dL    Bun/Cre Ratio NOT REPORTED 9 - 20    Calcium 9.0 8.6 - 10.4 mg/dL    Sodium 138 135 - 144 mmol/L    Potassium 3.9 3.7 - 5.3 mmol/L    Chloride 106 98 - 107 mmol/L    CO2 21 20 - 31 mmol/L    Anion Gap 11 9 - 17 mmol/L    GFR Non-African American >60 >60 mL/min    GFR African American >60 >60 mL/min    GFR Comment          GFR Staging NOT REPORTED    Urinalysis Reflex to Culture   Result Value Ref Range    Color, UA YELLOW YELLOW    Turbidity UA CLEAR CLEAR    Glucose, Ur NEGATIVE NEGATIVE    Bilirubin Urine NEGATIVE NEGATIVE    Ketones, Urine NEGATIVE NEGATIVE    Specific Gravity, UA 1.011 1.005 - 1.030    Urine Hgb NEGATIVE NEGATIVE    pH, UA 6.5 5.0 - 8.0    Protein, UA NEGATIVE NEGATIVE    Urobilinogen, Urine Normal Normal    Nitrite, Urine POSITIVE (A) NEGATIVE    Leukocyte Esterase, Urine SMALL (A) NEGATIVE    Urinalysis Comments NOT REPORTED    Microscopic Urinalysis   Result Value Ref Range    -          WBC, UA 5 TO 10 0 - 5 /HPF    RBC, UA None 0 - 4 /HPF    Casts UA  0 - 8 /LPF     0 TO 2 HYALINE Reference range defined for non-centrifuged specimen. Crystals UA NOT REPORTED None /HPF    Epithelial Cells UA 0 TO 2 0 - 5 /HPF    Renal Epithelial, Urine NOT REPORTED 0 /HPF    Bacteria, UA MODERATE (A) None    Mucus, UA NOT REPORTED None    Trichomonas, UA NOT REPORTED None    Amorphous, UA NOT REPORTED None    Other Observations UA NOT REPORTED NOT REQ.     Yeast, UA NOT REPORTED None   CK   Result Value Ref Range    Total  (H) 26 - 192 U/L   Troponin   Result Value Ref Range    Troponin, High Sensitivity 6 0 - 14 ng/L    Troponin T NOT REPORTED <0.03 ng/mL    Troponin Interp NOT REPORTED    Myoglobin, Serum   Result Value Ref Range    Myoglobin 26 25 - 58 ng/mL   CK   Result Value Ref Range    Total  (H) 26 - 192 U/L   Basic Metabolic Panel w/ Reflex to MG   Result Value Ref Range    Glucose 97 70 - 99 mg/dL    BUN 10 8 - 23 mg/dL    CREATININE 0.57 0.50 - 0.90 mg/dL    Bun/Cre Ratio NOT REPORTED 9 - 20    Calcium 9.2 8.6 - 10.4 mg/dL    Sodium 137 135 - 144 mmol/L    Potassium 4.2 3.7 - 5.3 mmol/L    Chloride 104 98 - 107 mmol/L    CO2 21 20 - 31 mmol/L    Anion Gap 12 9 - 17 mmol/L    GFR Non-African American >60 >60 mL/min    GFR African American >60 >60 mL/min    GFR Comment          GFR Staging NOT REPORTED    CBC auto differential   Result Value Ref Range    WBC 4.0 3.5 - 11.3 k/uL    RBC 4.22 3.95 - 5.11 m/uL    Hemoglobin 12.5 11.9 - 15.1 g/dL    Hematocrit 39.3 36.3 - 47.1 %    MCV 93.1 82.6 - 102.9 fL    MCH 29.6 25.2 - 33.5 pg    MCHC 31.8 28.4 - 34.8 g/dL    RDW 14.3 11.8 - 14.4 %    Platelets 506 581 - 956 k/uL    MPV 11.6 8.1 - 13.5 fL    NRBC Automated 0.0 0.0 per 100 WBC    Differential Type NOT REPORTED     Seg Neutrophils 48 36 - 65 %    Lymphocytes 40 24 - 43 %    Monocytes 8 3 - 12 %    Eosinophils % 3 1 - 4 %    Basophils 1 0 - 2 %    Immature Granulocytes 0 0 %    Segs Absolute 1.91 1.50 - 8.10 k/uL    Absolute Lymph # 1.60 1.10 - 3.70 k/uL    Absolute Mono # 0.31 0.10 - 1.20 k/uL    Absolute Eos # 0.11 0.00 - 0.44 k/uL    Basophils Absolute 0.03 0.00 - 0.20 k/uL    Absolute Immature Granulocyte <0.03 0.00 - 0.30 k/uL    WBC Morphology NOT REPORTED     RBC Morphology NOT REPORTED     Platelet Estimate NOT REPORTED    EKG 12 Lead   Result Value Ref Range    Ventricular Rate 70 BPM    Atrial Rate 70 BPM    P-R Interval 186 ms    QRS Duration 88 ms    Q-T Interval 422 ms    QTc Calculation (Bazett) 455 ms    P Axis 49 degrees    R Axis 11 degrees    T Axis 27 degrees         RADIOLOGY:  CT ABDOMEN PELVIS WO CONTRAST Additional Contrast? Radiologist Recommendation Final Result   No renal or ureteral calculi. No obstructive uropathy. No acute inflammatory process in the abdomen or pelvis. XR CHEST STANDARD (2 VW)   Final Result   No acute cardiopulmonary process. EMERGENCY DEPARTMENT COURSE:   Patient presenting with left-sided flank pain, muscle soreness. Suspected UTI as patient recently was diagnosed and prescribed antibiotics. She admits to not being able to complete course due to muscle pain. Patient allergic to multiple medications. No signs of trauma on exam.  Patient ambulating with some discomfort. Labs and urinalysis obtained, which showed evidence of UTI as well as elevated CK. Patient otherwise stable with normal vital signs. Plan is to admit patient for further work-up for unexplained muscle soreness and elevated CK. No signs of GARY. DISPOSITION: Admitted to [facility] for treatment of UTI and elevated CK. PROCEDURES:  None    CONSULTS:  IP CONSULT TO HOSPITALIST  IP CONSULT TO NEUROSURGERY    CRITICAL CARE:  None    FINAL IMPRESSION      1. Flank pain    2. Elevated CK    3.  Urinary tract infection without hematuria, site unspecified          DISPOSITION / PLAN     DISPOSITION Admitted 01/12/2020 04:08:26 AM      PATIENT REFERRED TO:  MD Vidya Castellon [facility] [address]. [address] [place] [address] # [address] [place] [address]  [phone]    Schedule an appointment as soon as possible for a visit in 2 weeks  Neurosurgeon - lumbar stenosis      DISCHARGE MEDICATIONS:  Current Discharge Medication List          Korey Zamora MD  Emergency Medicine Resident    (Please note that portions of thisnote were completed with a voice recognition program.  Efforts were made to edit the dictations but occasionally words are mis-transcribed.)       Korey Zamora MD  01/13/20 [id number]

## 2020-01-13 VITALS
TEMPERATURE: 98.4 F | HEIGHT: 71 IN | RESPIRATION RATE: 18 BRPM | WEIGHT: 293 LBS | BODY MASS INDEX: 41.02 KG/M2 | HEART RATE: 75 BPM | OXYGEN SATURATION: 98 % | DIASTOLIC BLOOD PRESSURE: 69 MMHG | SYSTOLIC BLOOD PRESSURE: 138 MMHG

## 2020-01-13 PROBLEM — R10.9 FLANK PAIN: Status: RESOLVED | Noted: 2020-01-12 | Resolved: 2020-01-13

## 2020-01-13 LAB
ABSOLUTE EOS #: 0.11 K/UL (ref 0–0.44)
ABSOLUTE IMMATURE GRANULOCYTE: <0.03 K/UL (ref 0–0.3)
ABSOLUTE LYMPH #: 1.6 K/UL (ref 1.1–3.7)
ABSOLUTE MONO #: 0.31 K/UL (ref 0.1–1.2)
ANION GAP SERPL CALCULATED.3IONS-SCNC: 12 MMOL/L (ref 9–17)
BASOPHILS # BLD: 1 % (ref 0–2)
BASOPHILS ABSOLUTE: 0.03 K/UL (ref 0–0.2)
BUN BLDV-MCNC: 10 MG/DL (ref 8–23)
BUN/CREAT BLD: NORMAL (ref 9–20)
CALCIUM SERPL-MCNC: 9.2 MG/DL (ref 8.6–10.4)
CHLORIDE BLD-SCNC: 104 MMOL/L (ref 98–107)
CO2: 21 MMOL/L (ref 20–31)
CREAT SERPL-MCNC: 0.57 MG/DL (ref 0.5–0.9)
CULTURE: ABNORMAL
DIFFERENTIAL TYPE: NORMAL
EOSINOPHILS RELATIVE PERCENT: 3 % (ref 1–4)
GFR AFRICAN AMERICAN: >60 ML/MIN
GFR NON-AFRICAN AMERICAN: >60 ML/MIN
GFR SERPL CREATININE-BSD FRML MDRD: NORMAL ML/MIN/{1.73_M2}
GFR SERPL CREATININE-BSD FRML MDRD: NORMAL ML/MIN/{1.73_M2}
GLUCOSE BLD-MCNC: 97 MG/DL (ref 70–99)
HCT VFR BLD CALC: 39.3 % (ref 36.3–47.1)
HEMOGLOBIN: 12.5 G/DL (ref 11.9–15.1)
IMMATURE GRANULOCYTES: 0 %
LYMPHOCYTES # BLD: 40 % (ref 24–43)
Lab: ABNORMAL
MCH RBC QN AUTO: 29.6 PG (ref 25.2–33.5)
MCHC RBC AUTO-ENTMCNC: 31.8 G/DL (ref 28.4–34.8)
MCV RBC AUTO: 93.1 FL (ref 82.6–102.9)
MONOCYTES # BLD: 8 % (ref 3–12)
NRBC AUTOMATED: 0 PER 100 WBC
PDW BLD-RTO: 14.3 % (ref 11.8–14.4)
PLATELET # BLD: 253 K/UL (ref 138–453)
PLATELET ESTIMATE: NORMAL
PMV BLD AUTO: 11.6 FL (ref 8.1–13.5)
POTASSIUM SERPL-SCNC: 4.2 MMOL/L (ref 3.7–5.3)
RBC # BLD: 4.22 M/UL (ref 3.95–5.11)
RBC # BLD: NORMAL 10*6/UL
SEG NEUTROPHILS: 48 % (ref 36–65)
SEGMENTED NEUTROPHILS ABSOLUTE COUNT: 1.91 K/UL (ref 1.5–8.1)
SODIUM BLD-SCNC: 137 MMOL/L (ref 135–144)
SPECIMEN DESCRIPTION: ABNORMAL
WBC # BLD: 4 K/UL (ref 3.5–11.3)
WBC # BLD: NORMAL 10*3/UL

## 2020-01-13 PROCEDURE — 6360000002 HC RX W HCPCS: Performed by: NURSE PRACTITIONER

## 2020-01-13 PROCEDURE — 97162 PT EVAL MOD COMPLEX 30 MIN: CPT

## 2020-01-13 PROCEDURE — 97165 OT EVAL LOW COMPLEX 30 MIN: CPT

## 2020-01-13 PROCEDURE — 80048 BASIC METABOLIC PNL TOTAL CA: CPT

## 2020-01-13 PROCEDURE — G0378 HOSPITAL OBSERVATION PER HR: HCPCS

## 2020-01-13 PROCEDURE — 85025 COMPLETE CBC W/AUTO DIFF WBC: CPT

## 2020-01-13 PROCEDURE — 96372 THER/PROPH/DIAG INJ SC/IM: CPT

## 2020-01-13 PROCEDURE — 6370000000 HC RX 637 (ALT 250 FOR IP): Performed by: NURSE PRACTITIONER

## 2020-01-13 PROCEDURE — 97530 THERAPEUTIC ACTIVITIES: CPT

## 2020-01-13 PROCEDURE — 36415 COLL VENOUS BLD VENIPUNCTURE: CPT

## 2020-01-13 PROCEDURE — 99225 PR SBSQ OBSERVATION CARE/DAY 25 MINUTES: CPT | Performed by: PHYSICIAN ASSISTANT

## 2020-01-13 PROCEDURE — 97535 SELF CARE MNGMENT TRAINING: CPT

## 2020-01-13 RX ORDER — NITROFURANTOIN 25; 75 MG/1; MG/1
100 CAPSULE ORAL EVERY 12 HOURS SCHEDULED
Qty: 10 CAPSULE | Refills: 0 | Status: SHIPPED | OUTPATIENT
Start: 2020-01-13 | End: 2020-01-18

## 2020-01-13 RX ADMIN — HYDROCHLOROTHIAZIDE 25 MG: 25 TABLET ORAL at 09:03

## 2020-01-13 RX ADMIN — ENOXAPARIN SODIUM 40 MG: 40 INJECTION SUBCUTANEOUS at 09:03

## 2020-01-13 RX ADMIN — OXYCODONE HYDROCHLORIDE AND ACETAMINOPHEN 500 MG: 500 TABLET ORAL at 11:41

## 2020-01-13 RX ADMIN — ASPIRIN 162 MG: 325 TABLET ORAL at 09:03

## 2020-01-13 RX ADMIN — NITROFURANTOIN MONOHYDRATE/MACROCRYSTALLINE 100 MG: 25; 75 CAPSULE ORAL at 09:03

## 2020-01-13 RX ADMIN — DULOXETINE 40 MG: 20 CAPSULE, DELAYED RELEASE ORAL at 09:03

## 2020-01-13 ASSESSMENT — PAIN DESCRIPTION - PAIN TYPE: TYPE: ACUTE PAIN;CHRONIC PAIN

## 2020-01-13 ASSESSMENT — PAIN DESCRIPTION - LOCATION
LOCATION: LEG
LOCATION: LEG

## 2020-01-13 ASSESSMENT — ENCOUNTER SYMPTOMS
BACK PAIN: 1
SORE THROAT: 0
TROUBLE SWALLOWING: 0
ABDOMINAL PAIN: 0
COUGH: 0
SHORTNESS OF BREATH: 0
NAUSEA: 0
PHOTOPHOBIA: 0
CHEST TIGHTNESS: 0
WHEEZING: 0
VOMITING: 0

## 2020-01-13 ASSESSMENT — PAIN DESCRIPTION - ORIENTATION
ORIENTATION: RIGHT;LEFT
ORIENTATION: RIGHT;LEFT

## 2020-01-13 ASSESSMENT — PAIN SCALES - GENERAL: PAINLEVEL_OUTOF10: 7

## 2020-01-13 ASSESSMENT — PAIN DESCRIPTION - PROGRESSION: CLINICAL_PROGRESSION: GRADUALLY IMPROVING

## 2020-01-13 NOTE — PROGRESS NOTES
Smoking Cessation - topics covered   []  Health Risks  []  Benefits of Quitting   []  Smoking Cessation  []  Patient has no history of tobacco use per note in significant history. [x]  Patient is former smoker. Per note in significant history, patient quit in 1988. [x]  No need for tobacco cessation education. []  Booklet given  []  Patient verbalizes understanding. []  Patient denies need for tobacco cessation education. []  Unable to meet with patient today. Will follow up as able.   Paris Pozo  9:35 AM

## 2020-01-13 NOTE — PROGRESS NOTES
Leg  Pain Orientation: Right;Left  Clinical Progression: Gradually improving  Vital Signs  Patient Currently in Pain: Yes     Orientation  Orientation  Overall Orientation Status: Within Normal Limits  Social/Functional History  Social/Functional History  Lives With: Alone  Type of Home: Apartment  Home Layout: One level  Home Access: Ramped entrance, Elevator(pt salvador in an underground parking structure, takes elevator up to 1st floor)  Bathroom Shower/Tub: Curtain, Shower chair without back, Walk-in shower  Bathroom Toilet: Standard(pt uses BSC over st toilet, B rails )  Bathroom Equipment: Shower chair, Hand-held shower, Commode  Bathroom Accessibility: Not accessible(rollator will not fit in bathroom. Pt uses r walker and will turn it side ways to enter bathroom)  Home Equipment: 4 wheeled walker, Quad cane, Grab bars, Rolling walker  Receives Help From: Family(pt reports siblings live close by and are supportive)  ADL Assistance: Independent  Homemaking Assistance: Independent  Homemaking Responsibilities: Yes  Meal Prep Responsibility: Primary(pt unable to stand for a long period of time.  pt has a chair in the kitchen to take breaks as needed)  Laundry Responsibility: Primary(laundry in the basement, uses elevator with rollator)  Cleaning Responsibility: Primary(pt rpeorts taking rest breaks throughout cleaning process)  Bill Paying/Finance Responsibility: Primary  Shopping Responsibility: Primary(pt unable to mobilize throughout the store, uses electric scooter)  Ambulation Assistance: Independent  Transfer Assistance: Independent  Active : Yes  Mode of Transportation: Car  Occupation: Retired  Type of occupation: , nurse aide  Leisure & Hobbies: watch tv, read, participate in activiites in the apt building, go out and visit family  Cognition      Objective     AROM RLE (degrees)  RLE AROM: WFL  AROM LLE (degrees)  LLE AROM : WFL  AROM RUE (degrees)  RUE AROM : WNL  AROM LUE (degrees)  LUE AROM : WNL  Strength RLE  Strength RLE: WFL  Strength LLE  Strength LLE: WFL  Strength RUE  Strength RUE: WFL  Strength LUE  Strength LUE: WFL     Sensation  Overall Sensation Status: WFL  Bed mobility  Supine to Sit: Stand by assistance  Sit to Supine: Stand by assistance  Scooting: Stand by assistance  Transfers  Sit to Stand: Stand by assistance  Stand to sit: Stand by assistance  Ambulation  Ambulation?: Yes  Ambulation 1  Surface: level tile  Device: No Device  Assistance: Stand by assistance  Distance: amb 100 ft without a device x SBA     Balance  Posture: Good  Sitting - Static: Good  Sitting - Dynamic: Good  Standing - Static: Fair  Standing - Dynamic: [facility]  Times per week: DC  PT  Safety Devices  Type of devices: Left in bed, Nurse notified, Call light within reach    G-Code       OutComes Score     AM-PAC Score  AM-PAC Inpatient Mobility Raw Score : 21 (01/13/20 1505)  AM-PAC Inpatient T-Scale Score : 50.25 (01/13/20 1505)  Mobility Inpatient CMS 0-100% Score: 28.97 (01/13/20 1505)  Mobility Inpatient CMS G-Code Modifier : CJ (01/13/20 1505)       Goals  Short term goals  Time Frame for Short term goals: No PT needs at this time    DC   PT       Therapy Time   Individual Concurrent Group Co-treatment   Time In 1310         Time Out 1335         Minutes 800 [facility],

## 2020-01-13 NOTE — PROGRESS NOTES
radiculopathy, Obesity, Spinal stenosis, and Wound infection after surgery. has a past surgical history that includes Foot surgery (Bilateral); Gallbladder surgery; Lumbar spine surgery (1986); Lumbar spine surgery (4/2012); Nerve Block (10/26/2015); Nerve Block (12/22/15); Spine surgery (07/24/2016); back surgery; and Vein Surgery (Bilateral, 03/2017). Treatment Diagnosis: Muscle Pain      Restrictions  Restrictions/Precautions  Restrictions/Precautions: Up as Tolerated  Required Braces or Orthoses?: No    Subjective   General  Patient assessed for rehabilitation services?: Yes  Family / Caregiver Present: No  Patient Currently in Pain: Yes  Pain Assessment  Pain Assessment: 0-10  Pain Level: 7  Pain Type: Acute pain;Chronic pain  Pain Location: Leg  Pain Orientation: Right;Left(LLE is worse)  Non-Pharmaceutical Pain Intervention(s): Therapeutic presence;Distraction; Ambulation/Increased Activity;Repositioned(pt reports pain meds were given prior to eval)  Vital Signs  Level of Consciousness: Alert  Patient Currently in Pain: Yes  Oxygen Therapy  O2 Device: None (Room air)  Social/Functional History  Social/Functional History  Lives With: Alone  Type of Home: Apartment  Home Layout: One level  Home Access: Ramped entrance, Elevator(pt salvador in an underground parking structure, takes elevator up to 1st floor)  Bathroom Shower/Tub: Curtain, Shower chair without back, Walk-in shower  Bathroom Toilet: Standard(pt uses BSC over st toilet, B rails )  Bathroom Equipment: Shower chair, Hand-held shower, Commode  Bathroom Accessibility: Not accessible(rollator will not fit in bathroom.  Pt uses r walker and will turn it side ways to enter bathroom)  Home Equipment: 4 wheeled walker, Quad cane, Grab bars, Rolling walker  Receives Help From: Family(pt reports siblings live close by and are supportive)  ADL Assistance: Independent  Homemaking Assistance: Independent  Homemaking Responsibilities: Yes  Meal Prep sit: Supervision     Cognition  Overall Cognitive Status: WFL     Sensation  Overall Sensation Status: WFL     LUE PROM (degrees)  LUE PROM: WFL  LUE AROM (degrees)  LUE AROM : WFL  Left Hand PROM (degrees)  Left Hand PROM: WFL  Left Hand AROM (degrees)  Left Hand AROM: WFL  RUE PROM (degrees)  RUE PROM: WFL  RUE AROM (degrees)  RUE AROM : WFL  Right Hand PROM (degrees)  Right Hand PROM: WFL  Right Hand AROM (degrees)  Right Hand AROM: WFL  LUE Strength  Gross LUE Strength: WFL  L Hand General: 4-/5  LUE Strength Comment: 4/5 overall muscle strength  RUE Strength  Gross RUE Strength: WFL  R Hand General: 4-/5  RUE Strength Comment: 4/5 overall muscle strength      Plan   Plan  Times per week: 1-2 sessions        AM-PAC Score  AM-PAC Inpatient Daily Activity Raw Score: 21 (01/13/20 1037)  AM-PAC Inpatient ADL T-Scale Score : 44.27 (01/13/20 1037)  ADL Inpatient CMS 0-100% Score: 32.79 (01/13/20 1037)  ADL Inpatient CMS G-Code Modifier : CJ (01/13/20 1037)    Goals  Short term goals  Time Frame for Short term goals: Pt will, by discharge:   Short term goal 1: Dem I with adl performance utilizing energy conservation tech  Short term goal 2: Dem I with functional transfers/mobility using rollator with good        Therapy Time   Individual Concurrent Group Co-treatment   Time In 0920         Time Out 0954         Minutes 34                 VERÓNICA MORENO OTR/L

## 2020-01-13 NOTE — DISCHARGE SUMMARY
Melony Tse [address]    Discharge Summary     Patient ID: Husam Umana  :  1960   MRN: [id number]     ACCOUNT:  [de-identified]   Patient's PCP: Shara Patel MD  Admit Date: 2020   Discharge Date: 2020     Length of Stay: 0  Code Status:  Full Code  Admitting Physician: Holley Farrell DO  Discharge Physician: Tony Bates PA-C     Active Discharge Diagnoses:     Hospital Problem Lists:  Principal Problem:    Weakness of both lower extremities  Active Problems: Morbid obesity due to excess calories ([address]    Spinal stenosis at L4-L5 level    Recurrent UTI  Resolved Problems:    Flank pain      Admission Condition:  fair     Discharged Condition: good    Hospital Stay:     Hospital Course:    Lynna Lennox Wallace is a 60 y.o. F with hx of spinal stenosis, HTN, recurrent UTI, multiple antibiotic allergies who presented with flank pain. Patient was initially seen in ER last month with complaints of dysuria and frequency. Was prescribed Keflex at that time. Only took partial course as she stated she developed lower extremity weakness, numbness/tingling. States she has history of chronic weakness from history of spinal stenosis and multiple previous surgeries but symptoms worsened after taking Keflex.     In ER CT abdomen done was negative for stones or hydronephrosis. Concern for possible side affect from antibiotics, admitted for further evaluation and pain control. Urinalysis revealed positive nitrates and leukocyte esterase. Culture growing lactose fermenting gram-negative rods. She was started on Macrobid and symptoms improved significantly. There was concern for possible reaction to Keflex as the patient reports that her lower extremity pain and weakness worsened when she was taken this medication. Notably CK was mildly elevated to 241 and was trending down at the time of discharge.   She was deemed appropriate for discharge home. She will complete a 7-day course of Macrobid and follow-up with her primary care physician. Significant therapeutic interventions: See above.     Significant Diagnostic Studies:   Labs / Micro:  CBC:   Lab Results   Component Value Date    WBC 4.0 01/13/2020    RBC 4.22 01/13/2020    RBC 3.75 04/10/2012    HGB 12.5 01/13/2020    HCT 39.3 01/13/2020    MCV 93.1 01/13/2020    MCH 29.6 01/13/2020    MCHC 31.8 01/13/2020    RDW 14.3 01/13/2020     01/13/2020     04/10/2012     BMP:    Lab Results   Component Value Date    GLUCOSE 97 01/13/2020    GLUCOSE 87 04/18/2012     01/13/2020    K 4.2 01/13/2020     01/13/2020    CO2 21 01/13/2020    ANIONGAP 12 01/13/2020    BUN 10 01/13/2020    CREATININE 0.57 01/13/2020    BUNCRER NOT REPORTED 01/13/2020    CALCIUM 9.2 01/13/2020    LABGLOM >60 01/13/2020    GFRAA >60 01/13/2020    GFR      01/13/2020    GFR NOT REPORTED 01/13/2020     CMP:    Lab Results   Component Value Date    GLUCOSE 97 01/13/2020    GLUCOSE 87 04/18/2012     01/13/2020    K 4.2 01/13/2020     01/13/2020    CO2 21 01/13/2020    BUN 10 01/13/2020    CREATININE 0.57 01/13/2020    ANIONGAP 12 01/13/2020    ALKPHOS 117 10/23/2018    ALT 21 10/23/2018    AST 24 10/23/2018    BILITOT 0.18 10/23/2018    LABALBU 4.3 10/23/2018    LABALBU 4.6 04/04/2012    ALBUMIN 1.3 10/23/2018    LABGLOM >60 01/13/2020    GFRAA >60 01/13/2020    GFR      01/13/2020    GFR NOT REPORTED 01/13/2020    PROT 7.6 10/23/2018    PROT 6.5 04/18/2012    CALCIUM 9.2 01/13/2020     U/A:    Lab Results   Component Value Date    COLORU YELLOW 01/12/2020    TURBIDITY CLEAR 01/12/2020    SPECGRAV 1.011 01/12/2020    HGBUR NEGATIVE 01/12/2020    PHUR 6.5 01/12/2020    PROTEINU NEGATIVE 01/12/2020    GLUCOSEU NEGATIVE 01/12/2020    GLUCOSEU NEGATIVE 04/05/2012    KETUA NEGATIVE 01/12/2020    BILIRUBINUR NEGATIVE 01/12/2020    BILIRUBINUR neg 03/18/2016    BILIRUBINUR NEGATIVE 04/05/2012 UROBILINOGEN Normal 01/12/2020    NITRU POSITIVE 01/12/2020    LEUKOCYTESUR SMALL 01/12/2020       Radiology:  Ct Abdomen Pelvis Wo Contrast Additional Contrast? Radiologist Recommendation    Result Date: 1/13/2020  No renal or ureteral calculi. No obstructive uropathy. No acute inflammatory process in the abdomen or pelvis. Xr Chest Standard (2 Vw)    Result Date: 1/12/2020  No acute cardiopulmonary process. Consultations:    Consults:     Final Specialist Recommendations/Findings:   IP CONSULT TO HOSPITALIST  IP CONSULT TO NEUROSURGERY      The patient was seen and examined on day of discharge and this discharge summary is in conjunction with any daily progress note from day of discharge.     Discharge plan:     Disposition: Home    Physician Follow Up:   MD Vidya [address] [facility]  [phone]    Schedule an appointment as soon as possible for a visit in 2 weeks  Neurosurgeon - lumbar stenosis       Diet: cardiac diet    Activity: As tolerated    Discharge Medications:      Medication List      START taking these medications    nitrofurantoin (macrocrystal-monohydrate) 100 MG capsule  Commonly known as:  MACROBID  Take 1 capsule by mouth every 12 hours for 5 days        CONTINUE taking these medications    ascorbic acid 500 MG tablet  Commonly known as:  VITAMIN C  Take 1 tablet by mouth daily     aspirin 81 MG tablet  Take 2 tablets by mouth daily     DULoxetine HCl 40 MG Cpep  Take 40 mg by mouth daily     hydrochlorothiazide 25 MG tablet  Commonly known as:  HYDRODIURIL  Take 1 tablet by mouth daily     ibuprofen 600 MG tablet  Commonly known as:  ADVIL;MOTRIN     THERAPEUTIC MULTIVITAMIN PO        STOP taking these medications    atorvastatin 40 MG tablet  Commonly known as:  LIPITOR     cephALEXin 500 MG capsule  Commonly known as:  Verner Dally

## 2020-01-13 NOTE — PROGRESS NOTES
on Macrobid and symptoms improved significantly. There was concern for possible reaction to Keflex as the patient reports that her lower extremity pain and weakness worsened when she was taken this medication. Notably CK was mildly elevated to 241 and was trending down at the time of discharge. She was deemed appropriate for discharge home. She will complete a 7-day course of Macrobid and follow-up with her primary care physician. Review of Systems:     Constitutional:  negative for chills, fevers, sweats  Respiratory:  negative for cough, dyspnea on exertion, hemoptysis, shortness of breath, wheezing  Cardiovascular:  negative for chest pain, chest pressure/discomfort, lower extremity edema, palpitations  Gastrointestinal:  negative for abdominal pain, constipation, diarrhea, nausea, vomiting  Neurological:  negative for dizziness, headache    Medications: Allergies:     Allergies   Allergen Reactions    Lisinopril Hives and Anaphylaxis     seizures  seizures  seizures    Azithromycin Swelling    Ciprofloxacin Swelling    Keflex [Cephalexin] Swelling    Mobic [Meloxicam]      Muscle cramping/spasm    Penicillins Other (See Comments)    Aztreonam Swelling and Rash    Bactrim [Sulfamethoxazole-Trimethoprim] Rash       Current Meds:   Scheduled Meds:    hydrochlorothiazide  25 mg Oral Daily    aspirin  162 mg Oral Daily    ascorbic acid  500 mg Oral Daily    atorvastatin  40 mg Oral Daily    sodium chloride flush  10 mL Intravenous 2 times per day    enoxaparin  40 mg Subcutaneous Daily    nitrofurantoin (macrocrystal-monohydrate)  100 mg Oral 2 times per day    DULoxetine  40 mg Oral Daily     Continuous Infusions:   PRN Meds: sodium chloride flush, magnesium hydroxide, ondansetron, acetaminophen, oxyCODONE-acetaminophen **OR** oxyCODONE-acetaminophen    Data:     Past Medical History:   has a past medical history of Acquired cystic kidney disease, Anemia, Arthritis, Asthma, Bilateral leg 72 hours. ABG:No results found for: POCPH, PHART, PH, POCPCO2, RCP9PQY, PCO2, POCPO2, PO2ART, PO2, POCHCO3, YLE5PBL, HCO3, NBEA, PBEA, BEART, BE, THGBART, THB, OQN3RGS, GPHN9IUY, G1VHIPHJ, O2SAT, FIO2  Lab Results   Component Value Date/Time    SPECIAL NOT REPORTED 01/12/2020 01:46 AM     Lab Results   Component Value Date/Time    CULTURE (A) 01/12/2020 01:46 AM     LACTOSE FERMENTING GRAM NEGATIVE RODS >[id number] CFU/ML       Radiology:  Ct Abdomen Pelvis Wo Contrast Additional Contrast? Radiologist Recommendation    Result Date: 1/13/2020  No renal or ureteral calculi. No obstructive uropathy. No acute inflammatory process in the abdomen or pelvis. Xr Chest Standard (2 Vw)    Result Date: 1/12/2020  No acute cardiopulmonary process. Physical Examination:        General appearance:  alert, cooperative and no distress  Mental Status:  oriented to person, place and time and normal affect  Lungs:  clear to auscultation bilaterally, normal effort  Heart:  regular rate and rhythm, no murmur  Abdomen:  soft, nontender, nondistended, normal bowel sounds, no masses, hepatomegaly, splenomegaly  Extremities:  no edema, redness, tenderness in the calves  Skin:  no gross lesions, rashes, induration    Assessment:        Hospital Problems           Last Modified POA    * (Principal) Weakness of both lower extremities 1/13/2020 Yes    Spinal stenosis at L4-L5 level 1/13/2020 Yes    Recurrent UTI 1/13/2020 Yes          Plan:        1. Complete 7-day Macrobid course for urinary tract infection. 2. Resume outpatient water therapy  3. Follow-up with primary care physician within 1 to 2 weeks  4. Outpatient follow-up with neurosurgery  5.  Discharge home    [facility]  1/13/2020  2:44 PM

## 2020-01-19 LAB
EKG ATRIAL RATE: 70 BPM
EKG P AXIS: 49 DEGREES
EKG P-R INTERVAL: 186 MS
EKG Q-T INTERVAL: 422 MS
EKG QRS DURATION: 88 MS
EKG QTC CALCULATION (BAZETT): 455 MS
EKG R AXIS: 11 DEGREES
EKG T AXIS: 27 DEGREES
EKG VENTRICULAR RATE: 70 BPM

## 2020-03-06 ENCOUNTER — OFFICE VISIT (OUTPATIENT)
Dept: PAIN MANAGEMENT | Age: 60
End: 2020-03-06
Payer: MEDICARE

## 2020-03-06 VITALS
DIASTOLIC BLOOD PRESSURE: 73 MMHG | SYSTOLIC BLOOD PRESSURE: 124 MMHG | HEIGHT: 71 IN | WEIGHT: 293 LBS | HEART RATE: 86 BPM | OXYGEN SATURATION: 97 % | BODY MASS INDEX: 41.02 KG/M2

## 2020-03-06 PROCEDURE — 99213 OFFICE O/P EST LOW 20 MIN: CPT | Performed by: NURSE PRACTITIONER

## 2020-03-06 RX ORDER — OXYCODONE HYDROCHLORIDE AND ACETAMINOPHEN 5; 325 MG/1; MG/1
1 TABLET ORAL DAILY PRN
Qty: 30 TABLET | Refills: 0 | Status: SHIPPED | OUTPATIENT
Start: 2020-03-06 | End: 2020-04-03 | Stop reason: SDUPTHER

## 2020-03-06 RX ORDER — DULOXETINE 40 MG/1
40 CAPSULE, DELAYED RELEASE ORAL DAILY
Qty: 30 CAPSULE | Refills: 0 | Status: SHIPPED | OUTPATIENT
Start: 2020-03-06 | End: 2020-04-03 | Stop reason: SDUPTHER

## 2020-03-06 RX ORDER — TIZANIDINE 4 MG/1
4 TABLET ORAL NIGHTLY PRN
Qty: 30 TABLET | Refills: 0 | Status: SHIPPED | OUTPATIENT
Start: 2020-03-06 | End: 2020-04-03 | Stop reason: SDUPTHER

## 2020-03-06 ASSESSMENT — ENCOUNTER SYMPTOMS
BACK PAIN: 1
CONSTIPATION: 0
SHORTNESS OF BREATH: 0
COUGH: 1
WHEEZING: 1

## 2020-03-06 NOTE — PROGRESS NOTES
last visit:no   Any legal problems e.g. DUI etc.:No  Satisfied with current management: Yes    Opioid Contract: none on file  Last Urine Dug screen dated: 11/8/2019    Lab Results   Component Value Date    LABA1C 5.9 10/23/2018     Lab Results   Component Value Date     10/23/2018       Past Medical History, Past Surgical History, Social History, Allergies and Medications reviewed and updated in EPIC as indicated    Family History reviewed and is noncontributory. Periodic Controlled Substance Monitoring: Possible medication side effects, risk of tolerance/dependence & alternative treatments discussed., No signs of potential drug abuse or diversion identified. , Assessed functional status., Obtaining appropriate analgesic effect of treatment.  Tripp Porras, APRN - CNP)      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    Anemia 10/5/2016    Arthritis     Asthma 1/4/2017    Bilateral leg and foot pain 02/2019    left much worse    Degeneration of cervical intervertebral disc 6/21/2018    Depression 2/2/2016    Failed back syndrome 4/3/2018    History of recurrent UTI (urinary tract infection)     Hyperlipidemia     Hypertension     Lumbar disc herniation with radiculopathy 6/4/2013    Obesity     Spinal stenosis     Wound infection after surgery        Past Surgical History:   Procedure Laterality Date    BACK SURGERY      FOOT SURGERY Bilateral     heel spurs, plantar fascia release    GALLBLADDER SURGERY      [facility]UMBAR SPINE SURGERY  [facility]   Laina Mighty LUMBAR SPINE SURGERY  4/2012    Amy Payan NERVE BLOCK  10/26/2015    caudal# 1 decadron 7.5 mg    NERVE BLOCK  12/22/15    caudal #2  celestone 9mg    SPINE SURGERY  07/24/2016    3 places     VEIN SURGERY Bilateral 03/2017       Allergies   Allergen Reactions    Lisinopril Hives and Anaphylaxis     seizures  seizures  seizures    Azithromycin Swelling    Ciprofloxacin Swelling    Keflex Substance and Sexual Activity    Alcohol use: No     Alcohol/week: 0.0 standard drinks    Drug use: No    Sexual activity: Never   Lifestyle    Physical activity     Days per week: Not on file     Minutes per session: Not on file    Stress: Not on file   Relationships    Social connections     Talks on phone: Not on file     Gets together: Not on file     Attends Taoist service: Not on file     Active member of club or organization: Not on file     Attends meetings of clubs or organizations: Not on file     Relationship status: Not on file    Intimate partner violence     Fear of current or ex partner: Not on file     Emotionally abused: Not on file     Physically abused: Not on file     Forced sexual activity: Not on file   Other Topics Concern    Not on file   Social History Narrative    Not on file       Review of Systems:  Review of Systems   Constitution: Negative for chills and fever. Cardiovascular: Negative for chest pain. Respiratory: Positive for cough and wheezing. Negative for shortness of breath. Musculoskeletal: Positive for arthritis and back pain. Gastrointestinal: Negative for constipation. Neurological: Positive for numbness and paresthesias. Physical Exam:  /73   Pulse 86   Ht 5' 11\" (1.803 m)   Wt (!) 350 lb (158.8 kg)   LMP 09/07/2007   SpO2 97%   BMI 48.82 kg/m²     Physical Exam  Constitutional:       Comments: Obese BMI 48.82   Cardiovascular:      Rate and Rhythm: Normal rate. Pulmonary:      Effort: Pulmonary effort is normal.   Musculoskeletal:      Lumbar back: She exhibits decreased range of motion and tenderness. Comments: Stooped posture - using walker   Skin:     General: Skin is warm and dry. Neurological:      Mental Status: She is alert and oriented to person, place, and time.              Assessment:  Problem List Items Addressed This Visit     Spinal stenosis at L4-L5 level    Relevant Medications    oxyCODONE-acetaminophen (PERCOCET) 5-325 MG per tablet    Spinal stenosis, lumbar region, without neurogenic claudication    Relevant Medications    oxyCODONE-acetaminophen (PERCOCET) 5-325 MG per tablet             Treatment Plan:  Patient relates current medications are helping the pain. Patient reports taking pain medications as prescribed, denies obtaining medications from different sources and denies use of illegal drugs. Patient denies side effects from medications like nausea, vomiting, constipation or drowsiness. Patient reports current activities of daily living are possible due to medications and would like to continue them. As always, we encourage daily stretching and strengthening exercises, and recommend minimizing use of pain medications unless patient cannot get through daily activities due to pain. Contract requirements met. Continue opioid therapy.  Script written for  Follow up appointment made for 4 weeks with MD per pt request to discuss options

## 2020-03-09 RX ORDER — HYDROCHLOROTHIAZIDE 25 MG/1
TABLET ORAL
Qty: 30 TABLET | Refills: 5 | Status: SHIPPED | OUTPATIENT
Start: 2020-03-09 | End: 2020-04-21

## 2020-03-09 NOTE — TELEPHONE ENCOUNTER
Request for HCTZ.       Next Visit Date:  Future Appointments   Date Time Provider Mari Chikis   3/26/2020  8:15 AM Gianni Mane MD [facility]   3/31/2020  3:00 PM Elan Holman MD [facility]   4/2/2020  1:40 PM Saint Blossom, MD [facility] [address] Maintenance   Topic Date Due    DTaP/Tdap/Td vaccine (1 - Tdap) 02/11/1978    Shingles Vaccine (1 of 2) 02/11/2009    Cervical cancer screen  10/14/2017    Annual Wellness Visit (AWV)  05/29/2019    Flu vaccine (1) 09/01/2019    A1C test (Diabetic or Prediabetic)  10/23/2019    Colon Cancer Screen FIT/FOBT  01/25/2020    Potassium monitoring  01/13/2021    Creatinine monitoring  01/13/2021    Breast cancer screen  06/07/2021    Lipid screen  10/23/2023    Hepatitis C screen  Completed    HIV screen  Completed    Hepatitis A vaccine  Aged Out    Hepatitis B vaccine  Aged Out    Hib vaccine  Aged Out    Meningococcal (ACWY) vaccine  Aged Out    Pneumococcal 0-64 years Vaccine  Aged Out       Hemoglobin A1C (%)   Date Value   10/23/2018 5.9   10/05/2015 5.4   04/10/2012 5.7             ( goal A1C is < 7)   No results found for: LABMICR  LDL Cholesterol (mg/dL)   Date Value   10/23/2018 222 (H)       (goal LDL is <100)   AST (U/L)   Date Value   10/23/2018 24     ALT (U/L)   Date Value   10/23/2018 21     BUN (mg/dL)   Date Value   01/13/2020 10     BP Readings from Last 3 Encounters:   03/06/20 124/73   01/13/20 138/69   12/30/19 (!) 149/78          (goal 120/80)    All Future Testing planned in [facility]  Lab Frequency Next Occurrence         Patient Active Problem List:     Morbid obesity due to excess calories (HCC)     Hyperlipemia     Chronic pelvic pain in female     Fatigue     Spinal stenosis at L4-L5 level     Adjustment disorder with mixed anxiety and depressed mood     Rash and nonspecific skin eruption     Anemia     Morbid obesity with BMI of 50.0-59.9, adult ([facility] [address]     Medication monitoring encounter     Spinal

## 2020-03-26 ENCOUNTER — TELEPHONE (OUTPATIENT)
Dept: PAIN MANAGEMENT | Age: 60
End: 2020-03-26

## 2020-03-26 NOTE — TELEPHONE ENCOUNTER
Pt does not have Internet connection at home and unable to switch to VV. Pt keeping ov appointment on 3/31/20. Pt screening negative.

## 2020-03-30 NOTE — TELEPHONE ENCOUNTER
Dr. Trung Mera will not be in the office tomorrow. Attempted to notify pt that visit will be switched to telephone appointment. Pt is not able to switch to VV so it must a telephone call only.  LVM informing pt she should not come to office tomorrow for appointment and requested pt to call the office back for further instructions

## 2020-04-02 ENCOUNTER — HOSPITAL ENCOUNTER (OUTPATIENT)
Age: 60
Setting detail: SPECIMEN
Discharge: HOME | End: 2020-04-02
Payer: MEDICARE

## 2020-04-02 ENCOUNTER — OFFICE VISIT (OUTPATIENT)
Dept: INTERNAL MEDICINE | Age: 60
End: 2020-04-02
Payer: MEDICARE

## 2020-04-02 VITALS
BODY MASS INDEX: 41.02 KG/M2 | WEIGHT: 293 LBS | TEMPERATURE: 98 F | HEIGHT: 71 IN | HEART RATE: 82 BPM | SYSTOLIC BLOOD PRESSURE: 167 MMHG | DIASTOLIC BLOOD PRESSURE: 84 MMHG

## 2020-04-02 LAB
ABSOLUTE EOS #: 0.1 K/UL (ref 0–0.44)
ABSOLUTE IMMATURE GRANULOCYTE: <0.03 K/UL (ref 0–0.3)
ABSOLUTE LYMPH #: 2.22 K/UL (ref 1.1–3.7)
ABSOLUTE MONO #: 0.36 K/UL (ref 0.1–1.2)
ALBUMIN SERPL-MCNC: 4.2 G/DL (ref 3.5–5.2)
ALBUMIN/GLOBULIN RATIO: 1.1 (ref 1–2.5)
ALP BLD-CCNC: 113 U/L (ref 35–104)
ALT SERPL-CCNC: 18 U/L (ref 5–33)
ANION GAP SERPL CALCULATED.3IONS-SCNC: 15 MMOL/L (ref 9–17)
AST SERPL-CCNC: 19 U/L
BASOPHILS # BLD: 1 % (ref 0–2)
BASOPHILS ABSOLUTE: 0.03 K/UL (ref 0–0.2)
BILIRUB SERPL-MCNC: 0.21 MG/DL (ref 0.3–1.2)
BUN BLDV-MCNC: 13 MG/DL (ref 8–23)
BUN/CREAT BLD: ABNORMAL (ref 9–20)
C-REACTIVE PROTEIN: 13.7 MG/L (ref 0–5)
CALCIUM SERPL-MCNC: 10 MG/DL (ref 8.6–10.4)
CHLORIDE BLD-SCNC: 101 MMOL/L (ref 98–107)
CHOLESTEROL/HDL RATIO: 4.1
CHOLESTEROL: 342 MG/DL
CO2: 25 MMOL/L (ref 20–31)
CREAT SERPL-MCNC: 0.64 MG/DL (ref 0.5–0.9)
CREATININE URINE: 88.5 MG/DL (ref 28–217)
DIFFERENTIAL TYPE: ABNORMAL
EOSINOPHILS RELATIVE PERCENT: 2 % (ref 1–4)
GFR AFRICAN AMERICAN: >60 ML/MIN
GFR NON-AFRICAN AMERICAN: >60 ML/MIN
GFR SERPL CREATININE-BSD FRML MDRD: ABNORMAL ML/MIN/{1.73_M2}
GFR SERPL CREATININE-BSD FRML MDRD: ABNORMAL ML/MIN/{1.73_M2}
GLUCOSE BLD-MCNC: 107 MG/DL (ref 70–99)
HCT VFR BLD CALC: 42.8 % (ref 36.3–47.1)
HDLC SERPL-MCNC: 83 MG/DL
HEMOGLOBIN: 13.8 G/DL (ref 11.9–15.1)
IMMATURE GRANULOCYTES: 0 %
LDL CHOLESTEROL: 247 MG/DL (ref 0–130)
LYMPHOCYTES # BLD: 41 % (ref 24–43)
MCH RBC QN AUTO: 29.7 PG (ref 25.2–33.5)
MCHC RBC AUTO-ENTMCNC: 32.2 G/DL (ref 28.4–34.8)
MCV RBC AUTO: 92 FL (ref 82.6–102.9)
MONOCYTES # BLD: 7 % (ref 3–12)
NRBC AUTOMATED: 0 PER 100 WBC
PDW BLD-RTO: 14.8 % (ref 11.8–14.4)
PLATELET # BLD: 287 K/UL (ref 138–453)
PLATELET ESTIMATE: ABNORMAL
PMV BLD AUTO: 11.9 FL (ref 8.1–13.5)
POTASSIUM SERPL-SCNC: 3.7 MMOL/L (ref 3.7–5.3)
RBC # BLD: 4.65 M/UL (ref 3.95–5.11)
RBC # BLD: ABNORMAL 10*6/UL
SEDIMENTATION RATE, ERYTHROCYTE: 38 MM (ref 0–20)
SEG NEUTROPHILS: 49 % (ref 36–65)
SEGMENTED NEUTROPHILS ABSOLUTE COUNT: 2.73 K/UL (ref 1.5–8.1)
SODIUM BLD-SCNC: 141 MMOL/L (ref 135–144)
TOTAL PROTEIN, URINE: 20 MG/DL
TOTAL PROTEIN: 8 G/DL (ref 6.4–8.3)
TRIGL SERPL-MCNC: 59 MG/DL
TSH SERPL DL<=0.05 MIU/L-ACNC: 2.71 MIU/L (ref 0.3–5)
VLDLC SERPL CALC-MCNC: ABNORMAL MG/DL (ref 1–30)
WBC # BLD: 5.5 K/UL (ref 3.5–11.3)
WBC # BLD: ABNORMAL 10*3/UL

## 2020-04-02 PROCEDURE — 99213 OFFICE O/P EST LOW 20 MIN: CPT | Performed by: INTERNAL MEDICINE

## 2020-04-02 PROCEDURE — 3017F COLORECTAL CA SCREEN DOC REV: CPT | Performed by: INTERNAL MEDICINE

## 2020-04-02 PROCEDURE — 1036F TOBACCO NON-USER: CPT | Performed by: INTERNAL MEDICINE

## 2020-04-02 PROCEDURE — G8417 CALC BMI ABV UP PARAM F/U: HCPCS | Performed by: INTERNAL MEDICINE

## 2020-04-02 PROCEDURE — 99211 OFF/OP EST MAY X REQ PHY/QHP: CPT | Performed by: INTERNAL MEDICINE

## 2020-04-02 PROCEDURE — G8427 DOCREV CUR MEDS BY ELIG CLIN: HCPCS | Performed by: INTERNAL MEDICINE

## 2020-04-02 RX ORDER — ADHESIVE BANDAGE 3/4"
BANDAGE TOPICAL
Qty: 1 EACH | Refills: 0 | Status: SHIPPED | OUTPATIENT
Start: 2020-04-02 | End: 2020-04-02 | Stop reason: SDUPTHER

## 2020-04-02 RX ORDER — ADHESIVE BANDAGE 3/4"
BANDAGE TOPICAL
Qty: 1 EACH | Refills: 0 | Status: SHIPPED | OUTPATIENT
Start: 2020-04-02 | End: 2020-04-21

## 2020-04-02 RX ORDER — FUROSEMIDE 20 MG/1
20 TABLET ORAL DAILY
Qty: 60 TABLET | Refills: 3 | Status: SHIPPED | OUTPATIENT
Start: 2020-04-02 | End: 2021-01-14 | Stop reason: SDUPTHER

## 2020-04-02 ASSESSMENT — PATIENT HEALTH QUESTIONNAIRE - PHQ9
SUM OF ALL RESPONSES TO PHQ9 QUESTIONS 1 & 2: 1
SUM OF ALL RESPONSES TO PHQ QUESTIONS 1-9: 1
2. FEELING DOWN, DEPRESSED OR HOPELESS: 1
1. LITTLE INTEREST OR PLEASURE IN DOING THINGS: 0
SUM OF ALL RESPONSES TO PHQ QUESTIONS 1-9: 1

## 2020-04-02 ASSESSMENT — ENCOUNTER SYMPTOMS
SHORTNESS OF BREATH: 1
EYES NEGATIVE: 1
CONSTIPATION: 1

## 2020-04-02 NOTE — PROGRESS NOTES
patient instructions    6. Was a self-tracking handout given in paper form or via [facility]? Yes  If yes, see orders or list here. Orders Placed This Encounter   Procedures    CBC Auto Differential     Please get this labwork done before your next visit. Standing Status:   Future     Number of Occurrences:   1     Standing Expiration Date:   4/2/2021    Comprehensive Metabolic Panel     Please get this labwork done before your next visit. Standing Status:   Future     Number of Occurrences:   1     Standing Expiration Date:   4/1/2021    Lipid Panel     Standing Status:   Future     Number of Occurrences:   1     Standing Expiration Date:   4/2/2021     Order Specific Question:   Is Patient Fasting?/# of Hours     Answer:   8    C-Reactive Protein     Please get this labwork done before your next visit. Standing Status:   Future     Number of Occurrences:   1     Standing Expiration Date:   4/2/2021    Sedimentation Rate     Please get this labwork done before your next visit. Standing Status:   Future     Number of Occurrences:   1     Standing Expiration Date:   4/2/2021    TSH with Reflex     Please get this labwork done before your next visit. Standing Status:   Future     Number of Occurrences:   1     Standing Expiration Date:   4/2/2021    Protein, urine, random     Standing Status:   Future     Number of Occurrences:   1     Standing Expiration Date:   4/2/2021    Creatinine, Random Urine     Standing Status:   Future     Number of Occurrences:   1     Standing Expiration Date:   4/2/2021     Scheduling Instructions:      Please get this labwork done before your next visit.  Hemoglobin A1C     Standing Status:   Future     Number of Occurrences:   1     Standing Expiration Date:   4/2/2021       No follow-ups on file. Patient voiced understanding and agreed to treatment plan.      This note is created with the assistance of a speech-recognition program. While intending to generate a document that actually reflects the content of the visit, the document can still have some mistakes which may not have been identified and corrected by editing.       Dr Maria Eugenia Talavera MD, [facility] , Department of Internal Medicine  Resident Ambulatory Site Medical Director  [address] [place] [address] Internal Medicine  [facility]  Internal Medicine Clerkship - [facility]                   4/2/2020, 11:53 PM

## 2020-04-02 NOTE — PATIENT INSTRUCTIONS
Medications e-scribe to pharmacy of pt's choice. Printed script for Blood Pressure Cuff given to pt with signed office notes. Scripts for lab given to pt with fasting instructions, pt will get labs done as soon as possible. Script for urine given to pt, pt will get urine test done as soon as possible. Patient to return to clinic 1 week (4/9/20). AVS reviewed and given to pt. It is very important for your care that you keep your appointment. If for some reason you are unable to keep your appointment it is equally important that you call our office at [phone] to cancel your appointment and reschedule. Failure to do so may result in your termination from our practice.   MB

## 2020-04-03 ENCOUNTER — VIRTUAL VISIT (OUTPATIENT)
Dept: PAIN MANAGEMENT | Age: 60
End: 2020-04-03
Payer: MEDICARE

## 2020-04-03 ENCOUNTER — TELEPHONE (OUTPATIENT)
Dept: INTERNAL MEDICINE | Age: 60
End: 2020-04-03

## 2020-04-03 LAB
ESTIMATED AVERAGE GLUCOSE: 114 MG/DL
HBA1C MFR BLD: 5.6 % (ref 4–6)

## 2020-04-03 PROCEDURE — 99441 PR PHYS/QHP TELEPHONE EVALUATION 5-10 MIN: CPT | Performed by: ANESTHESIOLOGY

## 2020-04-03 RX ORDER — OXYCODONE HYDROCHLORIDE AND ACETAMINOPHEN 5; 325 MG/1; MG/1
1 TABLET ORAL DAILY PRN
Qty: 30 TABLET | Refills: 0 | Status: SHIPPED | OUTPATIENT
Start: 2020-04-03 | End: 2020-05-03

## 2020-04-03 RX ORDER — DULOXETINE 40 MG/1
40 CAPSULE, DELAYED RELEASE ORAL DAILY
Qty: 30 CAPSULE | Refills: 0 | Status: SHIPPED | OUTPATIENT
Start: 2020-04-03 | End: 2020-05-18 | Stop reason: SDUPTHER

## 2020-04-03 RX ORDER — TIZANIDINE 4 MG/1
4 TABLET ORAL NIGHTLY PRN
Qty: 30 TABLET | Refills: 0 | Status: SHIPPED | OUTPATIENT
Start: 2020-04-03 | End: 2020-05-03

## 2020-04-03 NOTE — TELEPHONE ENCOUNTER
Pt was seen yesterday by Monica Nieves-- she said that she forgot to discuss this with you during her visit.  Please advise

## 2020-04-03 NOTE — PROGRESS NOTES
Neli Talamantes is a 63 y.o. female evaluated via telephone on 4/3/2020. Consent:  She and/or health care decision maker is aware that that she may receive a bill for this telephone service, depending on her insurance coverage, and has provided verbal consent to proceed: Yes      Documentation:  I communicated with the patient and/or health care decision made about    Chronic pain  medication refill    Details of this discussion including any medical advice provided:    Yes  In brief patient have history of chronic bilateral lower extremity pain diagnosed with postlaminectomy syndrome, chronic lumbar radiculopathy and peripheral neuropathy  She has tried and failed different modalities  She is on low-dose opioid with oxycodone 1 tablet a day along with Cymbalta and muscle relaxant Zanaflex  She denies any side effect  She finds the medication helpful  Pain is poorly controlled    Automated prescription report reviewed  Medication refill ordered  Option for spinal cord stimulation discussed with patient  We will discuss further in future    Controlled Substance Monitoring:    Acute and Chronic Pain Monitoring:   RX Monitoring 4/3/2020   Attestation -   Periodic Controlled Substance Monitoring Possible medication side effects, risk of tolerance/dependence & alternative treatments discussed. ;No signs of potential drug abuse or diversion identified. ;Assessed functional status. No orders of the defined types were placed in this encounter. Orders Placed This Encounter   Medications    oxyCODONE-acetaminophen (PERCOCET) 5-325 MG per tablet     Sig: Take 1 tablet by mouth daily as needed for Pain for up to 30 days.      Dispense:  30 tablet     Refill:  0     Reduce doses taken as pain becomes manageable    tiZANidine (ZANAFLEX) 4 MG tablet     Sig: Take 1 tablet by mouth nightly as needed (MUSCLE SPASMS)     Dispense:  30 tablet     Refill:  0    naloxegol (MOVANTIK) 25 MG TABS tablet     Sig: Take 1 tablet by mouth every morning     Dispense:  30 tablet     Refill:  0    DULoxetine HCl 40 MG CPEP     Sig: Take 40 mg by mouth daily     Dispense:  30 capsule     Refill:  0             I affirm this is a Patient Initiated Episode with an Established Patient who has not had a related appointment within my department in the past 7 days or scheduled within the next 24 hours.     Total Time: minutes: 5-10 minutes    Note: not billable if this call serves to triage the patient into an appointment for the relevant concern      Fay Hernandez

## 2020-04-03 NOTE — LETTER
[address]  [address] [address]  Phone: [phone]  Fax: [phone]    Edu Max MD        April 13, 2020    Shreyas Fournier  [address]      Dear Jazmin Spears: We have tried to contact you on numerous occasions in regards to a called you placed in regards to a UTI  and have been unable to reach you. If you could contact us at [phone] (option 3 then option 1) your earliest convenience we would greatly appreciate it. If you have any questions or concerns, please don't hesitate to call.     Sincerely,        Edu Max MD

## 2020-04-16 ENCOUNTER — TELEPHONE (OUTPATIENT)
Dept: INTERNAL MEDICINE | Age: 60
End: 2020-04-16

## 2020-04-16 NOTE — TELEPHONE ENCOUNTER
64 y/o Pt currently in Medicare and due for her AWV.     LMOR to call [facility] to schedule AWV and these are being done as Virtual Visits.     (placed reminder in Recall to have letter mailed out regarding visit due)

## 2020-04-21 ENCOUNTER — VIRTUAL VISIT (OUTPATIENT)
Dept: INTERNAL MEDICINE | Age: 60
End: 2020-04-21
Payer: MEDICARE

## 2020-04-21 VITALS — WEIGHT: 293 LBS | BODY MASS INDEX: 41.02 KG/M2 | HEIGHT: 71 IN

## 2020-04-21 PROCEDURE — G2012 BRIEF CHECK IN BY MD/QHP: HCPCS | Performed by: INTERNAL MEDICINE

## 2020-04-21 RX ORDER — ATORVASTATIN CALCIUM 40 MG/1
40 TABLET, FILM COATED ORAL DAILY
Qty: 30 TABLET | Refills: 3 | Status: SHIPPED | OUTPATIENT
Start: 2020-04-21 | End: 2021-05-25 | Stop reason: SDUPTHER

## 2020-04-30 ENCOUNTER — TELEPHONE (OUTPATIENT)
Dept: PAIN MANAGEMENT | Age: 60
End: 2020-04-30

## 2020-05-01 ENCOUNTER — TELEPHONE (OUTPATIENT)
Dept: PAIN MANAGEMENT | Age: 60
End: 2020-05-01

## 2020-05-04 ENCOUNTER — TELEPHONE (OUTPATIENT)
Dept: PAIN MANAGEMENT | Age: 60
End: 2020-05-04

## 2020-05-04 NOTE — TELEPHONE ENCOUNTER
pt is calling re' appt of 4/30-states appt. was made without her acknowledgement-wants to know why appt was made when she had just had appt on 4/3-would like a call back also to discuss meds.

## 2020-05-14 ENCOUNTER — TELEPHONE (OUTPATIENT)
Dept: INTERNAL MEDICINE | Age: 60
End: 2020-05-14

## 2020-05-14 NOTE — TELEPHONE ENCOUNTER
claudication     Chronic pain of left knee     Angioedema     Congenital pes planus     Failed back surgical syndrome     Venous insufficiency of both lower extremities     Weakness of both lower extremities     Recurrent UTI

## 2020-05-14 NOTE — TELEPHONE ENCOUNTER
Patient called and is requesting a referral to see a vascular physician. She said both of her legs are in pain, warm to the touch, and have some swelling.    Health Maintenance   Topic Date Due    DTaP/Tdap/Td vaccine (1 - Tdap) 02/11/1978    Shingles Vaccine (1 of 2) 02/11/2009    Cervical cancer screen  10/14/2017    Annual Wellness Visit (AWV)  05/29/2019    Colon Cancer Screen FIT/FOBT  01/25/2020    Flu vaccine (Season Ended) 09/01/2020    Lipid screen  04/02/2021    Potassium monitoring  04/02/2021    Creatinine monitoring  04/02/2021    Breast cancer screen  06/07/2021    Diabetes screen  04/02/2023    Hepatitis C screen  Completed    HIV screen  Completed    Hepatitis A vaccine  Aged Out    Hepatitis B vaccine  Aged Out    Hib vaccine  Aged Out    Meningococcal (ACWY) vaccine  Aged Out    Pneumococcal 0-64 years Vaccine  Aged Out             (applicable per patient's age: Cancer Screenings, Depression Screening, Fall Risk Screening, Immunizations)    Hemoglobin A1C (%)   Date Value   04/02/2020 5.6   10/23/2018 5.9   10/05/2015 5.4     LDL Cholesterol (mg/dL)   Date Value   04/02/2020 247 (H)     AST (U/L)   Date Value   04/02/2020 19     ALT (U/L)   Date Value   04/02/2020 18     BUN (mg/dL)   Date Value   04/02/2020 13      (goal A1C is < 7)   (goal LDL is <100) need 30-50% reduction from baseline     BP Readings from Last 3 Encounters:   04/02/20 (!) 167/84   03/06/20 124/73   01/13/20 138/69    (goal /80)      All Future Testing planned in [facility]:  Lab Frequency Next Occurrence       Next Visit Date:  Future Appointments   Date Time Provider Mari Diop   5/26/2020  1:40 PM Kit Sorensen MD [facility]            Patient Active Problem List:     Morbid obesity due to excess calories ([facility] [address]     Hyperlipemia     Chronic pelvic pain in female     Fatigue     Spinal stenosis at L4-L5 level     Adjustment disorder with mixed anxiety and depressed mood     Rash and nonspecific

## 2020-05-18 ENCOUNTER — VIRTUAL VISIT (OUTPATIENT)
Dept: PAIN MANAGEMENT | Age: 60
End: 2020-05-18
Payer: MEDICARE

## 2020-05-18 PROBLEM — M54.16 CHRONIC LUMBAR RADICULOPATHY: Status: ACTIVE | Noted: 2020-05-18

## 2020-05-18 PROCEDURE — 99443 PR PHYS/QHP TELEPHONE EVALUATION 21-30 MIN: CPT | Performed by: ANESTHESIOLOGY

## 2020-05-18 RX ORDER — DULOXETINE 40 MG/1
40 CAPSULE, DELAYED RELEASE ORAL DAILY
Qty: 30 CAPSULE | Refills: 0 | Status: SHIPPED | OUTPATIENT
Start: 2020-05-18 | End: 2021-01-14 | Stop reason: SDUPTHER

## 2020-05-18 RX ORDER — OXYCODONE HYDROCHLORIDE AND ACETAMINOPHEN 5; 325 MG/1; MG/1
1 TABLET ORAL DAILY PRN
Qty: 30 TABLET | Refills: 0 | Status: SHIPPED | OUTPATIENT
Start: 2020-05-18 | End: 2020-09-18 | Stop reason: SDUPTHER

## 2020-05-18 RX ORDER — TIZANIDINE 4 MG/1
4 TABLET ORAL DAILY PRN
Qty: 30 TABLET | Refills: 0 | Status: SHIPPED | OUTPATIENT
Start: 2020-05-18 | End: 2020-09-18 | Stop reason: SDUPTHER

## 2020-05-18 NOTE — PROGRESS NOTES
Logan Ge is a 63 y.o. female evaluated via telephone on 5/18/2020. Consent:  She and/or health care decision maker is aware that that she may receive a bill for this telephone service, depending on her insurance coverage, and has provided verbal consent to proceed: Yes      Documentation:  I communicated with the patient and/or health care decision maker about     1. Failed back surgical syndrome    2. Morbid obesity with BMI of 50.0-59.9, adult ([address]    3. Chronic lumbar radiculopathy      .    Details of this discussion including any medical advice provided:     -65-year-old woman with history of chronic back pain  Onset of symptoms several years ago  Pain extends and radiates down both leg predominantly left side all the way to the foot  Associated symptoms include severe leg numbness and tingling  Pain aggravated with any activity and alleviates with rest  History of 3 previous lumbar spine surgery  Last surgery was 2016  She do not recall any hardware in her back  Last diagnostic work-up with MRI lumbar spine was in 2018  Recalls EMG nerve testing in the past unable to find the result    Symptoms have progressively been worsening  She is taking muscle relaxants and gabapentin and as needed use of oxycodone 1 tablet a day    Automated prescription report reviewed  No sign or symptoms of any aberrancy  Patient is at high risk of respiratory depression considering morbid obesity history  She is stable on very low-dose opioid    Considering that she have very significant lumbar spine pathology issues and has her symptoms have been worsening therefore I think we need new diagnostic work-up  We will order MRI lumbar spine and EMG nerve testing    She is interested in neuromodulation trial  Will discuss it after diagnostic work-up  Orders Placed This Encounter   Procedures    MRI LUMBAR SPINE W WO CONTRAST     Standing Status:   Future     Standing Expiration Date:   5/18/2021    EMG     Standing

## 2020-05-19 RX ORDER — HYDROCHLOROTHIAZIDE 25 MG/1
TABLET ORAL
Qty: 30 TABLET | Refills: 5 | Status: SHIPPED | OUTPATIENT
Start: 2020-05-19 | End: 2020-06-03 | Stop reason: SDUPTHER

## 2020-05-28 ENCOUNTER — APPOINTMENT (OUTPATIENT)
Dept: GENERAL RADIOLOGY | Age: 60
End: 2020-05-28
Payer: MEDICARE

## 2020-05-28 ENCOUNTER — APPOINTMENT (OUTPATIENT)
Dept: MRI IMAGING | Age: 60
End: 2020-05-28
Payer: MEDICARE

## 2020-05-28 ENCOUNTER — HOSPITAL ENCOUNTER (OUTPATIENT)
Age: 60
Setting detail: OBSERVATION
Discharge: HOME | End: 2020-05-29
Attending: EMERGENCY MEDICINE | Admitting: EMERGENCY MEDICINE
Payer: MEDICARE

## 2020-05-28 ENCOUNTER — APPOINTMENT (OUTPATIENT)
Dept: CT IMAGING | Age: 60
End: 2020-05-28
Payer: MEDICARE

## 2020-05-28 PROBLEM — R53.1 WEAKNESS: Status: ACTIVE | Noted: 2020-05-28

## 2020-05-28 LAB
% CKMB: 0.5 % (ref 0–3)
ABSOLUTE EOS #: 0.11 K/UL (ref 0–0.44)
ABSOLUTE IMMATURE GRANULOCYTE: <0.03 K/UL (ref 0–0.3)
ABSOLUTE LYMPH #: 2.07 K/UL (ref 1.1–3.7)
ABSOLUTE MONO #: 0.36 K/UL (ref 0.1–1.2)
ALLEN TEST: ABNORMAL
ANION GAP SERPL CALCULATED.3IONS-SCNC: 14 MMOL/L (ref 9–17)
ANION GAP: 9 MMOL/L (ref 7–16)
BASOPHILS # BLD: 1 % (ref 0–2)
BASOPHILS ABSOLUTE: 0.04 K/UL (ref 0–0.2)
BUN BLDV-MCNC: 11 MG/DL (ref 8–23)
BUN/CREAT BLD: ABNORMAL (ref 9–20)
CALCIUM SERPL-MCNC: 10.2 MG/DL (ref 8.6–10.4)
CHLORIDE BLD-SCNC: 105 MMOL/L (ref 98–107)
CK MB: 3.2 NG/ML
CKMB INTERPRETATION: ABNORMAL
CO2: 23 MMOL/L (ref 20–31)
CREAT SERPL-MCNC: 0.74 MG/DL (ref 0.5–0.9)
DIFFERENTIAL TYPE: ABNORMAL
EOSINOPHILS RELATIVE PERCENT: 2 % (ref 1–4)
FIO2: ABNORMAL
GFR AFRICAN AMERICAN: >60 ML/MIN
GFR NON-AFRICAN AMERICAN: >60 ML/MIN
GFR NON-AFRICAN AMERICAN: >60 ML/MIN
GFR SERPL CREATININE-BSD FRML MDRD: >60 ML/MIN
GFR SERPL CREATININE-BSD FRML MDRD: ABNORMAL ML/MIN/{1.73_M2}
GFR SERPL CREATININE-BSD FRML MDRD: ABNORMAL ML/MIN/{1.73_M2}
GFR SERPL CREATININE-BSD FRML MDRD: NORMAL ML/MIN/{1.73_M2}
GLUCOSE BLD-MCNC: 92 MG/DL (ref 74–100)
GLUCOSE BLD-MCNC: 94 MG/DL (ref 70–99)
HCO3 VENOUS: 25.2 MMOL/L (ref 22–29)
HCT VFR BLD CALC: 45 % (ref 36.3–47.1)
HEMOGLOBIN: 14.6 G/DL (ref 11.9–15.1)
IMMATURE GRANULOCYTES: 0 %
INR BLD: 1
LYMPHOCYTES # BLD: 37 % (ref 24–43)
MCH RBC QN AUTO: 30 PG (ref 25.2–33.5)
MCHC RBC AUTO-ENTMCNC: 32.4 G/DL (ref 28.4–34.8)
MCV RBC AUTO: 92.4 FL (ref 82.6–102.9)
MODE: ABNORMAL
MONOCYTES # BLD: 7 % (ref 3–12)
MYOGLOBIN: 48 NG/ML (ref 25–58)
NEGATIVE BASE EXCESS, VEN: ABNORMAL (ref 0–2)
NRBC AUTOMATED: 0 PER 100 WBC
O2 DEVICE/FLOW/%: ABNORMAL
O2 SAT, VEN: 70 % (ref 60–85)
PARTIAL THROMBOPLASTIN TIME: 29 SEC (ref 20.5–30.5)
PATIENT TEMP: ABNORMAL
PCO2, VEN: 35.6 MM HG (ref 41–51)
PDW BLD-RTO: 14.2 % (ref 11.8–14.4)
PH VENOUS: 7.46 (ref 7.32–7.43)
PLATELET # BLD: 270 K/UL (ref 138–453)
PLATELET ESTIMATE: ABNORMAL
PMV BLD AUTO: 10.9 FL (ref 8.1–13.5)
PO2, VEN: 34.4 MM HG (ref 30–50)
POC CHLORIDE: 109 MMOL/L (ref 98–107)
POC CREATININE: 0.91 MG/DL (ref 0.51–1.19)
POC HEMATOCRIT: 48 % (ref 36–46)
POC HEMOGLOBIN: 16.2 G/DL (ref 12–16)
POC IONIZED CALCIUM: 1.14 MMOL/L (ref 1.15–1.33)
POC LACTIC ACID: 1.37 MMOL/L (ref 0.56–1.39)
POC PCO2 TEMP: ABNORMAL MM HG
POC PH TEMP: ABNORMAL
POC PO2 TEMP: ABNORMAL MM HG
POC POTASSIUM: 4.1 MMOL/L (ref 3.5–4.5)
POC SODIUM: 143 MMOL/L (ref 138–146)
POSITIVE BASE EXCESS, VEN: 2 (ref 0–3)
POTASSIUM SERPL-SCNC: 4.1 MMOL/L (ref 3.7–5.3)
PROTHROMBIN TIME: 10 SEC (ref 9–12)
RBC # BLD: 4.87 M/UL (ref 3.95–5.11)
RBC # BLD: ABNORMAL 10*6/UL
SAMPLE SITE: ABNORMAL
SEG NEUTROPHILS: 53 % (ref 36–65)
SEGMENTED NEUTROPHILS ABSOLUTE COUNT: 2.99 K/UL (ref 1.5–8.1)
SODIUM BLD-SCNC: 142 MMOL/L (ref 135–144)
TOTAL CK: 684 U/L (ref 26–192)
TOTAL CO2, VENOUS: 26 MMOL/L (ref 23–30)
TROPONIN INTERP: ABNORMAL
TROPONIN T: ABNORMAL NG/ML
TROPONIN, HIGH SENSITIVITY: 6 NG/L (ref 0–14)
WBC # BLD: 5.6 K/UL (ref 3.5–11.3)
WBC # BLD: ABNORMAL 10*3/UL

## 2020-05-28 PROCEDURE — 85730 THROMBOPLASTIN TIME PARTIAL: CPT

## 2020-05-28 PROCEDURE — 82553 CREATINE MB FRACTION: CPT

## 2020-05-28 PROCEDURE — 82803 BLOOD GASES ANY COMBINATION: CPT

## 2020-05-28 PROCEDURE — 82565 ASSAY OF CREATININE: CPT

## 2020-05-28 PROCEDURE — APPSS45 APP SPLIT SHARED TIME 31-45 MINUTES: Performed by: PHYSICIAN ASSISTANT

## 2020-05-28 PROCEDURE — 82435 ASSAY OF BLOOD CHLORIDE: CPT

## 2020-05-28 PROCEDURE — 82330 ASSAY OF CALCIUM: CPT

## 2020-05-28 PROCEDURE — 73562 X-RAY EXAM OF KNEE 3: CPT

## 2020-05-28 PROCEDURE — 93005 ELECTROCARDIOGRAM TRACING: CPT | Performed by: EMERGENCY MEDICINE

## 2020-05-28 PROCEDURE — 85025 COMPLETE CBC W/AUTO DIFF WBC: CPT

## 2020-05-28 PROCEDURE — 70450 CT HEAD/BRAIN W/O DYE: CPT

## 2020-05-28 PROCEDURE — 6360000002 HC RX W HCPCS

## 2020-05-28 PROCEDURE — 73610 X-RAY EXAM OF ANKLE: CPT

## 2020-05-28 PROCEDURE — 82550 ASSAY OF CK (CPK): CPT

## 2020-05-28 PROCEDURE — 72148 MRI LUMBAR SPINE W/O DYE: CPT

## 2020-05-28 PROCEDURE — 84132 ASSAY OF SERUM POTASSIUM: CPT

## 2020-05-28 PROCEDURE — 84295 ASSAY OF SERUM SODIUM: CPT

## 2020-05-28 PROCEDURE — G0378 HOSPITAL OBSERVATION PER HR: HCPCS

## 2020-05-28 PROCEDURE — 82947 ASSAY GLUCOSE BLOOD QUANT: CPT

## 2020-05-28 PROCEDURE — 73502 X-RAY EXAM HIP UNI 2-3 VIEWS: CPT

## 2020-05-28 PROCEDURE — 2580000003 HC RX 258: Performed by: EMERGENCY MEDICINE

## 2020-05-28 PROCEDURE — 73630 X-RAY EXAM OF FOOT: CPT

## 2020-05-28 PROCEDURE — 83605 ASSAY OF LACTIC ACID: CPT

## 2020-05-28 PROCEDURE — 99285 EMERGENCY DEPT VISIT HI MDM: CPT

## 2020-05-28 PROCEDURE — 6360000004 HC RX CONTRAST MEDICATION: Performed by: PSYCHIATRY & NEUROLOGY

## 2020-05-28 PROCEDURE — 85014 HEMATOCRIT: CPT

## 2020-05-28 PROCEDURE — 84484 ASSAY OF TROPONIN QUANT: CPT

## 2020-05-28 PROCEDURE — 85610 PROTHROMBIN TIME: CPT

## 2020-05-28 PROCEDURE — 99219 PR INITIAL OBSERVATION CARE/DAY 50 MINUTES: CPT | Performed by: PSYCHIATRY & NEUROLOGY

## 2020-05-28 PROCEDURE — 80048 BASIC METABOLIC PNL TOTAL CA: CPT

## 2020-05-28 PROCEDURE — 6370000000 HC RX 637 (ALT 250 FOR IP): Performed by: STUDENT IN AN ORGANIZED HEALTH CARE EDUCATION/TRAINING PROGRAM

## 2020-05-28 PROCEDURE — 73552 X-RAY EXAM OF FEMUR 2/>: CPT

## 2020-05-28 PROCEDURE — 83874 ASSAY OF MYOGLOBIN: CPT

## 2020-05-28 PROCEDURE — 99219 PR INITIAL OBSERVATION CARE/DAY 50 MINUTES: CPT | Performed by: NEUROLOGICAL SURGERY

## 2020-05-28 PROCEDURE — 6370000000 HC RX 637 (ALT 250 FOR IP): Performed by: EMERGENCY MEDICINE

## 2020-05-28 PROCEDURE — 70496 CT ANGIOGRAPHY HEAD: CPT

## 2020-05-28 RX ORDER — HYDROCHLOROTHIAZIDE 25 MG/1
25 TABLET ORAL DAILY
Status: DISCONTINUED | OUTPATIENT
Start: 2020-05-28 | End: 2020-05-29 | Stop reason: HOSPADM

## 2020-05-28 RX ORDER — OXYCODONE HYDROCHLORIDE AND ACETAMINOPHEN 5; 325 MG/1; MG/1
1 TABLET ORAL ONCE
Status: COMPLETED | OUTPATIENT
Start: 2020-05-28 | End: 2020-05-28

## 2020-05-28 RX ORDER — OXYCODONE HYDROCHLORIDE AND ACETAMINOPHEN 5; 325 MG/1; MG/1
1 TABLET ORAL EVERY 4 HOURS PRN
Status: DISCONTINUED | OUTPATIENT
Start: 2020-05-28 | End: 2020-05-29 | Stop reason: HOSPADM

## 2020-05-28 RX ORDER — MIDAZOLAM HYDROCHLORIDE 1 MG/ML
2 INJECTION INTRAMUSCULAR; INTRAVENOUS ONCE
Status: DISCONTINUED | OUTPATIENT
Start: 2020-05-28 | End: 2020-05-29 | Stop reason: HOSPADM

## 2020-05-28 RX ORDER — TIZANIDINE 4 MG/1
4 TABLET ORAL DAILY PRN
Status: DISCONTINUED | OUTPATIENT
Start: 2020-05-28 | End: 2020-05-29 | Stop reason: HOSPADM

## 2020-05-28 RX ORDER — LIDOCAINE HYDROCHLORIDE 10 MG/ML
INJECTION, SOLUTION EPIDURAL; INFILTRATION; INTRACAUDAL; PERINEURAL
Status: DISPENSED
Start: 2020-05-28 | End: 2020-05-29

## 2020-05-28 RX ORDER — SODIUM CHLORIDE 0.9 % (FLUSH) 0.9 %
10 SYRINGE (ML) INJECTION PRN
Status: DISCONTINUED | OUTPATIENT
Start: 2020-05-28 | End: 2020-05-29 | Stop reason: HOSPADM

## 2020-05-28 RX ORDER — LIDOCAINE HYDROCHLORIDE 10 MG/ML
20 INJECTION, SOLUTION EPIDURAL; INFILTRATION; INTRACAUDAL; PERINEURAL ONCE
Status: DISCONTINUED | OUTPATIENT
Start: 2020-05-28 | End: 2020-05-29 | Stop reason: HOSPADM

## 2020-05-28 RX ORDER — FUROSEMIDE 20 MG/1
20 TABLET ORAL DAILY
Status: DISCONTINUED | OUTPATIENT
Start: 2020-05-28 | End: 2020-05-29 | Stop reason: HOSPADM

## 2020-05-28 RX ORDER — MIDAZOLAM HYDROCHLORIDE 1 MG/ML
INJECTION INTRAMUSCULAR; INTRAVENOUS
Status: COMPLETED
Start: 2020-05-28 | End: 2020-05-28

## 2020-05-28 RX ORDER — SODIUM CHLORIDE 0.9 % (FLUSH) 0.9 %
10 SYRINGE (ML) INJECTION EVERY 12 HOURS SCHEDULED
Status: DISCONTINUED | OUTPATIENT
Start: 2020-05-28 | End: 2020-05-29 | Stop reason: HOSPADM

## 2020-05-28 RX ORDER — DULOXETIN HYDROCHLORIDE 20 MG/1
40 CAPSULE, DELAYED RELEASE ORAL DAILY
Status: DISCONTINUED | OUTPATIENT
Start: 2020-05-28 | End: 2020-05-29 | Stop reason: HOSPADM

## 2020-05-28 RX ORDER — ASPIRIN 325 MG
162 TABLET ORAL DAILY
Status: DISCONTINUED | OUTPATIENT
Start: 2020-05-28 | End: 2020-05-28

## 2020-05-28 RX ORDER — ATORVASTATIN CALCIUM 40 MG/1
40 TABLET, FILM COATED ORAL DAILY
Status: DISCONTINUED | OUTPATIENT
Start: 2020-05-28 | End: 2020-05-29 | Stop reason: HOSPADM

## 2020-05-28 RX ORDER — OXYCODONE HYDROCHLORIDE AND ACETAMINOPHEN 5; 325 MG/1; MG/1
2 TABLET ORAL ONCE
Status: COMPLETED | OUTPATIENT
Start: 2020-05-28 | End: 2020-05-28

## 2020-05-28 RX ORDER — ACETAMINOPHEN 325 MG/1
650 TABLET ORAL EVERY 4 HOURS PRN
Status: DISCONTINUED | OUTPATIENT
Start: 2020-05-28 | End: 2020-05-29 | Stop reason: HOSPADM

## 2020-05-28 RX ADMIN — OXYCODONE HYDROCHLORIDE AND ACETAMINOPHEN 1 TABLET: 5; 325 TABLET ORAL at 20:51

## 2020-05-28 RX ADMIN — DULOXETINE 40 MG: 20 CAPSULE, DELAYED RELEASE ORAL at 16:44

## 2020-05-28 RX ADMIN — IOHEXOL 90 ML: 350 INJECTION, SOLUTION INTRAVENOUS at 08:38

## 2020-05-28 RX ADMIN — HYDROCHLOROTHIAZIDE 25 MG: 25 TABLET ORAL at 16:18

## 2020-05-28 RX ADMIN — OXYCODONE HYDROCHLORIDE AND ACETAMINOPHEN 1 TABLET: 5; 325 TABLET ORAL at 09:48

## 2020-05-28 RX ADMIN — DESMOPRESSIN ACETATE 40 MG: 0.2 TABLET ORAL at 16:16

## 2020-05-28 RX ADMIN — OXYCODONE HYDROCHLORIDE AND ACETAMINOPHEN 2 TABLET: 5; 325 TABLET ORAL at 13:13

## 2020-05-28 RX ADMIN — Medication 10 ML: at 16:20

## 2020-05-28 RX ADMIN — MIDAZOLAM HYDROCHLORIDE: 1 INJECTION, SOLUTION INTRAMUSCULAR; INTRAVENOUS at 21:20

## 2020-05-28 RX ADMIN — OXYCODONE HYDROCHLORIDE AND ACETAMINOPHEN 1 TABLET: 5; 325 TABLET ORAL at 14:56

## 2020-05-28 RX ADMIN — Medication 10 ML: at 20:51

## 2020-05-28 ASSESSMENT — ENCOUNTER SYMPTOMS
SORE THROAT: 0
SHORTNESS OF BREATH: 0
EYE PAIN: 0
DIARRHEA: 0
BACK PAIN: 1
COLOR CHANGE: 0
SINUS PAIN: 0
RHINORRHEA: 0
ABDOMINAL PAIN: 0
COUGH: 0
VOMITING: 0
NAUSEA: 0

## 2020-05-28 ASSESSMENT — PAIN SCALES - GENERAL
PAINLEVEL_OUTOF10: 10
PAINLEVEL_OUTOF10: 8
PAINLEVEL_OUTOF10: 10
PAINLEVEL_OUTOF10: 8

## 2020-05-28 ASSESSMENT — PAIN DESCRIPTION - ORIENTATION: ORIENTATION: LEFT;RIGHT

## 2020-05-28 ASSESSMENT — PAIN DESCRIPTION - FREQUENCY: FREQUENCY: CONTINUOUS

## 2020-05-28 ASSESSMENT — PAIN DESCRIPTION - DESCRIPTORS: DESCRIPTORS: ACHING

## 2020-05-28 ASSESSMENT — PAIN DESCRIPTION - PAIN TYPE: TYPE: CHRONIC PAIN

## 2020-05-28 NOTE — H&P
oriented fracture of the 5th metatarsal base partially imaged with probable comminution. The distal tibia and fibular intact. Decreased calcaneal angle of inclination. Abnormal hindfoot alignment may be projectional/positional in nature. Please correlate for hindfoot pain. Diffuse swelling. Probable acute 5th metatarsal base fracture. Circumferential swelling about the hindfoot; irregular hindfoot alignment may be projectional in nature. Consider CT for further characterization. Xr Foot Left (min 3 Views)    Result Date: 5/28/2020  EXAMINATION: THREE XRAY VIEWS OF THE LEFT FOOT 5/28/2020 10:37 am COMPARISON: May 8, 2008 HISTORY: ORDERING SYSTEM PROVIDED HISTORY: fall TECHNOLOGIST PROVIDED HISTORY: fall Reason for Exam: legs gave out today FINDINGS: Fracture of the distal aspect of the 5th metatarsal bone with mild angulation. Mild narrowing of the distal interphalangeal joint spaces. 5th metatarsal fracture. Ct Head Wo Contrast    Result Date: 5/28/2020  EXAMINATION: CT OF THE HEAD WITHOUT CONTRAST  5/28/2020 8:08 am TECHNIQUE: CT of the head was performed without the administration of intravenous contrast. Dose modulation, iterative reconstruction, and/or weight based adjustment of the mA/kV was utilized to reduce the radiation dose to as low as reasonably achievable. COMPARISON: July 24, 2007 HISTORY: ORDERING SYSTEM PROVIDED HISTORY: LUE/LLE weakness TECHNOLOGIST PROVIDED HISTORY: LUE/LLE weakness Reason for Exam: STROKE ALERT, NUMBNESS IN LEGS Acuity: Unknown Type of Exam: Unknown FINDINGS: BRAIN/VENTRICLES: There is no acute intracranial hemorrhage, mass effect or midline shift. No abnormal extra-axial fluid collection. The gray-white differentiation is maintained without evidence of an acute infarct. There is no evidence of hydrocephalus. ORBITS: The visualized portion of the orbits demonstrate no acute abnormality.  SINUSES: The visualized paranasal sinuses and mastoid air cells vertebral arteries. SOFT TISSUES: No significant soft tissue abnormality. BONES: Multilevel degenerative changes of the cervical spine. CTA HEAD: ANTERIOR CIRCULATION: Atherosclerotic disease involving the intracranial internal carotid arteries, without flow-limiting stenosis. The right anterior cerebral artery is extremely small, not well evaluated. No focal occlusion. No high-grade stenosis or focal occlusion involving left anterior cerebral artery or bilateral middle cerebral arteries. No significant aneurysm. POSTERIOR CIRCULATION: No significant stenosis of the vertebral, basilar, or posterior cerebral arteries. No aneurysm. 1. Entire right anterior cerebral artery is extremely small and not well evaluated. No focal occlusion is identified within this limitation. 2. Otherwise, no high-grade stenosis or focal occlusion involving the intracranial or cervical vasculature. No evidence of acute dissection. No evidence of sizable aneurysm. LABS:  I have reviewed and interpreted all available lab results.   Labs Reviewed   STROKE PANEL - Abnormal; Notable for the following components:       Result Value    Total  (*)     All other components within normal limits   HGB/HCT - Abnormal; Notable for the following components:    POC Hemoglobin 16.2 (*)     POC Hematocrit 48 (*)     All other components within normal limits   CHLORIDE (POC) - Abnormal; Notable for the following components:    POC Chloride 109 (*)     All other components within normal limits   CALCIUM, IONIC (POC) - Abnormal; Notable for the following components:    POC Ionized Calcium 1.14 (*)     All other components within normal limits   VENOUS BLOOD GAS, POINT OF CARE - Abnormal; Notable for the following components:    pH, Spenser 7.458 (*)     pCO2, [place]er 35.6 (*)     All other components within normal limits   SODIUM (POC)   POTASSIUM (POC)   CREATININE W/GFR POINT OF CARE   LACTIC ACID,POINT OF CARE   POCT GLUCOSE   ANION GAP (CALC) POC SCREENING TOOLS:        CDU IMPRESSION/PLAN      Camilla Agrawal is a 63 y.o. female who presents with    1. Acute bilateral lower extremity weakness and left lower extremity pain, secondary to spinal stenosis. Neurology consulted, appreciate recommendations. MRI significant for foraminal disc protrusion at L5-S1 effacing the right descending S1 nerve root. 1. Neurosurgery consult  2. PT/OT evaluation and management  3. Placement    2. Acute left fifth metatarsal fracture, secondary to fall. Wrapped with Ace bandage in the emergency department  1. Podiatry consult  2. Oral oxycodone as needed    ·   · IP CONSULT TO NEUROLOGY  · IP CONSULT TO NEUROLOGY  · IP CONSULT TO HOSPITALIST  · IP CONSULT TO PODIATRY  · Further workup and evaluation   · Follow up recommendations     · Continue home meds, pain control   · Monitor vitals, labs, imaging         CONSULTS:    IP CONSULT TO NEUROLOGY  IP CONSULT TO NEUROLOGY  IP CONSULT TO HOSPITALIST  IP CONSULT TO PODIATRY    PROCEDURES:  Not indicated       PATIENT REFERRED TO:    Darline Holliday MD  [address] [place] [address]            --  Delisa Fung MD   Emergency Medicine Resident     This dictation was generated by voice recognition computer software. Although all attempts are made to edit the dictation for accuracy, there may be errors in the transcription that are not intended.

## 2020-05-28 NOTE — CARE COORDINATION
Case Management Initial Discharge Plan  Linnea Farrell,             Met with:patient to discuss discharge plans. Information verified: address, contacts, phone number, , insurance Yes  Emergency Contact/Next of Kin name & number:   PCP: Sania Patterson MD  Date of last visit: virtual visit 20    Insurance Provider: Medicare and Medicaid    Discharge Planning    Living Arrangements:  Alone   Support Systems:  Family Members    Home: apartment, handicap accessible stories  ramp to get into front door,     Patient able to perform ADL's:Independent    Current Services (outpatient & in home) none  DME equipment: rollator, walker, quad cane, regular cane, shower chair, elevated toilet seat. DME provider:       Potential Assistance Needed:       Patient agreeable to home care: Yes  Freedom of choice provided:  n/a    Prior SNF/Rehab Placement and Facility:   Agreeable to SNF/Rehab: Yes  [place] of choice provided: [facility] select provider list   Evaluation: no    Expected Discharge date:  20  Patient expects to be discharged to:  SNF  Follow Up Appointment: Best Day/ Time:      Transportation provider: self   Transportation arrangements needed for discharge: Yes    Readmission Risk              Risk of Unplanned Readmission:        0             Does patient have a readmission risk score greater than 14?: not calculated. If yes, follow-up appointment must be made within 7 days of discharge. Goals of Care: decreased pain      Discharge Plan: plans to transition to a SNF.           Electronically signed by Rashi Erwin RN on 20 at 1:41 PM EDT

## 2020-05-28 NOTE — ED PROVIDER NOTES
are interpreted by the Emergency Department Physician who either signs or Co-signs this chart in the absenceof a cardiologist.    Sinus rhythm 99. Normal intervals normal axis no acute ST or T changes. No acute findings. RADIOLOGY:   I directly visualized the following  images and reviewed theradiologist interpretations:  XR FOOT LEFT (MIN 3 VIEWS)   Preliminary Result   5th metatarsal fracture. XR KNEE LEFT (3 VIEWS)   Final Result   Tricompartmental osteoarthritis. XR HIP LEFT (2-3 VIEWS)   Final Result   No evidence for acute fracture or dislocation of the hip/femur. XR FEMUR LEFT (MIN 2 VIEWS)   Final Result   No evidence for acute fracture or dislocation of the hip/femur. XR ANKLE LEFT (MIN 3 VIEWS)   Final Result   Probable acute 5th metatarsal base fracture. Circumferential swelling about the hindfoot; irregular hindfoot alignment may   be projectional in nature. Consider CT for further characterization. CTA HEAD NECK W CONTRAST   Final Result   1. Entire right anterior cerebral artery is extremely small and not well   evaluated. No focal occlusion is identified within this limitation. 2. Otherwise, no high-grade stenosis or focal occlusion involving the   intracranial or cervical vasculature. No evidence of acute dissection. No   evidence of sizable aneurysm. CT Head WO Contrast   Preliminary Result   No acute intracranial abnormality. The findings were sent to the Radiology Results [address] at 8:33   am on 5/28/2020to be communicated to a licensed caregiver.          MRI LUMBAR SPINE WO CONTRAST    (Results Pending)           ED BEDSIDE ULTRASOUND:   none    LABS:  Labs Reviewed   STROKE PANEL - Abnormal; Notable for the following components:       Result Value    Total  (*)     All other components within normal limits   HGB/HCT - Abnormal; Notable for the following components:    POC Hemoglobin 16.2 (*)     POC

## 2020-05-28 NOTE — CONSULTS
file   Other Topics Concern    Not on file   Social History Narrative    Not on file       Family History:       Problem Relation Age of Onset    Ovarian Cancer Mother     Heart Disease Father     Hypertension Father        Allergies:  Lisinopril; Azithromycin; Ciprofloxacin; Keflex [cephalexin]; Mobic [meloxicam]; Penicillins; Aztreonam; and Bactrim [sulfamethoxazole-trimethoprim]    Home Medications:  Prior to Admission medications    Medication Sig Start Date End Date Taking? Authorizing Provider   hydroCHLOROthiazide (HYDRODIURIL) 25 MG tablet TAKE 1 TABLET BY MOUTH ONCE DAILY. 5/19/20   aJyson Strong MD   oxyCODONE-acetaminophen (PERCOCET) 5-325 MG per tablet Take 1 tablet by mouth daily as needed for Pain for up to 30 days.  5/18/20 6/17/20  Herminia Collins MD   tiZANidine (ZANAFLEX) 4 MG tablet Take 1 tablet by mouth daily as needed (pain and spasm) 5/18/20 6/17/20  Herminia Collins MD   DULoxetine HCl 40 MG CPEP Take 40 mg by mouth daily 5/18/20 6/17/20  Herminia Collins MD   atorvastatin (LIPITOR) 40 MG tablet Take 1 tablet by mouth daily 4/21/20   Jayson Strong MD   furosemide (LASIX) 20 MG tablet Take 1 tablet by mouth daily 4/2/20   Jayson Strong MD   ibuprofen (ADVIL;MOTRIN) 600 MG tablet  9/19/19   Historical Provider, MD   aspirin 81 MG tablet Take 2 tablets by mouth daily 5/1/19   Edu Max MD   ascorbic acid (VITAMIN C) 500 MG tablet Take 1 tablet by mouth daily 5/1/19   Edu Max MD   Multiple Vitamin (THERAPEUTIC MULTIVITAMIN PO) Take 1 tablet by mouth    Historical Provider, MD       Current Medications:   Current Facility-Administered Medications: sodium chloride flush 0.9 % injection 10 mL, 10 mL, Intravenous, 2 times per day  sodium chloride flush 0.9 % injection 10 mL, 10 mL, Intravenous, PRN  acetaminophen (TYLENOL) tablet 650 mg, 650 mg, Oral, Q4H PRN  enoxaparin (LOVENOX) injection 40 mg, 40 mg, Subcutaneous, Daily  aspirin tablet 162 mg, 162 mg, Oral, Daily  atorvastatin grossly intact    Motor Exam:    Drift:  absent  Tone:  normal    Motor exam is 5 out of 5 all extremities with the exception of LLE 0/5 plantarflexion, LUE 4/5 HG, LUE 4/5 triceps    Sensory:    Right Upper Extremity:  abnormal - subjective numbness 1st-4th digits  Left Upper Extremity:  normal  Right Lower Extremity:  normal  Left Lower Extremity:  abnormal - decreased sensation throughout    Deep Tendon Reflexes:    Right Bicep:  2+  Left Bicep:  2+  Right Knee:  2+  Left Knee:  2+    Clonus:  absent  Cardenas's:  absent    Gait:  Not tested due to non weight bearing for left 5th metatarsal fracture   SKIN: no rash       LABS AND IMAGING:     CBC with Differential:    Lab Results   Component Value Date    WBC 5.6 05/28/2020    RBC 4.87 05/28/2020    RBC 3.75 04/10/2012    HGB 14.6 05/28/2020    HCT 45.0 05/28/2020     05/28/2020     04/10/2012    MCV 92.4 05/28/2020    MCH 30.0 05/28/2020    MCHC 32.4 05/28/2020    RDW 14.2 05/28/2020    LYMPHOPCT 37 05/28/2020    MONOPCT 7 05/28/2020    BASOPCT 1 05/28/2020    MONOSABS 0.36 05/28/2020    LYMPHSABS 2.07 05/28/2020    EOSABS 0.11 05/28/2020    BASOSABS 0.04 05/28/2020    DIFFTYPE NOT REPORTED 05/28/2020     BMP:    Lab Results   Component Value Date     05/28/2020    K 4.1 05/28/2020     05/28/2020    CO2 23 05/28/2020    BUN 11 05/28/2020    LABALBU 4.2 04/02/2020    LABALBU 4.6 04/04/2012    CREATININE 0.74 05/28/2020    CALCIUM 10.2 05/28/2020    GFRAA >60 05/28/2020    LABGLOM >60 05/28/2020    GLUCOSE 94 05/28/2020    GLUCOSE 87 04/18/2012       Radiology Review:    Xr Foot Left (min 3 Views)    Result Date: 5/28/2020  EXAMINATION: THREE XRAY VIEWS OF THE LEFT FOOT 5/28/2020 10:37 am COMPARISON: May 8, 2008 HISTORY: ORDERING SYSTEM PROVIDED HISTORY: fall TECHNOLOGIST PROVIDED HISTORY: fall Reason for Exam: legs gave out today FINDINGS: Fracture of the distal aspect of the 5th metatarsal bone with mild angulation.   Mild narrowing of eruption    Anemia    Morbid obesity with BMI of 50.0-59.9, adult ([facility] [address]    Medication monitoring encounter    Spinal stenosis, lumbar region, without neurogenic claudication    Chronic pain of left knee    Angioedema    Congenital pes planus    Failed back surgical syndrome    Venous insufficiency of both lower extremities    Weakness of both lower extremities    Recurrent UTI    Chronic lumbar radiculopathy    Weakness         A/P:  Bilateral leg pain and weakness            Disc protrusion L5-S1    Patient care will be discussed with attending, will reevaluated patient along with attending.     - CTLS recommendations: CTLS cleared   - Neuro checks per protocol  - Hold antiplatelet and anticoagulation     Additional recommendations may follow    Please contact neurosurgery with any changes in patients neurologic status. Thank you for your consult.        Omar Ochoa pager [phone]  5/28/2020  3:29 PM

## 2020-05-28 NOTE — CONSULTS
 Lumbar disc herniation with radiculopathy 2013    Obesity     Spinal stenosis     Wound infection after surgery        Past Surgical History:        Procedure Laterality Date    BACK SURGERY      FOOT SURGERY Bilateral     heel spurs, plantar fascia release    GALLBLADDER SURGERY      LUMBAR SPINE SURGERY  1986    [facility]   Cindiyasmin Reggie LUMBAR SPINE SURGERY  2012    [facility] NERVE BLOCK  10/26/2015    caudal# 1 decadron 7.5 mg    NERVE BLOCK  12/22/15    caudal #2  celestone 9mg    SPINE SURGERY  2016    3 places     VEIN SURGERY Bilateral 2017       Social History:   Social History     Socioeconomic History    Marital status: Single     Spouse name: Not on file    Number of children: Not on file    Years of education: Not on file    Highest education level: Not on file   Occupational History    Not on file   Social Needs    Financial resource strain: Not on file    Food insecurity     Worry: Not on file     Inability: Not on file    Transportation needs     Medical: Not on file     Non-medical: Not on file   Tobacco Use    Smoking status: Former Smoker     Packs/day: 0.10     Years: 30.00     Pack years: 3.00     Types: Cigarettes     Last attempt to quit: 10/23/1988     Years since quittin.6    Smokeless tobacco: Never Used   Substance and Sexual Activity    Alcohol use: No     Alcohol/week: 0.0 standard drinks    Drug use: No    Sexual activity: Never   Lifestyle    Physical activity     Days per week: Not on file     Minutes per session: Not on file    Stress: Not on file   Relationships    Social connections     Talks on phone: Not on file     Gets together: Not on file     Attends [employer] service: Not on file     Active member of club or organization: Not on file     Attends meetings of clubs or organizations: Not on file     Relationship status: Not on file    Intimate partner violence     Fear of current or ex partner: Not on file     Emotionally abused: Not on 05/28/2020    BUN 11 05/28/2020    LABALBU 4.2 04/02/2020    LABALBU 4.6 04/04/2012    CREATININE 0.74 05/28/2020    CALCIUM 10.2 05/28/2020    GFRAA >60 05/28/2020    LABGLOM >60 05/28/2020    GLUCOSE 94 05/28/2020    GLUCOSE 87 04/18/2012       Radiology Review: MRI lumbar spine reveals a large right lateral recess and paracentral disc protrusion effacing the lateral recess. This MRI is actually completely stable from 2018. No other levels of significant central or foraminal disease. MRI thoracic spine stable from years ago as well. ASSESSMENT AND PLAN:       Left hemiparesis. Considering constellation of left-sided symptoms including numbness tingling and weakness along with positive long tract signs would consider MRI cervical as well as brain. The patient's complete plantarflexion weakness on the left side is not explained by the lumbar spine considering that she has a right-sided lesion with left side florid weakness that also was completely stable from the 2018 MRI.     MRI brain and cervical spine noncontrast    [facility]  Neurosurgery  O: [phone]  C: [address] [facility]   NS pager [phone]  5/28/2020  5:27 PM

## 2020-05-28 NOTE — CONSULTS
[facility] Neurology   [address] [facility] note            Date:   5/28/2020  Patient name:  Zulema Raphael  Date of admission:  5/28/2020  7:35 AM  MRN:   [id number]  Account:  [de-identified]  YOB: 1960  PCP:    Francesco Stephens MD  Room:   06/06  Code Status:    Prior    Chief Complaint:     Chief Complaint   Patient presents with    Extremity Weakness     legs gave out underneath her around 2200 last night        History Obtained From:     patient, electronic medical record, medical staff    History of Present Illness: The patient is a 63 y.o. Non-/non  female who presents with Extremity Weakness (legs gave out underneath her around 2200 last night )   and she is admitted to the hospital for the management of bilateral lower extremity weakness worsening in the left side. The patient mentioned above with past medical history of chronic lumbar radiculopathy with spinal stenosis at the level of L4-L5 L5-S1 s/p multiple surgeries, failed back surgical syndrome, morbid obesity, presented to the hospital after falling down from the bed. The patient stated th when she woke up and tried to stand up, her knees gave away and she felt on the ground with her left lower extremity underneath. She denied dizziness, palpitations, vertigo, lightheadedness, blurry vision. She denied loss of consciousness or seizures. She denied history of strokes in the past.   the patient stated that at baseline she has bilateral lower extremity weakness from prior lumbar and thoracic pathology associated with numbness, worsening numbness and weakness in the left lower extremity with swelling around the left foot and ankle. In the ER, stroke alert was called, NIHSS was 1 for numbness. Physical exam revealed bilateral lower extremity weakness right side 4 out of 5, left side 3+ out of 5 in both proximal and distal extremities.   Reflexes + 2 in the bilateral lower extremities, positive adductor  sign, bilateral Babinski positive, nonsustained clonus in the right lower extremity. CT head CTA were unremarkable. Stroke panel was unremarkable. X-ray of the left ankle showed acute fracture of the base of the fifth metatarsal.    Off note, last MRI of the lumbar spine was in 4/2018 which showed extensive postsurgical changes of the thoracic and lumbar spine, disc protrusion at the level of L5-S1 severely effacing the right lateral recess displacing the right S1 nerve roots and resulting in moderate to severe spinal canal stenosis, multilevel degenerative changes of the lower thoracic and lumbar spine, focal myelomalacia in the lower thoracic spinal cord at the level of T11-T12. The patient was seen by neurosurgery 1/12/2020 for worsening lower extremity weakness. Images were reviewed and due to the patient morbid obesity and failed multiple surgeries in the past, the plan was to not have further surgical interventions.     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    Anemia 10/5/2016    Arthritis     Asthma 1/4/2017    Bilateral leg and foot pain 02/2019    left much worse    Degeneration of cervical intervertebral disc 6/21/2018    Depression 2/2/2016    Failed back syndrome 4/3/2018    History of recurrent UTI (urinary tract infection)     Hyperlipidemia     Hypertension     Lumbar disc herniation with radiculopathy 6/4/2013    Obesity     Spinal stenosis     Wound infection after surgery         Past Surgical History:     Past Surgical History:   Procedure Laterality Date    BACK SURGERY      FOOT SURGERY Bilateral     heel spurs, plantar fascia release    GALLBLADDER SURGERY      LUMBAR SPINE SURGERY  1986    [facility] LUMBAR SPINE SURGERY  4/2012    Ashly Pereyra   [facility] NERVE BLOCK  10/26/2015    caudal# 1 decadron 7.5 mg    NERVE BLOCK  12/22/15    caudal #2  celestone 9mg    SPINE SURGERY 07/24/2016    3 places     VEIN SURGERY Bilateral 03/2017        Medications Prior to Admission:     Prior to Admission medications    Medication Sig Start Date End Date Taking? Authorizing Provider   hydroCHLOROthiazide (HYDRODIURIL) 25 MG tablet TAKE 1 TABLET BY MOUTH ONCE DAILY. 5/19/20   Russell Barthel, MD   oxyCODONE-acetaminophen (PERCOCET) 5-325 MG per tablet Take 1 tablet by mouth daily as needed for Pain for up to 30 days. 5/18/20 6/17/20  Lena Morgan MD   tiZANidine (ZANAFLEX) 4 MG tablet Take 1 tablet by mouth daily as needed (pain and spasm) 5/18/20 6/17/20  Lena Morgan MD   DULoxetine HCl 40 MG CPEP Take 40 mg by mouth daily 5/18/20 6/17/20  Lena Morgan MD   atorvastatin (LIPITOR) 40 MG tablet Take 1 tablet by mouth daily 4/21/20   Russell Barthel, MD   furosemide (LASIX) 20 MG tablet Take 1 tablet by mouth daily 4/2/20   Russell Barthel, MD   ibuprofen (ADVIL;MOTRIN) 600 MG tablet  9/19/19   Historical Provider, MD   aspirin 81 MG tablet Take 2 tablets by mouth daily 5/1/19   Wilda Reveles MD   ascorbic acid (VITAMIN C) 500 MG tablet Take 1 tablet by mouth daily 5/1/19   Wilda Reveles MD   Multiple Vitamin (THERAPEUTIC MULTIVITAMIN PO) Take 1 tablet by mouth    Historical Provider, MD        Allergies:     Lisinopril; Azithromycin; Ciprofloxacin; Keflex [cephalexin]; Mobic [meloxicam]; Penicillins; Aztreonam; and Bactrim [sulfamethoxazole-trimethoprim]    Social History:     Tobacco:    reports that she quit smoking about 31 years ago. Her smoking use included cigarettes. She has a 3.00 pack-year smoking history. She has never used smokeless tobacco.  Alcohol:      reports no history of alcohol use. Drug Use:  reports no history of drug use.     Family History:     Family History   Problem Relation Age of Onset    Ovarian Cancer Mother     Heart Disease Father     Hypertension Father        Review of Systems:     ROS:  Constitutional  Negative for fever and chills    HEENT  Negative for ear discharge, ear pain, nosebleed    Eyes  Negative for photophobia, pain and discharge    Respiratory  Negative for hemoptysis and sputum    Cardiovascular  Negative for orthopnea, claudication and PND    Gastrointestinal  Negative for abdominal pain, diarrhea, blood in stool    Musculoskeletal  Negative for joint pain, negative for myalgia    Neurology bilateral lower extremity weakness and numbness more on the left side   Skin  Negative for rash or itching    Endo/heme/allergies  Negative for polydipsia, environmental allergy    Psychiatric/behavioral  Negative for suicidal ideation.  Patient is not anxious        Physical Exam:   BP (!) 172/91   Pulse 106   Temp 98.8 °F (37.1 °C) (Oral)   Resp 22   Wt (!) 325 lb (147.4 kg)   LMP 2007   SpO2 99%   BMI 45.33 kg/m²   Temp (24hrs), Av.8 °F (37.1 °C), Min:98.8 °F (37.1 °C), Max:98.8 °F (37.1 °C)    Recent Labs     20  0754   POCGLU 92     No intake or output data in the 24 hours ending 20 1016      NEUROLOGIC EXAMINATION  GENERAL  Appears comfortable and in no distress   HEENT  NC/ AT   NECK  Supple and no bruits heard   MENTAL STATUS:  Alert, oriented, intact memory, no confusion, normal speech, normal language, no hallucination or delusion   CRANIAL NERVES: II     -      Visual fields intact to confrontation  III,IV,VI -  EOMs full, no afferent defect, no PRANAY, no ptosis  V     -     Normal facial sensation  VII    -     Normal facial symmetry  VIII   -     Intact hearing  IX,X -     Symmetrical palate  XI    -     Symmetrical shoulder shrug  XII   -     Midline tongue, no atrophy    MOTOR FUNCTION:   bilateral lower extremity weakness right side 4 out of 5, left side 3+ out of 5 in both proximal and distal extremities with normal bulk, normal tone and no involuntary movements, no tremor   SENSORY FUNCTION:  Decrease sensation in the distribution of L4-5 and S1 dermatomes   CEREBELLAR FUNCTION:  Intact fine motor control over upper limbs Range    POC Glucose 92 74 - 100 mg/dL   Anion Gap (Calc) POC    Collection Time: 05/28/20  7:54 AM   Result Value Ref Range    Anion Gap 9 7 - 16 mmol/L   STROKE PANEL    Collection Time: 05/28/20  8:01 AM   Result Value Ref Range    WBC 5.6 3.5 - 11.3 k/uL    RBC 4.87 3.95 - 5.11 m/uL    Hemoglobin 14.6 11.9 - 15.1 g/dL    Hematocrit 45.0 36.3 - 47.1 %    MCV 92.4 82.6 - 102.9 fL    MCH 30.0 25.2 - 33.5 pg    MCHC 32.4 28.4 - 34.8 g/dL    RDW 14.2 11.8 - 14.4 %    Platelets 509 423 - 439 k/uL    MPV 10.9 8.1 - 13.5 fL    NRBC Automated 0.0 0.0 per 100 WBC    Differential Type NOT REPORTED     Seg Neutrophils 53 36 - 65 %    Lymphocytes 37 24 - 43 %    Monocytes 7 3 - 12 %    Eosinophils % 2 1 - 4 %    Basophils 1 0 - 2 %    Immature Granulocytes 0 0 %    Segs Absolute 2.99 1.50 - 8.10 k/uL    Absolute Lymph # 2.07 1.10 - 3.70 k/uL    Absolute Mono # 0.36 0.10 - 1.20 k/uL    Absolute Eos # 0.11 0.00 - 0.44 k/uL    Basophils Absolute 0.04 0.00 - 0.20 k/uL    Absolute Immature Granulocyte <0.03 0.00 - 0.30 k/uL    WBC Morphology NOT REPORTED     RBC Morphology NOT REPORTED     Platelet Estimate NOT REPORTED     Protime 10.0 9.0 - 12.0 sec    INR 1.0     PTT 29.0 20.5 - 30.5 sec    Myoglobin 48 25 - 58 ng/mL    Troponin, High Sensitivity 6 0 - 14 ng/L    Troponin T NOT REPORTED <0.03 ng/mL    Troponin Interp NOT REPORTED     Glucose 94 70 - 99 mg/dL    BUN 11 8 - 23 mg/dL    CREATININE 0.74 0.50 - 0.90 mg/dL    Bun/Cre Ratio NOT REPORTED 9 - 20    Calcium 10.2 8.6 - 10.4 mg/dL    Sodium 142 135 - 144 mmol/L    Potassium 4.1 3.7 - 5.3 mmol/L    Chloride 105 98 - 107 mmol/L    CO2 23 20 - 31 mmol/L    Anion Gap 14 9 - 17 mmol/L    GFR Non-African American >60 >60 mL/min    GFR African American >60 >60 mL/min    GFR Comment          GFR Staging NOT REPORTED     Total  (H) 26 - 192 U/L    CK-MB 3.2 <5.4 ng/mL    % CKMB 0.5 0.0 - 3.0 %    CKMB Interpretation COMPATIBLE WITH SKELETAL MUSCLE ORIGIN          Assessment :      Primary Problem  Lower extremity weakness     There are no active hospital problems to display for this patient. Plan:     The patient mentioned above with past medical history of chronic lumbar radiculopathy with spinal stenosis at the level of L4-L5 L5-S1 s/p multiple surgeries, failed back surgical syndrome, morbid obesity, presented to the hospital after falling down from the bed. DD;   Acute 5th metatarsal #  Lower lumbar stenosis   Low suspicious of stroke    Plan;  -CT/CTA unremarkable  -Stroke panel was unremarkable. -X-ray of the left ankle showed acute fracture of the base of the fifth metatarsal.  -Will get STAT MRI of the lumbar spine      Will follow   Consultations:   [address] [place] [address]      Follow-up further recommendations after discussing the case with attending  The plan was discussed with the patient, patient's family and the medical staff. Patient is admitted as inpatient status because of co-morbidities listed above, severity of signs and symptoms as outlined, requirement for current medical therapies and most importantly because of direct risk to patient if care not provided in a hospital setting.     Anish Yañez MD  5/28/2020  10:16 AM    Copy sent to Dr. Janet Nicole MD

## 2020-05-28 NOTE — CONSULTS
abnormality. The findings were sent to the Radiology Results [address] at 8:33   am on 5/28/2020to be communicated to a licensed caregiver. MRI BRAIN WO CONTRAST    (Results Pending)   MRI CERVICAL SPINE WO CONTRAST    (Results Pending)         Assessment     Isa Orr is a 63 y.o. female with   1. Closed displaced spiral fracture of the distal metadiaphyseal junction of the fifth metatarsal, left foot    Active Problems:    Weakness  Resolved Problems:    * No resolved hospital problems. *        Plan     · Patient examined and evaluated at bedside   · Treatment options discussed in detail with the patient  · Radiographs reviewed and discussed in detail with patient. · Close reduction attempted at bedside but unsuccessful  · Discussed with patient that this injury will require ORIF of the fifth metatarsal to maximize chances of a good outcome due to displacement and instability of the fracture. Answered all questions her satisfaction. Patient demonstrates verbalized understanding. · Recommend evaluation by PT/OT  due to bilateral lower extremity weakness and to educate patient on weightbearing restrictions. · No surgical intervention planned at this time. Surgery can be done outpatient. Podiatry will sign off. Please reconsult or perfect serve should any concerns arise. Thank for the consult.   · Dressing applied to Left lower extremity: Ortiz compressive dressing  · Weightbearing to heel to Left lower extremity with use of OrthoWedge shoe  · Follow-up with Dr. Mare Armstrong within 1 week of discharge   · Discussed with Dr. Pati Wade DPM   Podiatric Medicine & Surgery   5/28/2020 at [address] PM

## 2020-05-28 NOTE — PROGRESS NOTES
Stroke alert called 0746  Arrived at bedside 3101    Alert called for lower leg weakness and left arm weakness    Presents; pt c/o falling out of bed this am, reports that she has numbness, tingling in both legs and intense burning in left leg. Reports not able to stand. Reports she has had swelling to upper thighs bilat lately. Denied fever, cough or sickness. Does c/o increased SOB, pt SaO2 100% on room air presently. Left foot with 2-3+ edema and warm to touch. Painful with any movement below waist.  Pt did not want to lift legs d/t back and leg pain, when writer lifted legs approx 5 inches off stretcher pt was able to maintain without drift for 5 seconds. /99, HR 97, RR 22, SaO2- 100%. NIH= 1  For sensation impairment to lower legs. LKW 10pm 5/27 when pt went to bed. Discussed presentation and patient past medical history with Dr.S Gill, advised to obtain [facility] and CTA head and neck. suspect low probability of stroke. Plan to get neurology consult. CTH= no acute abnormality  CTA= identified small caliber right PRIYANKA without occlusion     Neuro consult placed.    notified

## 2020-05-29 ENCOUNTER — APPOINTMENT (OUTPATIENT)
Dept: MRI IMAGING | Age: 60
End: 2020-05-29
Payer: MEDICARE

## 2020-05-29 VITALS
TEMPERATURE: 97 F | WEIGHT: 293 LBS | BODY MASS INDEX: 45.33 KG/M2 | HEART RATE: 72 BPM | DIASTOLIC BLOOD PRESSURE: 82 MMHG | OXYGEN SATURATION: 100 % | RESPIRATION RATE: 18 BRPM | SYSTOLIC BLOOD PRESSURE: 153 MMHG

## 2020-05-29 LAB
EKG ATRIAL RATE: 99 BPM
EKG P AXIS: 59 DEGREES
EKG P-R INTERVAL: 174 MS
EKG Q-T INTERVAL: 354 MS
EKG QRS DURATION: 86 MS
EKG QTC CALCULATION (BAZETT): 454 MS
EKG R AXIS: -5 DEGREES
EKG T AXIS: 67 DEGREES
EKG VENTRICULAR RATE: 99 BPM

## 2020-05-29 PROCEDURE — 72141 MRI NECK SPINE W/O DYE: CPT

## 2020-05-29 PROCEDURE — G0378 HOSPITAL OBSERVATION PER HR: HCPCS

## 2020-05-29 PROCEDURE — 93010 ELECTROCARDIOGRAM REPORT: CPT | Performed by: INTERNAL MEDICINE

## 2020-05-29 PROCEDURE — 70551 MRI BRAIN STEM W/O DYE: CPT

## 2020-05-29 PROCEDURE — APPSS30 APP SPLIT SHARED TIME 16-30 MINUTES: Performed by: PHYSICIAN ASSISTANT

## 2020-05-29 PROCEDURE — 97166 OT EVAL MOD COMPLEX 45 MIN: CPT

## 2020-05-29 PROCEDURE — 6370000000 HC RX 637 (ALT 250 FOR IP): Performed by: STUDENT IN AN ORGANIZED HEALTH CARE EDUCATION/TRAINING PROGRAM

## 2020-05-29 PROCEDURE — 97116 GAIT TRAINING THERAPY: CPT

## 2020-05-29 PROCEDURE — 2580000003 HC RX 258: Performed by: EMERGENCY MEDICINE

## 2020-05-29 PROCEDURE — 97535 SELF CARE MNGMENT TRAINING: CPT

## 2020-05-29 PROCEDURE — 97162 PT EVAL MOD COMPLEX 30 MIN: CPT

## 2020-05-29 RX ADMIN — Medication 10 ML: at 09:56

## 2020-05-29 RX ADMIN — OXYCODONE HYDROCHLORIDE AND ACETAMINOPHEN 1 TABLET: 5; 325 TABLET ORAL at 09:56

## 2020-05-29 RX ADMIN — DULOXETINE 40 MG: 20 CAPSULE, DELAYED RELEASE ORAL at 09:56

## 2020-05-29 RX ADMIN — OXYCODONE HYDROCHLORIDE AND ACETAMINOPHEN 1 TABLET: 5; 325 TABLET ORAL at 01:35

## 2020-05-29 RX ADMIN — DESMOPRESSIN ACETATE 40 MG: 0.2 TABLET ORAL at 09:56

## 2020-05-29 RX ADMIN — HYDROCHLOROTHIAZIDE 25 MG: 25 TABLET ORAL at 09:56

## 2020-05-29 ASSESSMENT — PAIN DESCRIPTION - LOCATION
LOCATION: LEG
LOCATION: GENERALIZED

## 2020-05-29 ASSESSMENT — PAIN SCALES - GENERAL
PAINLEVEL_OUTOF10: 8
PAINLEVEL_OUTOF10: 4
PAINLEVEL_OUTOF10: 8

## 2020-05-29 ASSESSMENT — PAIN DESCRIPTION - PAIN TYPE
TYPE: CHRONIC PAIN
TYPE: CHRONIC PAIN

## 2020-05-29 ASSESSMENT — PAIN DESCRIPTION - ORIENTATION: ORIENTATION: RIGHT;LEFT

## 2020-05-29 NOTE — DISCHARGE SUMMARY
L5-S1: Right foraminal and subarticular disc protrusion displacing the descending right S1 nerve root and exiting right L5 nerve root in the neural foramen. Moderate canal and moderate right-sided neural foraminal narrowing. Severe left-sided neural foraminal narrowing due to facet arthropathy. Right subarticular and foraminal disc protrusion at L5-S1 effacing right descending S1 nerve root and exiting right L5 nerve root. Severe left-sided neural foraminal narrowing at L5-S1 due to endplate and facet degenerative changes. Cta Head Neck W Contrast    Result Date: 5/28/2020  EXAMINATION: CTA OF THE HEAD AND NECK WITH CONTRAST 5/28/2020 8:30 am: TECHNIQUE: CTA of the head and neck was performed with the administration of intravenous contrast. Multiplanar reformatted images are provided for review. MIP images are provided for review. Stenosis of the internal carotid arteries measured using NASCET criteria. Dose modulation, iterative reconstruction, and/or weight based adjustment of the mA/kV was utilized to reduce the radiation dose to as low as reasonably achievable. COMPARISON: None. HISTORY: ORDERING SYSTEM PROVIDED HISTORY: STROKE ALERT, LOWER EXTREMITY WEAKNESS, TINGLING AND BURNING TECHNOLOGIST PROVIDED HISTORY: STROKE ALERT, LOWER EXTREMITY WEAKNESS, TINGLING AND BURNING Reason for Exam: LOWER EXTREMITY WEAKNESS, STROKE ALERT FINDINGS: CTA NECK: AORTIC ARCH/ARCH VESSELS: No dissection or arterial injury. No significant stenosis of the brachiocephalic or subclavian arteries. Mild atherosclerotic calcifications of the bilateral subclavian arteries. CAROTID ARTERIES: No dissection, arterial injury, or hemodynamically significant stenosis by NASCET criteria. Atherosclerotic disease at the bilateral carotid bifurcation. VERTEBRAL ARTERIES: Vertebral artery origins are not well evaluated due to streak artifacts. No significant stenosis involving the visualized segment of the vertebral arteries.  SOFT sinuses and mastoid air cells are clear. BONES/SOFT TISSUES: The bone marrow signal intensity and craniocervical junction are unremarkable. There are areas of increased diffusion signal associated with tiny intraparotid lymph nodes, benign. 1. No evidence of an acute infarct. 2. Partially empty sella, unchanged dating back to 2007. Physical Exam:    General appearance - NAD, AOx 3   Lungs -CTAB, no R/R/R  Heart - RRR, no M/R/G  Abdomen - Soft, NT/ND  Neurological:  MAEx4, No focal motor deficit, sensory loss  Extremities - Cap refil <2 sec in all ext., no edema  Skin -warm, dry      Hospital Course:  Clinical course has improved, labs and imaging reviewed. Damaris Ahn originally presented to the hospital on 5/28/2020  7:35 AM with fall. At that time it was determined that She required further observation and testing and consultation. Labs and imaging were followed daily. Imaging results as above. She is medically stable to be discharged. Disposition: Home    Patient stated that they will not drive themselves home from the hospital if they have gotten pain killers/ narcotics earlier that day and that they will arrange for transportation on their own or work with the  for a ride. Patient counseled NOT to drive while under the influence of narcotics/ pain killers. Condition: Good    Patient stable and ready for discharge home. I have discussed plan of care with patient and they are in understanding. They were instructed to read discharge paperwork. All of their questions and concerns were addressed. Time Spent: 0 day      --  Iliana Tang MD  Emergency Medicine Resident Physician    This dictation was generated by voice recognition computer software. Although all attempts are made to edit the dictation for accuracy, there may be errors in the transcription that are not intended.

## 2020-05-29 NOTE — PROGRESS NOTES
Bearing Restrictions  Right Lower Extremity Weight Bearing: Weight Bearing As Tolerated  Left Lower Extremity Weight Bearing: Partial Weight Bearing  Partial Weight Bearing Percentage Or Pounds: Weightbearing to heel to Left lower extremity with use of OrthoWedge shoe per podiatry consult note 5/28/2020  Required Braces or Orthoses  Left Lower Extremity Brace: (OrthoWedge shoe )  Position Activity Restriction  Other position/activity restrictions: CTLS clear  Vision/Hearing  Vision: Within Functional Limits  Hearing: Within functional limits     Subjective  General  Patient assessed for rehabilitation services?: Yes  Response To Previous Treatment: Not applicable  Family / Caregiver Present: No  Follows Commands: Within Functional Limits  Subjective  Subjective: RN and pt in agreement for PT eval; pt seated EOB upon PT arrival, pt pleasant and cooperative throughout.  Wedged shoe in place upon writer's arrival.  Pain Screening  Patient Currently in Pain: Yes  Pain Assessment  Pain Assessment: 0-10  Pain Level: 8  Pain Type: Chronic pain  Pain Location: Leg  Non-Pharmaceutical Pain Intervention(s): Ambulation/Increased Activity;Repositioned  Response to Pain Intervention: Patient Satisfied  Multiple Pain Sites: No  Vital Signs  Patient Currently in Pain: Yes       Orientation  Orientation  Overall Orientation Status: Within Functional Limits  Social/Functional History  Social/Functional History  Lives With: Alone  Type of Home: Apartment  Home Layout: One level  Home Access: Ramped entrance  Bathroom Shower/Tub: Shower chair with back, Walk-in shower  Bathroom Toilet: Standard(Pt reports raised toilet seat with grab bars )  Bathroom Equipment: Toilet raiser, Tub transfer bench  Home Equipment: 4 wheeled walker, Quad cane, Cane, [employer] walker(rollator walker, reports mainly in community, RW used in home )  Receives Help From: Other (comment)(Pt reports Passport services, planning on upping services with aide )  ADL Assistance: Independent(Pt reports pt IND but requires increased time and effort )  Homemaking Assistance: Independent  Homemaking Responsibilities: Yes(Pt reports increased time and effort required to complete all activities )  Meal Prep Responsibility: Primary  Laundry Responsibility: Primary  Cleaning Responsibility: Primary  Shopping Responsibility: Primary  Ambulation Assistance: Independent  Transfer Assistance: Independent  Active : Yes  Mode of Transportation: Car  Occupation: Unemployed  [address]: Tristen Roach, reading, going to movies   Additional Comments: states has a  from [address] [place] [address]  Overall Cognitive Status: WFL    Objective  Joint Mobility  Spine: WFL  ROM RLE: WFL  ROM LLE: Hip and knee WFL; MARISSA ankle DF due to wedged shoe  ROM RUE: See OT assessment- PT/ OT coeval  ROM LUE: See OT assessment- PT/ OT coeval   Strength RLE  Strength RLE: Exception  Comment: Grossly 4/5 at hip, knee, and ankle  Strength LLE  Strength LLE: Exception  Comment: Grossly 4/5 at hip, knee, and ankle  Strength RUE  Comment: See OT assessment- PT/ OT coeval  Strength LUE  Comment: See OT assessment- PT/ OT coeval     Sensation  Overall Sensation Status: Impaired  Additional Comments: Pt reports numbness/ tingling in BLE from thighs to feet, chronic in nature.  Pt reports new numbness/ tingling in R hand, digits 2-4  Bed mobility  Comment: Pt seated EOB upon writer's arrival, retired EOB upon writer's exit  Transfers  Sit to Stand: Minimal Assistance  Stand to sit: Minimal Assistance;Contact guard assistance  Comment: Pt educated on HWB throughout functional transfers with good return demo  Ambulation  Ambulation?: Yes  Ambulation 1  Surface: level tile  Device: Rolling Walker  Assistance: Stand by assistance  Quality of Gait: High reliance on RW; forward flexed posture  Gait Deviations: Slow Laury  Distance: 150ft  Comments: Pt demonstrated HWB throughout ambulation requiring min verbal cues to maintain. No LOB noted throughout  Stairs/Curb  Stairs?: No     Balance  Posture: Fair  Sitting - Static: Good;-  Sitting - Dynamic: Fair;+  Standing - Static: Fair;+  Standing - Dynamic: Fair;+  Comments: Standing balance assessed w/ RW        Plan   Plan  Times per week: 5-6x/week  Current Treatment Recommendations: Strengthening, Transfer Training, Endurance Training, Patient/Caregiver Education & Training, ROM, Balance Training, Gait Training, Home Exercise Program, Functional Mobility Training, Stair training, Safety Education & Training  Safety Devices  Type of devices: Gait belt, Left in bed, Call light within reach, Patient at risk for falls, Nurse notified  Restraints  Initially in place: No  AM-PAC Score     AM-PAC Inpatient Mobility without Stair Climbing Raw Score : 17 (05/29/20 1555)  AM-PAC Inpatient without Stair Climbing T-Scale Score : 48.47 (05/29/20 1555)  Mobility Inpatient CMS 0-100% Score: 32.72 (05/29/20 1555)  Mobility Inpatient without Stair CMS G-Code Modifier : [place] Reasons (05/29/20 1555)       Goals  Short term goals  Time Frame for Short term goals: 10  Short term goal 1: Pt to perform bed mobility SBA  Short term goal 2: Perform functional transfers with heel weight bearing SBA  Short term goal 3: Ambulate 250ft w/ RW HWB throughout SBA  Short term goal 4: Tolerate 30 minutes of therapy to demo increased endurance  Patient Goals   Patient goals :  To go home       Therapy Time   Individual Concurrent Group Co-treatment   Time In 1335         Time Out 1357         Minutes 22         Timed Code Treatment Minutes: 10 Minutes       Javier Parks, PT

## 2020-05-29 NOTE — PROGRESS NOTES
[address] [facility]  CDU / OBSERVATION eNCOUnter  Attending NOte       I performed a history and physical examination of the patient and discussed management with the resident. I reviewed the residents note and agree with the documented findings and plan of care. Any areas of disagreement are noted on the chart. I was personally present for the key portions of any procedures. I have documented in the chart those procedures where I was not present during the key portions. I have reviewed the nurses notes. I agree with the chief complaint, past medical history, past surgical history, allergies, medications, social and family history as documented unless otherwise noted below. The Family history, social history, and ROS are effectively unchanged since admission unless noted elsewhere in the chart. Pt admitted for weakness to legs and fall. Pt was found to have a L 5th metatarsal fx for which podiatry was consulted. Surgery is planned to be done as an outpt. Neurology consulted for the leg weakness and MRI head and cervical spine ordered for today. Will await results and NS recommendations. PT/OT also to evaluate pt. Pt says she is feeling well this morning. She was sitting on the side of the bed eating breakfast when I saw her this morning. She says she prefers to go home with home care rather than be placed in rehab facility.  is to have this arranged.     Ammon Scherer MD  Attending Emergency  Physician

## 2020-05-29 NOTE — PROGRESS NOTES
Occupational Therapy   Occupational Therapy Initial Assessment  Date: 2020   Patient Name: Tamela Odom  MRN: [id number]     : 1960     Chief Complaint   Patient presents with    Extremity Weakness     legs gave out underneath her around 2200 last night      Copied from H&P:  Tamela Odom is a 63 y.o. female who presented to the emergency department with bilateral lower extremity weakness, left side greater than right. Patient got up from bed the night before presentation, both legs gave out on her and she fell onto her left side. Patient denied hitting her head, loss of consciousness, not on any anticoagulation medication. Patient complaining of bilateral lower extremity weakness and left leg pain, sharp, nonradiating, involving the whole left leg, worse with walking, improving with rest.  Patient denies fevers, cough, congestion, chest pain, shortness of breath, palpitations, nausea, vomiting, abdominal pain, dysuria, diarrhea, constipation, incontinence, saddle anesthesia. Patient has history significant for chronic lumbar radiculopathy with spinal stenosis at the level of L4-L5, L5-S1, numerous surgeries, morbid obesity. In the emergency department, patient's vitals were stable, CK slightly elevated, labs otherwise unremarkable, CT head and CTA head and neck unremarkable, x-rays significant for left fifth metatarsal fracture. Stroke alert was called, NIHSS 1 for numbness. Patient was admitted to the observation unit for placement, neurology evaluation, PT/OT, podiatry evaluation. Date of Service: 2020    Discharge Recommendations:  Patient would benefit from continued therapy after discharge  OT Equipment Recommendations  Other: CTA pending progress, pt reports RW and shower chair at home     Assessment   Performance deficits / Impairments: Decreased functional mobility ; Decreased ADL status; Decreased endurance  Assessment: Pt would benefit from continued acute care and post 3-5x/wk   Current Treatment Recommendations: Functional Mobility Training, Endurance Training, Safety Education & Training, Patient/Caregiver Education & Training, Equipment Evaluation, Education, & procurement, Self-Care / ADL    AM-PAC Score  AM-PAC Inpatient Daily Activity Raw Score: 18 (05/29/20 1524)  AM-PAC Inpatient ADL T-Scale Score : 38.66 (05/29/20 1524)  ADL Inpatient CMS 0-100% Score: 46.65 (05/29/20 1524)  ADL Inpatient CMS G-Code Modifier : CK (05/29/20 1524)    Goals  Short term goals  Time Frame for Short term goals: by discharge, pt will  Short term goal 1: demo I in UE ADL activities   Short term goal 2: demo mod I for LE ADL activities with AD as needed  Short term goal 3: demo I in functional transfers/ mobility with use of RW, good safety awareness and heel WB status to assist with ADL/ functional activities   Short term goal 4: demo understanding and I use of ECWS and fall prevention tech during functional activities   Short term goal 5: demo increased activity tolerance of 30+ min to assist with ADL/ functional activities   Patient Goals   Patient goals : to go home        Therapy Time   Individual Concurrent Group Co-treatment   Time In 1335         Time Out 1355         Minutes 20         Timed Code Treatment Minutes: 8 Minutes   See above for LOF. RN reports patient is medically stable for therapy treatment this date. Chart reviewed prior to treatment and patient is agreeable for therapy. All lines intact and patient positioned comfortably at end of treatment. All patient needs addressed prior to ending therapy session.       Co-evaluation with PT   Joanna Madden OTR/L

## 2020-05-29 NOTE — PROGRESS NOTES
Podiatry follow-up as outpatient, Dr. Chauncey Peña in 1 week  - Weightbearing to heel to left lower extremity with use of OrthoWedge shoe  - Oral oxycodone as needed    · Continue home medications  · Monitor vitals, labs, and imaging  · DISPO: pending consults and clinical improvement    --  Delmer [facility]  Emergency Medicine Resident Physician     This dictation was generated by voice recognition computer software. Although all attempts are made to edit the dictation for accuracy, there may be errors in the transcription that are not intended.

## 2020-05-29 NOTE — PROGRESS NOTES
Neurosurgery JACOB/Resident    Daily Progress Note   Chief Complaint   Patient presents with    Extremity Weakness     legs gave out underneath her around 2200 last night      5/29/2020  12:03 PM    Chart reviewed. No acute events overnight. No new complaints.     Vitals:    05/28/20 0747 05/28/20 1132 05/28/20 1523 05/29/20 0728   BP: (!) 172/91 (!) 164/91 (!) 148/89 (!) 153/82   Pulse:  77 80 72   Resp:  12 18 18   Temp: 98.8 °F (37.1 °C)  98.3 °F (36.8 °C) 97 °F (36.1 °C)   TempSrc: Oral  Oral Oral   SpO2:  99% 96% 100%   Weight:           PE:   AOx3   CNII-XII intact   PERRL, EOMI   Motor   L deltoid 5/5; R deltoid 5/5  L biceps 5/5; R biceps 5/5  L triceps 4/5; R triceps 5/5  L wrist extension 5/5; R wrist extension 5/5  L HG 4/5; R intrinsics 5/5      L iliopsoas 5/5 , R iliopsoas 5/5  L quadriceps 5/5; R quadriceps 5/5  L Dorsiflexion 5/5; R dorsiflexion 5/5  L Plantarflexion 0/5; R plantarflexion 5/5  L EHL 5/5; R EHL 5/5    Sensation diminished in LLE throughout        Lab Results   Component Value Date    WBC 5.6 05/28/2020    HGB 14.6 05/28/2020    HCT 45.0 05/28/2020     05/28/2020    CHOL 342 (H) 04/02/2020    TRIG 59 04/02/2020    HDL 83 04/02/2020    ALT 18 04/02/2020    AST 19 04/02/2020     05/28/2020    K 4.1 05/28/2020     05/28/2020    CREATININE 0.74 05/28/2020    BUN 11 05/28/2020    CO2 23 05/28/2020    TSH 2.71 04/02/2020    INR 1.0 05/28/2020    GLUF 92 12/14/2011    LABA1C 5.6 04/02/2020    CRP 13.7 (H) 04/02/2020    SEDRATE 38 (H) 04/02/2020       Radiology   Mri Cervical Spine Wo Contrast    Result Date: 5/29/2020  EXAMINATION: MRI OF THE CERVICAL SPINE WITHOUT CONTRAST 5/29/2020 8:52 am TECHNIQUE: Multiplanar multisequence MRI of the cervical spine was performed without the administration of intravenous contrast. COMPARISON: 05/25/2007 HISTORY: ORDERING SYSTEM PROVIDED HISTORY: L hemiparesis and long tract signs TECHNOLOGIST PROVIDED HISTORY: L hemiparesis and long

## 2020-06-03 ENCOUNTER — VIRTUAL VISIT (OUTPATIENT)
Dept: INTERNAL MEDICINE | Age: 60
End: 2020-06-03
Payer: MEDICARE

## 2020-06-03 VITALS — BODY MASS INDEX: 45.33 KG/M2 | DIASTOLIC BLOOD PRESSURE: 80 MMHG | HEIGHT: 71 IN | SYSTOLIC BLOOD PRESSURE: 167 MMHG

## 2020-06-03 PROCEDURE — 99441 PR PHYS/QHP TELEPHONE EVALUATION 5-10 MIN: CPT | Performed by: INTERNAL MEDICINE

## 2020-06-03 RX ORDER — HYDROCHLOROTHIAZIDE 50 MG/1
50 TABLET ORAL DAILY
Qty: 30 TABLET | Refills: 5 | Status: SHIPPED | OUTPATIENT
Start: 2020-06-03 | End: 2021-01-14 | Stop reason: SDUPTHER

## 2020-06-03 RX ORDER — ADHESIVE BANDAGE 3/4"
BANDAGE TOPICAL
COMMUNITY
Start: 2020-04-02

## 2020-06-03 NOTE — PATIENT INSTRUCTIONS
Medications e-scribe to pharmacy of pt's choice. Referral for Home Health sent to [facility]  they will call pt for appt, copy of referral with number and address mailed to pt. Pt should call referral number if not heard from within a couple of weeks. Patient to return to clinic 4 weeks (7/2/20)  Telephone vsisit. AVS reviewed and mailed to pt. It is very important for your care that you keep your appointment. If for some reason you are unable to keep your appointment it is equally important that you call our office at [phone] to cancel your appointment and reschedule. Failure to do so may result in your termination from our practice.   MB

## 2020-06-05 ENCOUNTER — TELEPHONE (OUTPATIENT)
Dept: INTERNAL MEDICINE | Age: 60
End: 2020-06-05

## 2020-06-05 NOTE — TELEPHONE ENCOUNTER
Patient calling back - she states she will call [facility] to let them come for the evaluation as she states [facility] has NOT started services & she has not even heard back from the CM there. Please disregard previous message.

## 2020-06-05 NOTE — TELEPHONE ENCOUNTER
Patient is calling concerned because she got a call from [place] [phone] she did not know why you placed that referral or what for. She states she explained to you that she was having someone from [facility] come to the home & those visits have already started. She is wondering if it is necessary to have both?

## 2020-06-09 ENCOUNTER — TELEPHONE (OUTPATIENT)
Dept: INTERNAL MEDICINE | Age: 60
End: 2020-06-09

## 2020-06-09 NOTE — TELEPHONE ENCOUNTER
Received VM from Patient - Patient requesting new compression stockings and knee stabilizer . Needs VV for documentation on medical necessity for insurance. LM for patient to contact office to schedule VV.

## 2020-06-17 ENCOUNTER — VIRTUAL VISIT (OUTPATIENT)
Dept: INTERNAL MEDICINE | Age: 60
End: 2020-06-17
Payer: MEDICARE

## 2020-06-17 PROCEDURE — 99441 PR PHYS/QHP TELEPHONE EVALUATION 5-10 MIN: CPT | Performed by: INTERNAL MEDICINE

## 2020-06-30 ENCOUNTER — TELEPHONE (OUTPATIENT)
Dept: PAIN MANAGEMENT | Age: 60
End: 2020-06-30

## 2020-07-10 ENCOUNTER — TELEPHONE (OUTPATIENT)
Dept: INTERNAL MEDICINE | Age: 60
End: 2020-07-10

## 2020-07-10 NOTE — TELEPHONE ENCOUNTER
Juan Carlos Jose calling from [facility] - patient transferred from [facility] to [employer] They are doing assessment today & wanted to know if you have any questions, concerns, comments to input.

## 2020-08-04 ENCOUNTER — TELEPHONE (OUTPATIENT)
Dept: INTERNAL MEDICINE | Age: 60
End: 2020-08-04

## 2020-08-04 NOTE — TELEPHONE ENCOUNTER
PC from pt's [facility] stating that the pt needs a bariatric walker with a seat and brakes. Script needs pt's weight and height along with the diagnosis. Pt wants a phone call when script is ready so she can come pick it up and take it to the medical equipment store. Health Maintenance   Topic Date Due    DTaP/Tdap/Td vaccine (1 - Tdap) 02/11/1978    Shingles Vaccine (1 of 2) 02/11/2009    Cervical cancer screen  10/14/2017    Annual Wellness Visit (AWV)  05/29/2019    Colon Cancer Screen FIT/FOBT  01/25/2020    Flu vaccine (1) 09/01/2020    Lipid screen  04/02/2021    Potassium monitoring  05/28/2021    Creatinine monitoring  05/28/2021    Breast cancer screen  06/07/2021    Hepatitis C screen  Completed    HIV screen  Completed    Hepatitis A vaccine  Aged Out    Hepatitis B vaccine  Aged Out    Hib vaccine  Aged Out    Meningococcal (ACWY) vaccine  Aged Out    Pneumococcal 0-64 years Vaccine  Aged Out             (applicable per patient's age: Cancer Screenings, Depression Screening, Fall Risk Screening, Immunizations)    Hemoglobin A1C (%)   Date Value   04/02/2020 5.6   10/23/2018 5.9   10/05/2015 5.4     LDL Cholesterol (mg/dL)   Date Value   04/02/2020 247 (H)     AST (U/L)   Date Value   04/02/2020 19     ALT (U/L)   Date Value   04/02/2020 18     BUN (mg/dL)   Date Value   05/28/2020 11      (goal A1C is < 7)   (goal LDL is <100) need 30-50% reduction from baseline     BP Readings from Last 3 Encounters:   06/03/20 (!) 167/80   05/29/20 (!) 153/82   04/02/20 (!) 167/84    (goal /80)      All Future Testing planned in [facility]:  Lab Frequency Next Occurrence   MRI LUMBAR SPINE W WO CONTRAST Once 05/18/2020   EMG Once 06/12/2020   EMG Once 06/12/2020       Next Visit Date:  No future appointments.          Patient Active Problem List:     Morbid obesity due to excess calories (HCC)     Hyperlipemia     Chronic pelvic pain in female     Fatigue     Spinal stenosis at L4-L5 level     Adjustment disorder with mixed anxiety and depressed mood     Rash and nonspecific skin eruption     Anemia     Morbid obesity with BMI of 50.0-59.9, adult (HCC)     Medication monitoring encounter     Bilateral stenosis of lateral recess of lumbar spine     Chronic pain of left knee     Angioedema     Congenital pes planus     Failed back surgical syndrome     Venous insufficiency of both lower extremities     Weakness of both lower extremities     Recurrent UTI     Chronic lumbar radiculopathy     Weakness     Left leg weakness

## 2020-08-13 ENCOUNTER — TELEPHONE (OUTPATIENT)
Dept: INTERNAL MEDICINE | Age: 60
End: 2020-08-13

## 2020-08-13 NOTE — TELEPHONE ENCOUNTER
Please addend OV notes from 6/17 if you are able to to justify patients need for walker. The DME company needs justification for insurance purposes.

## 2020-08-14 NOTE — TELEPHONE ENCOUNTER
Patient calling in regards - please addend OV notes as soon as you get a chance. Patient is requesting to pick the OV notes up from the office as opposed to faxing - she is tired of all the hoops she is having to jump through with [facility].

## 2020-08-19 NOTE — TELEPHONE ENCOUNTER
Patient is going to take scripts to another DME company. Please edit notes as requested in my last message & I will print them out for her to be picked up. Thank you.

## 2020-08-21 NOTE — TELEPHONE ENCOUNTER
Phone call to patient - left voicemail informing of completion. Will take downstairs in an envelope once she returns call so I can ensure she has everything she needs.

## 2020-08-21 NOTE — TELEPHONE ENCOUNTER
Patient returned call & let me know everything she needed. Envelope taken downstairs for her to  at her earliest convenience.

## 2020-09-02 ENCOUNTER — TELEPHONE (OUTPATIENT)
Dept: INTERNAL MEDICINE | Age: 60
End: 2020-09-02

## 2020-09-02 NOTE — TELEPHONE ENCOUNTER
OV notes faxed to [address] [place] [address].  Incontinence supply form completed with two diagnoses & faxed to [employer].  Insurance is NOT going to cover compression stockings. Everything is scanned to media.

## 2020-09-18 ENCOUNTER — OFFICE VISIT (OUTPATIENT)
Dept: PAIN MANAGEMENT | Age: 60
End: 2020-09-18
Payer: MEDICARE

## 2020-09-18 VITALS
DIASTOLIC BLOOD PRESSURE: 82 MMHG | SYSTOLIC BLOOD PRESSURE: 145 MMHG | OXYGEN SATURATION: 100 % | TEMPERATURE: 97.2 F | BODY MASS INDEX: 41.02 KG/M2 | HEIGHT: 71 IN | HEART RATE: 74 BPM | WEIGHT: 293 LBS

## 2020-09-18 PROCEDURE — G8417 CALC BMI ABV UP PARAM F/U: HCPCS | Performed by: ANESTHESIOLOGY

## 2020-09-18 PROCEDURE — 1036F TOBACCO NON-USER: CPT | Performed by: ANESTHESIOLOGY

## 2020-09-18 PROCEDURE — G8427 DOCREV CUR MEDS BY ELIG CLIN: HCPCS | Performed by: ANESTHESIOLOGY

## 2020-09-18 PROCEDURE — 3017F COLORECTAL CA SCREEN DOC REV: CPT | Performed by: ANESTHESIOLOGY

## 2020-09-18 PROCEDURE — 99215 OFFICE O/P EST HI 40 MIN: CPT | Performed by: ANESTHESIOLOGY

## 2020-09-18 RX ORDER — OXYCODONE HYDROCHLORIDE AND ACETAMINOPHEN 5; 325 MG/1; MG/1
1 TABLET ORAL 2 TIMES DAILY PRN
Qty: 60 TABLET | Refills: 0 | Status: SHIPPED | OUTPATIENT
Start: 2020-09-18 | End: 2020-10-18

## 2020-09-18 RX ORDER — OXYCODONE HYDROCHLORIDE AND ACETAMINOPHEN 5; 325 MG/1; MG/1
1 TABLET ORAL DAILY PRN
Qty: 30 TABLET | Refills: 0 | Status: SHIPPED | OUTPATIENT
Start: 2020-09-18 | End: 2020-09-18

## 2020-09-18 RX ORDER — TIZANIDINE 4 MG/1
4 TABLET ORAL DAILY PRN
Qty: 30 TABLET | Refills: 0 | Status: SHIPPED | OUTPATIENT
Start: 2020-09-18 | End: 2020-10-18

## 2020-09-18 ASSESSMENT — ENCOUNTER SYMPTOMS
BACK PAIN: 1
RESPIRATORY NEGATIVE: 1

## 2020-09-18 NOTE — PROGRESS NOTES
or organization: None     Attends meetings of clubs or organizations: None     Relationship status: None    Intimate partner violence     Fear of current or ex partner: None     Emotionally abused: None     Physically abused: None     Forced sexual activity: None   Other Topics Concern    None   Social History Narrative    None     Family History   Problem Relation Age of Onset    Ovarian Cancer Mother     Heart Disease Father     Hypertension Father      Allergies   Allergen Reactions    Lisinopril Hives and Anaphylaxis     seizures  seizures  seizures    Azithromycin Swelling    Ciprofloxacin Swelling    Keflex [Cephalexin] Swelling    Mobic [Meloxicam]      Muscle cramping/spasm    Penicillins Other (See Comments)    Aztreonam Swelling and Rash    Bactrim [Sulfamethoxazole-Trimethoprim] Rash     Lisinopril; Azithromycin; Ciprofloxacin; Keflex [cephalexin]; Mobic [meloxicam]; Penicillins; Aztreonam; and Bactrim [sulfamethoxazole-trimethoprim]   Vitals:    09/18/20 1401   BP: (!) 145/82   Pulse: 74   Temp: 97.2 °F (36.2 °C)   SpO2: 100%     Current Outpatient Medications   Medication Sig Dispense Refill    tiZANidine (ZANAFLEX) 4 MG tablet Take 1 tablet by mouth daily as needed (pain and spasm) 30 tablet 0    naloxegol (MOVANTIK) 25 MG TABS tablet Take 1 tablet by mouth every morning 30 tablet 0    oxyCODONE-acetaminophen (PERCOCET) 5-325 MG per tablet Take 1 tablet by mouth 2 times daily as needed for Pain for up to 30 days.  60 tablet 0    hydroCHLOROthiazide (HYDRODIURIL) 50 MG tablet Take 1 tablet by mouth daily 30 tablet 5    VITAMIN E PO Take by mouth daily      furosemide (LASIX) 20 MG tablet Take 1 tablet by mouth daily 60 tablet 3    ibuprofen (ADVIL;MOTRIN) 600 MG tablet       aspirin 81 MG tablet Take 2 tablets by mouth daily 60 tablet 3    ascorbic acid (VITAMIN C) 500 MG tablet Take 1 tablet by mouth daily 30 tablet 3    Multiple Vitamin (THERAPEUTIC MULTIVITAMIN PO) Take 1 tablet by mouth      Blood Pressure Monitoring (BLOOD PRESSURE CUFF) MISC Blood Pressure Monitoring (BLOOD PRESSURE CUFF) MISC Blood Pressure Monitoring (BLOOD PRESSURE CUFF) MISC Indications: Essential hypertension Use as directed 1 each 0 04/02/2020 Active 04-  [facility] [place], [place] ([id number])     [facility] DULoxetine HCl 40 MG CPEP Take 40 mg by mouth daily 30 capsule 0    atorvastatin (LIPITOR) 40 MG tablet Take 1 tablet by mouth daily (Patient not taking: Reported on 9/18/2020) 30 tablet 3     No current facility-administered medications for this visit. Review of Systems   Constitutional: Positive for fatigue. Negative for fever. Respiratory: Negative. Musculoskeletal: Positive for back pain. Neurological: Positive for tremors, weakness and numbness. All other systems reviewed and are negative. Objective:  General Appearance:  Well-appearing, in no acute distress, uncomfortable and in pain. Vital signs: (most recent): Blood pressure (!) 145/82, pulse 74, temperature 97.2 °F (36.2 °C), temperature source Temporal, height 5' 11\" (1.803 m), weight (!) 350 lb (158.8 kg), last menstrual period 09/07/2007, SpO2 100 %, currently breastfeeding. Vital signs are normal.  No fever. Output: Producing urine and producing stool. HEENT: Normal HEENT exam.    Lungs:  Normal effort and normal respiratory rate. Breath sounds clear to auscultation. She is not in respiratory distress. No decreased breath sounds. Heart: Normal rate. Extremities: Normal range of motion. There is no deformity. Neurological: Patient is alert and oriented to person, place and time. Patient has normal coordination. Pupils:  Pupils are equal, round, and reactive to light. Pupils are equal.   Skin:  Warm and dry. No rash or cyanosis.       Assessment & Plan   69-year-old woman with history of morbid obesity, BMI 48  She is seen in clinic for history of chronic low back and left lower extremity pain numbness and weakness  History of 3 previous lumbar spine surgeries  Last surgery was in 2016  She has been on chronic medication management with the as needed use of Percocet 1 tablet a day  She recently she fell with sudden development of left leg weakness  She was in ER at [facility] and had diagnostic work-up with MRI brain cervical spine and lumbar spine  Reports are available in epic  She was evaluated by the neurosurgeon there and was not advised for any urgent surgery  She was advised to complete EMG and follow-up as an outpatient with neurosurgery    Patient describes the back pain is constant with radiation down left leg all the way to the foot rates it as severe 10 out of 10  Nothing seems to alleviate the pain  Pain aggravated with routine activity  She is not interested in any spine injection  Denies any changes in bladder or bowel control  Report progressive worsening of gait  Medication Refill: Percocet, Tizanidine, Movantik    EXAMINATION: MRI OF THE CERVICAL SPINE WITHOUT CONTRAST 5/29/2020 8:52 am    Impression 1. Moderate central spinal canal narrowing at C5-C6, and C6-C7. Mild central spinal canal narrowing at C3-C4, and C4-C5. 2. Multilevel foraminal narrowing, as above. This is greatest on the left at C5-C6, and bilaterally at C6-C7. C5-C6: There is a 3 mm central disc protrusion indenting the anterior thecal sac. Moderate central spinal canal narrowing is noted at this level. Asymmetric left-sided uncovertebral spurring has increased in severity with moderate left foraminal narrowing. No right foraminal narrowing. C6-C7: There is a new right paracentral disc protrusion measuring 3 mm, contacting the right anterolateral margin of the cervical spinal cord. Moderate central spinal canal narrowing. Asymmetric right-sided uncovertebral spurring with moderate bilateral foraminal narrowing, increased in severity.       EXAMINATION: MRI OF THE LUMBAR SPINE WITHOUT CONTRAST, 5/28/2020 11:50 am L2-L3: Moderate disc bulge eccentric to the left. Facet and ligamentum flavum hypertrophy. Mild canal narrowing. Moderate bilateral neural foraminal narrowing. L3-L4: Circumferential disc bulge with facet and ligamentum flavum hypertrophy. Effacement of bilateral descending L4 nerve roots left greater than right. Mild to moderate bilateral neural foraminal narrowing. Mild canal narrowing. L4-L5: Circumferential disc bulge with facet and ligamentum flavum hypertrophy. No significant spinal canal narrowing. Moderate bilateral neural foraminal narrowing. L5-S1: Right foraminal and subarticular disc protrusion displacing the descending right S1 nerve root and exiting right L5 nerve root in the neural foramen. Moderate canal and moderate right-sided neural foraminal narrowing. Severe left-sided neural foraminal narrowing due to facet arthropathy. 1. Failed back surgical syndrome    2. Chronic lumbar radiculopathy    3. Morbid obesity with BMI of 50.0-59.9, adult ([facility] [address]    4.  Chronic use of opiate drug for therapeutic purpose          MRI cervical spine  Report reviewed  MRI lumbar spine  Report reviewed  Images reviewed independently  Images shown to the patient  Finding discussed with patient    I think her progressive left leg pain worsening and weakness in the left leg is related to the large left-sided L5 level disc herniation with complete entrapment of the exiting L5 and displacement of the traversing S1 nerve root    I think she can benefit from potential surgical correction  She is not interested in any surgery at all    I also discussed weight management option that losing some weight could significantly improve her postop surgical recovery time and reduce the risk and can also with improved pain even without surgery    She states that she will be working on it at home    She is requesting to increase the Percocet to twice a day  Considering severe pain and limited treatment option I will increase the Percocet to twice a day  Advised patient that I will not be able to increase it further considering increased risk from obesity for respiratory depression with use of opioid      Collect urine for toxicology  We will sign opioid contract    Orders Placed This Encounter   Procedures    Urine Drug Screen, Comprehensive      Orders Placed This Encounter   Medications    DISCONTD: oxyCODONE-acetaminophen (PERCOCET) 5-325 MG per tablet     Sig: Take 1 tablet by mouth daily as needed for Pain for up to 30 days. Dispense:  30 tablet     Refill:  0     Reduce doses taken as pain becomes manageable    tiZANidine (ZANAFLEX) 4 MG tablet     Sig: Take 1 tablet by mouth daily as needed (pain and spasm)     Dispense:  30 tablet     Refill:  0    naloxegol (MOVANTIK) 25 MG TABS tablet     Sig: Take 1 tablet by mouth every morning     Dispense:  30 tablet     Refill:  0    oxyCODONE-acetaminophen (PERCOCET) 5-325 MG per tablet     Sig: Take 1 tablet by mouth 2 times daily as needed for Pain for up to 30 days. Dispense:  60 tablet     Refill:  0     Reduce doses taken as pain becomes manageable        Controlled Substance Monitoring:    Acute and Chronic Pain Monitoring:   RX Monitoring 9/18/2020   Attestation -   Periodic Controlled Substance Monitoring No signs of potential drug abuse or diversion identified. ;Possible medication side effects, risk of tolerance/dependence & alternative treatments discussed. ;Assessed functional status. ;Random urine drug screen sent today. Chronic Pain > 50 MEDD Obtained or confirmed \"Consent for Opioid Use\" on file. In today's visit , total time spent face to face with patient was 45 minutes , in which  50% or more of the time was spent in counseling and coordinating care. The patient was counseled about  1. Diagnostic Impression  2. Recommended Diagnostic Studies  3. Treatment options  4. Risks and benefits of treatment options  5.  Importance of compliance with treatment options. 6. Follow up instructions.         Electronically signed by Laina Musa MD on 9/18/2020 at 2:28 PM  Urine toxicology September 18, 2020  Validity testing satisfactory  No illicit substance  Positive for oxycodone and metabolite  During encounter patient stated she had not taken Percocet since April  Last prescription was filled in May 2020  Will discuss the aberrancy with patient

## 2020-10-20 ENCOUNTER — TELEPHONE (OUTPATIENT)
Dept: INTERNAL MEDICINE | Age: 60
End: 2020-10-20

## 2020-10-20 NOTE — TELEPHONE ENCOUNTER
Pt calling she needs a new bedside commode pt needs a larger one that can hold weight. Her last one is old and needs to be replaced. Pt is on wait list for Jan. Pt would like to  script. Script pended.

## 2020-10-23 NOTE — TELEPHONE ENCOUNTER
Phone call to patient, left voicemail for patient to return call to the office to state when she is coming to the office to  script. Script was put desk  front office.

## 2020-11-24 ENCOUNTER — HOSPITAL ENCOUNTER (OUTPATIENT)
Dept: PHYSICAL THERAPY | Age: 60
Setting detail: THERAPIES SERIES
Discharge: HOME | End: 2020-11-24
Payer: MEDICAID

## 2020-12-01 ENCOUNTER — HOSPITAL ENCOUNTER (OUTPATIENT)
Dept: VASCULAR LAB | Age: 60
Discharge: HOME | End: 2020-12-01
Payer: MEDICAID

## 2020-12-01 PROCEDURE — 93971 EXTREMITY STUDY: CPT

## 2020-12-16 ENCOUNTER — TELEPHONE (OUTPATIENT)
Dept: PAIN MANAGEMENT | Age: 60
End: 2020-12-16

## 2020-12-16 ENCOUNTER — OFFICE VISIT (OUTPATIENT)
Dept: NEUROSURGERY | Age: 60
End: 2020-12-16
Payer: MEDICAID

## 2020-12-16 VITALS
BODY MASS INDEX: 41.02 KG/M2 | HEIGHT: 71 IN | DIASTOLIC BLOOD PRESSURE: 81 MMHG | HEART RATE: 76 BPM | WEIGHT: 293 LBS | OXYGEN SATURATION: 100 % | SYSTOLIC BLOOD PRESSURE: 131 MMHG

## 2020-12-16 PROCEDURE — G8427 DOCREV CUR MEDS BY ELIG CLIN: HCPCS | Performed by: NEUROLOGICAL SURGERY

## 2020-12-16 PROCEDURE — G8484 FLU IMMUNIZE NO ADMIN: HCPCS | Performed by: NEUROLOGICAL SURGERY

## 2020-12-16 PROCEDURE — 99213 OFFICE O/P EST LOW 20 MIN: CPT | Performed by: NEUROLOGICAL SURGERY

## 2020-12-16 PROCEDURE — 3017F COLORECTAL CA SCREEN DOC REV: CPT | Performed by: NEUROLOGICAL SURGERY

## 2020-12-16 PROCEDURE — G8417 CALC BMI ABV UP PARAM F/U: HCPCS | Performed by: NEUROLOGICAL SURGERY

## 2020-12-16 PROCEDURE — 1036F TOBACCO NON-USER: CPT | Performed by: NEUROLOGICAL SURGERY

## 2020-12-16 NOTE — PROGRESS NOTES
Denilson Mccabe  [facility] [place] [address] [place] [address]  Dept: [phone]    Patient:  Aylin Montes  YOB: 1960  Date: 12/16/20    The patient is a 63 y.o. female who presents today for consult of the following problems:     Chief Complaint   Patient presents with    Follow-up     EMG              HPI:     Aylin Montes is a 63 y.o. female on whom neurosurgical consultation was requested by Alecia Keene MD for management of significant axial back as well as left lower extremity pain. The patient first has had multiple lumbar surgeries most recently 2018 she had a surgery that then left her with some residual left leg numbness. She is persistently having significant left paraspinal back pain aching spasmodic worse with change in position and ambulating as well as radiation and associated left lower extremity pain and radiation numbness. Left entire left leg nondermatomal distribution with distinct numbness and tingling anterior lateral aspect of the thigh as well as pain that shoots down to the foot. Intermittent right leg symptoms were predominately on the left leg with no bowel bladder incontinence or saddle symptoms present. She has been through brief courses aquatic therapy approximately 1 year ago and is currently getting therapy for her left foot fracture. She has required the use of a walker since her most recent back surgery 2 years ago. 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    Anemia 10/5/2016    Arthritis     Asthma 1/4/2017    Bilateral leg and foot pain 02/2019    left much worse    Degeneration of cervical intervertebral disc 6/21/2018    Depression 2/2/2016    Failed back syndrome 4/3/2018    History of recurrent UTI (urinary tract infection)     Hyperlipidemia     Hypertension     Lumbar disc herniation with radiculopathy 6/4/2013  Obesity     Spinal stenosis     Wound infection after surgery      Past Surgical History:   Procedure Laterality Date    BACK SURGERY      FOOT SURGERY Bilateral     heel spurs, plantar fascia release    GALLBLADDER SURGERY      LUMBAR SPINE SURGERY  1986    [facility] LUMBAR SPINE SURGERY  4/2012    Chester Chowdary   [facility] NERVE BLOCK  10/26/2015    caudal# 1 decadron 7.5 mg    NERVE BLOCK  12/22/15    caudal #2  celestone 9mg    SPINE SURGERY  07/24/2016    3 places     VEIN SURGERY Bilateral 03/2017     Family History   Problem Relation Age of Onset    Ovarian Cancer Mother     Heart Disease Father     Hypertension Father      Current Outpatient Medications on File Prior to Visit   Medication Sig Dispense Refill    Blood Pressure Monitoring (BLOOD PRESSURE CUFF) MISC Blood Pressure Monitoring (BLOOD PRESSURE CUFF) MISC Blood Pressure Monitoring (BLOOD PRESSURE CUFF) MISC Indications: Essential hypertension Use as directed 1 each 0 04/02/2020 Active 04-  Joshua 28 ([id number])      hydroCHLOROthiazide (HYDRODIURIL) 50 MG tablet Take 1 tablet by mouth daily 30 tablet 5    VITAMIN E PO Take by mouth daily      DULoxetine HCl 40 MG CPEP Take 40 mg by mouth daily 30 capsule 0    atorvastatin (LIPITOR) 40 MG tablet Take 1 tablet by mouth daily (Patient not taking: Reported on 9/18/2020) 30 tablet 3    furosemide (LASIX) 20 MG tablet Take 1 tablet by mouth daily 60 tablet 3    ibuprofen (ADVIL;MOTRIN) 600 MG tablet       aspirin 81 MG tablet Take 2 tablets by mouth daily 60 tablet 3    ascorbic acid (VITAMIN C) 500 MG tablet Take 1 tablet by mouth daily 30 tablet 3    Multiple Vitamin (THERAPEUTIC MULTIVITAMIN PO) Take 1 tablet by mouth       No current facility-administered medications on file prior to visit.       Social History     Tobacco Use    Smoking status: Former Smoker     Packs/day: 0.10     Years: 30.00     Pack years: 3.00     Types: Cigarettes     Quit date: 10/23/1988 Years since quittin.1    Smokeless tobacco: Never Used   Substance Use Topics    Alcohol use: No     Alcohol/week: 0.0 standard drinks    Drug use: No       Allergies   Allergen Reactions    Lisinopril Hives and Anaphylaxis     seizures  seizures  seizures    Azithromycin Swelling    Ciprofloxacin Swelling    Keflex [Cephalexin] Swelling    Mobic [Meloxicam]      Muscle cramping/spasm    Penicillins Other (See Comments)    Aztreonam Swelling and Rash    Bactrim [Sulfamethoxazole-Trimethoprim] Rash       Review of Systems  Constitutional: Negative for activity change and appetite change. HENT: Negative for ear pain and facial swelling. Eyes: Negative for discharge and itching. Respiratory: Negative for choking and chest tightness. Cardiovascular: Negative for chest pain and leg swelling. Gastrointestinal: Negative for nausea and abdominal pain. Endocrine: Negative for cold intolerance and heat intolerance. Genitourinary: Negative for frequency and flank pain. Musculoskeletal: Negative for myalgias and joint swelling. Skin: Negative for rash and wound. Allergic/Immunologic: Negative for environmental allergies and food allergies. Hematological: Negative for adenopathy. Does not bruise/bleed easily. Psychiatric/Behavioral: Negative for self-injury. The patient is not nervous/anxious. Physical Exam:      /81 (Site: Right Upper Arm, Position: Sitting, Cuff Size: Medium Adult)   Pulse 76   Ht 5' 11\" (1.803 m)   Wt (!) 350 lb (158.8 kg)   LMP 2007   SpO2 100%   BMI 48.82 kg/m²   Estimated body mass index is 48.82 kg/m² as calculated from the following:    Height as of this encounter: 5' 11\" (1.803 m). Weight as of this encounter: 350 lb (158.8 kg). General:  Grace Lyons is a 63y.o. year old female who appears her stated age. HEENT: Normocephalic atraumatic. Neck supple.   Chest: regular rate; pulses equal Abdomen: Soft nontender nondistended. Normoactive bowel sounds. Ext: DP and PT pulses 2+, good cap refill  Neuro    Mentation  Appropriate affect  Registration intact  Orientation intact  3 item recall intact  Judgement intact to situation    Cranial Nerves:   Pupils equal and reactive to light  Extraocular motion intact  Face and shrug symmetric  Tongue midline  No dysarthria  v1-3 sensation symmetric, masseter tone symmetric  Hearing symmetric and intact to finger rub    Sensation:   Diminished in station left lower extremity diffuse nondermatomal.    Motor  L deltoid 5/5; R deltoid 5/5  L biceps 5/5; R biceps 5/5  L triceps 5/5; R triceps 5/5  L wrist extension 5/5; R wrist extension 5/5  L intrinsics 5/5; R intrinsics 5/5     L iliopsoas 5/5 , R iliopsoas 5/5  L quadriceps 5/5; R quadriceps 5/5  L Dorsiflexion 5/5; R dorsiflexion 5/5  L Plantarflexion 5/5; R plantarflexion 5/5  L EHL 5/5; R EHL 5/5    Reflexes  L Brachioradialis 2+/4; R brachioradialis 2+/4  L Biceps 2+/4; R Biceps 2+/4  L Triceps 2+/4; R Triceps 2+/4  L Patellar 2+/4: R Patellar 2+/4  L Achilles 2+/4; R Achilles 2+/4    hoffmans L: neg  hoffmans R: neg  Clonus L: neg  Clonus R: neg  Babinski L: up  Babinski R; up  Neg SLR    Studies Review:     Mri C mild multilevel stenosis  MRI B neg  MRI L multilevel foraminal disease    Assessment and Plan:      1. Other spondylosis with radiculopathy, lumbar region    2. Saphenofemoral venous reflux          Plan: Significant left lower extremity neuropathic findings of multilevel foraminal disease. Patient is a nonsurgical candidate with multilevel disease as well as morbid obesity with no focal mass lesion. If she has persistent neuropathic symptoms in the left leg ultimately that is refractory to conservative measures can consider trial of spinal cord stimulator and ultimate implantation of successful. Followup: No follow-ups on file.     Prescriptions Ordered: No orders of the defined types were placed in this encounter. Orders Placed:  No orders of the defined types were placed in this encounter. Electronically signed by Dino Pascual DO on 12/16/2020 at 12:12 PM    Please note that this chart was generated using voice recognition Dragon dictation software. Although every effort was made to ensure the accuracy of this automated transcription, some errors in transcription may have occurred.

## 2021-01-06 ENCOUNTER — TELEPHONE (OUTPATIENT)
Dept: VASCULAR SURGERY | Age: 61
End: 2021-01-06

## 2021-01-06 NOTE — TELEPHONE ENCOUNTER
Spoke with Mike Paulino from referring office and advised this is not something Dr Thai Montoya treat and that Dr Thai Montoya thinks she needs to be referred to Dr Dayne Echevarria. Graciela verbalized understanding. Referral sent back to office. Called and LM on PT VM stating her appointment has been cancelled and her referral was sent back to referring physician. If she had any questions to contact us.

## 2021-01-07 NOTE — TELEPHONE ENCOUNTER
Patient called back asking if there was any cancellations sooner that she could be seen. I advised her the appt was cancelled d/t not treating her dx. She verbalized understanding and stated that she has seen him before.

## 2021-01-12 NOTE — TELEPHONE ENCOUNTER
Pt calling about script she needs recent office visit notes with in 30 days. Appt set up with pt. She will need a new script along with office notes sent to [facility].

## 2021-01-13 ENCOUNTER — OFFICE VISIT (OUTPATIENT)
Dept: PAIN MANAGEMENT | Age: 61
End: 2021-01-13
Payer: MEDICAID

## 2021-01-13 VITALS — HEIGHT: 71 IN | WEIGHT: 293 LBS | BODY MASS INDEX: 41.02 KG/M2 | TEMPERATURE: 97.8 F

## 2021-01-13 DIAGNOSIS — Z98.890 HISTORY OF LUMBAR SURGERY: ICD-10-CM

## 2021-01-13 DIAGNOSIS — G89.29 CHRONIC BILATERAL LOW BACK PAIN WITH BILATERAL SCIATICA: Primary | ICD-10-CM

## 2021-01-13 DIAGNOSIS — E66.01 MORBID OBESITY WITH BMI OF 50.0-59.9, ADULT (HCC): ICD-10-CM

## 2021-01-13 DIAGNOSIS — M54.42 CHRONIC BILATERAL LOW BACK PAIN WITH BILATERAL SCIATICA: Primary | ICD-10-CM

## 2021-01-13 DIAGNOSIS — M54.41 CHRONIC BILATERAL LOW BACK PAIN WITH BILATERAL SCIATICA: Primary | ICD-10-CM

## 2021-01-13 DIAGNOSIS — M51.26 LUMBAR DISC HERNIATION: ICD-10-CM

## 2021-01-13 PROCEDURE — 3017F COLORECTAL CA SCREEN DOC REV: CPT | Performed by: ANESTHESIOLOGY

## 2021-01-13 PROCEDURE — G8427 DOCREV CUR MEDS BY ELIG CLIN: HCPCS | Performed by: ANESTHESIOLOGY

## 2021-01-13 PROCEDURE — G8484 FLU IMMUNIZE NO ADMIN: HCPCS | Performed by: ANESTHESIOLOGY

## 2021-01-13 PROCEDURE — 99213 OFFICE O/P EST LOW 20 MIN: CPT | Performed by: ANESTHESIOLOGY

## 2021-01-13 PROCEDURE — 1036F TOBACCO NON-USER: CPT | Performed by: ANESTHESIOLOGY

## 2021-01-13 PROCEDURE — G8417 CALC BMI ABV UP PARAM F/U: HCPCS | Performed by: ANESTHESIOLOGY

## 2021-01-13 ASSESSMENT — ENCOUNTER SYMPTOMS
RESPIRATORY NEGATIVE: 1
ALLERGIC/IMMUNOLOGIC NEGATIVE: 1
GASTROINTESTINAL NEGATIVE: 1
EYES NEGATIVE: 1
BACK PAIN: 1

## 2021-01-13 NOTE — PROGRESS NOTES
The patient is a 63 y. o. Non-/non  female.     Chief Complaint   Patient presents with    Follow-up    Hip Pain     bilat    Leg Pain     bilat    Knee Pain     bilat    Foot Pain     bilat        HPI      74-year-old woman with history of chronic lower back pain  Onset of symptom many years ago  She reports 3 previous lumbar spine surgery last surgery was 5 years ago  Pain located in the lumbar area  Report radiation of pain down both legs all the way to the feet  Majority of her pain is in the leg  Left side is more affected than right  Report numbness and tingling in both legs  Report worsening left leg weakness also  Had recent diagnostic work-up with a MRI lumbar spine that showed L5 level disc herniation  Was recently evaluated by the neurosurgery department and was not advised for any surgery  Patient was advised for spinal cord stimulator trial by the neurosurgeons      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    Anemia 10/5/2016    Arthritis     Asthma 1/4/2017    Bilateral leg and foot pain 02/2019    left much worse    Degeneration of cervical intervertebral disc 6/21/2018    Depression 2/2/2016    Failed back syndrome 4/3/2018    History of recurrent UTI (urinary tract infection)     Hyperlipidemia     Hypertension     Lumbar disc herniation with radiculopathy 6/4/2013    Obesity     Spinal stenosis     Wound infection after surgery       Past Surgical History:   Procedure Laterality Date    BACK SURGERY      FOOT SURGERY Bilateral     heel spurs, plantar fascia release    GALLBLADDER SURGERY      LUMBAR SPINE [facility]  [facility]   [employer] LUMBAR SPINE SURGERY  4/2012    Geeta Brian   [employer] NERVE BLOCK  10/26/2015    caudal# 1 decadron 7.5 mg    NERVE BLOCK  12/22/15    caudal #2  celestone 9mg    SPINE SURGERY  07/24/2016    3 places     VEIN SURGERY Bilateral 03/2017     Social History     Socioeconomic History  Marital status: Single     Spouse name: None    Number of children: None    Years of education: None    Highest education level: None   Occupational History    None   Social Needs    Financial resource strain: None    Food insecurity     Worry: None     Inability: None    Transportation needs     Medical: None     Non-medical: None   Tobacco Use    Smoking status: Former Smoker     Packs/day: 0.10     Years: 30.00     Pack years: 3.00     Types: Cigarettes     Quit date: 10/23/1988     Years since quittin.2    Smokeless tobacco: Never Used   Substance and Sexual Activity    Alcohol use: No     Alcohol/week: 0.0 standard drinks    Drug use: No    Sexual activity: Never   Lifestyle    Physical activity     Days per week: None     Minutes per session: None    Stress: None   Relationships    Social connections     Talks on phone: None     Gets together: None     Attends Spiritism service: None     Active member of club or organization: None     Attends meetings of clubs or organizations: None     Relationship status: None    Intimate partner violence     Fear of current or ex partner: None     Emotionally abused: None     Physically abused: None     Forced sexual activity: None   Other Topics Concern    None   Social History Narrative    None     Family History   Problem Relation Age of Onset    Ovarian Cancer Mother     Heart Disease Father     Hypertension Father      Allergies   Allergen Reactions    Lisinopril Hives and Anaphylaxis     seizures  seizures  seizures    Azithromycin Swelling    Ciprofloxacin Swelling    Keflex [Cephalexin] Swelling    Mobic [Meloxicam]      Muscle cramping/spasm    Penicillins Other (See Comments)    Aztreonam Swelling and Rash    Bactrim [Sulfamethoxazole-Trimethoprim] Rash     Lisinopril, Azithromycin, Ciprofloxacin, Keflex [cephalexin], Mobic [meloxicam], Penicillins, Aztreonam, and Bactrim [sulfamethoxazole-trimethoprim]   Vitals: 01/13/21 1509   Temp: 97.8 °F (36.6 °C)     Current Outpatient Medications   Medication Sig Dispense Refill    Blood Pressure Monitoring (BLOOD PRESSURE CUFF) MISC Blood Pressure Monitoring (BLOOD PRESSURE CUFF) MISC Blood Pressure Monitoring (BLOOD PRESSURE CUFF) MISC Indications: Essential hypertension Use as directed 1 each 0 04/02/2020 Active 04-  Joshua 28 ([id number])      hydroCHLOROthiazide (HYDRODIURIL) 50 MG tablet Take 1 tablet by mouth daily 30 tablet 5    VITAMIN E PO Take by mouth daily      atorvastatin (LIPITOR) 40 MG tablet Take 1 tablet by mouth daily 30 tablet 3    furosemide (LASIX) 20 MG tablet Take 1 tablet by mouth daily 60 tablet 3    ibuprofen (ADVIL;MOTRIN) 600 MG tablet       aspirin 81 MG tablet Take 2 tablets by mouth daily 60 tablet 3    ascorbic acid (VITAMIN C) 500 MG tablet Take 1 tablet by mouth daily 30 tablet 3    Multiple Vitamin (THERAPEUTIC MULTIVITAMIN PO) Take 1 tablet by mouth      DULoxetine HCl 40 MG CPEP Take 40 mg by mouth daily 30 capsule 0     No current facility-administered medications for this visit. Review of Systems   Constitutional: Positive for fatigue. Negative for fever. HENT: Negative. Eyes: Negative. Respiratory: Negative. Cardiovascular: Positive for leg swelling. Gastrointestinal: Negative. Endocrine: Positive for cold intolerance and heat intolerance. Genitourinary: Negative. Musculoskeletal: Positive for arthralgias, back pain, joint swelling, myalgias, neck pain and neck stiffness. Skin: Negative. Allergic/Immunologic: Negative. Neurological: Positive for numbness. Hematological: Negative. Psychiatric/Behavioral: Negative. Objective:  General Appearance:  Well-appearing, in no acute distress, uncomfortable and in pain. Vital signs: (most recent): Temperature 97.8 °F (36.6 °C), temperature source Temporal, height 5' 11\" (1.803 m), weight (!) 332 lb (150.6 kg), last menstrual period 09/07/2007, currently breastfeeding. Vital signs are normal.  No fever. Output: Producing urine and producing stool. HEENT: Normal HEENT exam.    Lungs:  Normal effort and normal respiratory rate. Breath sounds clear to auscultation. She is not in respiratory distress. No decreased breath sounds. Heart: Normal rate. Regular rhythm. Extremities: Normal range of motion. There is no deformity. Neurological: Patient is alert and oriented to person, place and time. Normal strength. Patient has normal coordination. Pupils:  Pupils are equal, round, and reactive to light. Pupils are equal.   Skin:  Warm and dry. No rash or cyanosis. Assessment & Plan  1. Chronic bilateral low back pain with bilateral sciatica    2. Morbid obesity with BMI of 50.0-59.9, adult ([facility] [address]    3. History of lumbar surgery    4.  Lumbar disc herniation        Last urine toxicology was inconsistent with her history as explained in my note from previous visit  Patient is unable to provide explanation for that    She is at high risk for respiratory depression from opioid considering obesity    I advised patient that I will not recommend to continue opioids    I will suggest for epidural steroid injection for her chronic sciatica symptoms  If that intervention fails then neuromodulation trial of spinal cord stimulator should be considered    Patient states she is not interested in any interventional procedure    Follow-up on as needed basis    Electronically signed by Odalys Wharton MD on 1/13/2021 at 3:39 PM

## 2021-01-14 ENCOUNTER — VIRTUAL VISIT (OUTPATIENT)
Dept: INTERNAL MEDICINE | Age: 61
End: 2021-01-14
Payer: MEDICAID

## 2021-01-14 DIAGNOSIS — M54.16 CHRONIC LUMBAR RADICULOPATHY: ICD-10-CM

## 2021-01-14 DIAGNOSIS — M96.1 FAILED BACK SURGICAL SYNDROME: Primary | Chronic | ICD-10-CM

## 2021-01-14 DIAGNOSIS — K59.00 CONSTIPATION, UNSPECIFIED CONSTIPATION TYPE: ICD-10-CM

## 2021-01-14 DIAGNOSIS — I10 HYPERTENSION, UNSPECIFIED TYPE: ICD-10-CM

## 2021-01-14 DIAGNOSIS — Z12.11 COLON CANCER SCREENING: ICD-10-CM

## 2021-01-14 DIAGNOSIS — I10 ESSENTIAL HYPERTENSION: ICD-10-CM

## 2021-01-14 PROCEDURE — 99442 PR PHYS/QHP TELEPHONE EVALUATION 11-20 MIN: CPT | Performed by: INTERNAL MEDICINE

## 2021-01-14 RX ORDER — IBUPROFEN 600 MG/1
600 TABLET ORAL EVERY 8 HOURS PRN
Qty: 30 TABLET | Refills: 0 | Status: SHIPPED | OUTPATIENT
Start: 2021-01-14 | End: 2021-05-27

## 2021-01-14 RX ORDER — HYDROCHLOROTHIAZIDE 50 MG/1
50 TABLET ORAL DAILY
Qty: 30 TABLET | Refills: 5 | Status: SHIPPED | OUTPATIENT
Start: 2021-01-14 | End: 2022-03-03

## 2021-01-14 RX ORDER — FUROSEMIDE 20 MG/1
20 TABLET ORAL DAILY
Qty: 60 TABLET | Refills: 3 | Status: SHIPPED | OUTPATIENT
Start: 2021-01-14

## 2021-01-14 RX ORDER — OXYCODONE HYDROCHLORIDE AND ACETAMINOPHEN 5; 325 MG/1; MG/1
1 TABLET ORAL EVERY 6 HOURS PRN
COMMUNITY

## 2021-01-14 RX ORDER — DULOXETINE 40 MG/1
40 CAPSULE, DELAYED RELEASE ORAL DAILY
Qty: 30 CAPSULE | Refills: 0 | Status: SHIPPED | OUTPATIENT
Start: 2021-01-14 | End: 2021-05-25

## 2021-01-14 SDOH — ECONOMIC STABILITY: INCOME INSECURITY: HOW HARD IS IT FOR YOU TO PAY FOR THE VERY BASICS LIKE FOOD, HOUSING, MEDICAL CARE, AND HEATING?: NOT HARD AT ALL

## 2021-01-14 SDOH — ECONOMIC STABILITY: FOOD INSECURITY: WITHIN THE PAST 12 MONTHS, THE FOOD YOU BOUGHT JUST DIDN'T LAST AND YOU DIDN'T HAVE MONEY TO GET MORE.: NEVER TRUE

## 2021-01-14 SDOH — ECONOMIC STABILITY: TRANSPORTATION INSECURITY
IN THE PAST 12 MONTHS, HAS THE LACK OF TRANSPORTATION KEPT YOU FROM MEDICAL APPOINTMENTS OR FROM GETTING MEDICATIONS?: NO

## 2021-01-14 ASSESSMENT — PATIENT HEALTH QUESTIONNAIRE - PHQ9
SUM OF ALL RESPONSES TO PHQ9 QUESTIONS 1 & 2: 0
2. FEELING DOWN, DEPRESSED OR HOPELESS: 0
SUM OF ALL RESPONSES TO PHQ QUESTIONS 1-9: 0
1. LITTLE INTEREST OR PLEASURE IN DOING THINGS: 0
SUM OF ALL RESPONSES TO PHQ QUESTIONS 1-9: 0

## 2021-01-14 NOTE — PROGRESS NOTES
Qasim Brennan is a 63 y.o. female evaluated via telephone on 1/14/2021. Consent:  She and/or health care decision maker is aware that that she may receive a bill for this telephone service, depending on her insurance coverage, and has provided verbal consent to proceed: Yes      Documentation:  Patient following up for bedside commode. She is a morbidly obese female that is bed bound due to chronic back pain with hx of lumbar fusions times 3. She has seen multiple specialists and recently was seeing pain management. Back pain has been debilitating and hinders her ADLs. Urinary Incontinence  Patient complains of urinary incontinence. This has been present for several years. She leaks urine with coughing, sneezing, with urge. Patient describes the symptoms as urge to urinate with little or no warning. Diagnosis Orders   1. Failed back surgical syndrome  DULoxetine HCl 40 MG CPEP   2. Colon cancer screening  Cologuard (For External Results Only)   3. Chronic lumbar radiculopathy  ibuprofen (ADVIL;MOTRIN) 600 MG tablet    DULoxetine HCl 40 MG CPEP   4. Constipation, unspecified constipation type  naloxegol (MOVANTIK) 25 MG TABS tablet   5. Essential hypertension  hydroCHLOROthiazide (HYDRODIURIL) 50 MG tablet   6. Hypertension, unspecified type  furosemide (LASIX) 20 MG tablet    hydroCHLOROthiazide (HYDRODIURIL) 50 MG tablet       I affirm this is a Patient Initiated Episode with a Patient who has not had a related appointment within my department in the past 7 days or scheduled within the next 24 hours.     Patient identification was verified at the start of the visit: Yes    Total Time: minutes: 11-20 minutes    Note: not billable if this call serves to triage the patient into an appointment for the relevant concern      Marcos Eli

## 2021-01-16 ENCOUNTER — TELEPHONE (OUTPATIENT)
Dept: INTERNAL MEDICINE | Age: 61
End: 2021-01-16

## 2021-01-16 NOTE — TELEPHONE ENCOUNTER
PA request received for DULoxetine HCl 40MG dr capsules    PA processed and submitted to pt insurance, waiting for response in regards to medication coverage

## 2021-03-09 ENCOUNTER — HOSPITAL ENCOUNTER (EMERGENCY)
Age: 61
Discharge: HOME | End: 2021-03-09
Attending: EMERGENCY MEDICINE
Payer: MEDICAID

## 2021-03-09 VITALS
RESPIRATION RATE: 16 BRPM | TEMPERATURE: 97.8 F | OXYGEN SATURATION: 100 % | WEIGHT: 293 LBS | HEIGHT: 71 IN | DIASTOLIC BLOOD PRESSURE: 102 MMHG | BODY MASS INDEX: 41.02 KG/M2 | SYSTOLIC BLOOD PRESSURE: 156 MMHG | HEART RATE: 74 BPM

## 2021-03-09 DIAGNOSIS — N30.00 ACUTE CYSTITIS WITHOUT HEMATURIA: Primary | ICD-10-CM

## 2021-03-09 LAB
-: ABNORMAL
AMORPHOUS: ABNORMAL
BACTERIA: ABNORMAL
BILIRUBIN URINE: NEGATIVE
CASTS UA: ABNORMAL /LPF (ref 0–8)
COLOR: YELLOW
COMMENT UA: ABNORMAL
CRYSTALS, UA: ABNORMAL /HPF
EPITHELIAL CELLS UA: ABNORMAL /HPF (ref 0–5)
GLUCOSE URINE: ABNORMAL
KETONES, URINE: NEGATIVE
LEUKOCYTE ESTERASE, URINE: NEGATIVE
MUCUS: ABNORMAL
NITRITE, URINE: NEGATIVE
OTHER OBSERVATIONS UA: ABNORMAL
PH UA: >9 (ref 5–8)
PROTEIN UA: ABNORMAL
RBC UA: ABNORMAL /HPF (ref 0–4)
RENAL EPITHELIAL, UA: ABNORMAL /HPF
SPECIFIC GRAVITY UA: 1.01 (ref 1–1.03)
TRICHOMONAS: ABNORMAL
TURBIDITY: ABNORMAL
URINE HGB: NEGATIVE
UROBILINOGEN, URINE: NORMAL
WBC UA: ABNORMAL /HPF (ref 0–5)
YEAST: ABNORMAL

## 2021-03-09 PROCEDURE — 87086 URINE CULTURE/COLONY COUNT: CPT

## 2021-03-09 PROCEDURE — 81001 URINALYSIS AUTO W/SCOPE: CPT

## 2021-03-09 PROCEDURE — 87186 SC STD MICRODIL/AGAR DIL: CPT

## 2021-03-09 PROCEDURE — 99284 EMERGENCY DEPT VISIT MOD MDM: CPT

## 2021-03-09 PROCEDURE — 6370000000 HC RX 637 (ALT 250 FOR IP): Performed by: STUDENT IN AN ORGANIZED HEALTH CARE EDUCATION/TRAINING PROGRAM

## 2021-03-09 PROCEDURE — 87077 CULTURE AEROBIC IDENTIFY: CPT

## 2021-03-09 RX ORDER — NITROFURANTOIN 25; 75 MG/1; MG/1
100 CAPSULE ORAL ONCE
Status: COMPLETED | OUTPATIENT
Start: 2021-03-09 | End: 2021-03-09

## 2021-03-09 RX ORDER — NITROFURANTOIN 25; 75 MG/1; MG/1
100 CAPSULE ORAL 2 TIMES DAILY
Qty: 10 CAPSULE | Refills: 0 | Status: SHIPPED | OUTPATIENT
Start: 2021-03-09 | End: 2021-03-14

## 2021-03-09 RX ADMIN — NITROFURANTOIN (MONOHYDRATE/MACROCRYSTALS) 100 MG: 75; 25 CAPSULE ORAL at 21:30

## 2021-03-09 ASSESSMENT — PAIN SCALES - GENERAL: PAINLEVEL_OUTOF10: 10

## 2021-03-09 ASSESSMENT — PAIN DESCRIPTION - DESCRIPTORS: DESCRIPTORS: BURNING

## 2021-03-10 NOTE — ED NOTES
Patient resting comfortably on stretcher, respirations are even and unlabored, NAD. No further concerns at this time.         Barak Luciano RN  03/09/21 2055

## 2021-03-10 NOTE — ED PROVIDER NOTES
includes Foot surgery (Bilateral); Gallbladder surgery; Lumbar spine surgery (1986); Lumbar spine surgery (4/2012); Nerve Block (10/26/2015); Nerve Block (12/22/15); Spine surgery (07/24/2016); back surgery; and Vein Surgery (Bilateral, 03/2017). Social History:  reports that she quit smoking about 32 years ago. Her smoking use included cigarettes. She has a 3.00 pack-year smoking history. She has never used smokeless tobacco. She reports that she does not drink alcohol or use drugs. Family History: Noncontributory at this time  Psychiatric History: Noncontributory at this time    Allergies:is allergic to lisinopril; keflex [cephalexin]; mobic [meloxicam]; penicillins; aztreonam; and bactrim [sulfamethoxazole-trimethoprim]. PHYSICAL EXAM       INITIAL VITALS: BP (!) 156/102   Pulse 74   Temp 97.8 °F (36.6 °C) (Infrared)   Resp 16   Ht 5' 11\" (1.803 m)   Wt (!) 325 lb (147.4 kg)   LMP 09/07/2007   SpO2 100%   Breastfeeding No   BMI 45.33 kg/m²     CONSTITUTIONAL: Vital signs reviewed, Alert and oriented X 3. HEAD: Atraumatic, Normocephalic. EYES: Eyes are normal to inspection, Pupils equal, round and reactive to light. NECK: Normal ROM, No jugular venous distention, No meningeal signs,  No midline bony tenderness to palpation of C/T/L/S-spines. RESPIRATORY CHEST: No respiratory distress. ABDOMEN: Does endorse mild suprapubic tenderness. No distension. No pulsatile masses palpated. BACK:  No midline bony tenderness to palpation. There is a mild bilateral flank tenderness without guarding  UPPER EXTREMITY: Inspection normal, No cyanosis. LOWER EXTREMITY: Pulses are 2+ and equal bilaterally with cap refill < 2 seconds. NEURO: GCS is 15.  5/5 strength in bilateral lower extremities with sensation to light touch intact. SKIN: Skin is warm, Skin is dry. PSYCHIATRIC: Oriented X 3, Normal affect.      Diagnostic Results     LABS:  Not indicated  Results for orders placed or performed during the hospital encounter of 03/09/21   URINALYSIS   Result Value Ref Range    Color, UA YELLOW YELLOW    Turbidity UA TURBID (A) CLEAR    Glucose, Ur 1+ (A) NEGATIVE    Bilirubin Urine NEGATIVE NEGATIVE    Ketones, Urine NEGATIVE NEGATIVE    Specific Gravity, UA 1.014 1.005 - 1.030    Urine Hgb NEGATIVE NEGATIVE    pH, UA >9.0 (H) 5.0 - 8.0    Protein, UA NEGATIVE  Verified by sulfosalicylic acid test. (A) NEGATIVE    Urobilinogen, Urine Normal Normal    Nitrite, Urine NEGATIVE NEGATIVE    Leukocyte Esterase, Urine NEGATIVE NEGATIVE    Urinalysis Comments NOT REPORTED    Microscopic Urinalysis   Result Value Ref Range    -          WBC, UA 5 TO 10 0 - 5 /HPF    RBC, UA 0 TO 2 0 - 4 /HPF    Casts UA  0 - 8 /LPF     5 TO 10 HYALINE Reference range defined for non-centrifuged specimen. Crystals, UA NOT REPORTED None /HPF    Epithelial Cells UA 2 TO 5 0 - 5 /HPF    Renal Epithelial, UA NOT REPORTED 0 /HPF    Bacteria, UA MANY (A) None    Mucus, UA NOT REPORTED None    Trichomonas, UA NOT REPORTED None    Amorphous, UA NOT REPORTED None    Other Observations UA NOT REPORTED NOT REQ. Yeast, UA NOT REPORTED None       RADIOLOGY:  Not indicated  No orders to display       Medical decision making  (MDM) / ED Course     Uncomplicated Cystitis  Patient with no significant medical history who presents with dysuria and mild suprapubic pain/tenderness most likely secondary to uncomplicated cystitis without overt e/o infected stone. Negative ICON, no h/o STDs, no vaginal discharge. Not Ectopic. Unlikely TOA, Ovarian Torsion, PID. Unlikely AAA, cholecystitis, pancreatitis, SBO, appendicitis, or other acute abdomen. Unlikely gonorrhea/chlamydia. No history relapse (prior infection this month) or reinfection (prior infection this year). Plan discharge home on Macrobid for 5 days duration. Procedures     None    Final impression      1.  Acute cystitis without hematuria           Disposition with plan     Disposition: Decision To Discharge    PATIENT REFERRED TO:  [facility] ED  [address] [facility] [address]  [phone]    As needed    Jefry Carrero MD  [address] [facility] [address] [facility]                                                                             [address] [facility] [phone]      As needed      DISCHARGE MEDICATIONS:  Discharge Medication List as of 3/9/2021  9:13 PM      START taking these medications    Details   nitrofurantoin, macrocrystal-monohydrate, (MACROBID) 100 MG capsule Take 1 capsule by mouth 2 times daily for 5 days, Disp-10 capsule, R-0Print           Kita Palmer MD  PGY-2, Emergency Medicine   [facility]       (Please note that portions of this note were completed with a voice recognition program.  Efforts were made to edit the dictations but occasionally words are mis-transcribed.)       Lonnie Panda MD  Resident  03/10/21 3165

## 2021-03-10 NOTE — ED NOTES
The following specimens labeled with pt sticker and tubed to lab:     [] [place]   [] on ice   [] Blue   [] Green/yellow  [] Green/black [] on ice  [] Pink  [] Red  [] Yellow     [x] Urine Sample    [] Covid19 Swab    [] Blood Cultures x         Griselda Duran RN  03/09/21 1951

## 2021-03-10 NOTE — ED NOTES
Pt c/o back pain has hx of UTI. Pt has concern for recurrent UTI. Denies CP SOB fever or chills.      Christian Moya RN  03/09/21 1921

## 2021-03-10 NOTE — ED PROVIDER NOTES
[facility] [address] [facility]     Emergency Department     Faculty Attestation        I performed a history and physical examination of the patient and discussed management with the resident. I reviewed the residents note and agree with the documented findings and plan of care. Any areas of disagreement are noted on the chart. I was personally present for the key portions of any procedures. I have documented in the chart those procedures where I was not present during the key portions. I have reviewed the emergency nurses triage note. I agree with the chief complaint, past medical history, past surgical history, allergies, medications, social and family history as documented unless otherwise noted below. For mid-level providers such as nurse practitioners as well as physicians assistants:    I have personally seen and evaluated the patient. I find the patient's history and physical exam are consistent with NP/PA documentation. I agree with the care provided, treatment rendered, disposition, & follow-up plan. Additional findings are as noted. Vital Signs: BP (!) 156/102   Pulse 74   Temp 97.8 °F (36.6 °C) (Infrared)   Resp 16   Ht 5' 11\" (1.803 m)   Wt (!) 325 lb (147.4 kg)   LMP 09/07/2007   SpO2 100%   BMI 45.33 kg/m²   PCP:  Mago Hadley MD    Pertinent Comments:     Patient complains of flank pain with some dysuria she has a history of recurrent UTIs and feels like she has 1. She denies fevers, chills, chest pain cough or shortness of breath there is no nausea vomiting diarrhea. She does have some flank pain which she states is typical.  She did have a CT earlier this year which showed a normal caliber abdominal aorta.   Will check urinalysis, urine culture, reassessment      Critical Care  None          Lalita Leigh MD  Attending Emergency Medicine Physician              Dianne Ortiz MD  03/09/21 3529

## 2021-03-11 LAB
CULTURE: ABNORMAL
CULTURE: ABNORMAL
Lab: ABNORMAL
SPECIMEN DESCRIPTION: ABNORMAL

## 2021-03-22 ENCOUNTER — TELEPHONE (OUTPATIENT)
Dept: INTERNAL MEDICINE | Age: 61
End: 2021-03-22

## 2021-03-22 NOTE — TELEPHONE ENCOUNTER
Fax received that pt has not returned cologuard test. PC to pt to see if pt received test and if pt had any questions or concerns about the test. PC to pt; Pt states she did get the test but keeps on forgetting to do it. If pt needs a new test Please call [phone] to get a new one sent to them.

## 2021-03-22 NOTE — LETTER
[address]  [address] [facility] [address]  Phone: [phone]  Fax: [phone]    Galdino Elena MD        April 27, 2021    [place]  [address] [facility] [address]      Dear Elaine Noguera: We were unable to reach you by phone. It is important that you contact us by calling [phone], at your earliest convenience. This message is regarding your Cologuard Test.  If you did not receive test your Cologuard Test in the mail Please call [phone] to get a new one. If you have any questions or concerns, please don't hesitate to call.     Sincerely,        Galdino Elena MD

## 2021-04-16 ENCOUNTER — VIRTUAL VISIT (OUTPATIENT)
Dept: INTERNAL MEDICINE | Age: 61
End: 2021-04-16
Payer: MEDICAID

## 2021-04-16 DIAGNOSIS — R35.1 FREQUENT URINATION AT NIGHT: ICD-10-CM

## 2021-04-16 DIAGNOSIS — G81.94 HEMIPLEGIA AFFECTING LEFT NONDOMINANT SIDE, UNSPECIFIED ETIOLOGY, UNSPECIFIED HEMIPLEGIA TYPE (HCC): ICD-10-CM

## 2021-04-16 DIAGNOSIS — I10 ESSENTIAL HYPERTENSION: Primary | ICD-10-CM

## 2021-04-16 DIAGNOSIS — E66.01 MORBID OBESITY DUE TO EXCESS CALORIES (HCC): ICD-10-CM

## 2021-04-16 PROCEDURE — 99442 PR PHYS/QHP TELEPHONE EVALUATION 11-20 MIN: CPT | Performed by: INTERNAL MEDICINE

## 2021-04-16 RX ORDER — TIZANIDINE 4 MG/1
4 TABLET ORAL 3 TIMES DAILY PRN
COMMUNITY
Start: 2021-03-19 | End: 2022-04-30

## 2021-04-16 NOTE — PATIENT INSTRUCTIONS
Scripts for lab given to pt with fasting instructions, pt will get labs done before next appointment. Patient to return to clinic 4 weeks (5/13/21) In Office  AVS reviewed and given to pt. It is very important for your care that you keep your appointment. If for some reason you are unable to keep your appointment it is equally important that you call our office at [phone] to cancel your appointment and reschedule. Failure to do so may result in your termination from our practice.   MB

## 2021-04-16 NOTE — PROGRESS NOTES
Kimani Saab is a 57 y.o. female evaluated via telephone on 4/16/2021. Consent:  She and/or health care decision maker is aware that that she may receive a bill for this telephone service, depending on her insurance coverage, and has provided verbal consent to proceed: Yes      Documentation:  57year old female today states that she has been urinating more often and that she I=has more lower extremity swelling. She denies pain or burning with urination and no odor noted. She denies fevers,chills or flank pain. No blood in urine noted. She also reports adherence with her medications and compression stockings. Echo in 2019 showed Global left ventricular systolic function is normal. Estimated ejection  fraction is 55 %% and   Grade I (mild) left ventricular diastolic dysfunction. Takes her lasix daily and is good about dietary and fluid modifications. She has followed up with her vascular and NS specialists since last visit with me. Diagnosis Orders   1. Essential hypertension  Comprehensive Metabolic Panel   2. Morbid obesity due to excess calories (HCC)  Lipid Panel    Vitamin D 25 Hydroxy   3. Frequent urination at night  Urinalysis   4. Hemiplegia affecting left nondominant side, unspecified etiology, unspecified hemiplegia type ([facility] [address]  Weakness of both sides but left greater than right and unchanged from previous         I affirm this is a Patient Initiated Episode with a Patient who has not had a related appointment within my department in the past 7 days or scheduled within the next 24 hours. Patient identification was verified at the start of the visit: Yes    Total Time: minutes: 11-20 minutes    The visit was conducted pursuant to the emergency declaration under the [facility], [address] [place] [address] authority and the [employer] Act.   Patient identification was verified, and a caregiver was present when appropriate. The patient was located in a state where the provider was credentialed to provide care.     Note: not billable if this call serves to triage the patient into an appointment for the relevant concern      Zena Lazar

## 2021-04-18 ENCOUNTER — HOSPITAL ENCOUNTER (EMERGENCY)
Age: 61
Discharge: HOME | End: 2021-04-18
Attending: EMERGENCY MEDICINE
Payer: MEDICAID

## 2021-04-18 VITALS
SYSTOLIC BLOOD PRESSURE: 174 MMHG | DIASTOLIC BLOOD PRESSURE: 98 MMHG | RESPIRATION RATE: 20 BRPM | HEART RATE: 87 BPM | TEMPERATURE: 97.8 F

## 2021-04-18 DIAGNOSIS — N30.00 ACUTE CYSTITIS WITHOUT HEMATURIA: Primary | ICD-10-CM

## 2021-04-18 LAB
-: ABNORMAL
AMORPHOUS: ABNORMAL
BACTERIA: ABNORMAL
BILIRUBIN URINE: NEGATIVE
CASTS UA: ABNORMAL /LPF (ref 0–8)
COLOR: YELLOW
CRYSTALS, UA: ABNORMAL /HPF
EPITHELIAL CELLS UA: ABNORMAL /HPF (ref 0–5)
GLUCOSE URINE: NEGATIVE
KETONES, URINE: NEGATIVE
LEUKOCYTE ESTERASE, URINE: ABNORMAL
MUCUS: ABNORMAL
NITRITE, URINE: NEGATIVE
OTHER OBSERVATIONS UA: ABNORMAL
PH UA: 8 (ref 5–8)
PROTEIN UA: NEGATIVE
RBC UA: ABNORMAL /HPF (ref 0–4)
RENAL EPITHELIAL, UA: ABNORMAL /HPF
SPECIFIC GRAVITY UA: 1.01 (ref 1–1.03)
TRICHOMONAS: ABNORMAL
TURBIDITY: ABNORMAL
URINE HGB: NEGATIVE
UROBILINOGEN, URINE: NORMAL
WBC UA: ABNORMAL /HPF (ref 0–5)
YEAST: ABNORMAL

## 2021-04-18 PROCEDURE — 99283 EMERGENCY DEPT VISIT LOW MDM: CPT

## 2021-04-18 PROCEDURE — 81001 URINALYSIS AUTO W/SCOPE: CPT

## 2021-04-18 PROCEDURE — 87086 URINE CULTURE/COLONY COUNT: CPT

## 2021-04-18 RX ORDER — NITROFURANTOIN 25; 75 MG/1; MG/1
100 CAPSULE ORAL 2 TIMES DAILY
Qty: 20 CAPSULE | Refills: 0 | Status: SHIPPED | OUTPATIENT
Start: 2021-04-18 | End: 2021-04-28

## 2021-04-18 RX ORDER — NITROFURANTOIN 25; 75 MG/1; MG/1
100 CAPSULE ORAL 2 TIMES DAILY
Qty: 10 CAPSULE | Refills: 0 | Status: SHIPPED | OUTPATIENT
Start: 2021-04-18 | End: 2021-04-18 | Stop reason: SDUPTHER

## 2021-04-18 ASSESSMENT — ENCOUNTER SYMPTOMS
ABDOMINAL PAIN: 0
SHORTNESS OF BREATH: 0
BACK PAIN: 1
COUGH: 0

## 2021-04-18 ASSESSMENT — PAIN DESCRIPTION - FREQUENCY: FREQUENCY: CONTINUOUS

## 2021-04-18 ASSESSMENT — PAIN DESCRIPTION - DESCRIPTORS: DESCRIPTORS: ACHING

## 2021-04-18 ASSESSMENT — PAIN DESCRIPTION - PAIN TYPE: TYPE: ACUTE PAIN

## 2021-04-18 ASSESSMENT — PAIN DESCRIPTION - ONSET: ONSET: ON-GOING

## 2021-04-18 ASSESSMENT — PAIN SCALES - GENERAL: PAINLEVEL_OUTOF10: 8

## 2021-04-18 ASSESSMENT — PAIN DESCRIPTION - LOCATION: LOCATION: GROIN;BACK

## 2021-04-18 NOTE — ED PROVIDER NOTES
resource strain: Not hard at all   [facility]     Worry: Never true     Inability: Never true    Transportation needs     Medical: No     Non-medical: No   Tobacco Use    Smoking status: Former Smoker     Packs/day: 0.10     Years: 30.00     Pack years: 3.00     Types: Cigarettes     Quit date: 10/23/1988     Years since quittin.5    Smokeless tobacco: Never Used   Substance and Sexual Activity    Alcohol use: No     Alcohol/week: 0.0 standard drinks    Drug use: No    Sexual activity: Not Currently   Lifestyle    Physical activity     Days per week: Not on file     Minutes per session: Not on file    Stress: Not on file   Relationships    Social connections     Talks on phone: Not on file     Gets together: Not on file     Attends Taoism service: Not on file     Active member of club or organization: Not on file     Attends meetings of clubs or organizations: Not on file     Relationship status: Not on file    Intimate partner violence     Fear of current or ex partner: Not on file     Emotionally abused: Not on file     Physically abused: Not on file     Forced sexual activity: Not on file   Other Topics Concern    Not on file   Social History Narrative    Not on file       Family History   Problem Relation Age of Onset    Ovarian Cancer Mother     Heart Disease Father     Hypertension Father        Allergies:  Lisinopril, Keflex [cephalexin], Mobic [meloxicam], Penicillins, Aztreonam, and Bactrim [sulfamethoxazole-trimethoprim]    Home Medications:  Prior to Admission medications    Medication Sig Start Date End Date Taking?  Authorizing Provider   nitrofurantoin, macrocrystal-monohydrate, (MACROBID) 100 MG capsule Take 1 capsule by mouth 2 times daily for 10 days 21 Yes Elva Lee,    tiZANidine (ZANAFLEX) 4 MG tablet Take 4 mg by mouth 3 times daily as needed 3/19/21  Yes Historical Provider, MD   ibuprofen (ADVIL;MOTRIN) 600 MG tablet Take 1 tablet by mouth every 8 hours as needed for Pain 1/14/21  Yes Helene Capone MD   DULoxetine HCl 40 MG CPEP Take 40 mg by mouth daily 1/14/21 4/18/21 Yes Helene Capone MD   furosemide (LASIX) 20 MG tablet Take 1 tablet by mouth daily 1/14/21  Yes Helene Capone MD   hydroCHLOROthiazide (HYDRODIURIL) 50 MG tablet Take 1 tablet by mouth daily 1/14/21  Yes Helene Capone MD   VITAMIN E PO Take by mouth daily   Yes Historical Provider, MD   aspirin 81 MG tablet Take 2 tablets by mouth daily 5/1/19  Yes Jay Douglass MD   ascorbic acid (VITAMIN C) 500 MG tablet Take 1 tablet by mouth daily 5/1/19  Yes Jay Douglass MD   Multiple Vitamin (THERAPEUTIC MULTIVITAMIN PO) Take 1 tablet by mouth   Yes Historical Provider, MD   oxyCODONE-acetaminophen (PERCOCET) 5-325 MG per tablet Take 1 tablet by mouth every 6 hours as needed for Pain. Historical Provider, MD   Blood Pressure Monitoring (BLOOD PRESSURE CUFF) MISC Blood Pressure Monitoring (BLOOD PRESSURE CUFF) MISC Blood Pressure Monitoring (BLOOD PRESSURE CUFF) MISC Indications: Essential hypertension Use as directed 1 each 0 04/02/2020 Active 04-  [address] [place] [address] ([id number]) 4/2/20   Historical Provider, MD   atorvastatin (LIPITOR) 40 MG tablet Take 1 tablet by mouth daily 4/21/20   Helene Capone MD       REVIEW OF SYSTEMS    (2-9 systems for level 4, 10 or more for level 5)      Review of Systems   Constitutional: Negative for activity change, chills and fever. Respiratory: Negative for cough and shortness of breath. Cardiovascular: Negative for chest pain. Gastrointestinal: Negative for abdominal pain. Genitourinary: Positive for dysuria, frequency and urgency. Negative for flank pain, hematuria, pelvic pain, vaginal bleeding, vaginal discharge and vaginal pain. Musculoskeletal: Positive for back pain. Skin: Negative for rash and wound.        PHYSICAL EXAM   (up to 7 for level 4, 8 or more for level 5)      INITIAL VITALS:   BP (!) 174/98 Comment: meds not taken today  Pulse 87   Temp 97.8 °F (36.6 °C) (Temporal)   Resp 20   LMP 09/07/2007     Physical Exam  Vitals signs reviewed. Constitutional:       General: She is not in acute distress. Appearance: She is obese. She is not ill-appearing, toxic-appearing or diaphoretic. Comments: Patient ambulating with walker   HENT:      Head: Normocephalic and atraumatic. Cardiovascular:      Rate and Rhythm: Normal rate. Pulmonary:      Comments: Breathing comfortably room air, symmetric chest rise, speaking full sentences  Abdominal:      Tenderness: There is no right CVA tenderness, left CVA tenderness, guarding or rebound. Neurological:      General: No focal deficit present. Mental Status: She is alert and oriented to person, place, and time. Psychiatric:         Mood and Affect: Mood normal.         Behavior: Behavior normal.         Thought Content:  Thought content normal.         Judgment: Judgment normal.         DIFFERENTIAL  DIAGNOSIS     PLAN (LABS / IMAGING / EKG):  Orders Placed This Encounter   Procedures    Culture, Urine    URINALYSIS WITH MICROSCOPIC       MEDICATIONS ORDERED:  Orders Placed This Encounter   Medications    nitrofurantoin, macrocrystal-monohydrate, (MACROBID) 100 MG capsule     Sig: Take 1 capsule by mouth 2 times daily for 10 days     Dispense:  10 capsule     Refill:  0       DDX: Cystitis, renal lithiasis, ureterolithiasis, vaginitis    DIAGNOSTIC RESULTS / EMERGENCY DEPARTMENT COURSE / MDM   :  Results for orders placed or performed during the hospital encounter of 04/18/21   URINALYSIS WITH MICROSCOPIC   Result Value Ref Range    Color, UA YELLOW YELLOW    Turbidity UA CLOUDY (A) CLEAR    Glucose, Ur NEGATIVE NEGATIVE    Bilirubin Urine NEGATIVE NEGATIVE    Ketones, Urine NEGATIVE NEGATIVE    Specific Gravity, UA 1.010 1.005 - 1.030    Urine Hgb NEGATIVE NEGATIVE    pH, UA 8.0 5.0 - 8.0    Protein, UA NEGATIVE NEGATIVE    Urobilinogen, Urine Normal Normal Nitrite, Urine NEGATIVE NEGATIVE    Leukocyte Esterase, Urine LARGE (A) NEGATIVE    -          WBC, UA 20 TO 50 0 - 5 /HPF    RBC, UA 2 TO 5 0 - 4 /HPF    Casts UA  0 - 8 /LPF     0 TO 2 HYALINE Reference range defined for non-centrifuged specimen. Crystals, UA NOT REPORTED None /HPF    Epithelial Cells UA None 0 - 5 /HPF    Renal Epithelial, UA NOT REPORTED 0 /HPF    Bacteria, UA MANY (A) None    Mucus, UA NOT REPORTED None    Trichomonas, UA NOT REPORTED None    Amorphous, UA NOT REPORTED None    Other Observations UA NOT REPORTED NOT REQ. Yeast, UA NOT REPORTED None       RADIOLOGY:  None    EKG  None    All EKG's are interpreted by the Emergency Department Physician who either signs or Co-signs this chart in the absence of a cardiologist.    EMERGENCY DEPARTMENT COURSE/  IMPRESSION: 59-year-old female with history of recurrent urinary tract infection presenting with dysuria, increased frequency and urgency for the last 3 days. Patient is well-appearing, no acute distress, afebrile. Abdomen is soft, no flank tenderness. Urinalysis was obtained, to show signs of infection. Patient will be treated with 10 days of Macrobid as she has allergy to Keflex. Patient also has Bactrim listed as an allergy however discussed with patient she states that she is able to take that however soon as it does cause her rash. Educated follow-up as well as additional return precautions. PROCEDURES:  None    CONSULTS:  None    CRITICAL CARE:  None    FINAL IMPRESSION      1. Acute cystitis without hematuria          DISPOSITION / PLAN     DISPOSITION Decision To Discharge 04/18/2021 04:18:55 PM      PATIENT REFERRED TO:  No follow-up provider specified.     DISCHARGE MEDICATIONS:  New Prescriptions    NITROFURANTOIN, MACROCRYSTAL-MONOHYDRATE, (MACROBID) 100 MG CAPSULE    Take 1 capsule by mouth 2 times daily for 10 days       Mandy Szymanski DO  Emergency Medicine Resident    (Please note that portions of thisnote

## 2021-04-18 NOTE — ED PROVIDER NOTES
[facility]     Emergency Department     Faculty Attestation    I performed a history and physical examination of the patient and discussed management with the resident. I have reviewed and agree with the residents findings including all diagnostic interpretations, and treatment plans as written at the time of my review. Any areas of disagreement are noted on the chart. I was personally present for the key portions of any procedures. I have documented in the chart those procedures where I was not present during the key portions. For Physician Assistant/ Nurse Practitioner cases/documentation I have personally evaluated this patient and have completed at least one if not all key elements of the E/M (history, physical exam, and MDM). Additional findings are as noted. This patient was evaluated in the Emergency Department for symptoms described in the history of present illness. The patient was evaluated in the context of the global COVID-19 pandemic, which necessitated consideration that the patient might be at risk for infection with the SARS-CoV-2 virus that causes COVID-19. Institutional protocols and algorithms that pertain to the evaluation of patients at risk for COVID-19 are in a state of rapid change based on information released by regulatory bodies including the CDC and federal and state organizations. These policies and algorithms were followed during the patient's care in the ED. Primary Care Physician: Laisha Rmairez MD    History: This is a 57 y.o. female who presents to the Emergency Department with complaint of dysuria increased frequency of urination. Is been ongoing for the last day or so. This been no vomiting no fever. The patient states this feels similar to her previous urinary tract infection. Her last urinary tract infection was last month. Patient states she was treated with Macrobid.     Physical:   temporal temperature is 97.8 °F (36.6 °C). Her blood pressure is 174/98 (abnormal) and her pulse is 87. Her respiration is 20. Awake alert nontoxic-appearing no acute distress. Impression: Dysuria    Plan: Urinalysis, urine culture      (Please note that portions of this note were completed with a voice recognition program.  Efforts were made to edit the dictations but occasionally words are mis-transcribed.)    Janis Sosa.  Evert Gallardo MD, [facility]  Attending Emergency Medicine Physician        Urbano Perera MD  04/18/21 [id number]

## 2021-04-19 LAB
CULTURE: ABNORMAL
Lab: ABNORMAL
SPECIMEN DESCRIPTION: ABNORMAL

## 2021-04-27 NOTE — TELEPHONE ENCOUNTER
Fax received that pt has not returned cologuard test. PC to pt to see if pt received test and if pt had any questions or concerns about the test. PC to pt; Unable to LM, Letter Sent to Pt to contact office. If pt needs a new test Please call [phone] to get a new one sent to them.

## 2021-04-30 ENCOUNTER — HOSPITAL ENCOUNTER (OUTPATIENT)
Age: 61
Setting detail: SPECIMEN
Discharge: HOME | End: 2021-04-30
Payer: MEDICAID

## 2021-04-30 DIAGNOSIS — E66.01 MORBID OBESITY DUE TO EXCESS CALORIES (HCC): ICD-10-CM

## 2021-04-30 DIAGNOSIS — R35.1 FREQUENT URINATION AT NIGHT: ICD-10-CM

## 2021-04-30 DIAGNOSIS — I10 ESSENTIAL HYPERTENSION: ICD-10-CM

## 2021-04-30 LAB
-: ABNORMAL
ALBUMIN SERPL-MCNC: 4 G/DL (ref 3.5–5.2)
ALBUMIN/GLOBULIN RATIO: 1.1 (ref 1–2.5)
ALP BLD-CCNC: 111 U/L (ref 35–104)
ALT SERPL-CCNC: 19 U/L (ref 5–33)
AMORPHOUS: ABNORMAL
ANION GAP SERPL CALCULATED.3IONS-SCNC: 13 MMOL/L (ref 9–17)
AST SERPL-CCNC: 20 U/L
BACTERIA: ABNORMAL
BILIRUB SERPL-MCNC: 0.17 MG/DL (ref 0.3–1.2)
BILIRUBIN URINE: NEGATIVE
BUN BLDV-MCNC: 19 MG/DL (ref 8–23)
BUN/CREAT BLD: ABNORMAL (ref 9–20)
CALCIUM SERPL-MCNC: 9.5 MG/DL (ref 8.6–10.4)
CASTS UA: ABNORMAL /LPF (ref 0–8)
CHLORIDE BLD-SCNC: 104 MMOL/L (ref 98–107)
CHOLESTEROL/HDL RATIO: 3.6
CHOLESTEROL: 321 MG/DL
CO2: 23 MMOL/L (ref 20–31)
COLOR: YELLOW
COMMENT UA: ABNORMAL
CREAT SERPL-MCNC: 0.68 MG/DL (ref 0.5–0.9)
CRYSTALS, UA: ABNORMAL /HPF
EPITHELIAL CELLS UA: ABNORMAL /HPF (ref 0–5)
GFR AFRICAN AMERICAN: >60 ML/MIN
GFR NON-AFRICAN AMERICAN: >60 ML/MIN
GFR SERPL CREATININE-BSD FRML MDRD: ABNORMAL ML/MIN/{1.73_M2}
GFR SERPL CREATININE-BSD FRML MDRD: ABNORMAL ML/MIN/{1.73_M2}
GLUCOSE BLD-MCNC: 88 MG/DL (ref 70–99)
GLUCOSE URINE: NEGATIVE
HDLC SERPL-MCNC: 90 MG/DL
KETONES, URINE: NEGATIVE
LDL CHOLESTEROL: 218 MG/DL (ref 0–130)
LEUKOCYTE ESTERASE, URINE: ABNORMAL
MUCUS: ABNORMAL
NITRITE, URINE: POSITIVE
OTHER OBSERVATIONS UA: ABNORMAL
PH UA: 5.5 (ref 5–8)
POTASSIUM SERPL-SCNC: 3.6 MMOL/L (ref 3.7–5.3)
PROTEIN UA: NEGATIVE
RBC UA: ABNORMAL /HPF (ref 0–4)
RENAL EPITHELIAL, UA: ABNORMAL /HPF
SODIUM BLD-SCNC: 140 MMOL/L (ref 135–144)
SPECIFIC GRAVITY UA: 1.01 (ref 1–1.03)
TOTAL PROTEIN: 7.5 G/DL (ref 6.4–8.3)
TRICHOMONAS: ABNORMAL
TRIGL SERPL-MCNC: 66 MG/DL
TURBIDITY: CLEAR
URINE HGB: NEGATIVE
UROBILINOGEN, URINE: NORMAL
VITAMIN D 25-HYDROXY: 15 NG/ML (ref 30–100)
VLDLC SERPL CALC-MCNC: ABNORMAL MG/DL (ref 1–30)
WBC UA: ABNORMAL /HPF (ref 0–5)
YEAST: ABNORMAL

## 2021-04-30 PROCEDURE — 82306 VITAMIN D 25 HYDROXY: CPT

## 2021-04-30 PROCEDURE — 81001 URINALYSIS AUTO W/SCOPE: CPT

## 2021-04-30 PROCEDURE — 80061 LIPID PANEL: CPT

## 2021-04-30 PROCEDURE — 36415 COLL VENOUS BLD VENIPUNCTURE: CPT

## 2021-04-30 PROCEDURE — 80053 COMPREHEN METABOLIC PANEL: CPT

## 2021-05-25 ENCOUNTER — OFFICE VISIT (OUTPATIENT)
Dept: INTERNAL MEDICINE | Age: 61
End: 2021-05-25
Payer: MEDICARE

## 2021-05-25 VITALS
DIASTOLIC BLOOD PRESSURE: 87 MMHG | HEIGHT: 71 IN | SYSTOLIC BLOOD PRESSURE: 141 MMHG | HEART RATE: 86 BPM | BODY MASS INDEX: 41.02 KG/M2 | WEIGHT: 293 LBS

## 2021-05-25 DIAGNOSIS — Z23 NEED FOR PROPHYLACTIC VACCINATION AND INOCULATION AGAINST VARICELLA: ICD-10-CM

## 2021-05-25 DIAGNOSIS — E78.00 PURE HYPERCHOLESTEROLEMIA: ICD-10-CM

## 2021-05-25 DIAGNOSIS — E66.01 MORBID OBESITY WITH BMI OF 50.0-59.9, ADULT (HCC): ICD-10-CM

## 2021-05-25 DIAGNOSIS — M54.16 CHRONIC LUMBAR RADICULOPATHY: ICD-10-CM

## 2021-05-25 DIAGNOSIS — Z12.31 ENCOUNTER FOR SCREENING MAMMOGRAM FOR BREAST CANCER: ICD-10-CM

## 2021-05-25 DIAGNOSIS — E55.9 VITAMIN D DEFICIENCY: Primary | ICD-10-CM

## 2021-05-25 DIAGNOSIS — I10 ESSENTIAL HYPERTENSION: ICD-10-CM

## 2021-05-25 PROBLEM — R53.1 WEAKNESS: Status: RESOLVED | Noted: 2020-05-28 | Resolved: 2021-05-25

## 2021-05-25 PROBLEM — Z51.81 MEDICATION MONITORING ENCOUNTER: Chronic | Status: RESOLVED | Noted: 2018-05-02 | Resolved: 2021-05-25

## 2021-05-25 PROCEDURE — 3017F COLORECTAL CA SCREEN DOC REV: CPT | Performed by: INTERNAL MEDICINE

## 2021-05-25 PROCEDURE — 1036F TOBACCO NON-USER: CPT | Performed by: INTERNAL MEDICINE

## 2021-05-25 PROCEDURE — G8427 DOCREV CUR MEDS BY ELIG CLIN: HCPCS | Performed by: INTERNAL MEDICINE

## 2021-05-25 PROCEDURE — G8417 CALC BMI ABV UP PARAM F/U: HCPCS | Performed by: INTERNAL MEDICINE

## 2021-05-25 PROCEDURE — 99214 OFFICE O/P EST MOD 30 MIN: CPT | Performed by: INTERNAL MEDICINE

## 2021-05-25 RX ORDER — ASCORBIC ACID 1000 MG
1 TABLET ORAL DAILY
Qty: 30 CAPSULE | Refills: 5 | Status: SHIPPED | OUTPATIENT
Start: 2021-05-25 | End: 2022-08-18

## 2021-05-25 RX ORDER — ATORVASTATIN CALCIUM 40 MG/1
40 TABLET, FILM COATED ORAL DAILY
Qty: 30 TABLET | Refills: 3 | Status: SHIPPED | OUTPATIENT
Start: 2021-05-25 | End: 2022-08-29

## 2021-05-25 RX ORDER — CARVEDILOL 3.12 MG/1
3.12 TABLET ORAL 2 TIMES DAILY
Qty: 60 TABLET | Refills: 3 | Status: SHIPPED | OUTPATIENT
Start: 2021-05-25

## 2021-05-25 RX ORDER — ERGOCALCIFEROL 1.25 MG/1
50000 CAPSULE ORAL WEEKLY
Qty: 4 CAPSULE | Refills: 5 | Status: SHIPPED | OUTPATIENT
Start: 2021-05-25 | End: 2022-09-27

## 2021-05-25 RX ORDER — MELATONIN
1000 DAILY
Qty: 90 TABLET | Refills: 1 | Status: SHIPPED | OUTPATIENT
Start: 2021-05-25

## 2021-05-25 ASSESSMENT — ENCOUNTER SYMPTOMS
SINUS PRESSURE: 0
SHORTNESS OF BREATH: 0
ABDOMINAL PAIN: 0
COUGH: 0
CONSTIPATION: 1
CHEST TIGHTNESS: 0

## 2021-05-25 NOTE — PROGRESS NOTES
[facility]   Progress Note      Date of patient's visit: 5/25/2021  Patient's Name:  Roly Crandall                   YOB: 1960        PCP:  Juan Cobian MD    Roly Crandall is a 57 y.o. female who presents for   Chief Complaint   Patient presents with    1 Month Follow-Up    Hypertension    Leg Swelling    Health Maintenance     shingles pended cologard mailed back  last week     and follow up of chronic medical problems. Patient Active Problem List   Diagnosis    Morbid obesity due to excess calories (HCC)    Hyperlipemia    Chronic pelvic pain in female    Spinal stenosis at L4-L5 level    Lumbar disc herniation    Adjustment disorder with mixed anxiety and depressed mood    Anemia    Morbid obesity with BMI of 50.0-59.9, adult ([address]    Bilateral stenosis of lateral recess of lumbar spine    Chronic pain of left knee    Angioedema    Congenital pes planus    Failed back surgical syndrome    Venous insufficiency of both lower extremities    Weakness of both lower extremities    Recurrent UTI    Chronic lumbar radiculopathy    Left leg weakness    History of lumbar surgery    Chronic bilateral low back pain with bilateral sciatica    Hemiplegia affecting left nondominant side ([address]       HISTORY OF PRESENT ILLNESS:    History was obtained from the patient. Patient seen in the emergency department for a UTI that she completed antibiotics for. She denies pain or burning with urination and no odor noted. She denies fevers,chills or flank pain. No blood in urine noted. Patient also has rescheduled follow-up with Dr. Cuco Jaffe her cardiologist. Echo in 2019 showed Global left ventricular systolic function is normal. Estimated ejection  fraction is 55 %% and Grade I (mild) left ventricular diastolic dysfunction. Takes her lasix daily and is good about dietary and fluid modifications.  She also reports adherence with her medications and compression stockings.     chronic back pain with hx of lumbar fusions times 3. She has seen multiple specialists and recently was seeing pain management. Back pain has been debilitating and hinders her ADLs. Currently uses a walker. She was prescribed duloxetine which she reports she is not taking.     Reviewed her lab work that was ordered which shows elevated cholesterol and very low vitamin D. Patient reports she has not been compliant with her statin therapy. Patient's allergies, medications, past medical, surgical, social and family histories were reviewed and updated as appropriate. ALLERGIES      Allergies   Allergen Reactions    Keflex [Cephalexin] Swelling    Mobic [Meloxicam]      Muscle cramping/spasm         MEDICATIONS:      Current Outpatient Medications   Medication Sig Dispense Refill    zoster recombinant adjuvanted vaccine (SHINGRIX) 50 MCG/0.5ML SUSR injection Inject 0.5 mLs into the muscle once for 1 dose 50 MCG IM then repeat 2-6 months. 1 each 1    vitamin D (ERGOCALCIFEROL) 1.25 MG (51353 UT) CAPS capsule Take 1 capsule by mouth once a week 4 capsule 5    vitamin D3 (CHOLECALCIFEROL) 25 MCG (1000 UT) TABS tablet Take 1 tablet by mouth daily 90 tablet 1    Coenzyme Q10 (CO Q 10) 10 MG CAPS Take 1 capsule by mouth daily 30 capsule 5    carvedilol (COREG) 3.125 MG tablet Take 1 tablet by mouth 2 times daily 60 tablet 3    atorvastatin (LIPITOR) 40 MG tablet Take 1 tablet by mouth daily 30 tablet 3    tiZANidine (ZANAFLEX) 4 MG tablet Take 4 mg by mouth 3 times daily as needed      oxyCODONE-acetaminophen (PERCOCET) 5-325 MG per tablet Take 1 tablet by mouth every 6 hours as needed for Pain.       ibuprofen (ADVIL;MOTRIN) 600 MG tablet Take 1 tablet by mouth every 8 hours as needed for Pain 30 tablet 0    furosemide (LASIX) 20 MG tablet Take 1 tablet by mouth daily 60 tablet 3    hydroCHLOROthiazide (HYDRODIURIL) 50 MG tablet Take 1 tablet by mouth daily 30 tablet 5    Blood Pressure Monitoring (BLOOD PRESSURE CUFF) MISC Blood Pressure Monitoring (BLOOD PRESSURE CUFF) MISC Blood Pressure Monitoring (BLOOD PRESSURE CUFF) MISC Indications: Essential hypertension Use as directed 1 each 0 04/02/2020 Active 04-  Kurtjuancho Ardon ([id number])     [facility] VITAMIN E PO Take by mouth daily      aspirin 81 MG tablet Take 2 tablets by mouth daily 60 tablet 3    ascorbic acid (VITAMIN C) 500 MG tablet Take 1 tablet by mouth daily 30 tablet 3    Multiple Vitamin (THERAPEUTIC MULTIVITAMIN PO) Take 1 tablet by mouth       No current facility-administered medications for this visit.        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    Anemia 10/5/2016    Arthritis     Asthma 1/4/2017    Bilateral leg and foot pain 02/2019    left much worse    Degeneration of cervical intervertebral disc 6/21/2018    Depression 2/2/2016    Failed back syndrome 4/3/2018    History of recurrent UTI (urinary tract infection)     Hyperlipidemia     Hypertension     Lumbar disc herniation with radiculopathy 6/4/2013    Obesity     Spinal stenosis     Wound infection after surgery      Past Surgical History:   Procedure Laterality Date    BACK SURGERY      FOOT SURGERY Bilateral     heel spurs, plantar fascia release    GALLBLADDER SURGERY      LUMBAR SPINE SURGERY  1986    [facility] LUMBAR SPINE SURGERY  4/2012    Duane Dailey   [facility] NERVE BLOCK  10/26/2015    caudal# 1 decadron 7.5 mg    NERVE BLOCK  12/22/15    caudal #2  celestone 9mg    SPINE SURGERY  07/24/2016    3 places     VEIN SURGERY Bilateral 03/2017       SOCIAL HISTORY      Social History     Tobacco Use    Smoking status: Former Smoker     Packs/day: 0.10     Years: 30.00 Pack years: 3.00     Types: Cigarettes     Quit date: 10/23/1988     Years since quittin.6    Smokeless tobacco: Never Used   Substance Use Topics    Alcohol use: No     Alcohol/week: 0.0 standard drinks     Francisca Guillen  reports that she quit smoking about 32 years ago. Her smoking use included cigarettes. She has a 3.00 pack-year smoking history. She has never used smokeless tobacco.    FAMILY HISTORY:      Family History   Problem Relation Age of Onset    Ovarian Cancer Mother     Heart Disease Father     Hypertension Father        REVIEW OF SYSTEMS:    Review of Systems   Constitutional: Negative for activity change, appetite change and fatigue. HENT: Negative for congestion, sinus pressure and sneezing. Respiratory: Negative for cough, chest tightness and shortness of breath. Cardiovascular: Positive for leg swelling. Negative for chest pain. Gastrointestinal: Positive for constipation. Negative for abdominal pain. Genitourinary: Positive for frequency. Negative for difficulty urinating and flank pain. Musculoskeletal: Positive for arthralgias. Neurological: Negative for dizziness, light-headedness and headaches. Psychiatric/Behavioral: Negative for agitation, behavioral problems and confusion.        PHYSICAL EXAM:      Vitals:    21 1248   BP: (!) 152/91   Pulse: 90     BP Readings from Last 3 Encounters:   21 (!) 152/91   21 (!) 174/98   21 (!) 156/102       Physical Examination: General appearance - overweight  Mental status - alert, oriented to person, place, and time  Chest - clear to auscultation, no wheezes, rales or rhonchi, symmetric air entry  Heart - normal rate and regular rhythm  Abdomen - bowel sounds normal  Back exam - limited range of motion  Neurological - alert, oriented, normal speech, no focal findings or movement disorder noted  Musculoskeletal - no joint tenderness, deformity or swelling  Extremities - pedal edema 1 +    LABORATORY adult [facility])     Added vitamin D replacement. Discussed adherence with statin therapy for increase in her cholesterol and patient reports muscle aches thus will add Co Q enzyme to her therapy  And Coreg for blood pressure control  Counseled on dietary modifications for weight loss  Return to clinic in 1 month      FOLLOW UP:   1.  Leatha Maradiaga received counseling on the following healthy behaviors: nutrition and exercise    2. Reviewed prior labs and health maintenance. 3.  Discussed use, benefit, and side effects of prescribed medications. Barriers to medication compliance addressed. All patient questions answered. Pt voiced understanding. 4.  Continue current medications, diet and exercise. Orders Placed This Encounter   Medications    zoster recombinant adjuvanted vaccine (SHINGRIX) 50 MCG/0.5ML SUSR injection     Sig: Inject 0.5 mLs into the muscle once for 1 dose 50 MCG IM then repeat 2-6 months. Dispense:  1 each     Refill:  1    vitamin D (ERGOCALCIFEROL) 1.25 MG (94975 UT) CAPS capsule     Sig: Take 1 capsule by mouth once a week     Dispense:  4 capsule     Refill:  5    vitamin D3 (CHOLECALCIFEROL) 25 MCG (1000 UT) TABS tablet     Sig: Take 1 tablet by mouth daily     Dispense:  90 tablet     Refill:  1    Coenzyme Q10 (CO Q 10) 10 MG CAPS     Sig: Take 1 capsule by mouth daily     Dispense:  30 capsule     Refill:  5    carvedilol (COREG) 3.125 MG tablet     Sig: Take 1 tablet by mouth 2 times daily     Dispense:  60 tablet     Refill:  3    atorvastatin (LIPITOR) 40 MG tablet     Sig: Take 1 tablet by mouth daily     Dispense:  30 tablet     Refill:  3          Completed Refills               Requested Prescriptions     Signed Prescriptions Disp Refills    zoster recombinant adjuvanted vaccine (SHINGRIX) 50 MCG/0.5ML SUSR injection 1 each 1     Sig: Inject 0.5 mLs into the muscle once for 1 dose 50 MCG IM then repeat 2-6 months.     vitamin D (ERGOCALCIFEROL) 1.25 MG

## 2021-05-25 NOTE — TELEPHONE ENCOUNTER
E-scribe request from [id number] [place] [address] for Ibuprofen 600 mg.        Health Maintenance   Topic Date Due    COVID-19 Vaccine (1) Never done    Shingles Vaccine (1 of 2) Never done    Cervical cancer screen  10/14/2017    Colon Cancer Screen FIT/FOBT  01/25/2020    Breast cancer screen  06/07/2021    DTaP/Tdap/Td vaccine (1 - Tdap) 01/13/2022 (Originally 2/11/1978)    Flu vaccine (Season Ended) 09/01/2021    Lipid screen  04/30/2022    Potassium monitoring  04/30/2022    Creatinine monitoring  04/30/2022    Hepatitis C screen  Completed    HIV screen  Completed    Hepatitis A vaccine  Aged Out    Hepatitis B vaccine  Aged Out    Hib vaccine  Aged Out    Meningococcal (ACWY) vaccine  Aged Out    Pneumococcal 0-64 years Vaccine  Aged Out             (applicable per patient's age: Cancer Screenings, Depression Screening, Fall Risk Screening, Immunizations)    Hemoglobin A1C (%)   Date Value   04/02/2020 5.6   10/23/2018 5.9   10/05/2015 5.4     LDL Cholesterol (mg/dL)   Date Value   04/30/2021 218 (H)     AST (U/L)   Date Value   04/30/2021 20     ALT (U/L)   Date Value   04/30/2021 19     BUN (mg/dL)   Date Value   04/30/2021 19      (goal A1C is < 7)   (goal LDL is <100) need 30-50% reduction from baseline     BP Readings from Last 3 Encounters:   05/25/21 (!) 141/87   04/18/21 (!) 174/98   03/09/21 (!) 156/102    (goal /80)      All Future Testing planned in [facility]:  Lab Frequency Next Occurrence   EMG Once 06/12/2020   EMG Once 06/12/2020   Cologuard (For External Results Only) Once 01/14/2022   MAURI Digital Screen Bilateral [[id number]] Once 06/24/2021       Next Visit Date:  Future Appointments   Date Time Provider Mari Diop   6/22/2021  1:00 PM Cailin Roberts MD [id number]            Patient Active Problem List:     Morbid obesity due to excess calories ([facility]     Hyperlipemia     Chronic pelvic pain in female     Spinal stenosis at L4-L5 level     Lumbar disc herniation Adjustment disorder with mixed anxiety and depressed mood     Anemia     Morbid obesity with BMI of 50.0-59.9, adult (HCC)     Bilateral stenosis of lateral recess of lumbar spine     Chronic pain of left knee     Angioedema     Congenital pes planus     Failed back surgical syndrome     Venous insufficiency of both lower extremities     Weakness of both lower extremities     Recurrent UTI     Chronic lumbar radiculopathy     Left leg weakness     History of lumbar surgery     Chronic bilateral low back pain with bilateral sciatica     Hemiplegia affecting left nondominant side ([address]

## 2021-05-27 RX ORDER — IBUPROFEN 600 MG/1
600 TABLET ORAL EVERY 8 HOURS PRN
Qty: 30 TABLET | Refills: 0 | Status: SHIPPED | OUTPATIENT
Start: 2021-05-27 | End: 2021-06-25

## 2021-06-02 DIAGNOSIS — Z12.11 COLON CANCER SCREENING: ICD-10-CM

## 2021-06-21 ENCOUNTER — TELEPHONE (OUTPATIENT)
Dept: INTERNAL MEDICINE | Age: 61
End: 2021-06-21

## 2021-06-21 NOTE — LETTER
[address] [address]  Phone: [phone]  Fax: [phone]    Millie Boyer MD        June 24, 2021    [address] [address]      Dear Leatha Maradiaga: The office has attempted to contact you several times to schedule an appointment with Dr Matheus Jerome.  Please call the office at your earliest convenience to schedule an appointment with Dr Matheus Jerome.    If you have any questions or concerns, please don't hesitate to call.     Sincerely,        Millie Boyer MD

## 2021-06-21 NOTE — TELEPHONE ENCOUNTER
Patient called to reschedule her appt due to having another appt tomorrow. I offered to see someone else and she denied and said she only wants to see Dr. Rebecca Kulkarni. The schedule did not have any openings. Please advise patient of next available appt opening.  Thanks

## 2021-06-22 NOTE — TELEPHONE ENCOUNTER
PC to pt-- pt stated she would like to reschedule appt she cancelled, writer informed pt that there are any available appts at this time. And for July Dr. Berna Fisher will be out of office the first week, advised pt to call back in the middle in the of July to see if there is any openings for August. Pt informed writer there isn't anything wrong she doesn't need any refills she just wanted to reschedule her appt.

## 2021-06-24 DIAGNOSIS — M54.16 CHRONIC LUMBAR RADICULOPATHY: ICD-10-CM

## 2021-06-24 NOTE — TELEPHONE ENCOUNTER
surgical syndrome     Venous insufficiency of both lower extremities     Weakness of both lower extremities     Recurrent UTI     Chronic lumbar radiculopathy     Left leg weakness     History of lumbar surgery     Chronic bilateral low back pain with bilateral sciatica     Hemiplegia affecting left nondominant side ([address]

## 2021-06-25 RX ORDER — IBUPROFEN 600 MG/1
600 TABLET ORAL EVERY 8 HOURS PRN
Qty: 30 TABLET | Refills: 0 | Status: SHIPPED | OUTPATIENT
Start: 2021-06-25 | End: 2021-09-10

## 2021-07-12 ENCOUNTER — TELEPHONE (OUTPATIENT)
Dept: INTERNAL MEDICINE | Age: 61
End: 2021-07-12

## 2021-07-12 NOTE — LETTER
[address] [address]  Phone: [phone]  Fax: [phone]    Panfilo Feliciano MD        July 12, 2021    [address] [facility] [address] [address]      Dear Pepper Luz: This letter is a reminder that you may have diagnostic Mammogram that has not been completed. It is important to your well-being that this test is performed. Please find the outstanding order attached. You can have the test completed at [facility] or Critical access hospital. Please see the order for scheduling instructions. Please call [phone] to schedule an appointment. We are also now offering service at our [facility] for more information or to schedule your mammogram call [address] [place] [address]([phone])  Please call our office at Dept: [phone] for additional information on the outstanding test or let us know if they have been completed so we may update your chart. If you have any questions or concerns, please don't hesitate to call.     Sincerely,        Panfilo Feliciano MD

## 2021-07-15 ENCOUNTER — TELEPHONE (OUTPATIENT)
Dept: INTERNAL MEDICINE | Age: 61
End: 2021-07-15

## 2021-07-15 NOTE — TELEPHONE ENCOUNTER
Carly Bernal with [employer] called because she is going to the patients home today to do an assessment for an in home waiver and wanted to know if Dr. Macario Eubanks has any input on the patient?

## 2021-08-03 ENCOUNTER — TELEPHONE (OUTPATIENT)
Dept: INTERNAL MEDICINE | Age: 61
End: 2021-08-03

## 2021-08-03 NOTE — TELEPHONE ENCOUNTER
----- Message from Stephanie Elizabeth sent at 8/3/2021  4:01 PM EDT -----  Subject: Message to Provider    QUESTIONS  Information for Provider? patient has been trying to contact Dr. Fadia Alicea or   her MA, please f/u with patient  ---------------------------------------------------------------------------  --------------  CALL BACK INFO  What is the best way for the office to contact you? OK to leave message on   voicemail  Preferred Call Back Phone Number? [phone]  ---------------------------------------------------------------------------  --------------  SCRIPT ANSWERS  Relationship to Patient?  Self

## 2021-08-12 ENCOUNTER — OFFICE VISIT (OUTPATIENT)
Dept: INTERNAL MEDICINE | Age: 61
End: 2021-08-12
Payer: MEDICARE

## 2021-08-12 ENCOUNTER — TELEPHONE (OUTPATIENT)
Dept: INTERNAL MEDICINE | Age: 61
End: 2021-08-12

## 2021-08-12 VITALS
DIASTOLIC BLOOD PRESSURE: 72 MMHG | SYSTOLIC BLOOD PRESSURE: 115 MMHG | HEIGHT: 71 IN | BODY MASS INDEX: 41.02 KG/M2 | HEART RATE: 81 BPM | WEIGHT: 293 LBS

## 2021-08-12 DIAGNOSIS — M48.061 SPINAL STENOSIS AT L4-L5 LEVEL: ICD-10-CM

## 2021-08-12 DIAGNOSIS — M54.16 CHRONIC LUMBAR RADICULOPATHY: Primary | ICD-10-CM

## 2021-08-12 DIAGNOSIS — E66.01 MORBID OBESITY DUE TO EXCESS CALORIES (HCC): ICD-10-CM

## 2021-08-12 PROCEDURE — 1036F TOBACCO NON-USER: CPT | Performed by: INTERNAL MEDICINE

## 2021-08-12 PROCEDURE — 99213 OFFICE O/P EST LOW 20 MIN: CPT | Performed by: INTERNAL MEDICINE

## 2021-08-12 PROCEDURE — G8417 CALC BMI ABV UP PARAM F/U: HCPCS | Performed by: INTERNAL MEDICINE

## 2021-08-12 PROCEDURE — G8427 DOCREV CUR MEDS BY ELIG CLIN: HCPCS | Performed by: INTERNAL MEDICINE

## 2021-08-12 PROCEDURE — 3017F COLORECTAL CA SCREEN DOC REV: CPT | Performed by: INTERNAL MEDICINE

## 2021-08-12 ASSESSMENT — ENCOUNTER SYMPTOMS
RESPIRATORY NEGATIVE: 1
ALLERGIC/IMMUNOLOGIC NEGATIVE: 1
CONSTIPATION: 1
EYES NEGATIVE: 1
BACK PAIN: 1

## 2021-08-12 NOTE — PROGRESS NOTES
[facility]   Progress Note        Date of patient's visit: 8/12/2021  Patient's Name:  Janes Crabtree                   YOB: 1960        PCP:  Protillo Post MD    Janes Crabtree is a 57 y.o. female who presents for   Chief Complaint   Patient presents with    Other     Patient here for F2F for lift chair    and follow up of chronic medical problems. Patient Active Problem List   Diagnosis    Morbid obesity due to excess calories (HCC)    Hyperlipemia    Chronic pelvic pain in female    Spinal stenosis at L4-L5 level    Lumbar disc herniation    Adjustment disorder with mixed anxiety and depressed mood    Anemia    Morbid obesity with BMI of 50.0-59.9, adult ([address]    Bilateral stenosis of lateral recess of lumbar spine    Chronic pain of left knee    Angioedema    Congenital pes planus    Failed back surgical syndrome    Venous insufficiency of both lower extremities    Weakness of both lower extremities    Recurrent UTI    Chronic lumbar radiculopathy    Left leg weakness    History of lumbar surgery    Chronic bilateral low back pain with bilateral sciatica    Hemiplegia affecting left nondominant side ([address]       HISTORY OF PRESENT ILLNESS:    History was obtained from the patient. The patient her for ongoing back and leg pain and weakness. She has had multiple lumbar surgeries most recently 2018 she had a surgery that then left her with some residual left leg numbness. She is persistently having significant left paraspinal back pain as well as radiation and associated left lower extremity pain and radiation numbness. Left entire left legnumbness and tingling anterior lateral aspect of the thigh as well as pain that shoots down to the foot. Intermittent right leg symptoms too but more on the left leg with no bowel bladder incontinence or saddle symptoms present.  She has been seen by NS was not advised for any surgery  But recommended to have sofia for spinal cord stimulator trial. She went to pain management which reviewed her recent diagnostic work-up with a MRI lumbar spine that showed L5 level disc herniation  And they suggested for epidural steroid injection for her chronic sciatica symptoms and If that intervention fails then neuromodulation trial of spinal cord stimulator should be considered  Patient states she is not interested in any interventional procedure and choose to follow up prn. At this time she reports continued pain  And weakness in legs due to this condition and exacerbated by arthritis of her knees and weight is also contributing. She is requesting and in my opinion requires a patient lift for the transfer between bed and a chair, commode and walker. Without the use of the lift, the patient would be bed confined. Patient's allergies, medications, past medical, surgical, social and family histories were reviewed and updated as appropriate. ALLERGIES      Allergies   Allergen Reactions    Keflex [Cephalexin] Swelling    Mobic [Meloxicam]      Muscle cramping/spasm         MEDICATIONS:      Current Outpatient Medications   Medication Sig Dispense Refill    ibuprofen (ADVIL;MOTRIN) 600 MG tablet TAKE 1 TABLET BY MOUTH EVERY 8 HOURS AS NEEDED FOR PAIN. 30 tablet 0    vitamin D (ERGOCALCIFEROL) 1.25 MG (97906 UT) CAPS capsule Take 1 capsule by mouth once a week 4 capsule 5    vitamin D3 (CHOLECALCIFEROL) 25 MCG (1000 UT) TABS tablet Take 1 tablet by mouth daily 90 tablet 1    Coenzyme Q10 (CO Q 10) 10 MG CAPS Take 1 capsule by mouth daily 30 capsule 5    carvedilol (COREG) 3.125 MG tablet Take 1 tablet by mouth 2 times daily 60 tablet 3    atorvastatin (LIPITOR) 40 MG tablet Take 1 tablet by mouth daily 30 tablet 3    tiZANidine (ZANAFLEX) 4 MG tablet Take 4 mg by mouth 3 times daily as needed      oxyCODONE-acetaminophen (PERCOCET) 5-325 MG per tablet Take 1 tablet by mouth every 6 hours as needed for Pain.       furosemide (LASIX) 20 MG tablet Take 1 tablet by mouth daily 60 tablet 3    hydroCHLOROthiazide (HYDRODIURIL) 50 MG tablet Take 1 tablet by mouth daily 30 tablet 5    Blood Pressure Monitoring (BLOOD PRESSURE CUFF) MISC Blood Pressure Monitoring (BLOOD PRESSURE CUFF) MISC Blood Pressure Monitoring (BLOOD PRESSURE CUFF) MISC Indications: Essential hypertension Use as directed 1 each 0 04/02/2020 Active 04-  Joshua Ardon ([id number])     [address] [place] [address] VITAMIN E PO Take by mouth daily      aspirin 81 MG tablet Take 2 tablets by mouth daily 60 tablet 3    ascorbic acid (VITAMIN C) 500 MG tablet Take 1 tablet by mouth daily 30 tablet 3    Multiple Vitamin (THERAPEUTIC MULTIVITAMIN PO) Take 1 tablet by mouth       No current facility-administered medications for this visit.        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    Anemia 10/5/2016    Arthritis     Asthma 1/4/2017    Bilateral leg and foot pain 02/2019    left much worse    Degeneration of cervical intervertebral disc 6/21/2018    Depression 2/2/2016    Failed back syndrome 4/3/2018    History of recurrent UTI (urinary tract infection)     Hyperlipidemia     Hypertension     Lumbar disc herniation with radiculopathy 6/4/2013    Obesity     Spinal stenosis     Wound infection after surgery      Past Surgical History:   Procedure Laterality Date    BACK SURGERY      FOOT SURGERY Bilateral     heel spurs, plantar fascia release    GALLBLADDER SURGERY      LUMBAR SPINE SURGERY  1986    [address] LUMBAR SPINE SURGERY  4/2012    [place] [facility] NERVE BLOCK  10/26/2015    caudal# 1 decadron 7.5 mg    NERVE BLOCK  12/22/15    caudal #2  celestone 9mg    SPINE SURGERY questions answered. Pt voiced understanding. 4.  Continue current medications, diet and exercise. No orders of the defined types were placed in this encounter. Completed Refills               Requested Prescriptions      No prescriptions requested or ordered in this encounter       5. Patient given educational materials - see patient instructions    6. Was a self-tracking handout given in paper form or via [employer]? Yes  If yes, see orders or list here. Orders Placed This Encounter   Procedures    DME Order for Patient Lift as OP     You must complete the order parameters below and add the medical necessity documentation for this DME in a separate note. Patient lift for home use    Diagnosis: spinal stenosis, leg weakness  Length of need: Lifetime       No follow-ups on file. Patient voiced understanding and agreed to treatment plan. This note is created with the assistance of a speech-recognition program. While intending to generate a document that actually reflects the content of the visit, the document can still have some mistakes which may not have been identified and corrected by editing.       Dr Al Jaffe MD, [address]  Associate , Department of Internal Medicine  Resident Ambulatory Site Medical Director  [address] [facility] Internal Medicine  [facility]  Internal Medicine Clerkship - Jamal Valladares                   8/12/2021, 3:08 PM

## 2021-08-12 NOTE — PATIENT INSTRUCTIONS
Printed script for Lift Chair given to pt with signed office notes. Patient to return to clinic AWV (8/17/21). Patient to return to clinic 3 months (office will call and schedule appt). AVS reviewed and given to pt. It is very important for your care that you keep your appointment. If for some reason you are unable to keep your appointment it is equally important that you call our office at [phone] to cancel your appointment and reschedule. Failure to do so may result in your termination from our practice.   MB

## 2021-08-12 NOTE — TELEPHONE ENCOUNTER
----- Message from Sandra Buckner sent at 8/12/2021  4:15 PM EDT -----  Subject: Message to Provider    QUESTIONS  Information for Provider? Jazmin () is calling for Pt, Pt   asked for a lift chair, they had PT out there to evaluate her and they   said they do recommend it. Jazmin would need the script faxed over to her   [phone]- Attention Jazmin CHAMPION   ---------------------------------------------------------------------------  --------------  Yefri WHITMORE  What is the best way for the office to contact you? OK to leave message on   voicemail  Preferred Call Back Phone Number? [phone]  ---------------------------------------------------------------------------  --------------  SCRIPT ANSWERS  Relationship to Patient? Third Party  Representative Name?  Wale Quiroga

## 2021-08-17 ENCOUNTER — VIRTUAL VISIT (OUTPATIENT)
Dept: INTERNAL MEDICINE | Age: 61
End: 2021-08-17
Payer: MEDICARE

## 2021-08-17 DIAGNOSIS — Z00.00 ROUTINE GENERAL MEDICAL EXAMINATION AT A HEALTH CARE FACILITY: ICD-10-CM

## 2021-08-17 PROCEDURE — 3017F COLORECTAL CA SCREEN DOC REV: CPT | Performed by: NURSE PRACTITIONER

## 2021-08-17 PROCEDURE — G0438 PPPS, INITIAL VISIT: HCPCS | Performed by: NURSE PRACTITIONER

## 2021-08-17 ASSESSMENT — PATIENT HEALTH QUESTIONNAIRE - PHQ9
SUM OF ALL RESPONSES TO PHQ QUESTIONS 1-9: 2
1. LITTLE INTEREST OR PLEASURE IN DOING THINGS: 1
2. FEELING DOWN, DEPRESSED OR HOPELESS: 1
SUM OF ALL RESPONSES TO PHQ9 QUESTIONS 1 & 2: 2

## 2021-08-17 ASSESSMENT — LIFESTYLE VARIABLES: HOW OFTEN DO YOU HAVE A DRINK CONTAINING ALCOHOL: 0

## 2021-08-17 NOTE — PATIENT INSTRUCTIONS
Learning About Low-Carbohydrate Diets  What is a low-carbohydrate diet? A low-carbohydrate (or \"low-carb\") diet limits foods and drinks that have carbohydrates. This includes grains, fruits, milk and yogurt, and starchy vegetables like potatoes, beans, and corn. It also avoids foods and drinks that have added sugar. Instead, low-carb diets include foods that are high in protein and fat. Why might you follow a low-carb diet? Low-carb diets may be used for a variety of reasons, such as for weight loss. People who have diabetes may use a low-carb diet to help manage their blood sugar levels. What should you do before you start the diet? Talk to your doctor before you try any diet. This is even more important if you have health problems like kidney disease, heart disease, or diabetes. Your doctor may suggest that you meet with a registered dietitian. A dietitian can help you make an eating plan that works for you. What foods do you eat on a low-carb diet? On a low-carb diet, you choose foods that are high in protein and fat. Examples of these are:  · Meat, poultry, and fish. · Eggs. · Nuts, such as walnuts, pecans, almonds, and peanuts. · Peanut butter and other nut butters. · Tofu. · Avocado. · Hazel Primer. · Non-starchy vegetables like broccoli, cauliflower, green beans, mushrooms, peppers, lettuce, and spinach. · Unsweetened non-dairy milks like almond milk and coconut milk. · Cheese, cottage cheese, and cream cheese. Current as of: December 17, 2020               Content Version: 12.9  © 3056-5551 Healthwise, [place]. Care instructions adapted under license by [facility] ([facility]). If you have questions about a medical condition or this instruction, always ask your healthcare professional. [address] any warranty or liability for your use of this information.            Eating Healthy Foods: Care Instructions  Your Care Instructions     Eating healthy foods can help lower your risk for disease. Healthy food gives you energy and keeps your heart strong, your brain active, your muscles working, and your bones strong. A healthy diet includes a variety of foods from the basic food groups: grains, vegetables, fruits, milk and milk products, and meat and beans. Some people may eat more of their favorite foods from only one food group and, as a result, miss getting the nutrients they need. So, it is important to pay attention not only to what you eat but also to what you are missing from your diet. You can eat a healthy, balanced diet by making a few small changes. Follow-up care is a key part of your treatment and safety. Be sure to make and go to all appointments, and call your doctor if you are having problems. It's also a good idea to know your test results and keep a list of the medicines you take. How can you care for yourself at home? Look at what you eat  · Keep a food diary for a week or two and record everything you eat or drink. Track the number of servings you eat from each food group. · For a balanced diet every day, eat a variety of:  ? 6 or more ounce-equivalents of grains, such as cereals, breads, crackers, rice, or pasta, every day. An ounce-equivalent is 1 slice of bread, 1 cup of ready-to-eat cereal, or ½ cup of cooked rice, cooked pasta, or cooked cereal.  ? 2½ cups of vegetables, especially:  § Dark-green vegetables such as broccoli and spinach. § Orange vegetables such as carrots and sweet potatoes. § Dry beans (such as shaffer and kidney beans) and peas (such as lentils). ? 2 cups of fresh, frozen, or canned fruit. A small apple or 1 banana or orange equals 1 cup. ? 3 cups of nonfat or low-fat milk, yogurt, or other milk products. ? 5½ ounces of meat and beans, such as chicken, fish, lean meat, beans, nuts, and seeds. One egg, 1 tablespoon of peanut butter, ½ ounce nuts or seeds, or ¼ cup of cooked beans equals 1 ounce of meat.   · Learn how to read food labels items. Make healthy choices easy  · Buy packaged, prewashed, ready-to-eat fresh vegetables and fruits, such as baby carrots, salad mixes, and chopped or shredded broccoli and cauliflower. · Buy packaged, presliced fruits, such as melon or pineapple. · Choose 100% fruit or vegetable juice instead of soda. Limit juice intake to 4 to 6 oz (½ to ¾ cup) a day. · Blend low-fat yogurt, fruit juice, and canned or frozen fruit to make a smoothie for breakfast or a snack. Where can you learn more? Go to [email or web address] and sign in to your [employer] account. Enter P602 in the [place] box to learn more about \"Eating Healthy Foods: Care Instructions. \"     If you do not have an account, please click on the \"Sign Up Now\" link. Current as of: December 17, 2020               Content Version: 12.9  © 2006-2021 Healthwise, [employer]. Care instructions adapted under license by [facility] . If you have questions about a medical condition or this instruction, always ask your healthcare professional. [place] [address] any warranty or liability for your use of this information. Personalized Preventive Plan for Usman Stanford - 8/17/2021  Medicare offers a range of preventive health benefits. Some of the tests and screenings are paid in full while other may be subject to a deductible, co-insurance, and/or copay. Some of these benefits include a comprehensive review of your medical history including lifestyle, illnesses that may run in your family, and various assessments and screenings as appropriate. After reviewing your medical record and screening and assessments performed today your provider may have ordered immunizations, labs, imaging, and/or referrals for you. A list of these orders (if applicable) as well as your Preventive Care list are included within your After Visit Summary for your review.     Other Preventive Recommendations:    · A preventive eye exam performed by an eye specialist is recommended every 1-2 years to screen for glaucoma; cataracts, macular degeneration, and other eye disorders. · A preventive dental visit is recommended every 6 months. · Try to get at least 150 minutes of exercise per week or 10,000 steps per day on a pedometer . · Order or download the FREE \"Exercise & Physical Activity: Your Everyday Guide\" from [employer]. Call 9-805.345.9497 or search [employer] online. · You need 2526-4653 mg of calcium and 2836-7733 IU of vitamin D per day. It is possible to meet your calcium requirement with diet alone, but a vitamin D supplement is usually necessary to meet this goal.  · When exposed to the sun, use a sunscreen that protects against both UVA and UVB radiation with an SPF of 30 or greater. Reapply every 2 to 3 hours or after sweating, drying off with a towel, or swimming. · Always wear a seat belt when traveling in a car. Always wear a helmet when riding a bicycle or motorcycle. Patient information: Weight loss treatments    INTRODUCTION  Obesity is a major international problem, and Americans are among the heaviest people in the world. The percentage of obese people in the [place] has risen steadily. Many people find that although they initially lose weight by dieting, they quickly regain the weight after the diet ends. Because it so hard to keep weight off over time, it is important to have as much information and support as possible before starting a diet. You are most likely to be successful in losing weight and keeping it off when you believe that your body weight can be controlled. STARTING A WEIGHT LOSS PROGRAM  Some people like to talk to their doctor or nurse to get help choosing the best plan, monitoring progress, and getting advice and support along the way.   To know what treatment (or combination of treatments) will work best, determine your body mass index (BMI) and waist circumference (measurement). The BMI is calculated from your height and weight. A person with a BMI between 25 and 29.9 is considered overweight   A person with a BMI of 30 or greater is considered to be obese  A waist circumference greater than 35 inches (88 cm) in women and 40 inches (102 cm) in men increases the risk of obesity-related complications, such as heart disease and diabetes. People who are obese and who have a larger waist size may need more aggressive weight loss treatment than others. Talk to your doctor or nurse for advice. Types of treatment  Based on your measurements and your medical history, your doctor or nurse can determine what combination of weight loss treatments would work best for you. Treatments may include changes in lifestyle, exercise, dieting, and, in some cases, weight loss medicines or weight loss surgery. Weight loss surgery, also called bariatric surgery, is reserved for people with severe obesity who have not responded to other weight loss treatments. SETTING A WEIGHT LOSS GOAL  It is important to set a realistic weight loss goal. Your first goal should be to avoid gaining more weight and staying at your current weight (or within 5 percent). Many people have a \"dream\" weight that is difficult or impossible to achieve. People at high risk of developing diabetes who are able to lose 5 percent of their body weight and maintain this weight will reduce their risk of developing diabetes by about 50 percent and reduce their blood pressure. This is a success. Losing more than 15 percent of your body weight and staying at this weight is an extremely good result, even if you never reach your \"dream\" or \"ideal\" weight. LIFESTYLE CHANGES  Programs that help you to change your lifestyle are usually run by psychologists or other professionals.  The goals of lifestyle changes are to help you change your eating habits, become more active, and be more aware of how much you eat and exercise, helping you to make healthier choices. This type of treatment can be broken down into three steps: The triggers that make you want to eat   Eating   What happens after you eat  Triggers to eat  Determining what triggers you to eat involves figuring out what foods you eat and where and when you eat. To figure out what triggers you to eat, keep a record for a few days of everything you eat, the places where you eat, how often you eat, and the emotions you were feeling when you ate. For some people, the trigger is related to a certain time of day or night. For others, the trigger is related to a certain place, like sitting at a desk working. Eating  You can change your eating habits by breaking the chain of events between the trigger for eating and eating itself. There are many ways to do this. For instance, you can:  Limit where you eat to a few places (eg, dining room)   Restrict the number of utensils (eg, only a fork) used for eating   Drink a sip of water between each bite   Chew your food a certain number of times   Get up and stop eating every few minutes  What happens after you eat  Rewarding yourself for good eating behaviors can help you to develop better habits. This is not a reward for weight loss; instead, it is a reward for changing unhealthy behaviors. Do not use food as a reward. Some people find money, clothing, or personal care (eg, a hair cut, manicure, or massage) to be effective rewards. Treat yourself immediately after making better eating choices to reinforce the value of the good behavior. You need to have clear behavior goals, and you must have a time frame for reaching your goals.  Reward small changes along the way to your final goal.  Other factors that contribute to successful weight loss  Changing your behavior involves more than just changing unhealthy eating habits; it also involves finding people around you to support your weight loss, reducing stress, and learning to be strong when tempted by food. Establish a \"sandy\" system  Having a friend or family member available to provide support and reinforce good behavior is very helpful. The support person needs to understand your goals. Learn to be strong  Learning to be strong when tempted by food is an important part of losing weight. As an example, you will need to learn how to say \"no\" and continue to say no when urged to eat at parties and social gatherings. Develop strategies for events before you go, such as eating before you go or taking low-calorie snacks and drinks with you. Develop a support system  Having a support system is helpful when losing weight. This is why many commercial groups are successful. Family support is also essential; if your family does not support your efforts to lose weight, this can slow your progress or even keep you from losing weight. Positive thinking  People often have conversations with themselves in their head; these conversations can be positive or negative. If you eat a piece of cake that was not planned, you may respond by thinking, \"Oh, you stupid idiot, you've blown your diet! \" and as a result, you may eat more cake. A positive thought for the same event could be, \"Well, I ate cake when it was not on my plan. Now I should do something to get back on track. \" A positive approach is much more likely to be successful than a negative one. Reduce stress  Although stress is a part of everyday life, it can trigger uncontrolled eating in some people. It is important to find a way to get through these difficult times without eating or by eating low-calorie food, like raw vegetables. It may be helpful to imagine a relaxing place that allows you to temporarily escape from stress. With deep breaths and closed eyes, you can imagine this relaxing place for a few minutes.    Self-help programs  Self-help programs like [place] Watchers®, Overeaters Anonymous®, and Take Off [employer] (TOPS)© work for some people. As with all weight loss programs, you are most likely to be successful with these plans if you make long-term changes in how you eat. CHOOSING A DIET  A calorie is a unit of energy found in food. Your body needs calories to function. The goal of any diet is to burn up more calories than you eat. How quickly you lose weight depends upon several factors, such as your age, gender, and starting weight. Older people have a slower metabolism than young people, so they lose weight more slowly. Men lose more weight than women of similar height and weight when dieting because they use more energy. People who are extremely overweight lose weight more quickly than those who are only mildly overweight. Try not to drink alcohol or drinks with added sugar, and most sweets (candy, cakes, cookies), since they rarely contain important nutrients. Portion-controlled diets  One simple way to diet is to buy packaged foods, like frozen low-calorie meals or meal-replacement canned drinks. A typical meal plan for one day may include:  A meal-replacement drink or breakfast bar for breakfast   A meal-replacement drink or a frozen low-calorie (250 to 350 calories) meal for lunch   A frozen low-calorie meal or other prepackaged, calorie-controlled meal, along with extra vegetables for dinner  This would give you 1000 to 1500 calories per day. Low-fat diet  To reduce the amount of fat in your diet, you can:  Eat low-fat foods. Low-fat foods are those that contain less than 30 percent of calories from fat. Fat is listed on the food facts label (figure 1). Count fat grams. For a 1500 calorie diet, this would mean about 45 g or fewer of fat per day. Low-carbohydrate diet  Low- and very-low-carbohydrate diets (eg, Atkins diet, [employer]) have become popular ways to lose weight quickly.   With a very-low-carbohydrate diet, you eat between 0 and 60 grams of carbohydrates per day (a standard diet contains 200 to 300 grams of carbohydrates)   With a low-carbohydrate diet, you eat between 60 and 130 grams of carbohydrates per day  Carbohydrates are found in fruits, vegetables, and grains (including breads, rice, pasta, and cereal), alcoholic beverages, and in dairy products. Meat and fish do not contain carbohydrates. Side effects of very-low-carbohydrate diets can include constipation, headache, bad breath, muscle cramps, diarrhea, and weakness. Mediterranean diet  The term \"Mediterranean diet\" refers to a way of eating that is common in [place] regions around [facility]. Although there is some variation in Mediterranean diets, there are some similarities. Most Mediterranean diets include:  A high level of monounsaturated fats (from olive or canola oil, walnuts, pecans, almonds) and a low level of saturated fats (from butter)   A high amount of vegetables, fruits, legumes, and grains (7 to 10 servings of fruits and vegetables per day)   A moderate amount of milk and dairy products, mostly in the form of cheese. Use low-fat dairy products (skim milk, fat-free yogurt, low-fat cheese). A relatively low amount of red meat and meat products. Substitute fish or poultry for red meat. For those who drink alcohol, a modest amount (mainly as red wine) may help to protect against cardiovascular disease. A modest amount is up to one (4 ounce) glass per day for women and up to two glasses per day for men. Which diet is best?  Studies have compared different diets, including:  Very-low-carbohydrate (Atkins)   Macronutrient balance controlling glycemic load (Zone®)   Reduced-calorie (Weight Watchers®)   Very-low-fat (Ornish)  No one diet is \"best\" for weight loss. Any diet will help you to lose weight if you stick with the diet. Therefore, it is important to choose a diet that includes foods you like.   Fad diets  Fad diets often promise quick weight loss (more than 1 to 2 pounds per week) and may claim that you do not need to exercise or give up favorite foods. Some fad diets cost a lot of money, because you have to pay for seminars or pills. Fad diets generally lack any scientific evidence that they are safe and effective, but instead rely on \"before\" and \"after\" photos or testimonials. Diets that sound too good to be true usually are. These plans are a waste of time and money and are not recommended. A doctor, nurse, or nutritionist can help you find a safe and effective way to lose weight and keep it off. WEIGHT LOSS [place] a weight loss medicine may be helpful when used in combination with diet, exercise, and lifestyle changes. However, it is important to understand the risks and benefits of these medicines. It is also important to be realistic about your goal weight using a weight loss medicine; you may not reach your \"dream\" weight, but you may be able to reduce your risk of diabetes or heart disease. Weight loss medicines may be recommended for people who have not been able to lose weight with diet and exercise who have a:  BMI of 30 or more    BMI between 27 and 29.9 and have other medical problems, such as diabetes, high cholesterol, or high blood pressure  Two weight loss medicines are approved in the [place] for long-term use. These are sibutramine and orlistat. Other weight loss medicines (phentermine, diethylpropion) are available but are only approved for short-term use (up to 12 weeks). Sibutramine  Sibutramine (Meridia®, Reductil®) is a medicine that reduces your appetite. In people who take the medicine for one year, the average weight loss is 10 percent of the initial body weight (5 percent more than those who took a placebo treatment). Side effects of sibutramine include insomnia, dry mouth, and constipation. Increases in blood pressure can occur. Therefore, blood pressure is usually monitored during treatment.  There is no evidence that sibutramine causes heart or lung problems (like dexfenfluramine and fenfluramine (Phen/Fen)). However, experts agree that sibutramine should not used by people with coronary heart disease, heart failure, uncontrolled hypertension, stroke, irregular heart rhythms, or peripheral vascular disease (poor circulation in the legs). Orlistat  Orlistat (Xenical® 120 mg capsules) is a medicine that reduces the amount of fat your body absorbs from the foods you eat. A lower-dose version is now available without a prescription (Davide® 60 mg capsules) in many countries, including the [place]. The medicine is recommended three times per day, taken with a meal; you can skip a dose if you skip a meal or if the meal contains no fat. After one year of treatment with orlistat, the average weight loss is approximately 8 to 10 percent of initial body weight (4 percent more than in those who took a placebo). Cholesterol levels often improve, and blood pressure sometimes falls. In people with diabetes, orlistat may help control blood sugar levels. Side effects occur in 15 to 10 percent of people and may include stomach cramps, gas, diarrhea, leakage of stool, or oily stools. These problems are more likely when you take orlistat with a high-fat meal (if more than 30 percent of calories in the meal are from fat). Side effects usually improve as you learn to avoid high-fat foods. Severe liver injury has been reported rarely in patients taking orlistat, but it is not known if orlistat caused the liver problems. Diet supplements  Diet supplements are widely used by people who are trying to lose weight, although the safety and efficacy of these supplements are often unproven. A few of the more common diet supplements are discussed below; none of these are recommended because they have not been studied carefully, and there is no proof they are safe or effective.   Chitosan and wheat dextrin are ineffective for weight loss, and their use is not recommended. Ephedra, a compound related to ephedrine, is no longer available in the [place] due to safety concerns. Many nonprescription diet pills previously contained ephedra. Although some studies have shown that ephedra helps with weight loss, there can be serious side effects (psychiatric symptoms, palpitations, and stomach upset), including death. There are not enough data about the safety and efficacy of chromium, ginseng, glucomannan, green tea, hydroxycitric acid, L carnitine, psyllium, pyruvate supplements, Hiram wort, and conjugated linoleic acid. Two supplements from [facility], [address] [place] (also known as the Gris Huerta 15 pill) and Herbathin dietary supplement, have been shown to contain prescription drugs. Hoodia gordonii is a dietary supplement derived from a plant in [place]. It is not recommended because there is no proof that it is safe or effective. Bitter orange (Citrus aurantium) can increase your heart rate and blood pressure and is not recommended. SHOULD I HAVE SURGERY TO LOSE WEIGHT?  Weight loss surgery is recommended ONLY for people with one of the following:  Severe obesity (body mass index above 40) (calculator 1 and calculator 2) who have not responded to diet, exercise, or weight loss medicines   Body mass index between 35 and 40, along with a serious medical problem (including diabetes, severe joint pain, or sleep apnea) that would improve with weight loss  You should be sure that you understand the potential risks and benefits of weight loss surgery. You must be motivated and willing to make lifelong changes in how you eat to reach and maintain a healthier weight after surgery. You must also be realistic about weight loss after surgery (see 'Effectiveness of weight loss surgery' below). PREPARING FOR WEIGHT LOSS SURGERY  Most people who have weight loss surgery will meet with several specialists before surgery is scheduled.  This often includes a dietitian, mental health counselor, a doctor who specializes in care of obese people, and a surgeon who performs weight loss surgery (bariatric surgeon). You may need to work with these providers for several weeks or months before surgery. The nutritionist will explain what and how much you will be able to eat after surgery. You may also need to lose a small amount of weight before surgery. The mental health specialist will help you to cope with stress and other factors that can make it harder to lose weight or trigger you to eat   The medical doctor will determine whether you need other tests, counseling, or treatment before surgery. He or she might also help you begin a medical weight loss program so that you can lose some weight before surgery. The bariatric surgeon will meet with you to discuss the surgeries available to treat obesity. He or she will also make sure you are a good candidate for surgery. TYPES OF WEIGHT LOSS SURGERY  There are several types of weight loss surgeries, the most common being lap banding, gastric bypass, and gastric sleeve. Lap banding  Laparoscopic adjustable gastric banding (LAGB), or lap banding, is a surgery that uses an adjustable band around the opening to the stomach (figure 1). This reduces the amount of food that you can eat at one time. Lap banding is done through small incisions, with a laparoscope. The band can be adjusted after surgery, allowing you to eat more or less food. Adjustments to the size and tightness of the band are made by using a needle to add or remove fluid from a port (a small container under the skin that is connected to the band). Adding fluid to the band makes it tighter which restricts the amount of food you can eat and may help you to lose more weight. Lap banding is a popular choice because it is relatively simple to perform, can be adjusted or removed, and has a low risk of serious complications immediately after surgery.  However, weight loss with the lap band depends on your ability to follow the program closely. You will need to prepare nutritious meals that [facility] with\" the band, not against it. For example, the lap band will not work well if you eat or drink a large amount of liquid calories (like ice cream). The band will not help you to feel full when you eat/drink liquid calories. Weight loss ranges from 45 to 75 percent after two years. As an example, a person who is 120 pounds overweight could expect to lose approximately 54 to 90 pounds in the two years after lap banding. Gastric bypass  Zen-en-Y gastric bypass, also called gastric bypass, helps you to lose weight by reducing the amount of food you can eat and reducing the number of calories and nutrients you absorb from the food you eat. To perform gastric bypass, a surgeon creates a small stomach pouch by dividing the stomach and attaching it to the small intestine. This helps you to lose weight in two ways: The smaller stomach can hold less food than before surgery. This causes you to feel full after eating a very small amount of food or liquid. Over time, the pouch might stretch, allowing you to eat more food. The body absorbs fewer calories, since food bypasses most of the stomach as well as the upper small intestine. This new arrangement seems to decrease your appetite and change how you break down foods by changing the release of various hormones. Gastric bypass can be performed as open surgery (through an incision on the abdomen) or laparoscopically, which uses smaller incisions and smaller instruments. Both the laparoscopic and open techniques have risks and benefits. You and your surgeon should work together to decide which surgery, if any, is right for you. Gastric bypass has a high success rate, and people lose an average of 62 to 68 percent of their excess body weight in the first year.  Weight loss typically levels off after one to two years, with an overall excess weight loss between 48 and 75 percent. For a person who is 120 pounds overweight, an average of 60 to 90 pounds of weight loss would be expected. Gastric sleeve  Gastric sleeve, also known as sleeve gastrectomy, is a surgery that reduces the size of the stomach and makes it into a narrow tube (figure 3). The new stomach is much smaller and produces less of the hormone (ghrelin) that causes hunger, helping you feel satisfied with less food. Sleeve gastrectomy is safer than gastric bypass because the intestines are not rearranged, and there is less chance of malnutrition. It also appears to control hunger better than lap banding. It might be safer than the lap banding because no foreign materials are used. The gastric sleeve has a good success rate, and people lose an average of 33 percent of their excess body weight in the first year. For a person who is 120 pounds overweight, this would mean losing about 40 pounds in the first year. WEIGHT LOSS SURGERY COMPLICATIONS  A variety of complications can occur with weight loss surgery. The risks of surgery depend upon which surgery you have and any medical problems you had before surgery. Some of the more common early surgical complications (one to six weeks after surgery) include:  Bleeding   Infection   Blockage or tear in the bowels   Need for further surgery  Important medical complications after surgery can include blood clots in the legs or lungs, heart attack, pneumonia, and urinary tract infection. Complications are less likely when surgery is performed in centers that are experienced in weight loss surgery. In general, centers with experience in weight loss surgery have:  Board-certified doctors and surgeons   A team of support staff (dietitians, counselors, nurses)   Long-term follow-up after surgery   Hospital staff experienced with the care of weight loss patients.  This includes nurses who are trained in the care of patients immediately after surgery and anesthesiologists who are experienced in caring for the morbidly obese. EFFECTIVENESS OF WEIGHT LOSS SURGERY  The goal of weight loss surgery is to reduce the risk of illness or death associated with obesity. Weight loss surgery can also help you to feel and look better, reduce the amount of money you spend on medicines, and cut down on sick days. As an example, weight loss surgery can improve health problems related to obesity (diabetes, high blood pressure, high cholesterol, sleep apnea) to the point that you need less or no medicine. Finally, weight loss surgery might reduce your risk of developing heart disease, cancer, and certain infections. AFTER WEIGHT LOSS SURGERY  You will need to stay in the hospital until your team feels that it is safe for you to leave (on average, one to three days). Do not drive if you are taking prescription pain medicine. Begin exercising as soon as possible once you have healed; most weight loss centers will design an exercise program for you. Once you are home, it is important to eat and drink exactly what your doctor and dietitian recommend. You will see your doctor, nurse, and dietitian on a regular basis after surgery to monitor your health, diet, and weight loss. You will be able to slowly increase how much you eat over time, although it will always be important to:  Eat small, frequent meals and not skip meals   Chew your food slowly and completely   Avoid eating while \"distracted\" (such as eating while watching TV)   Stop eating when you feel full   Drink liquids at least 30 minutes before or after eating   Avoid foods high in fat or sugar   Take vitamin supplements, as recommended  It can take several months to learn to listen to your body so that you know when you are hungry and when you are full. You may dislike foods you previously loved, and you may begin to prefer new foods.  This can be a frustrating process for some people, so talk to your dietitian if you are having trouble. It usually takes between one and two years to lose weight after surgery. After reaching their goal weight, some people have plastic surgery (called \"body contouring\") to remove excess skin from the body, particularly in the abdominal area. Before you decide to have weight loss surgery, you must commit to staying healthy for life. This includes following up with your healthcare team, exercising most days of the week, and eating a sensible diet every day. It can be difficult to develop new eating and exercise habits after weight loss surgery, and you will have to work hard to stick to your goals. Recovering from surgery and losing weight can be stressful and emotional, and it is important to have the support of family and friends. Working with a , therapist, or support group can help you through the ups and downs. WHERE TO GET MORE INFORMATION  Your healthcare provider is the best source of information for questions and concerns related to your medical problem. This article will be updated as needed every four months on our Web site ([email or web address])  ·     Keep Your Memory Anthony Arevalo       Many factors can affect your ability to remembera hectic lifestyle, aging, stress, chronic disease, and certain medicines. But, there are steps you can take to sharpen your mind and help preserve your memory. Challenge Your Brain   Regularly challenging your mind may help keeps it in top shape. Good mental exercises include:   Crossword puzzlesUse a dictionary if you need it; you will learn more that way. Brainteasers Try some! Crafts, such as wood working and sewing   Hobbies, such as gardening and building model airplanes   SocializingVisit old friends or join groups to meet new ones.    Reading   Learning a new language   Taking a class, whether it be art history or yvonne chi   TravelingExperience the food, history, and culture of your destination   Learning to use a computer beneficial effect. Examples of \"moderate\" exercise include:   Playing 18 holes of golf once a week, without a cart   Playing tennis twice a week   Walking one mile per day   Manage Stress    It can be tough to remember what is important when your mind is cluttered. Make time for relaxation. Choose activities that calm you down, and make it routine. Manage Chronic Conditions    Side effects of high blood pressure , diabetes, and heart disease can interfere with mental function. Many of the lifestyle steps discussed here can help manage these conditions. Strive to eat a healthy diet, exercise regularly, get stress under control, and follow your doctor's advice for your condition. Minimize Medications    Talk to your doctor about the medicines that you take. Some may be unnecessary. Also, healthy lifestyle habits may lower the need for certain drugs.      Last Reviewed: April 2010 Vane Stevenson MD   Updated: 4/13/2010   ·

## 2021-08-17 NOTE — PROGRESS NOTES
Medicare Annual Wellness Visit  Are Name: Mono Cortez Date: 2021   MRN: [id number] Sex: Female   Age: 57 y.o. Ethnicity: Non- / Non    : 1960 Race: Black /       Jose Cruz Jackson is here for Medicare AWV (COVID vaccine declined, PAP and mammo to be scheduled per pt )    Screenings for behavioral, psychosocial and functional/safety risks, and cognitive dysfunction are all negative except as indicated below. These results, as well as other patient data from the [address] form, are documented in Flowsheets linked to this Encounter. Allergies   Allergen Reactions    Keflex [Cephalexin] Swelling    Mobic [Meloxicam]      Muscle cramping/spasm         Prior to Visit Medications    Medication Sig Taking? Authorizing Provider   ibuprofen (ADVIL;MOTRIN) 600 MG tablet TAKE 1 TABLET BY MOUTH EVERY 8 HOURS AS NEEDED FOR PAIN. Yes Jena Villanueva MD   vitamin D (ERGOCALCIFEROL) 1.25 MG (60789 UT) CAPS capsule Take 1 capsule by mouth once a week Yes Jena Villanueva MD   vitamin D3 (CHOLECALCIFEROL) 25 MCG (1000 UT) TABS tablet Take 1 tablet by mouth daily Yes Jena Villanueva MD   Coenzyme Q10 (CO Q 10) 10 MG CAPS Take 1 capsule by mouth daily Yes Jena Villanueva MD   carvedilol (COREG) 3.125 MG tablet Take 1 tablet by mouth 2 times daily Yes Jena Villanueva MD   atorvastatin (LIPITOR) 40 MG tablet Take 1 tablet by mouth daily Yes Jena Villanueva MD   tiZANidine (ZANAFLEX) 4 MG tablet Take 4 mg by mouth 3 times daily as needed Yes Historical Provider, MD   oxyCODONE-acetaminophen (PERCOCET) 5-325 MG per tablet Take 1 tablet by mouth every 6 hours as needed for Pain.  Yes Historical Provider, MD   furosemide (LASIX) 20 MG tablet Take 1 tablet by mouth daily Yes Jena Villanueva MD   hydroCHLOROthiazide (HYDRODIURIL) 50 MG tablet Take 1 tablet by mouth daily Yes Jena Villanueva MD   VITAMIN E PO Take by mouth daily Yes Historical Provider, MD   aspirin 81 MG tablet Take 2 tablets by mouth daily Yes Farooq Iverson MD   ascorbic acid (VITAMIN C) 500 MG tablet Take 1 tablet by mouth daily Yes Farooq Iverson MD   Multiple Vitamin (THERAPEUTIC MULTIVITAMIN PO) Take 1 tablet by mouth Yes Historical Provider, MD   Blood Pressure Monitoring (BLOOD PRESSURE CUFF) MISC Blood Pressure Monitoring (BLOOD PRESSURE CUFF) MISC Blood Pressure Monitoring (BLOOD PRESSURE CUFF) MISC Indications: Essential hypertension Use as directed 1 each 0 04/02/2020 Active 04-  [address] [place] [address] ([id number])  Patient not taking: Reported on 8/17/2021  Historical Provider, MD         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    Anemia 10/5/2016    Arthritis     Asthma 1/4/2017    Bilateral leg and foot pain 02/2019    left much worse    Degeneration of cervical intervertebral disc 6/21/2018    Depression 2/2/2016    Failed back syndrome 4/3/2018    History of recurrent UTI (urinary tract infection)     Hyperlipidemia     Hypertension     Lumbar disc herniation with radiculopathy 6/4/2013    Obesity     Spinal stenosis     Wound infection after surgery        Past Surgical History:   Procedure Laterality Date    BACK SURGERY      FOOT SURGERY Bilateral     heel spurs, plantar fascia release    GALLBLADDER SURGERY      LUMBAR [facility]  [facility]   [facility] [employer] LUMBAR SPINE SURGERY  4/2012    [facility]   Gloria[employer]eds NERVE BLOCK  10/26/2015    caudal# 1 decadron 7.5 mg    NERVE BLOCK  12/22/15    caudal #2  celestone 9mg    SPINE SURGERY  07/24/2016    3 places     VEIN SURGERY Bilateral 03/2017         Family History   Problem Relation Age of Onset    Ovarian Cancer Mother     Heart Disease Father     Hypertension Father        CareTeam (Including outside providers/suppliers regularly involved in providing care):   Patient Care Team:  Jaden Michaels MD as PCP - General (Internal Medicine)  Jaden Michaels MD as PCP - [facility] Empaneled Provider  Alis Cooper Matt Daniels MD (Dermatology)  Katherine Correa (Neurosurgery)    Wt Readings from Last 3 Encounters:   08/12/21 (!) 350 lb 6.4 oz (158.9 kg)   05/25/21 (!) 347 lb (157.4 kg)   03/09/21 (!) 325 lb (147.4 kg)      Patient-Reported Vitals 8/17/2021   Patient-Reported Weight 350#   Patient-Reported Height 5'11\"   Patient-Reported Systolic 245   Patient-Reported Diastolic 98      There is no height or weight on file to calculate BMI. Based upon direct observation of the patient, evaluation of cognition reveals recent and remote memory intact. Wt Readings from Last 3 Encounters:   08/12/21 (!) 350 lb 6.4 oz (158.9 kg)   05/25/21 (!) 347 lb (157.4 kg)   03/09/21 (!) 325 lb (147.4 kg)     Temp Readings from Last 3 Encounters:   04/18/21 97.8 °F (36.6 °C) (Temporal)   03/09/21 97.8 °F (36.6 °C) (Infrared)   01/13/21 97.8 °F (36.6 °C) (Temporal)     BP Readings from Last 3 Encounters:   08/12/21 115/72   05/25/21 (!) 141/87   04/18/21 (!) 174/98     Pulse Readings from Last 3 Encounters:   08/12/21 81   05/25/21 86   04/18/21 87       Patient's complete Health Risk Assessment and screening values have been reviewed and are found in Flowsheets. The following problems were reviewed today and where indicated follow up appointments were made and/or referrals ordered. Positive Risk Factor Screenings with Interventions:      Cognitive: Words recalled: 2 Words Recalled  Clock Drawing Test (CDT) Score:  (lise)  Total Score Interpretation: Positive Mini-Cog  Cognitive Impairment Interventions:  · Patient declines any further evaluation/treatment for cognitive impairment  · info sent with S         General Health and ACP:  General  In general, how would you say your health is?: (!) Poor  In the past 7 days, have you experienced any of the following?  New or Increased Pain, New or Increased Fatigue, Loneliness, Social Isolation, Stress or Anger?: None of These  Do you get the social and emotional support that you need?: Yes  Do you have a Living Will?: Yes  Advance Directives     Power of [facility] Will ACP-Advance Directive ACP-Power of     Not on File Not on File Not on File Not on File      General Health Risk Interventions:  ·     Health Habits/Nutrition:  Health Habits/Nutrition  Do you exercise for at least 20 minutes 2-3 times per week?: (!) No  Have you lost any weight without trying in the past 3 months?: No  Do you eat only one meal per day?: No  Have you seen the dentist within the past year?: Appointment is scheduled     Health Habits/Nutrition Interventions:  · Inadequate physical activity:  patient agrees to exercise for at least 150 minutes/week, educational materials provided to promote increased physical activity       Personalized Preventive Plan   Current Health Maintenance Status    There is no immunization history on file for this patient. Health Maintenance   Topic Date Due    Cervical cancer screen  10/14/2017    Annual Wellness Visit (AWV)  Never done    Breast cancer screen  06/07/2021    DTaP/Tdap/Td vaccine (1 - Tdap) 01/13/2022 (Originally 2/11/1979)    COVID-19 Vaccine (1) 08/03/2022 (Originally 2/11/1972)    Shingles Vaccine (1 of 2) 08/12/2022 (Originally 2/11/2009)    Flu vaccine (1) 09/01/2021    Lipid screen  04/30/2022    Potassium monitoring  04/30/2022    Creatinine monitoring  04/30/2022    Colon cancer screen fecal DNA test (Cologuard)  05/19/2024    Hepatitis C screen  Completed    HIV screen  Completed    Hepatitis A vaccine  Aged Out    Hepatitis B vaccine  Aged Out    Hib vaccine  Aged Out    Meningococcal (ACWY) vaccine  Aged Out    Pneumococcal 0-64 years Vaccine  Aged Out     Recommendations for [employer] Due: see orders and patient instructions/AVS.  . Recommended screening schedule for the next 5-10 years is provided to the patient in written form: see Patient Instructions/AVS.    Margret Bhandari was seen today for medicare awv.     Diagnoses and all orders for this visit:    BMI 45.0-49.9, adult [facility])    Routine general medical examination at a health care facility  -     [address] [place] [address]               Janet Garcia is a 57 y.o. female being evaluated by a Virtual Visit (phone) encounter to address concerns as mentioned above. A caregiver was present when appropriate. Due to this being a TeleHealth encounter (During DEVTE-90 public health emergency), evaluation of the following organ systems was limited: Vitals/Constitutional/EENT/Resp/CV/GI//MS/Neuro/Skin/Heme-Lymph-Imm. Pursuant to the emergency declaration under the [address] [place] [address], [address] [place] [address] authority and the [employer] and [employer] Act, this Virtual Visit was conducted with patient's (and/or legal guardian's) consent, to reduce the patient's risk of exposure to COVID-19 and provide necessary medical care. The patient (and/or legal guardian) has also been advised to contact this office for worsening conditions or problems, and seek emergency medical treatment and/or call 911 if deemed necessary. Patient identification was verified at the start of the visit: Yes    Services were provided through phone to substitute for in-person clinic visit. Patient and provider were located at their individual homes. --KELLI Cope CNP on 8/17/2021 at 1:29 PM    An electronic signature was used to authenticate this note.

## 2021-09-08 DIAGNOSIS — M54.16 CHRONIC LUMBAR RADICULOPATHY: ICD-10-CM

## 2021-09-09 NOTE — TELEPHONE ENCOUNTER
level     Lumbar disc herniation     Adjustment disorder with mixed anxiety and depressed mood     Anemia     Morbid obesity with BMI of 50.0-59.9, adult (HCC)     Bilateral stenosis of lateral recess of lumbar spine     Chronic pain of left knee     Angioedema     Congenital pes planus     Failed back surgical syndrome     Venous insufficiency of both lower extremities     Weakness of both lower extremities     Recurrent UTI     Chronic lumbar radiculopathy     Left leg weakness     History of lumbar surgery     Chronic bilateral low back pain with bilateral sciatica     Hemiplegia affecting left nondominant side ([address]

## 2021-09-10 RX ORDER — IBUPROFEN 600 MG/1
600 TABLET ORAL EVERY 8 HOURS PRN
Qty: 30 TABLET | Refills: 0 | Status: SHIPPED | OUTPATIENT
Start: 2021-09-10 | End: 2021-10-11 | Stop reason: SDUPTHER

## 2021-09-27 ENCOUNTER — TELEPHONE (OUTPATIENT)
Dept: INTERNAL MEDICINE | Age: 61
End: 2021-09-27

## 2021-09-27 ENCOUNTER — OFFICE VISIT (OUTPATIENT)
Dept: ORTHOPEDIC SURGERY | Age: 61
End: 2021-09-27
Payer: MEDICARE

## 2021-09-27 VITALS — BODY MASS INDEX: 41.02 KG/M2 | WEIGHT: 293 LBS | HEIGHT: 71 IN

## 2021-09-27 DIAGNOSIS — M17.12 PRIMARY OSTEOARTHRITIS OF LEFT KNEE: Primary | ICD-10-CM

## 2021-09-27 PROCEDURE — 20610 DRAIN/INJ JOINT/BURSA W/O US: CPT | Performed by: ORTHOPAEDIC SURGERY

## 2021-09-27 PROCEDURE — G8427 DOCREV CUR MEDS BY ELIG CLIN: HCPCS | Performed by: ORTHOPAEDIC SURGERY

## 2021-09-27 PROCEDURE — 1036F TOBACCO NON-USER: CPT | Performed by: ORTHOPAEDIC SURGERY

## 2021-09-27 PROCEDURE — 3017F COLORECTAL CA SCREEN DOC REV: CPT | Performed by: ORTHOPAEDIC SURGERY

## 2021-09-27 PROCEDURE — G8417 CALC BMI ABV UP PARAM F/U: HCPCS | Performed by: ORTHOPAEDIC SURGERY

## 2021-09-27 PROCEDURE — 99202 OFFICE O/P NEW SF 15 MIN: CPT | Performed by: ORTHOPAEDIC SURGERY

## 2021-09-27 RX ORDER — LIDOCAINE HYDROCHLORIDE 10 MG/ML
2 INJECTION, SOLUTION INFILTRATION; PERINEURAL ONCE
Status: SHIPPED | OUTPATIENT
Start: 2021-09-27

## 2021-09-27 NOTE — PROGRESS NOTES
Chief Complaint   Patient presents with    New Patient     Left knee pain. Previously seen by DR. Micheal Méndez    This 78-year-old patient is returning here today complaining of pain in the left knee. The pain she describes is across the knee joint anteriorly. She has difficulty walking long distance. The patient says that the knee is swollen and the more is asking for aspiration. Examination: The patient weighs 348 pounds. Knee examination shows she has equal range of motion compared to the other side. The patella is located laterally from the groove. There is marked crepitation in the patellofemoral as well as medial compartment. There is no instability. X-rays: 3 view show the patient has medial and patellofemoral compartment osteoarthrosis. Diagnosis: #1 morbid obesity. #2 primary osteoarthritis patellofemoral and medial compartment with subluxation of the patella. Treatment: I aspirated the knee and really got only about 2 cc of clear fluid. This has been discarded. She also asked about arthroscopy and I said that this would not help her symptoms at all her primary goal should be to reduce the weight and once she does that she would not be a candidate for total knee arthroplasty. We will see her as needed.

## 2021-09-27 NOTE — TELEPHONE ENCOUNTER
PC to patient, spoke with patient she stated she has order and scheudling instructions and will call and schedule overdue mammogram when ready. Advised patient that I will note into her chart that she will contact and schedule mammogram herself.

## 2021-10-08 DIAGNOSIS — M54.16 CHRONIC LUMBAR RADICULOPATHY: ICD-10-CM

## 2021-10-08 NOTE — TELEPHONE ENCOUNTER
Request for ibuprofen. Next Visit Date: Patient is on the waitlist.   No future appointments.     Health Maintenance   Topic Date Due    Cervical cancer screen  10/14/2017    Breast cancer screen  06/07/2021    Flu vaccine (1) Never done    DTaP/Tdap/Td vaccine (1 - Tdap) 01/13/2022 (Originally 2/11/1978)    COVID-19 Vaccine (1) 08/03/2022 (Originally 2/11/1972)    Shingles Vaccine (1 of 2) 08/12/2022 (Originally 2/11/2009)    Lipid screen  04/30/2022    Potassium monitoring  04/30/2022    Creatinine monitoring  04/30/2022    Annual Wellness Visit (AWV)  08/18/2022    Colon cancer screen fecal DNA test (Cologuard)  05/19/2024    Hepatitis C screen  Completed    HIV screen  Completed    Hepatitis A vaccine  Aged Out    Hepatitis B vaccine  Aged Out    Hib vaccine  Aged Out    Meningococcal (ACWY) vaccine  Aged Out    Pneumococcal 0-64 years Vaccine  Aged Out       Hemoglobin A1C (%)   Date Value   04/02/2020 5.6   10/23/2018 5.9   10/05/2015 5.4             ( goal A1C is < 7)   No results found for: LABMICR  LDL Cholesterol (mg/dL)   Date Value   04/30/2021 218 (H)       (goal LDL is <100)   AST (U/L)   Date Value   04/30/2021 20     ALT (U/L)   Date Value   04/30/2021 19     BUN (mg/dL)   Date Value   04/30/2021 19     BP Readings from Last 3 Encounters:   08/12/21 115/72   05/25/21 (!) 141/87   04/18/21 (!) 174/98          (goal 120/80)    All Future Testing planned in [facility]  Lab Frequency Next Occurrence   MAURI Digital Screen Bilateral [[id number]] Once 12/27/2021         Patient Active Problem List:     Morbid obesity due to excess calories (HCC)     Hyperlipemia     Chronic pelvic pain in female     Spinal stenosis at L4-L5 level     Lumbar disc herniation     Adjustment disorder with mixed anxiety and depressed mood     Anemia     Morbid obesity with BMI of 50.0-59.9, adult ([address]     Bilateral stenosis of lateral recess of lumbar spine     Chronic pain of left knee     Angioedema Congenital pes planus     Failed back surgical syndrome     Venous insufficiency of both lower extremities     Weakness of both lower extremities     Recurrent UTI     Chronic lumbar radiculopathy     Left leg weakness     History of lumbar surgery     Chronic bilateral low back pain with bilateral sciatica     Hemiplegia affecting left nondominant side (HCC)     Primary osteoarthritis of left knee

## 2021-10-11 RX ORDER — IBUPROFEN 600 MG/1
600 TABLET ORAL EVERY 8 HOURS PRN
Qty: 30 TABLET | Refills: 0 | Status: SHIPPED | OUTPATIENT
Start: 2021-10-11 | End: 2022-03-03

## 2021-10-22 ENCOUNTER — HOSPITAL ENCOUNTER (EMERGENCY)
Age: 61
Discharge: HOME | End: 2021-10-22
Attending: EMERGENCY MEDICINE
Payer: MEDICARE

## 2021-10-22 VITALS
OXYGEN SATURATION: 99 % | TEMPERATURE: 97.8 F | WEIGHT: 293 LBS | RESPIRATION RATE: 18 BRPM | SYSTOLIC BLOOD PRESSURE: 166 MMHG | BODY MASS INDEX: 47.42 KG/M2 | HEART RATE: 66 BPM | DIASTOLIC BLOOD PRESSURE: 89 MMHG

## 2021-10-22 DIAGNOSIS — N30.00 ACUTE CYSTITIS WITHOUT HEMATURIA: Primary | ICD-10-CM

## 2021-10-22 LAB
-: ABNORMAL
AMORPHOUS: ABNORMAL
BACTERIA: ABNORMAL
BILIRUBIN URINE: NEGATIVE
CASTS UA: ABNORMAL /LPF (ref 0–8)
COLOR: YELLOW
COMMENT UA: ABNORMAL
CRYSTALS, UA: ABNORMAL /HPF
EPITHELIAL CELLS UA: ABNORMAL /HPF (ref 0–5)
GLUCOSE URINE: NEGATIVE
KETONES, URINE: NEGATIVE
LEUKOCYTE ESTERASE, URINE: ABNORMAL
MUCUS: ABNORMAL
NITRITE, URINE: POSITIVE
OTHER OBSERVATIONS UA: ABNORMAL
PH UA: 7.5 (ref 5–8)
PROTEIN UA: NEGATIVE
RBC UA: ABNORMAL /HPF (ref 0–4)
RENAL EPITHELIAL, UA: ABNORMAL /HPF
SPECIFIC GRAVITY UA: 1.01 (ref 1–1.03)
TRICHOMONAS: ABNORMAL
TURBIDITY: CLEAR
URINE HGB: NEGATIVE
UROBILINOGEN, URINE: NORMAL
WBC UA: ABNORMAL /HPF (ref 0–5)
YEAST: ABNORMAL

## 2021-10-22 PROCEDURE — 81001 URINALYSIS AUTO W/SCOPE: CPT

## 2021-10-22 PROCEDURE — 87086 URINE CULTURE/COLONY COUNT: CPT

## 2021-10-22 PROCEDURE — 99283 EMERGENCY DEPT VISIT LOW MDM: CPT

## 2021-10-22 PROCEDURE — 6370000000 HC RX 637 (ALT 250 FOR IP): Performed by: STUDENT IN AN ORGANIZED HEALTH CARE EDUCATION/TRAINING PROGRAM

## 2021-10-22 PROCEDURE — 87077 CULTURE AEROBIC IDENTIFY: CPT

## 2021-10-22 PROCEDURE — 87186 SC STD MICRODIL/AGAR DIL: CPT

## 2021-10-22 RX ORDER — SULFAMETHOXAZOLE AND TRIMETHOPRIM 800; 160 MG/1; MG/1
1 TABLET ORAL 2 TIMES DAILY
Qty: 20 TABLET | Refills: 0 | Status: SHIPPED | OUTPATIENT
Start: 2021-10-22 | End: 2021-11-01

## 2021-10-22 RX ORDER — SULFAMETHOXAZOLE AND TRIMETHOPRIM 800; 160 MG/1; MG/1
1 TABLET ORAL ONCE
Status: COMPLETED | OUTPATIENT
Start: 2021-10-22 | End: 2021-10-22

## 2021-10-22 RX ADMIN — SULFAMETHOXAZOLE AND TRIMETHOPRIM 1 TABLET: 800; 160 TABLET ORAL at 18:09

## 2021-10-22 ASSESSMENT — PAIN SCALES - GENERAL: PAINLEVEL_OUTOF10: 9

## 2021-10-22 ASSESSMENT — ENCOUNTER SYMPTOMS
CONSTIPATION: 0
NAUSEA: 0
DIARRHEA: 0
SORE THROAT: 0
ABDOMINAL PAIN: 0
VOMITING: 0
SHORTNESS OF BREATH: 0
TROUBLE SWALLOWING: 0

## 2021-10-22 ASSESSMENT — PAIN DESCRIPTION - PAIN TYPE: TYPE: ACUTE PAIN

## 2021-10-22 ASSESSMENT — PAIN DESCRIPTION - LOCATION: LOCATION: ABDOMEN;FLANK

## 2021-10-22 NOTE — ED PROVIDER NOTES
[address] [facility]     Emergency Department     Faculty Attestation    I performed a history and physical examination of the patient and discussed management with the resident. I reviewed the residents note and agree with the documented findings including all diagnostic interpretations and plan of care. Any areas of disagreement are noted on the chart. I was personally present for the key portions of any procedures. I have documented in the chart those procedures where I was not present during the key portions. I have reviewed the emergency nurses triage note. I agree with the chief complaint, past medical history, past surgical history, allergies, medications, social and family history as documented unless otherwise noted below. Documentation of the HPI, Physical Exam and Medical Decision Making performed by scribes is based on my personal performance of the HPI, PE and MDM. For Physician Assistant/ Nurse Practitioner cases/documentation I have personally evaluated this patient and have completed at least one if not all key elements of the E/M (history, physical exam, and MDM). Additional findings are as noted. This patient was evaluated in the Emergency Department for symptoms described in the history of present illness. He/she was evaluated in the context of the global COVID-19 pandemic, which necessitated consideration that the patient might be at risk for infection with the SARS-CoV-2 virus that causes COVID-19. Institutional protocols and algorithms that pertain to the evaluation of patients at risk for COVID-19 are in a state of rapid change based on information released by regulatory bodies including the CDC and federal and state organizations. These policies and algorithms were followed during the patient's care in the ED. Primary Care Physician: Rajan Luna MD    History:  This is a 57 y.o. female who presents to the Emergency Department with complaint of dysuria. Some flank pain. No fevers. Prior history of UTIs. No vomiting. Physical:     weight is 340 lb (154.2 kg) (abnormal). Her oral temperature is 97.8 °F (36.6 °C). Her blood pressure is 166/89 (abnormal) and her pulse is 66. Her respiration is 18 and oxygen saturation is 99%. 57 y.o. female no acute distress, abdomen soft nontender nondistended, does have some mild flank pain on palpation. Bedside ultrasound performed which shows no hydronephrosis no abnormal renal cortex structures    RENAL ULTRASOUND:  A limited, bedside ultrasound of the kidneys was performed. The medical necessity was to evaluate for hydronephrosis in a patient with possible renal colic. The structures studied were the kidney, including the renal cortex and collecting system. FINDINGS:    negative for hydronephrosis. The study was technically adequate although slightly challenging due to body habitus.         Impression: UTI    Plan: Urine testing, antibiotic    Bharti Jose MD, Robert Anderson  Attending Emergency Physician         Gavin Patel MD  10/22/21 [id number]

## 2021-10-22 NOTE — ED NOTES
Pt presents to the ed via private auto c/o poss UTI that started x1 week ago. Pt reports that she has a h/o UTI's states that over 1 week ago she was experiencing diarrhea and may have wipe herself the wrong way when trying to clean herself. Pt reports bilateral flank pain, urinary frequency, and pain with urination. Pt denies fever or chills pt was able to provide urine sample.      Florence Teresa RN  10/22/21 [id number]

## 2021-10-23 LAB
CULTURE: ABNORMAL
Lab: ABNORMAL
SPECIMEN DESCRIPTION: ABNORMAL

## 2021-10-23 NOTE — ED PROVIDER NOTES
[facility] ED  Emergency Department Encounter  Emergency Medicine Resident     Pt Connor Chava Perdue  MRN: [id number]  [place] 1960  Date of evaluation: 10/22/21  PCP:  Misti Rae MD    [address] [place] [address]       Chief Complaint   Patient presents with    Urinary Tract Infection     reports increased urinary frequency; lower back pain; dysuria xs 4 days; hx UTI       HISTORY OF PRESENT ILLNESS  (Location/Symptom, Timing/Onset, Context/Setting, Quality, Duration, Modifying Factors, Severity.)      Latasha Jamil is a 57 y.o. female who presents with concern for recurrent acute cystitis, patient with a history of recurrent urinary tract infections. Symptomatic with dysuria, frequency, foul-smelling urine, and bilateral flank pain consistent with her previous urinary tract infections. Believes is triggered by episode of diarrhea last week. Patient follows with PCP regularly. No fevers chills decreased p.o. intake, chest pain, shortness of breath, or abdominal pain. PAST MEDICAL / SURGICAL / SOCIAL / FAMILY HISTORY      has a past medical history of Acquired cystic kidney disease, Anemia, Arthritis, Asthma, Bilateral leg and foot pain, Degeneration of cervical intervertebral disc, Depression, Failed back syndrome, History of recurrent UTI (urinary tract infection), Hyperlipidemia, Hypertension, Lumbar disc herniation with radiculopathy, Obesity, Spinal stenosis, and Wound infection after surgery. has a past surgical history that includes Foot surgery (Bilateral); Gallbladder surgery; Lumbar spine surgery (1986); Lumbar spine surgery (4/2012); Nerve Block (10/26/2015); Nerve Block (12/22/15); Spine surgery (07/24/2016); back surgery; and Vein Surgery (Bilateral, 03/2017).     Social History     Socioeconomic History    Marital status: Single     Spouse name: Not on file    Number of children: Not on file    Years of education: Not on file    Highest education level: Not on file Occupational History    Not on file   Tobacco Use    Smoking status: Former Smoker     Packs/day: 0.10     Years: 30.00     Pack years: 3.00     Types: Cigarettes     Quit date: 10/23/1988     Years since quittin.0    Smokeless tobacco: Never Used   Vaping Use    Vaping Use: Never used   Substance and Sexual Activity    Alcohol use: No     Alcohol/week: 0.0 standard drinks    Drug use: No    Sexual activity: Not Currently   Other Topics Concern    Not on file   Social History Narrative    Not on file     Social Determinants of Health     Financial Resource Strain: Low Risk     Difficulty of Paying Living Expenses: Not hard at all   Food Insecurity: No Food Insecurity    Worried About Running Out of Food in the Last Year: Never true    Krunal of Food in the Last Year: Never true   Transportation Needs: No Transportation Needs    Lack of Transportation (Medical): No    Lack of Transportation (Non-Medical): No   Physical Activity:     Days of Exercise per Week:     Minutes of Exercise per Session:    Stress:     Feeling of Stress :    Social Connections:     Frequency of Communication with Friends and Family:     Frequency of Social Gatherings with Friends and Family:     Attends [employer] Services:     Active Member of Clubs or Organizations:     Attends Club or Organization Meetings:     Marital Status:    Intimate Partner Violence:     Fear of Current or Ex-Partner:     Emotionally Abused:     Physically Abused:     Sexually Abused:        Family History   Problem Relation Age of Onset    Ovarian Cancer Mother     Heart Disease Father     Hypertension Father        Allergies:  Keflex [cephalexin] and Mobic [meloxicam]    Home Medications:  Prior to Admission medications    Medication Sig Start Date End Date Taking?  Authorizing Provider   sulfamethoxazole-trimethoprim (BACTRIM DS;SEPTRA DS) 800-160 MG per tablet Take 1 tablet by mouth 2 times daily for 10 days 10/22/21 11/1/21 Yes Matty Fajardo MD   ibuprofen (ADVIL;MOTRIN) 600 MG tablet Take 1 tablet by mouth every 8 hours as needed for Pain 10/11/21   Delfin Castañeda MD   vitamin D (ERGOCALCIFEROL) 1.25 MG (15175 UT) CAPS capsule Take 1 capsule by mouth once a week 5/25/21   Delfin Castañeda MD   vitamin D3 (CHOLECALCIFEROL) 25 MCG (1000 UT) TABS tablet Take 1 tablet by mouth daily 5/25/21   Delfin Castañeda MD   Coenzyme Q10 (CO Q 10) 10 MG CAPS Take 1 capsule by mouth daily 5/25/21   Delfin Castañeda MD   carvedilol (COREG) 3.125 MG tablet Take 1 tablet by mouth 2 times daily 5/25/21   Delfin Castañeda MD   atorvastatin (LIPITOR) 40 MG tablet Take 1 tablet by mouth daily 5/25/21   Delfin Castañeda MD   tiZANidine (ZANAFLEX) 4 MG tablet Take 4 mg by mouth 3 times daily as needed 3/19/21   Historical Provider, MD   oxyCODONE-acetaminophen (PERCOCET) 5-325 MG per tablet Take 1 tablet by mouth every 6 hours as needed for Pain.     Historical Provider, MD   furosemide (LASIX) 20 MG tablet Take 1 tablet by mouth daily 1/14/21   Delfin Castañeda MD   hydroCHLOROthiazide (HYDRODIURIL) 50 MG tablet Take 1 tablet by mouth daily 1/14/21   Delfin Castañeda MD   Blood Pressure Monitoring (BLOOD PRESSURE CUFF) MISC Blood Pressure Monitoring (BLOOD PRESSURE CUFF) MISC Blood Pressure Monitoring (BLOOD PRESSURE CUFF) MISC Indications: Essential hypertension Use as directed 1 each 0 04/02/2020 Active 04-  [address] [place] [address] ([id number])  Patient not taking: Reported on 8/17/2021 4/2/20   Historical Provider, MD   VITAMIN E PO Take by mouth daily    Historical Provider, MD   aspirin 81 MG tablet Take 2 tablets by mouth daily 5/1/19   Reji Spann MD   ascorbic acid (VITAMIN C) 500 MG tablet Take 1 tablet by mouth daily 5/1/19   Reji Spann MD   Multiple Vitamin (THERAPEUTIC MULTIVITAMIN PO) Take 1 tablet by mouth    Historical Provider, MD       REVIEW OF SYSTEMS    (2-9 systems for level 4, 10 or more for level 5)      Review of Systems   Constitutional: Negative for chills and fever. HENT: Negative for sore throat and trouble swallowing. Respiratory: Negative for shortness of breath. Cardiovascular: Negative for chest pain. Gastrointestinal: Negative for abdominal pain, constipation, diarrhea, nausea and vomiting. Genitourinary: Positive for dysuria, flank pain and urgency. Negative for decreased urine volume and vaginal discharge. Musculoskeletal: Negative for arthralgias and myalgias. Skin: Negative for wound. Neurological: Negative for light-headedness and headaches. Psychiatric/Behavioral: Negative for behavioral problems. PHYSICAL EXAM   (up to 7 for level 4, 8 or more for level 5)      INITIAL VITALS:   BP (!) 166/89   Pulse 66   Temp 97.8 °F (36.6 °C) (Oral)   Resp 18   Wt (!) 340 lb (154.2 kg)   LMP 09/07/2007   SpO2 99%   BMI 47.42 kg/m²     Physical Exam  Vitals and nursing note reviewed. Constitutional:       General: She is not in acute distress. Appearance: Normal appearance. She is well-developed. She is obese. She is not diaphoretic. HENT:      Head: Normocephalic and atraumatic. Right Ear: External ear normal.      Left Ear: External ear normal.      Nose: Nose normal.      Mouth/Throat:      Pharynx: Uvula midline. Cardiovascular:      Rate and Rhythm: Normal rate and regular rhythm. Heart sounds: Normal heart sounds. No murmur heard. Pulmonary:      Effort: Pulmonary effort is normal. No respiratory distress. Abdominal:      Palpations: Abdomen is soft. Tenderness: There is no abdominal tenderness. There is right CVA tenderness and left CVA tenderness. Musculoskeletal:         General: No deformity. Normal range of motion. Cervical back: Normal range of motion. Skin:     General: Skin is warm and dry. Capillary Refill: Capillary refill takes less than 2 seconds. Neurological:      General: No focal deficit present.       Mental Status: She is alert and oriented to REPORTED None    Other Observations UA NOT REPORTED NOT REQ. Yeast, UA NOT REPORTED None         RADIOLOGY:  None    EKG  None    All EKG's are interpreted by the Emergency Department Physician who either signs or Co-signs this chart in the absence of a cardiologist.    EMERGENCY DEPARTMENT COURSE:  Patient found seated upright in bed, no acute distress, not ill or toxic appearing. Engaged in cooperative exam. Stable vitals with hypertension. Had urinalysis consistent with acute cystitis. Patient does have flank pain however low suspicion for pyelonephritis as there are no other systemic signs or symptoms but given the pain will not treat with nitrofurantoin. Patient with an allergy to cephalexin. Elected to use Bactrim as this has worked for patient in the past and review of previous cultures show sensitivity. Patient to follow-up with PCP. Discharge plan discussed with patient who is in agreement. Educated on likely pathology, medications, return precautions, and follow-up. Patient understood all educated materials with all questions answered to their satisfaction. No indication for additional labs or work-up, no decrease in urine, no history of renal disease. PROCEDURES:  None    CONSULTS:  None    CRITICAL CARE:  None    FINAL IMPRESSION      1.  Acute cystitis without hematuria          DISPOSITION / PLAN     DISPOSITION Decision To Discharge 10/22/2021 06:05:15 PM      PATIENT REFERRED TO:  Gerardo Orozco MD  [address] [place] [address] [phone]    Schedule an appointment as soon as possible for a visit   Regarding this visit    [facility]  [address] [address]  [phone]  Go to   If symptoms worsen      DISCHARGE MEDICATIONS:  Discharge Medication List as of 10/22/2021  6:06 PM      START taking these medications    Details   sulfamethoxazole-trimethoprim (BACTRIM DS;SEPTRA DS) 800-160 MG per tablet Take 1 tablet by mouth 2 times daily for 10 days, Disp-20 tablet, R-0Print             Ariane Martin MD  Emergency Medicine Resident    (Please note that portions of this note were completed with a voice recognition program.  Efforts were made to edit the dictations but occasionally words are mis-transcribed.)       Ariane Martin MD  Resident  10/22/21 [id number]

## 2021-12-02 ENCOUNTER — TELEPHONE (OUTPATIENT)
Dept: VASCULAR SURGERY | Age: 61
End: 2021-12-02

## 2021-12-02 DIAGNOSIS — I87.2 SAPHENOFEMORAL VENOUS REFLUX: Primary | ICD-10-CM

## 2021-12-02 DIAGNOSIS — I83.892 VARICOSE VEINS OF LEFT LOWER EXTREMITY WITH OTHER COMPLICATIONS: ICD-10-CM

## 2021-12-10 ENCOUNTER — HOSPITAL ENCOUNTER (OUTPATIENT)
Dept: VASCULAR LAB | Age: 61
Discharge: HOME | End: 2021-12-10
Payer: COMMERCIAL

## 2021-12-10 PROCEDURE — 93970 EXTREMITY STUDY: CPT

## 2021-12-21 ENCOUNTER — TELEPHONE (OUTPATIENT)
Dept: VASCULAR SURGERY | Age: 61
End: 2021-12-21

## 2021-12-21 NOTE — TELEPHONE ENCOUNTER
LVM for pt in regards to appointment from 1/17 being moved to 1/20. Advised pt to give us a call back if new appointment does not work. Also LVM for pts emergency contact.

## 2022-01-12 ENCOUNTER — TELEPHONE (OUTPATIENT)
Dept: INTERNAL MEDICINE | Age: 62
End: 2022-01-12

## 2022-01-12 NOTE — TELEPHONE ENCOUNTER
PC to patient to lavelle overdue mammogram, pt refused and stated she will lavelle herself. Patient overdue for Mammogram order Reprinted and mailed to patient with instructions.

## 2022-01-12 NOTE — LETTER
[address] [facility] [place] [address]  Phone: [phone]  Fax: [phone]    Denzel Ibarra MD        January 12, 2022    Gregg [address]utton  [address]      Dear Jamel Camara: This letter is a reminder that you may have diagnostic Mammogram that has not been completed. It is important to your well-being that this test is performed. Please find the outstanding order attached. You can have the test completed at [facility]. [facility] or [facility]. Please see the order for scheduling instructions. Please call [phone] to schedule an appointment. We are also now offering service at our [facility] for more information or to schedule your mammogram call [phone])  Please call our office at Dept: [phone] for additional information on the outstanding test or let us know if they have been completed so we may update your chart. If you have any questions or concerns, please don't hesitate to call.     Sincerely,        Denzel Ibarra MD

## 2022-01-19 ENCOUNTER — TELEPHONE (OUTPATIENT)
Dept: VASCULAR SURGERY | Age: 62
End: 2022-01-19

## 2022-01-19 NOTE — TELEPHONE ENCOUNTER
----- Message from Gayatri Tan MD sent at 1/18/2022  4:20 PM EST -----  Needs reflux study  ----- Message -----  From: Lake Silver MA  Sent: 1/18/2022   3:34 PM EST  To: Gayatri Tan MD, #    Pt has an upcoming appointment Thursday for Saphenofemoral reflux. Looks like you ordered a Reflux study and a Duplex. She got the duplex but not the reflux study. Would you like her to get the reflux study then come in on next Thursday? Or just see her Thursday with just the duplex.

## 2022-01-24 ENCOUNTER — INITIAL CONSULT (OUTPATIENT)
Dept: VASCULAR SURGERY | Age: 62
End: 2022-01-24
Payer: MEDICARE

## 2022-01-24 VITALS
OXYGEN SATURATION: 98 % | RESPIRATION RATE: 17 BRPM | HEART RATE: 84 BPM | DIASTOLIC BLOOD PRESSURE: 79 MMHG | TEMPERATURE: 98.2 F | BODY MASS INDEX: 41.02 KG/M2 | HEIGHT: 71 IN | SYSTOLIC BLOOD PRESSURE: 141 MMHG | WEIGHT: 293 LBS

## 2022-01-24 DIAGNOSIS — I73.9 PAD (PERIPHERAL ARTERY DISEASE) (HCC): Primary | ICD-10-CM

## 2022-01-24 DIAGNOSIS — I87.2 VENOUS INSUFFICIENCY OF BOTH LOWER EXTREMITIES: ICD-10-CM

## 2022-01-24 DIAGNOSIS — I87.2 SAPHENOFEMORAL VENOUS REFLUX: ICD-10-CM

## 2022-01-24 DIAGNOSIS — I89.0 LYMPHEDEMA DUE TO VENOUS INSUFFICIENCY: ICD-10-CM

## 2022-01-24 DIAGNOSIS — I87.2 LYMPHEDEMA DUE TO VENOUS INSUFFICIENCY: ICD-10-CM

## 2022-01-24 PROCEDURE — G8484 FLU IMMUNIZE NO ADMIN: HCPCS | Performed by: SURGERY

## 2022-01-24 PROCEDURE — G8417 CALC BMI ABV UP PARAM F/U: HCPCS | Performed by: SURGERY

## 2022-01-24 PROCEDURE — 99204 OFFICE O/P NEW MOD 45 MIN: CPT | Performed by: SURGERY

## 2022-01-24 PROCEDURE — 3017F COLORECTAL CA SCREEN DOC REV: CPT | Performed by: SURGERY

## 2022-01-24 PROCEDURE — 1036F TOBACCO NON-USER: CPT | Performed by: SURGERY

## 2022-01-24 PROCEDURE — G8427 DOCREV CUR MEDS BY ELIG CLIN: HCPCS | Performed by: SURGERY

## 2022-01-24 ASSESSMENT — ENCOUNTER SYMPTOMS
ALLERGIC/IMMUNOLOGIC NEGATIVE: 1
BACK PAIN: 1
SHORTNESS OF BREATH: 0
ABDOMINAL PAIN: 0
COLOR CHANGE: 1
CHEST TIGHTNESS: 0

## 2022-01-24 NOTE — PROGRESS NOTES
Division of Vascular Surgery        New Consult      Physician Requesting Consult:  Dr. Norah Franz    Reason for Consult:   Chronic lower extremity swelling    Chief Complaint:     Leg swelling and discoloration of skin    History of Present Illness:      Janeth Chase is a 57 y.o. woman who presents with chronic bilateral, left greater then right lower extremity swelling, pain, numbness/tingling. 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 She has noticed swelling in her legs for several years, has noticed worsening symptoms over the last several months. She denies any prior trauma or DVT to her lower extremities. Discomfort in her legs she describes as heaviness and fatigues, worse towards the end of the day. She has been worried about the darkening of her skin, worried about poor circulation. She denies specific symptoms of claudication to the back of her calf. She has been using a walker for the last 3 years, mainly because of balance issues after her spine surgeries. She is compliant with her compression stockings and has noticed difference in her lower legs. Its her upper legs and pelvis area where she feels it the most.        Vein ablation both legs 2015 and 2017 Dr. Ashleigh Tipton  [facility] closure at [facility] vascular [facility]  Dr. Toma Vargas did venogram with IVUS in April 2021, 50% compression? May Thurner's unclear, but was told nothing needs to be done at this time.     Medical History:     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    Anemia 10/5/2016    Arthritis     Asthma 1/4/2017    Bilateral leg and foot pain 02/2019    left much worse    Degeneration of cervical intervertebral disc 6/21/2018    Depression 2/2/2016    Failed back syndrome 4/3/2018    History of recurrent UTI (urinary tract infection)     Hyperlipidemia     Hypertension     Lumbar disc herniation with radiculopathy 6/4/2013  Obesity     Spinal stenosis     Wound infection after surgery        Surgical History:     Past Surgical History:   Procedure Laterality Date    BACK SURGERY      FOOT SURGERY Bilateral     heel spurs, plantar fascia release    GALLBLADDER SURGERY      LUMBAR SPINE SURGERY  1986    [facility] - [address] LUMBAR SPINE SURGERY  4/2012    Phyllistine Gallop   [address] [place] [address] NERVE BLOCK  10/26/2015    caudal# 1 decadron 7.5 mg    NERVE BLOCK  12/22/15    caudal #2  celestone 9mg    SPINE SURGERY  07/24/2016    3 places     VEIN SURGERY Bilateral 03/2017       Family History:     Family History   Problem Relation Age of Onset    Ovarian Cancer Mother     Heart Disease Father     Hypertension Father        Allergies:       Keflex [cephalexin] and Mobic [meloxicam]    Medications:      Current Outpatient Medications   Medication Sig Dispense Refill    ibuprofen (ADVIL;MOTRIN) 600 MG tablet Take 1 tablet by mouth every 8 hours as needed for Pain 30 tablet 0    vitamin D (ERGOCALCIFEROL) 1.25 MG (38966 UT) CAPS capsule Take 1 capsule by mouth once a week 4 capsule 5    vitamin D3 (CHOLECALCIFEROL) 25 MCG (1000 UT) TABS tablet Take 1 tablet by mouth daily 90 tablet 1    Coenzyme Q10 (CO Q 10) 10 MG CAPS Take 1 capsule by mouth daily 30 capsule 5    carvedilol (COREG) 3.125 MG tablet Take 1 tablet by mouth 2 times daily 60 tablet 3    atorvastatin (LIPITOR) 40 MG tablet Take 1 tablet by mouth daily 30 tablet 3    tiZANidine (ZANAFLEX) 4 MG tablet Take 4 mg by mouth 3 times daily as needed      oxyCODONE-acetaminophen (PERCOCET) 5-325 MG per tablet Take 1 tablet by mouth every 6 hours as needed for Pain.       furosemide (LASIX) 20 MG tablet Take 1 tablet by mouth daily 60 tablet 3    hydroCHLOROthiazide (HYDRODIURIL) 50 MG tablet Take 1 tablet by mouth daily 30 tablet 5    Blood Pressure Monitoring (BLOOD PRESSURE CUFF) MISC Blood Pressure Monitoring (BLOOD PRESSURE CUFF) MISC Blood Pressure Monitoring (BLOOD PRESSURE CUFF) MISC Indications: Essential hypertension Use as directed 1 each 0 04/02/2020 Active 04-  Joshua Ardon ([id number])     Mayito Roller VITAMIN E PO Take by mouth daily      aspirin 81 MG tablet Take 2 tablets by mouth daily 60 tablet 3    ascorbic acid (VITAMIN C) 500 MG tablet Take 1 tablet by mouth daily 30 tablet 3    Multiple Vitamin (THERAPEUTIC MULTIVITAMIN PO) Take 1 tablet by mouth       Current Facility-Administered Medications   Medication Dose Route Frequency Provider Last Rate Last Admin    lidocaine 1 % injection 2 mL  2 mL Intra-artICUlar Once Kelsi Goyal MD         Social History:     Tobacco:    reports that she quit smoking about 33 years ago. Her smoking use included cigarettes. She has a 3.00 pack-year smoking history. She has never used smokeless tobacco.  Alcohol:      reports no history of alcohol use. Drug Use:  reports no history of drug use. 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 HENT: Negative for congestion. Eyes: Negative for visual disturbance. Respiratory: Negative for chest tightness and shortness of breath. Cardiovascular: Positive for leg swelling. Negative for chest pain. Gastrointestinal: Negative for abdominal pain. Endocrine: Negative. Genitourinary: Negative. Musculoskeletal: Positive for arthralgias, back pain and gait problem. Skin: Positive for color change. Negative for wound. Allergic/Immunologic: Negative. Neurological: Positive for numbness. Negative for facial asymmetry, speech difficulty and weakness. Hematological: Negative. Psychiatric/Behavioral: Negative.       Physical Exam:     Vitals:  BP (!) 141/79 (Site: Left Lower Arm, Position: Sitting, Cuff Size: Medium Adult)   Pulse 84   Temp 98.2 °F (36.8 °C) (Temporal)   Resp 17   Ht 5' 11\" (1.803 m)   Wt (!) 346 lb (156.9 kg)   LMP 09/07/2007   SpO2 98%   Breastfeeding Yes   BMI 48.26 kg/m²     Physical Exam  Constitutional:       Appearance: She is well-developed and well-groomed. Eyes:      Extraocular Movements: Extraocular movements intact. Conjunctiva/sclera: Conjunctivae normal.   Neck:      Vascular: No carotid bruit. Cardiovascular:      Rate and Rhythm: Normal rate and regular rhythm. Pulses:           Dorsalis pedis pulses are 1+ on the right side and 1+ on the left side. Posterior tibial pulses are 1+ on the right side and 1+ on the left side. Pulmonary:      Effort: Pulmonary effort is normal. No respiratory distress. Abdominal:      Palpations: Abdomen is soft. Tenderness: There is no abdominal tenderness. Musculoskeletal:      Cervical back: Full passive range of motion without pain. Right upper leg: Swelling present. No edema or tenderness. Left upper leg: Swelling present. No edema or tenderness. Right lower leg: Swelling present. No tenderness. No edema. Left lower leg: Swelling present. No tenderness. No edema. Right foot: Normal capillary refill. No swelling or tenderness. Left foot: Normal capillary refill. No swelling or tenderness. Feet:      Right foot:      Skin integrity: No ulcer or skin breakdown. Left foot:      Skin integrity: No ulcer or skin breakdown. Comments: Darkening skin color to both feet, left greater then right  Skin:     General: Skin is warm. Capillary Refill: Capillary refill takes less than 2 seconds. Neurological:      Mental Status: She is alert and oriented to person, place, and time. GCS: GCS eye subscore is 4. GCS verbal subscore is 5. GCS motor subscore is 6. Sensory: Sensory deficit (severe neuropathy) present. Motor: Motor function is intact.    Psychiatric:         Mood and Affect: Mood normal.         Speech: Speech normal.         Behavior: Behavior normal.             Imaging/Labs:     Venous duplex 12/10/21    Assessment and Plan: Chronic venous insufficiency of bilateral lower extremity, lymphadema  · Continue compression therapy  · Will make referral to lymphedema clonic for evaluation and treatment  · Would benefit from lymphedema pumps  Patient has tried and failed 4 weeks of conservative treatments including elevation, compression, and exercise and significant symptoms still remain. Patient has proximal swelling leading into the groin and abdomen area. A basic pump could exacerbate symptoms and cause an increase in proximal swelling. I am recommending this patient receive a flexitouch to address truncal swelling and safely and efficiently move lymphatic fluid. · Follow up in 6 months, will obtain PVRs for evaluation of arterial insufficiency    Electronically signed by Radha Hammer MD on 1/24/22 at 1:03 PM [address]  Office: [phone]  Cell: [phone]  Email: [email or web address]

## 2022-01-29 ENCOUNTER — APPOINTMENT (OUTPATIENT)
Dept: GENERAL RADIOLOGY | Age: 62
End: 2022-01-29
Payer: MEDICARE

## 2022-01-29 ENCOUNTER — HOSPITAL ENCOUNTER (EMERGENCY)
Age: 62
Discharge: HOME | End: 2022-01-29
Attending: EMERGENCY MEDICINE
Payer: MEDICARE

## 2022-01-29 VITALS
WEIGHT: 293 LBS | TEMPERATURE: 98.2 F | RESPIRATION RATE: 18 BRPM | DIASTOLIC BLOOD PRESSURE: 102 MMHG | SYSTOLIC BLOOD PRESSURE: 154 MMHG | HEIGHT: 71 IN | HEART RATE: 87 BPM | BODY MASS INDEX: 41.02 KG/M2 | OXYGEN SATURATION: 99 %

## 2022-01-29 DIAGNOSIS — U07.1 COVID: Primary | ICD-10-CM

## 2022-01-29 LAB
ABSOLUTE EOS #: 0.03 K/UL (ref 0–0.44)
ABSOLUTE IMMATURE GRANULOCYTE: <0.03 K/UL (ref 0–0.3)
ABSOLUTE LYMPH #: 1.2 K/UL (ref 1.1–3.7)
ABSOLUTE MONO #: 0.27 K/UL (ref 0.1–1.2)
ANION GAP SERPL CALCULATED.3IONS-SCNC: 13 MMOL/L (ref 9–17)
BASOPHILS # BLD: 0 % (ref 0–2)
BASOPHILS ABSOLUTE: <0.03 K/UL (ref 0–0.2)
BNP INTERPRETATION: NORMAL
BUN BLDV-MCNC: 9 MG/DL (ref 8–23)
BUN/CREAT BLD: NORMAL (ref 9–20)
CALCIUM SERPL-MCNC: 9.2 MG/DL (ref 8.6–10.4)
CHLORIDE BLD-SCNC: 101 MMOL/L (ref 98–107)
CO2: 22 MMOL/L (ref 20–31)
CREAT SERPL-MCNC: 0.62 MG/DL (ref 0.5–0.9)
DIFFERENTIAL TYPE: ABNORMAL
EOSINOPHILS RELATIVE PERCENT: 1 % (ref 1–4)
GFR AFRICAN AMERICAN: >60 ML/MIN
GFR NON-AFRICAN AMERICAN: >60 ML/MIN
GFR SERPL CREATININE-BSD FRML MDRD: NORMAL ML/MIN/{1.73_M2}
GFR SERPL CREATININE-BSD FRML MDRD: NORMAL ML/MIN/{1.73_M2}
GLUCOSE BLD-MCNC: 98 MG/DL (ref 70–99)
HCT VFR BLD CALC: 40.5 % (ref 36.3–47.1)
HEMOGLOBIN: 13.3 G/DL (ref 11.9–15.1)
IMMATURE GRANULOCYTES: 0 %
LYMPHOCYTES # BLD: 34 % (ref 24–43)
MCH RBC QN AUTO: 30 PG (ref 25.2–33.5)
MCHC RBC AUTO-ENTMCNC: 32.8 G/DL (ref 28.4–34.8)
MCV RBC AUTO: 91.2 FL (ref 82.6–102.9)
MONOCYTES # BLD: 8 % (ref 3–12)
NRBC AUTOMATED: 0 PER 100 WBC
PDW BLD-RTO: 14.5 % (ref 11.8–14.4)
PLATELET # BLD: 236 K/UL (ref 138–453)
PLATELET ESTIMATE: ABNORMAL
PMV BLD AUTO: 11.2 FL (ref 8.1–13.5)
POTASSIUM SERPL-SCNC: 4.4 MMOL/L (ref 3.7–5.3)
PRO-BNP: 91 PG/ML
RBC # BLD: 4.44 M/UL (ref 3.95–5.11)
RBC # BLD: ABNORMAL 10*6/UL
SARS-COV-2, RAPID: DETECTED
SEG NEUTROPHILS: 57 % (ref 36–65)
SEGMENTED NEUTROPHILS ABSOLUTE COUNT: 1.99 K/UL (ref 1.5–8.1)
SODIUM BLD-SCNC: 136 MMOL/L (ref 135–144)
SPECIMEN DESCRIPTION: ABNORMAL
TROPONIN INTERP: NORMAL
TROPONIN T: NORMAL NG/ML
TROPONIN, HIGH SENSITIVITY: 7 NG/L (ref 0–14)
WBC # BLD: 3.5 K/UL (ref 3.5–11.3)
WBC # BLD: ABNORMAL 10*3/UL

## 2022-01-29 PROCEDURE — 6360000002 HC RX W HCPCS

## 2022-01-29 PROCEDURE — 87635 SARS-COV-2 COVID-19 AMP PRB: CPT

## 2022-01-29 PROCEDURE — 71045 X-RAY EXAM CHEST 1 VIEW: CPT

## 2022-01-29 PROCEDURE — 80048 BASIC METABOLIC PNL TOTAL CA: CPT

## 2022-01-29 PROCEDURE — 2580000003 HC RX 258

## 2022-01-29 PROCEDURE — 83880 ASSAY OF NATRIURETIC PEPTIDE: CPT

## 2022-01-29 PROCEDURE — 93005 ELECTROCARDIOGRAM TRACING: CPT

## 2022-01-29 PROCEDURE — 96374 THER/PROPH/DIAG INJ IV PUSH: CPT

## 2022-01-29 PROCEDURE — 85025 COMPLETE CBC W/AUTO DIFF WBC: CPT

## 2022-01-29 PROCEDURE — 84484 ASSAY OF TROPONIN QUANT: CPT

## 2022-01-29 PROCEDURE — 99284 EMERGENCY DEPT VISIT MOD MDM: CPT

## 2022-01-29 RX ORDER — METHOCARBAMOL 750 MG/1
750 TABLET, FILM COATED ORAL 3 TIMES DAILY
Qty: 21 TABLET | Refills: 0 | Status: SHIPPED | OUTPATIENT
Start: 2022-01-29 | End: 2022-02-05

## 2022-01-29 RX ORDER — SODIUM CHLORIDE, SODIUM LACTATE, POTASSIUM CHLORIDE, AND CALCIUM CHLORIDE .6; .31; .03; .02 G/100ML; G/100ML; G/100ML; G/100ML
500 INJECTION, SOLUTION INTRAVENOUS ONCE
Status: COMPLETED | OUTPATIENT
Start: 2022-01-29 | End: 2022-01-29

## 2022-01-29 RX ORDER — KETOROLAC TROMETHAMINE 30 MG/ML
30 INJECTION, SOLUTION INTRAMUSCULAR; INTRAVENOUS ONCE
Status: COMPLETED | OUTPATIENT
Start: 2022-01-29 | End: 2022-01-29

## 2022-01-29 RX ADMIN — KETOROLAC TROMETHAMINE 30 MG: 30 INJECTION, SOLUTION INTRAMUSCULAR at 21:00

## 2022-01-29 RX ADMIN — SODIUM CHLORIDE, POTASSIUM CHLORIDE, SODIUM LACTATE AND CALCIUM CHLORIDE 500 ML: 600; 310; 30; 20 INJECTION, SOLUTION INTRAVENOUS at 19:22

## 2022-01-29 ASSESSMENT — ENCOUNTER SYMPTOMS
COUGH: 1
SHORTNESS OF BREATH: 1
VOMITING: 0
BACK PAIN: 0
ABDOMINAL PAIN: 0
NAUSEA: 0
RHINORRHEA: 0
PHOTOPHOBIA: 0

## 2022-01-29 ASSESSMENT — PAIN SCALES - GENERAL: PAINLEVEL_OUTOF10: 9

## 2022-01-29 ASSESSMENT — PAIN DESCRIPTION - PROGRESSION: CLINICAL_PROGRESSION: GRADUALLY WORSENING

## 2022-01-29 ASSESSMENT — PAIN DESCRIPTION - FREQUENCY: FREQUENCY: CONTINUOUS

## 2022-01-29 ASSESSMENT — PAIN DESCRIPTION - ORIENTATION: ORIENTATION: LEFT

## 2022-01-29 NOTE — ED PROVIDER NOTES
[facility] ED  Emergency Department Encounter  EmergencyMedicine Resident     Pt Beltran Nguyen  MRN: [id number]  Joangfsoheila 1960  Date of evaluation: 1/29/22  PCP:  Neisha Wen MD    This patient was evaluated in the Emergency Department for symptoms described in the history of present illness. The patient was evaluated in the context of the global COVID-19 pandemic, which necessitated consideration that the patient might be at risk for infection with the SARS-CoV-2 virus that causes COVID-19. Institutional protocols and algorithms that pertain to the evaluation of patients at risk for COVID-19 are in a state of rapid change based on information released by regulatory bodies including the CDC and federal and state organizations. These policies and algorithms were followed during the patient's care in the ED. CHIEF COMPLAINT       Chief Complaint   Patient presents with    Cough    Shortness of Breath    Dizziness       HISTORY OF PRESENT ILLNESS  (Location/Symptom, Timing/Onset, Context/Setting, Quality, Duration, Modifying Factors, Severity.)      Ludin Garcia is a 57 y.o. female who presents with complaints of cough, shortness of breath over the past 4 days with positive Covid contact within the last couple weeks from a family member who was staying at her house. Patient also notes intermittent chills and lightheadedness upon position change. No chest pain. Notes history of hypertension, spinal stenosis and notes left leg pain that radiates down leg from hip and worse with walking over the past few days. No leg swelling. No history of blood clot. Notes shortness of breath is mainly when up and moving around. Not Covid vaccinated.     PAST MEDICAL / SURGICAL / SOCIAL / FAMILY HISTORY      has a past medical history of Acquired cystic kidney disease, Anemia, Arthritis, Asthma, Bilateral leg and foot pain, Degeneration of cervical intervertebral disc, Depression, Failed back syndrome, History of recurrent UTI (urinary tract infection), Hyperlipidemia, Hypertension, Lumbar disc herniation with radiculopathy, Obesity, Spinal stenosis, and Wound infection after surgery. has a past surgical history that includes Foot surgery (Bilateral); Gallbladder surgery; Lumbar spine surgery (); Lumbar spine surgery (2012); Nerve Block (10/26/2015); Nerve Block (12/22/15); Spine surgery (2016); back surgery; and Vein Surgery (Bilateral, 2017). Social History     Socioeconomic History    Marital status: Single     Spouse name: Not on file    Number of children: Not on file    Years of education: Not on file    Highest education level: Not on file   Occupational History    Not on file   Tobacco Use    Smoking status: Former Smoker     Packs/day: 0.10     Years: 30.00     Pack years: 3.00     Types: Cigarettes     Quit date: 10/23/1988     Years since quittin.2    Smokeless tobacco: Never Used   Vaping Use    Vaping Use: Never used   Substance and Sexual Activity    Alcohol use: No     Alcohol/week: 0.0 standard drinks    Drug use: No    Sexual activity: Not Currently   Other Topics Concern    Not on file   Social History Narrative    Not on file     Social Determinants of Health     Financial Resource Strain:     Difficulty of Paying Living Expenses: Not on file   Food Insecurity:     Worried About Running Out of Food in the Last Year: Not on file    Krunal of Food in the Last Year: Not on file   Transportation Needs:     Lack of Transportation (Medical): Not on file    Lack of Transportation (Non-Medical):  Not on file   Physical Activity:     Days of Exercise per Week: Not on file    Minutes of Exercise per Session: Not on file   Stress:     Feeling of Stress : Not on file   Social Connections:     Frequency of Communication with Friends and Family: Not on file    Frequency of Social Gatherings with Friends and Family: Not on file   Randi Tihbodeaux Attends Restorationist Services: Not on file    Active Member of Clubs or Organizations: Not on file    Attends Club or Organization Meetings: Not on file    Marital Status: Not on file   Intimate Partner Violence:     Fear of Current or Ex-Partner: Not on file    Emotionally Abused: Not on file    Physically Abused: Not on file    Sexually Abused: Not on file   Housing Stability:     Unable to Pay for Housing in the Last Year: Not on file    Number of [place] in the Last Year: Not on file    Unstable Housing in the Last Year: Not on file       Family History   Problem Relation Age of Onset    Ovarian Cancer Mother     Heart Disease Father     Hypertension Father        Allergies:  Keflex [cephalexin] and Mobic [meloxicam]    Home Medications:  Prior to Admission medications    Medication Sig Start Date End Date Taking? Authorizing Provider   methocarbamol (ROBAXIN-750) 750 MG tablet Take 1 tablet by mouth 3 times daily for 7 days 22 Yes Tri Jo MD   ibuprofen (ADVIL;MOTRIN) 600 MG tablet Take 1 tablet by mouth every 8 hours as needed for Pain 10/11/21   Shweta Niño MD   vitamin D (ERGOCALCIFEROL) 1.25 MG (30873 UT) CAPS capsule Take 1 capsule by mouth once a week 21   Shweta Niño MD   vitamin D3 (CHOLECALCIFEROL) 25 MCG (1000 UT) TABS tablet Take 1 tablet by mouth daily 21   Shweta Niño MD   Coenzyme Q10 (CO Q 10) 10 MG CAPS Take 1 capsule by mouth daily 21   Shweta Niño MD   carvedilol (COREG) 3.125 MG tablet Take 1 tablet by mouth 2 times daily 21   Shweta Niño MD   atorvastatin (LIPITOR) 40 MG tablet Take 1 tablet by mouth daily 21   Shweta Niño MD   tiZANidine (ZANAFLEX) 4 MG tablet Take 4 mg by mouth 3 times daily as needed 3/19/21   Historical Provider, MD   oxyCODONE-acetaminophen (PERCOCET) 5-325 MG per tablet Take 1 tablet by mouth every 6 hours as needed for Pain.     Historical Provider, MD   furosemide (LASIX) 20 MG tablet Take 1 tablet by mouth daily 1/14/21   Shahid Thao MD   hydroCHLOROthiazide (HYDRODIURIL) 50 MG tablet Take 1 tablet by mouth daily 1/14/21   Shahid Thao MD   Blood Pressure Monitoring (BLOOD PRESSURE CUFF) MISC Blood Pressure Monitoring (BLOOD PRESSURE CUFF) MISC Blood Pressure Monitoring (BLOOD PRESSURE CUFF) MISC Indications: Essential hypertension Use as directed 1 each 0 04/02/2020 Active 04-  [address] [place] [address] ([id number]) 4/2/20   Historical Provider, MD   VITAMIN E PO Take by mouth daily    Historical Provider, MD   aspirin 81 MG tablet Take 2 tablets by mouth daily 5/1/19   Edith Carreno MD   ascorbic acid (VITAMIN C) 500 MG tablet Take 1 tablet by mouth daily 5/1/19   Edith Carreno MD   Multiple Vitamin (THERAPEUTIC MULTIVITAMIN PO) Take 1 tablet by mouth    Historical Provider, MD       REVIEW OF SYSTEMS    (2-9 systems for level 4, 10 or more for level 5)      Review of Systems   Constitutional: Positive for chills. Negative for fever. HENT: Negative for congestion and rhinorrhea. Eyes: Negative for photophobia and visual disturbance. Respiratory: Positive for cough and shortness of breath. Cardiovascular: Negative for chest pain and palpitations. Gastrointestinal: Negative for abdominal pain, nausea and vomiting. Genitourinary: Negative for dysuria and frequency. Musculoskeletal: Negative for back pain and neck pain. Leg pain   Skin: Negative for rash and wound. Neurological: Positive for light-headedness. Negative for headaches. PHYSICAL EXAM   (up to 7 for level 4, 8 or more for level 5)      INITIAL VITALS:   BP (!) 154/102   Pulse 87   Temp 98.2 °F (36.8 °C) (Temporal)   Resp 18   Ht 5' 11\" (1.803 m)   Wt (!) 345 lb (156.5 kg)   LMP 09/07/2007   SpO2 99%   BMI 48.12 kg/m²     Physical Exam  Vitals and nursing note reviewed. Constitutional:       General: She is not in acute distress. HENT:      Head: Normocephalic and atraumatic.       Mouth/Throat: 40.5 36.3 - 47.1 %    MCV 91.2 82.6 - 102.9 fL    MCH 30.0 25.2 - 33.5 pg    MCHC 32.8 28.4 - 34.8 g/dL    RDW 14.5 (H) 11.8 - 14.4 %    Platelets 742 729 - 928 k/uL    MPV 11.2 8.1 - 13.5 fL    NRBC Automated 0.0 0.0 per 100 WBC    Differential Type NOT REPORTED     Seg Neutrophils 57 36 - 65 %    Lymphocytes 34 24 - 43 %    Monocytes 8 3 - 12 %    Eosinophils % 1 1 - 4 %    Basophils 0 0 - 2 %    Immature Granulocytes 0 0 %    Segs Absolute 1.99 1.50 - 8.10 k/uL    Absolute Lymph # 1.20 1.10 - 3.70 k/uL    Absolute Mono # 0.27 0.10 - 1.20 k/uL    Absolute Eos # 0.03 0.00 - 0.44 k/uL    Basophils Absolute <0.03 0.00 - 0.20 k/uL    Absolute Immature Granulocyte <0.03 0.00 - 0.30 k/uL    WBC Morphology NOT REPORTED     RBC Morphology ANISOCYTOSIS PRESENT     Platelet Estimate NOT REPORTED    Basic Metabolic Panel w/ Reflex to MG   Result Value Ref Range    Glucose 98 70 - 99 mg/dL    BUN 9 8 - 23 mg/dL    CREATININE 0.62 0.50 - 0.90 mg/dL    Bun/Cre Ratio NOT REPORTED 9 - 20    Calcium 9.2 8.6 - 10.4 mg/dL    Sodium 136 135 - 144 mmol/L    Potassium 4.4 3.7 - 5.3 mmol/L    Chloride 101 98 - 107 mmol/L    CO2 22 20 - 31 mmol/L    Anion Gap 13 9 - 17 mmol/L    GFR Non-African American >60 >60 mL/min    GFR African American >60 >60 mL/min    GFR Comment          GFR Staging NOT REPORTED    Brain Natriuretic Peptide   Result Value Ref Range    Pro-BNP 91 <300 pg/mL    BNP Interpretation NOT REPORTED    Troponin   Result Value Ref Range    Troponin, High Sensitivity 7 0 - 14 ng/L    Troponin T NOT REPORTED <0.03 ng/mL    Troponin Interp NOT REPORTED        IMPRESSION: COVID    RADIOLOGY:  XR CHEST PORTABLE   Final Result   No definite acute cardiopulmonary process             EKG  Normal sinus rhythm    All EKG's are interpreted by the Emergency Department Physician who either signs or Co-signs this chart in the absence of a cardiologist.    EMERGENCY DEPARTMENT COURSE:  69-year-old female presented to emergency department with complaints of recent Covid contact over the last couple weeks, shortness of breath, cough, chills, leg pain in the setting of spinal stenosis and chronic leg pain. No chest pain. On exam, afebrile, nontachycardic, 99% on room air, lungs clear to auscultation bilaterally, abdomen soft and nontender, bilateral upper extremity strength 5 out of 5, lower extremity strength 4 out of 5 in patient notes chronic diminished sensation in bilateral lower extremities and is worse on the left, no leg swelling or erythema. Covid positive, chest x-ray nonacute. EKG nonspecific normal sinus rhythm. Trop 7 with previous troponin past 6. Electrolytes within normal limits and white blood cell within normal limits. Patient given Toradol for leg pain and noted Toradol has helped leg pain in the past.  Patient did not meet criteria for antibody infusion due to age less than 72 per pharmacy and not immunocompromised. Patient provided with pulse ox and discharged home with prescription for Robaxin for leg pain.     PROCEDURES:  None    CONSULTS:  None    CRITICAL CARE:  None    FINAL IMPRESSION      1. COVID          DISPOSITION / PLAN     DISPOSITION Decision To Discharge 01/29/2022 08:37:39 PM      PATIENT REFERRED TO:  Alanna Trivedi MD  [address] [place] [address] [phone]    In 1 week        DISCHARGE MEDICATIONS:  Discharge Medication List as of 1/29/2022  8:45 PM      START taking these medications    Details   methocarbamol (ROBAXIN-750) 750 MG tablet Take 1 tablet by mouth 3 times daily for 7 days, Disp-21 tablet, R-0Print             Emmanuel Harrison MD  Emergency Medicine Resident    (Please note that portions of thisnote were completed with a voice recognition program.  Efforts were made to edit the dictations but occasionally words are mis-transcribed.)      Ella Pritchard MD  Resident  01/29/22 [address]

## 2022-01-29 NOTE — ED TRIAGE NOTES
Pt presented to the ED today with complaints of cough, SOB, and chest pain. Patient also complains of left leg pain.

## 2022-01-30 NOTE — ED NOTES
The following labs labeled with pt sticker and tubed to lab:     [x] Blue     [x] Lavender   [] on ice  [x] Green/yellow  [x] Green/black [] on ice  [x] Yellow  [] Red  [] Pink      [x] COVID-19 swab    [x] Rapid  [] PCR  [] Flu swab  [] Peds Viral Panel     [] Urine Sample  [] Pelvic Cultures  [] Blood Cultures            Jeniffer Cano RN  01/29/22 1927

## 2022-01-30 NOTE — ED PROVIDER NOTES
[facility]     Emergency Department     Faculty Attestation    I performed a history and physical examination of the patient and discussed management with the resident. I reviewed the residents note and agree with the documented findings and plan of care. Any areas of disagreement are noted on the chart. I was personally present for the key portions of any procedures. I have documented in the chart those procedures where I was not present during the key portions. I have reviewed the emergency nurses triage note. I agree with the chief complaint, past medical history, past surgical history, allergies, medications, social and family history as documented unless otherwise noted below. For Physician Assistant/ Nurse Practitioner cases/documentation I have personally evaluated this patient and have completed at least one if not all key elements of the E/M (history, physical exam, and MDM). Additional findings are as noted. I have personally seen and evaluated the patient. I find the patient's history and physical exam are consistent with the NP/PA documentation. I agree with the care provided, treatment rendered, disposition and follow-up plan. Patient reporting cough dyspnea chest discomfort and well listed by the LPN to have complained of left leg pain the patient has had chronic issues with her lower extremities due to venous stasis and complains of bilateral thigh pain    Vital signs as noted    Patient is resting comfortably no acute distress lungs are clear bilaterally lower extremities without significant edema or tenderness of either leg. EKG Interpretation    Interpreted by emergency department physician    Rhythm: normal sinus   Rate: normal  Axis: normal  Conduction: normal  ST Segments: no acute change  T Waves: no acute change  Q Waves: no acute change    Clinical Impression:  nonspecific EKG. Critical Care     Shanae Eli M.D.   Attending Emergency Physician              Kiana Kebede MD  01/29/22 1925

## 2022-01-31 ENCOUNTER — CARE COORDINATION (OUTPATIENT)
Dept: CARE COORDINATION | Age: 62
End: 2022-01-31

## 2022-01-31 LAB
EKG ATRIAL RATE: 85 BPM
EKG P AXIS: 59 DEGREES
EKG P-R INTERVAL: 158 MS
EKG Q-T INTERVAL: 416 MS
EKG QRS DURATION: 90 MS
EKG QTC CALCULATION (BAZETT): 495 MS
EKG R AXIS: -6 DEGREES
EKG T AXIS: 54 DEGREES
EKG VENTRICULAR RATE: 85 BPM

## 2022-01-31 PROCEDURE — 93010 ELECTROCARDIOGRAM REPORT: CPT | Performed by: INTERNAL MEDICINE

## 2022-01-31 NOTE — CARE COORDINATION
Ambulatory Care Coordination ED COVID Follow up Call    Challenges to be reviewed by the provider   Additional needs identified to be addressed with provider: No  none                 Encounter was not routed to provider for escalation. Method of communication with provider: none    Discussed COVID-19 related testing which was: available at this time. Test results were: positive. Patient informed of results, if available? Yes. Current Symptoms: cough, no new symptoms and no worsening symptoms    Reviewed New or Changed Meds: yes- Robaxin    Do you have what you need at home?  Durable Medical Equipment ordered at discharge: None   Home Health/Outpatient orders at discharge: none   Was patient discharged with a pulse oximeter? Yes Discussed and confirmed pulse oximeter discharge instructions and when to notify provider or seek emergency care. Patient education provided: Reviewed appropriate site of care based on symptoms and resources available to patient including: PCP and When to call 911. Follow up appointment recommended: yes. If no appointment scheduled, scheduling offered: has appt scheduled 2/10  Future Appointments   Date Time Provider Mari Diop   2/10/2022  2:40 PM [facility]   7/28/2022  1:30 PM [facility]       Interventions: Obtained and reviewed discharge summary and/or continuity of care documents  Education of patient/family/caregiver/guardian to support self-management-syptom management ; S/S of when to return to the ED  Reviewed discharge instructions, medical action plan and red flags with patient who verbalized understanding. Plan for follow-up call in 7-10 days based on severity of symptoms and risk factors. Plan for next call: symptom management-assess severity of symptoms  Provided contact information for future needs. Luz Severance states she feels a little better. She has no fever. Still has a cough.  Taking OTC medications. Has f/u scheduled. Will follow up in 7-10 days.    Alivia Gee RN

## 2022-02-07 ENCOUNTER — TELEPHONE (OUTPATIENT)
Dept: INTERNAL MEDICINE | Age: 62
End: 2022-02-07

## 2022-02-07 ENCOUNTER — CARE COORDINATION (OUTPATIENT)
Dept: CARE COORDINATION | Age: 62
End: 2022-02-07

## 2022-02-07 NOTE — TELEPHONE ENCOUNTER
Patient tested positive for Covid on 1/29/22. [facility]'s ER told her that they were short on the infusion and to contact her PCP in  5 days about the infusion or viral medication. Patient states that she is feeling much better. Still has a little cough but the dizziness, chest discomfort and dyspnea is gone. Patient has been taking OTC cough medication and that is helping. Patient has a VV on 2/10/22.

## 2022-02-07 NOTE — CARE COORDINATION
Follow Up Call    Challenges to be reviewed by the provider   Additional needs identified to be addressed with provider: No  none                 Encounter was not routed to provider for escalation. Method of communication with provider:  none. Contacted the patient by telephone to follow up after ED. Status: significantly improved  Interventions to address identified needs: Education of patient/family/caregiver/guardian to support self-management-discussed follow up with PCP. [id number] follow up appointment(s):   Future Appointments   Date Time Provider Mari Diop   2/10/2022  2:40 PM Haritha Knapp MD [facility]   7/28/2022  1:30 PM Geo Zimmer MD [facility]     [id number] follow up appointment(s): n/a   Follow up appointment completed? Yes. Provided contact information for future needs. No further follow-up call indicated based on severity of symptoms and risk factors. Plan for next call: none     Lino Vance states that she is feeling much better than last week. She still has an occasional cough but she said it's nothing like it was. She has a f/u scheduled with her PCP on 2/10. She said she called the office to see if her PCP wants her to get an infusion (MAB) or take oral antiviral medication. She is awaiting a return call. She voices no other concerns.      Krista Hong RN

## 2022-02-10 ENCOUNTER — VIRTUAL VISIT (OUTPATIENT)
Dept: INTERNAL MEDICINE | Age: 62
End: 2022-02-10
Payer: MEDICARE

## 2022-02-10 DIAGNOSIS — R32 URINARY INCONTINENCE, UNSPECIFIED TYPE: ICD-10-CM

## 2022-02-10 DIAGNOSIS — G81.94 HEMIPLEGIA AFFECTING LEFT NONDOMINANT SIDE, UNSPECIFIED ETIOLOGY, UNSPECIFIED HEMIPLEGIA TYPE (HCC): ICD-10-CM

## 2022-02-10 DIAGNOSIS — E66.01 MORBID OBESITY DUE TO EXCESS CALORIES (HCC): ICD-10-CM

## 2022-02-10 DIAGNOSIS — U07.1 COVID-19: Primary | ICD-10-CM

## 2022-02-10 PROCEDURE — 99442 PR PHYS/QHP TELEPHONE EVALUATION 11-20 MIN: CPT | Performed by: INTERNAL MEDICINE

## 2022-02-10 RX ORDER — PYRITHIONE ZINC 5 MG/ML
LOTION TOPICAL
Qty: 100 EACH | Refills: 0 | Status: SHIPPED | OUTPATIENT
Start: 2022-02-10 | End: 2022-02-10 | Stop reason: CLARIF

## 2022-02-10 RX ORDER — UNDERPADS 23" X 36"
EACH MISCELLANEOUS
Qty: 100 EACH | Refills: 0 | Status: SHIPPED | OUTPATIENT
Start: 2022-02-10 | End: 2022-02-10 | Stop reason: CLARIF

## 2022-02-10 RX ORDER — UNDERPADS 23" X 36"
EACH MISCELLANEOUS
Qty: 1 EACH | Refills: 0 | Status: SHIPPED | OUTPATIENT
Start: 2022-02-10

## 2022-02-10 RX ORDER — PYRITHIONE ZINC 5 MG/ML
LOTION TOPICAL
Qty: 1 EACH | Refills: 0 | Status: SHIPPED | OUTPATIENT
Start: 2022-02-10

## 2022-02-10 SDOH — ECONOMIC STABILITY: FOOD INSECURITY: WITHIN THE PAST 12 MONTHS, THE FOOD YOU BOUGHT JUST DIDN'T LAST AND YOU DIDN'T HAVE MONEY TO GET MORE.: NEVER TRUE

## 2022-02-10 SDOH — ECONOMIC STABILITY: TRANSPORTATION INSECURITY
IN THE PAST 12 MONTHS, HAS LACK OF TRANSPORTATION KEPT YOU FROM MEETINGS, WORK, OR FROM GETTING THINGS NEEDED FOR DAILY LIVING?: NO

## 2022-02-10 SDOH — ECONOMIC STABILITY: FOOD INSECURITY: WITHIN THE PAST 12 MONTHS, YOU WORRIED THAT YOUR FOOD WOULD RUN OUT BEFORE YOU GOT MONEY TO BUY MORE.: NEVER TRUE

## 2022-02-10 ASSESSMENT — SOCIAL DETERMINANTS OF HEALTH (SDOH): HOW HARD IS IT FOR YOU TO PAY FOR THE VERY BASICS LIKE FOOD, HOUSING, MEDICAL CARE, AND HEATING?: NOT HARD AT ALL

## 2022-02-10 NOTE — PATIENT INSTRUCTIONS
Printed script for Incontinence Supplies faxed to [address] [place] [address].  Patient to return to clinic   AVS reviewed and given to pt. It is very important for your care that you keep your appointment. If for some reason you are unable to keep your appointment it is equally important that you call our office at [phone] to cancel your appointment and reschedule. Failure to do so may result in your termination from our practice.   MB

## 2022-02-10 NOTE — PROGRESS NOTES
Jose Teixeira is a 57 y.o. female evaluated via telephone on 2/10/2022. Consent:  She and/or health care decision maker is aware that that she may receive a bill for this telephone service, which includes applicable co-pays, depending on her insurance coverage, and has provided verbal consent to proceed. Documentation:      Subjective   Jose Teixeira is currently Confirmed for COVID-19. Presenting symptoms: chills, cough, shortness of breath, nasal congestion and malaise. she denies  fever, cough, shortness of breath, nasal congestion, chest pain, nausea and vomiting, loss of smell and loss of taste  Max temperature in last 24 hours: afebrile  Symptoms began on 1/25 and tested positive on 1/29 . Diagnosis Orders   1. COVID-19  Symptom tx for cough   2. Urinary incontinence, unspecified type  Incontinence Supply Disposable (INCONTINENCE BRIEF LARGE) MISC    Incontinence Supply Disposable (ATTENDS UNDERPADS LARGE REG) MISC    Incontinence Supply Disposable (UNDERPADS SMALL) MISC   3. Hemiplegia affecting left nondominant side, unspecified etiology, unspecified hemiplegia type ([address]     4. Morbid obesity due to excess calories ([address]           I affirm this is a Patient Initiated Episode with a Patient who has not had a related appointment within my department in the past 7 days or scheduled within the next 24 hours. Patient identification was verified at the start of the visit: Yes    Total Time: minutes: 11-20 minutes    Jose Teixeira was evaluated through a synchronous (real-time) audio encounter. The patient was located at home in a state where the provider was licensed to provide care.     Note: not billable if this call serves to triage the patient into an appointment for the relevant concern      Millie Boyer MD

## 2022-02-11 DIAGNOSIS — I89.0 LYMPHEDEMA OF BOTH LOWER EXTREMITIES: Primary | ICD-10-CM

## 2022-02-16 ENCOUNTER — HOSPITAL ENCOUNTER (OUTPATIENT)
Dept: OCCUPATIONAL THERAPY | Age: 62
Setting detail: THERAPIES SERIES
Discharge: HOME | End: 2022-02-16
Payer: MEDICARE

## 2022-02-16 PROCEDURE — 97535 SELF CARE MNGMENT TRAINING: CPT

## 2022-02-16 PROCEDURE — 97166 OT EVAL MOD COMPLEX 45 MIN: CPT

## 2022-02-16 NOTE — CONSULTS
TREATMENT LOCATION:  [x] [facility]: [phone]  F: [phone] [] [address]: [phone]  F: [phone]      Lymphedema Services - Initial Evaluation for Lower Extremity    Date:  2022  Patient: Cirilo Callahan  : 1960             MRN: [id number]  Referring Physician: Jeison Khan*       Phone: [phone]  Fax: [phone]  Insurance: [employer]   Medicare - visits based on [facility]; No patient liability     [place] medicaid- kaitlin yr; visit limit  remain ; No patient liability     Medical Diagnosis: I89.0 (ICD-10-CM) - Lymphedema of both lower extremities  Rehab Codes: I89.0   Onset Date: 22  Visit# / total visits: ; Progress note for Medicare patient due at visit 10   ( Certification Dates: 2022 - 22)    Allergies:    [x] Medications    Medications: See charted information in Epic  Past Medical History: See charted information in Epic     Restrictions: None  Fall Risk:   [x] No    [] Yes   If yes, intervention:       Overview: Patient is a 60year old female referred to the Lymphedema Clinic with a diagnosis of bilateral Lower Extremity Lymphedema. Pt arrives for evaluation stating that her edema has gotten more severe in the past 6-8 months. Pt wears compression stockings daily and feels that it helps in the sense it will bring the fluid to the knee and then not do anything else with it. She also c/o the stocking is so tight on the L LE that it is inhibiting the flow of fluid. Pt states that she is not able to determine what exacerbates the fluid like feeling in the thigh, but it does become enlarged at times. Prior to leaving pt is also set up with compression tubigrips for the ( B LE- SIZE E ) (calf and foot) as well as provided with education for proper use.  Pt is encouraged to monitor the extremity daily to ensure the device does not fold over. Pt is also instructed to take tubigrip off nightly and inspect the extremity for any wounds/ reduction in size. Pt states she has good understanding and has no further questions prior to leaving. Pt is also educated that we will look into getting her different compression for the distal extremity as well as find a pair of compression pants with at least 20% spandex in them for daily use. Pt is agreeable and states she will begin looking. Pt states she has had a trail with the vasopneumtic pump and has one on order. PHYSICIAN NOTES from 1/24/22:      Chief Complaint:      Leg swelling and discoloration of skin     History of Present Illness:      Yi Randolph is a 57 y.o. woman who presents with chronic bilateral, left greater then right lower extremity swelling, pain, numbness/tingling. 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 She has noticed swelling in her legs for several years, has noticed worsening symptoms over the last several months. She denies any prior trauma or DVT to her lower extremities. Discomfort in her legs she describes as heaviness and fatigues, worse towards the end of the day. She has been worried about the darkening of her skin, worried about poor circulation. She denies specific symptoms of claudication to the back of her calf. She has been using a walker for the last 3 years, mainly because of balance issues after her spine surgeries. She is compliant with her compression stockings and has noticed difference in her lower legs. Its her upper legs and pelvis area where she feels it the most.          Vein ablation both legs 2015 and 2017 Dr. Mihaela Toribio  Venous closure at [facility] vascular 2010  Dr. Tony Kee did venogram with IVUS in April 2021, 50% compression? May Thurner's unclear, but was told nothing needs to be done at this time.     SUBJECTIVE:  \" I just want to make sure I am taking care of this now so it does not become a bigger problem. Hx of Complete Decongestive Therapy:[]Yes - Date:        [x]No   Rate of onset:   [x] Slow   [] Rapid     [] Acute   Progression: [] Improving   [x]  Worsening  [] Consistent   Conservative Treatments Utilized in the Past:  [] None  [x] Compression Garments   [] Bandaging  [] Elevation   [] Exercise  []Self MLD  [] Other/comments:      Current Lymphedmea Treatments:   [] None  [] Compression Garments   [] Bandaging  [] Elevation  [] Exercise   []Self MLD  [] Other/comments:          Aggravating factors:  [] None   [x] Activity  [] Stress  [] Sleep Position [] Travel [] Hot Temperatures [] Diet   []  Other/comments:    Relieving factors:   [x] None [] Elevation  [] Activity  [] Compression  [] Rest  [] Cold Temperatures [] Diet   []  Other/comments:    Comments:      Pain:  [x] YES    [] NO   Location: B LE     Pain Rating: ( 0-10 scale) : 4/10  Comments: pt states that it is a 10/10 however she does not want to go to the ER.      History of Cancer: []Yes [x]No       Comorbidities:   [x] Obesity [] Dialysis  [] Other:   [x] Asthma/COPD [] Dementia [] Other:   [] Stroke [] Sleep apnea [] Other:   [x] Vascular disease [] Rheumatic disease [] Other:     Absolute Lymphedema Contraindications - treatement [x] NONE    Absolute Contraindications Regarding the Deep Abdomen: [x] NONE   Relative Lymphedema Contraindications[x] Age 60+      Home/Work Environment  Patient lives with: Alone   In what type of home [x]  One story   [] Two story   [] Split level  [] Apartment   Number of stairs to enter    With handrail on the []  Right to enter   [] Left to enter   Bathroom has a []  Tub only  [] Tub/shower combo   [x] Walk in shower    []  Grab bars   Washing machine is on []  Main level   [] Second level   [x] Basement   Employer    Job Status []  Normal duty   [] Light duty   [] Off due to condition    [x]  Retired   [] Not employed   [x] Disability  [] Other:  []  Return to work: Work activities/duties        ADL/IADL Previous level of function Current level of function Who currently assists the patient with task   Bathing  [x] Independent  [] Assist [x] Independent  [] Assist    Dress/grooming [x] Independent  [] Assist [x] Independent  [] Assist Increase time to complete. Transfer/mobility [x] Independent  [] Assist [x] Independent  [] Assist    Feeding [x] Independent  [] Assist [x] Independent  [] Assist    Toileting [x] Independent  [] Assist [x] Independent  [] Assist    Driving [x] Independent  [] Assist [x] Independent  [] Assist Pt majority of the time will drive herself, but occasionally uses transportation. Housekeeping [x] Independent  [] Assist [x] Independent  [] Assist    Grocery shop/meal prep [x] Independent  [] Assist [x] Independent  [] Assist      Gait Prior level of function Current level of function    [x] Independent  [] Assist [x] Independent  [] Assist   Device: [x] Independent [x] Independent    [] Straight Cane [] Quad cane [] Straight Cane [] Quad cane    [] Standard walker [x] Rolling walker   [] 4 wheeled walker [] Standard walker [x] Rolling walker   [] 4 wheeled walker    [] Wheelchair [] Wheelchair     OBJECTIVE  Physical Status:   Range of motion: Deficits [] Yes [x] No       Comment: Pt is able to move legs with WFL, however, she has stiffness when the edema worsens. Strength:         Deficits [] Yes [x] No        Comment:    Presentation of Affected Areas  Location: bilateral Lower Extremity    Description: Vascularity/Varicose Veins  Doughy     Scars No   Wounds None   Sensation Numbness, Tingling   Additional Comments:        Circumferential Measurements  Measurements taken from nail base of D2 digit.   Measurements (cm) Right Left   Dorsum    23.5 23.5   Inframalleolar       33.5 33.0   Supramalleolar     22.0 21.0   Lower Calf 23.5 24.0   Mid Calf 34.5 33.4   Upper Calf 41.5 39.0   Knee 46.0 43.5   10 cm above knee Thigh-widest 78.0 78.0   Hip-widest     Initial Total 2/16/2022 :  302.5 295.4          ASSESSMENT: Patient would benefit from skilled occupational therapy services in order to: Decrease symptoms of possible early onset edema secondary to venous diagnosis, decrease risk of worsening edema/wound, improve limb ROM, and improve overall quality of life. Pt is provided with a folder which included educational hand out on the basics of lymphedema, program details and what to expect for further review at home. Writer educates on an impaired lymphatic system vs. a healthy lymphatic system and how it relates to swelling and skin integrity. Pt is also educated, in more detail, on the purpose of lymphedema treatments and a POC that would be of benefit to them. This may include:     []Bandaging   []Self-Bandaging/Caregiver Training   [x]MLD    [x]Self-MLD   [x] Therapeutic Exercise    [x] Education/Instruction in home management program  [x] Education and trial of a Vasopneumatic Pump    [x] Education and transition to long term management devices such as velcro compression garments and/or daytime compression sleeve/stocking(s)   [x]Discharge to Home Program     Response to treatment: Pt verbalizes and is agreeable to the instructions/ POC established at today's evaluation. PLAN FOR NEXT VISIT: Initiate CDT, educate risk reduction techniques, Follow up on purchase of compression pants and use of tg . Lymphedema Stage per ISL Guidelines    [] 0: Subclinical with no evidence on physical exam  [x] 1:  Early onset, swelling/heaviness, pitting edema, subsides with limb elevation  [] 2:  More advanced, fibrosis resulting in non-pitting edema, does not respond to elevation, thickening of the tissues  [] 3: Elephantiasis, pitting absent, huge limbs with dry/scaly, papillomatosis, hyperkeratosis, fluid may be leaking with recurrent cellulitis. Acanthosis (deep body folds). Elevation &   diuretics ineffective. STG - To be addressed within 2 visits    Pt will demonstrate compliance of maintaining lymphedema precautions to reduce the risks of infection and further exacerbations. Pt will demonstrate independence with decongestive exercise program in order to expedite fluid rerouting. LTG To be adressed within 4 visits     Pt will demonstrate competence with SELF MLD (Manual lymphatic drainage) in order to reroute lymphatic pathways for decreased swelling. Pt/Caregiver will demonstrate independence with donning/doffing and wearing schedule for compression garments/ devices to maintain decreased size upon discharge. Pt will demonstrate compliance with skin care routine for improved overall skin integrity and decreased risk of wounds and infection. Pt to compliant with CDT in order to reduce edema in the B LE by 5+ cm. Patient's Goal: \" I would like to improve my circulation, decrease the pain, strengthen my legs and reduce the edema in my legs. Response to Education Provided:  [x] Verbalized understanding   [] Demonstrates/verbalizes HEP/Education previously given      [] Needs continued review            [] No understanding   Learner(s): [x] Patient  [] Spouse [] Family  [] Other:   Method(s): [x] Verbal [] Demonstration [x] Handouts [] Exercise booklet   Family available to assist with home program if needed: [] Yes [x] No    FREQUENCY: Pt will be seen 1 x/ week for 4 additional visits and will follow up as appropriate. Focus is to remain on short and long term goals listed above.      Rehabilitation Potential/Commitment: [x] Good [] Fair     [] Poor    Functional Outcome Measure(s):  Lymphedema Life Impact Scale (LLIS) Score: 86%     Clearance needed:  []Yes    [x]No     Physicians/specialties giving clearance:               Treatment Charges   Minutes   Units   Evaluation                                                             $95.15 / $75.40            Low   (01208)            Moderate (38917) 15 1          High   (88798)     Manual Therapy (86704):                                      $26.92 / $21.34     Therapeutic activities (67872):                             $37.46/ $26.79     Therapeutic Exercise (41655)                              $29.21/$ 22.84     Self care/home mgmt (42064)                              $32.39 / $24.43 60 4   Vasopneumatic Device (89408)                           $9.21     Total Treatment Time    75 5     Medicare Tracking - $2150 cap Totals   Today 192.87   Previous     Grand Total        Time In: 4:07  Time Out: 5:15      Electronically signed by Jessica Sanders OT on 2/16/2022 at 4:19 PM      Physician Signature: _________________________        Date: _______________  By signing above or cosigning this note, I have reviewed this plan of care and certify a need to continue medically necessary rehabilitation services.       *PLEASE SIGN ABOVE AND FAX BACK [phone] WITH ALL PAGES FOR CONSENT TO CONTINUE LYMPHEDEMA TREATMENT*

## 2022-03-01 DIAGNOSIS — M54.16 CHRONIC LUMBAR RADICULOPATHY: ICD-10-CM

## 2022-03-01 DIAGNOSIS — I10 ESSENTIAL HYPERTENSION: ICD-10-CM

## 2022-03-01 DIAGNOSIS — I10 HYPERTENSION, UNSPECIFIED TYPE: ICD-10-CM

## 2022-03-01 NOTE — TELEPHONE ENCOUNTER
E-scribe request from [id number] [facility] for Hydrochlorothiazide 50 mg and Ibuprofen 600 mg.    Patient is on the waitlist for an appt in August.      Health Maintenance   Topic Date Due    Cervical cancer screen  10/14/2017    Breast cancer screen  06/07/2021    Flu vaccine (1) Never done    COVID-19 Vaccine (1) 08/03/2022 (Originally 2/11/1965)    Shingles Vaccine (1 of 2) 08/12/2022 (Originally 2/11/2009)    DTaP/Tdap/Td vaccine (1 - Tdap) 02/09/2023 (Originally 2/11/1978)    Lipid screen  04/30/2022    Depression Screen  08/17/2022    Annual Wellness Visit (AWV)  08/18/2022    Potassium monitoring  01/29/2023    Creatinine monitoring  01/29/2023    Colorectal Cancer Screen  05/19/2024    Hepatitis C screen  Completed    HIV screen  Completed    Hepatitis A vaccine  Aged Out    Hepatitis B vaccine  Aged Out    Hib vaccine  Aged Out    Meningococcal (ACWY) vaccine  Aged Out    Pneumococcal 0-64 years Vaccine  Aged Out             (applicable per patient's age: Cancer Screenings, Depression Screening, Fall Risk Screening, Immunizations)    Hemoglobin A1C (%)   Date Value   04/02/2020 5.6   10/23/2018 5.9   10/05/2015 5.4     LDL Cholesterol (mg/dL)   Date Value   04/30/2021 218 (H)     AST (U/L)   Date Value   04/30/2021 20     ALT (U/L)   Date Value   04/30/2021 19     BUN (mg/dL)   Date Value   01/29/2022 9      (goal A1C is < 7)   (goal LDL is <100) need 30-50% reduction from baseline     BP Readings from Last 3 Encounters:   01/29/22 (!) 154/102   01/24/22 (!) 141/79   10/22/21 (!) 166/89    (goal /80)      All Future Testing planned in [facility]:  Lab Frequency Next Occurrence   MAURI Digital Screen Bilateral [[id number]] Once 04/12/2022   Reflux study/reflux venous insufficiency bilateral Once 03/02/2022   VL ARTERIAL PVR LOWER WO EXERCISE Once 07/24/2022       Next Visit Date:  Future Appointments   Date Time Provider Mari Diop   3/14/2022  2:00 PM LISA Alva LISA [facility]om[facility]   7/28/2022  1:30 PM [facility]            Patient Active Problem List:     Morbid obesity due to excess calories ([address]     Hyperlipemia     Chronic pelvic pain in female     Spinal stenosis at L4-L5 level     Lumbar disc herniation     Adjustment disorder with mixed anxiety and depressed mood     Anemia     Morbid obesity with BMI of 50.0-59.9, adult ([address]     Bilateral stenosis of lateral recess of lumbar spine     Chronic pain of left knee     Angioedema     Congenital pes planus     Failed back surgical syndrome     Venous insufficiency of both lower extremities     Weakness of both lower extremities     Recurrent UTI     Chronic lumbar radiculopathy     Left leg weakness     History of lumbar surgery     Chronic bilateral low back pain with bilateral sciatica     Hemiplegia affecting left nondominant side (HCC)     Primary osteoarthritis of left knee

## 2022-03-03 RX ORDER — IBUPROFEN 600 MG/1
600 TABLET ORAL EVERY 8 HOURS PRN
Qty: 30 TABLET | Refills: 0 | Status: SHIPPED | OUTPATIENT
Start: 2022-03-03 | End: 2022-06-23

## 2022-03-03 RX ORDER — HYDROCHLOROTHIAZIDE 50 MG/1
TABLET ORAL
Qty: 30 TABLET | Refills: 5 | Status: SHIPPED | OUTPATIENT
Start: 2022-03-03 | End: 2022-08-18 | Stop reason: SDUPTHER

## 2022-03-07 ENCOUNTER — HOSPITAL ENCOUNTER (EMERGENCY)
Age: 62
Discharge: HOME | End: 2022-03-07
Attending: EMERGENCY MEDICINE
Payer: MEDICARE

## 2022-03-07 VITALS
SYSTOLIC BLOOD PRESSURE: 157 MMHG | RESPIRATION RATE: 18 BRPM | DIASTOLIC BLOOD PRESSURE: 95 MMHG | OXYGEN SATURATION: 97 % | HEIGHT: 71 IN | BODY MASS INDEX: 41.02 KG/M2 | HEART RATE: 92 BPM | WEIGHT: 293 LBS | TEMPERATURE: 98.5 F

## 2022-03-07 DIAGNOSIS — N30.00 ACUTE CYSTITIS WITHOUT HEMATURIA: Primary | ICD-10-CM

## 2022-03-07 LAB
-: ABNORMAL
BACTERIA: ABNORMAL
BILIRUBIN URINE: NEGATIVE
CASTS UA: ABNORMAL /LPF (ref 0–8)
COLOR: YELLOW
EPITHELIAL CELLS UA: ABNORMAL /HPF (ref 0–5)
GLUCOSE URINE: NEGATIVE
KETONES, URINE: NEGATIVE
LEUKOCYTE ESTERASE, URINE: ABNORMAL
NITRITE, URINE: NEGATIVE
PH UA: 6 (ref 5–8)
PROTEIN UA: NEGATIVE
RBC UA: ABNORMAL /HPF (ref 0–4)
SPECIFIC GRAVITY UA: 1.01 (ref 1–1.03)
TURBIDITY: CLEAR
URINE HGB: NEGATIVE
UROBILINOGEN, URINE: NORMAL
WBC UA: ABNORMAL /HPF (ref 0–5)

## 2022-03-07 PROCEDURE — 99285 EMERGENCY DEPT VISIT HI MDM: CPT

## 2022-03-07 PROCEDURE — 87077 CULTURE AEROBIC IDENTIFY: CPT

## 2022-03-07 PROCEDURE — 81001 URINALYSIS AUTO W/SCOPE: CPT

## 2022-03-07 PROCEDURE — 87186 SC STD MICRODIL/AGAR DIL: CPT

## 2022-03-07 PROCEDURE — 6370000000 HC RX 637 (ALT 250 FOR IP): Performed by: STUDENT IN AN ORGANIZED HEALTH CARE EDUCATION/TRAINING PROGRAM

## 2022-03-07 PROCEDURE — 87086 URINE CULTURE/COLONY COUNT: CPT

## 2022-03-07 RX ORDER — NITROFURANTOIN 25; 75 MG/1; MG/1
100 CAPSULE ORAL 2 TIMES DAILY
Qty: 20 CAPSULE | Refills: 0 | Status: SHIPPED | OUTPATIENT
Start: 2022-03-07 | End: 2022-03-07

## 2022-03-07 RX ORDER — SULFAMETHOXAZOLE AND TRIMETHOPRIM 800; 160 MG/1; MG/1
1 TABLET ORAL 2 TIMES DAILY
Qty: 20 TABLET | Refills: 0 | Status: SHIPPED | OUTPATIENT
Start: 2022-03-07 | End: 2022-03-17

## 2022-03-07 RX ORDER — SULFAMETHOXAZOLE AND TRIMETHOPRIM 800; 160 MG/1; MG/1
1 TABLET ORAL ONCE
Status: DISCONTINUED | OUTPATIENT
Start: 2022-03-07 | End: 2022-03-07

## 2022-03-07 RX ORDER — SULFAMETHOXAZOLE AND TRIMETHOPRIM 800; 160 MG/1; MG/1
1 TABLET ORAL 2 TIMES DAILY
Qty: 14 TABLET | Refills: 0 | Status: SHIPPED | OUTPATIENT
Start: 2022-03-07 | End: 2022-03-07 | Stop reason: SINTOL

## 2022-03-07 ASSESSMENT — ENCOUNTER SYMPTOMS
EYE REDNESS: 0
SINUS PAIN: 0
SHORTNESS OF BREATH: 0
VOMITING: 0
TROUBLE SWALLOWING: 0
DIARRHEA: 0
COUGH: 0
CONSTIPATION: 0
BACK PAIN: 0
SINUS PRESSURE: 0
PHOTOPHOBIA: 0
NAUSEA: 0
COLOR CHANGE: 0
CHEST TIGHTNESS: 0
SORE THROAT: 0
ABDOMINAL PAIN: 0

## 2022-03-07 NOTE — ED PROVIDER NOTES
[facility]     Emergency Department     Faculty Attestation    I performed a history and physical examination of the patient and discussed management with the resident. I reviewed the resident´s note and agree with the documented findings and plan of care. Any areas of disagreement are noted on the chart. I was personally present for the key portions of any procedures. I have documented in the chart those procedures where I was not present during the key portions. I have reviewed the emergency nurses triage note. I agree with the chief complaint, past medical history, past surgical history, allergies, medications, social and family history as documented unless otherwise noted below. For Physician Assistant/ Nurse Practitioner cases/documentation I have personally evaluated this patient and have completed at least one if not all key elements of the E/M (history, physical exam, and MDM). Additional findings are as noted. No CVA tenderness, abdomen soft and nontender. No pain at McBurney's point, no guarding or rebounding.      Karey Suárez MD  03/07/22 1007

## 2022-03-07 NOTE — ED NOTES
Pt arrived to ED with c/o of UTI. PT states she has a hx of uti's. Pt states she has an odor and increased frequency.  PT is alert and oriented x4     Precious Ruiz RN  03/07/22 [id number]

## 2022-03-07 NOTE — ED PROVIDER NOTES
[facility]  ED  Emergency Department Encounter  Emergency Medicine Resident     Pt Name: Radha Villafana  MRN: [id number]  [place] 1960  Date of evaluation: 3/7/22  PCP:  Minda Sykes MD    [address] [place] [address]       Chief Complaint   Patient presents with    Urinary Tract Infection    Leg Swelling       HISTORY OFPRESENT ILLNESS  (Location/Symptom, Timing/Onset, Context/Setting, Quality, Duration, Modifying Factors,Severity.)      Radha Villafana is a 60 y. o.yo female who presents with concerns for urinary tract infection. Patient states that she gets frequent urinary tract infections in for the last 4 days she has noticed increased frequency and odor to her urine. Patient also is complaining of chronic leg swelling. Patient states she is on Lasix and hydrochlorothiazide for her lymphedema. Patient denies any history of heart failure. Patient denies any fevers chills nausea vomiting or abdominal pain. PAST MEDICAL / SURGICAL / SOCIAL / FAMILY HISTORY      has a past medical history of Acquired cystic kidney disease, Anemia, Arthritis, Asthma, Bilateral leg and foot pain, Degeneration of cervical intervertebral disc, Depression, Failed back syndrome, History of recurrent UTI (urinary tract infection), Hyperlipidemia, Hypertension, Lumbar disc herniation with radiculopathy, Obesity, Spinal stenosis, and Wound infection after surgery. has a past surgical history that includes Foot surgery (Bilateral); Gallbladder surgery; Lumbar spine surgery (1986); Lumbar spine surgery (4/2012); Nerve Block (10/26/2015); Nerve Block (12/22/15); Spine surgery (07/24/2016); back surgery; and Vein Surgery (Bilateral, 03/2017).      Social History     Socioeconomic History    Marital status: Single     Spouse name: Not on file    Number of children: Not on file    Years of education: Not on file    Highest education level: Not on file   Occupational History    Not on file   Tobacco Use    Smoking status: Former Smoker     Packs/day: 0.10     Years: 30.00     Pack years: 3.00     Types: Cigarettes     Quit date: 10/23/1988     Years since quittin.3    Smokeless tobacco: Never Used   Vaping Use    Vaping Use: Never used   Substance and Sexual Activity    Alcohol use: No     Alcohol/week: 0.0 standard drinks    Drug use: No    Sexual activity: Not Currently   Other Topics Concern    Not on file   Social History Narrative    Not on file     Social Determinants of Health     Financial Resource Strain: Low Risk     Difficulty of Paying Living Expenses: Not hard at all   Food Insecurity: No Food Insecurity    Worried About Running Out of Food in the Last Year: Never true    Krunal of Food in the Last Year: Never true   Transportation Needs: No Transportation Needs    Lack of Transportation (Medical): No    Lack of Transportation (Non-Medical):  No   Physical Activity:     Days of Exercise per Week: Not on file    Minutes of Exercise per Session: Not on file   Stress:     Feeling of Stress : Not on file   Social Connections:     Frequency of Communication with Friends and Family: Not on file    Frequency of Social Gatherings with Friends and Family: Not on file    Attends Mosque Services: Not on file    Active Member of [address] [place] [address] [employer] or Organizations: Not on file    Attends Club or Organization Meetings: Not on file    Marital Status: Not on file   Intimate Partner Violence:     Fear of Current or Ex-Partner: Not on file    Emotionally Abused: Not on file    Physically Abused: Not on file    Sexually Abused: Not on file   Housing Stability:     Unable to Pay for Housing in the Last Year: Not on file    Number of [place] in the Last Year: Not on file    Unstable Housing in the Last Year: Not on file       Family History   Problem Relation Age of Onset    Ovarian Cancer Mother     Heart Disease Father     Hypertension Father         Allergies:  Keflex [cephalexin] and Mobic [meloxicam]    Home Medications:  Prior to Admission medications    Medication Sig Start Date End Date Taking? Authorizing Provider   sulfamethoxazole-trimethoprim (BACTRIM DS) 800-160 MG per tablet Take 1 tablet by mouth 2 times daily for 10 days 3/7/22 3/17/22 Yes Raina Albert DO   ibuprofen (ADVIL;MOTRIN) 600 MG tablet TAKE 1 TABLET BY MOUTH EVERY 8 HOURS AS NEEDED FOR PAIN. 3/3/22   Cailin Roberts MD   hydroCHLOROthiazide (HYDRODIURIL) 50 MG tablet TAKE 1 TABLET BY MOUTH ONCE DAILY. 3/3/22   Cailin Roberts MD   Incontinence Supply Disposable (INCONTINENCE BRIEF LARGE) MISC Use as needed 2/10/22   Cailin Roberts MD   Incontinence Supply Disposable (ATTENDS UNDERPADS LARGE REG) MISC Use as needed 2/10/22   Cailin Roberts MD   Incontinence Supply Disposable (UNDERPADS SMALL) MISC 1 box by Does not apply route daily 2/10/22   Cailin Roberts MD   vitamin D (ERGOCALCIFEROL) 1.25 MG (88302 UT) CAPS capsule Take 1 capsule by mouth once a week 5/25/21   Cailin Roberts MD   vitamin D3 (CHOLECALCIFEROL) 25 MCG (1000 UT) TABS tablet Take 1 tablet by mouth daily 5/25/21   Cailin Roberts MD   Coenzyme Q10 (CO Q 10) 10 MG CAPS Take 1 capsule by mouth daily 5/25/21   Cailin Roberts MD   carvedilol (COREG) 3.125 MG tablet Take 1 tablet by mouth 2 times daily 5/25/21   Cailin Roberts MD   atorvastatin (LIPITOR) 40 MG tablet Take 1 tablet by mouth daily 5/25/21   Cailin Roberts MD   tiZANidine (ZANAFLEX) 4 MG tablet Take 4 mg by mouth 3 times daily as needed 3/19/21   Historical Provider, MD   oxyCODONE-acetaminophen (PERCOCET) 5-325 MG per tablet Take 1 tablet by mouth every 6 hours as needed for Pain.     Historical Provider, MD   furosemide (LASIX) 20 MG tablet Take 1 tablet by mouth daily  Patient taking differently: Take 20 mg by mouth every other day  1/14/21   Cailin Roberts MD   Blood Pressure Monitoring (BLOOD PRESSURE CUFF) MISC Blood Pressure Monitoring (BLOOD PRESSURE CUFF) MISC Blood Pressure Monitoring (BLOOD PRESSURE CUFF) MISC Indications: Essential hypertension Use as directed 1 each 0 04/02/2020 Active 04-  [address] [place] [address] ([id number]) 4/2/20   Historical Provider, MD   VITAMIN E PO Take by mouth daily    Historical Provider, MD   aspirin 81 MG tablet Take 2 tablets by mouth daily 5/1/19   Valentin Rabago MD   ascorbic acid (VITAMIN C) 500 MG tablet Take 1 tablet by mouth daily 5/1/19   Valentin Rabago MD   Multiple Vitamin (THERAPEUTIC MULTIVITAMIN PO) Take 1 tablet by mouth    Historical Provider, MD       REVIEW OFSYSTEMS    (2-9 systems for level 4, 10 or more for level 5)      Review of Systems   Constitutional: Negative for chills, diaphoresis, fatigue and fever. HENT: Negative for congestion, sinus pressure, sinus pain, sore throat and trouble swallowing. Eyes: Negative for photophobia, redness and visual disturbance. Respiratory: Negative for cough, chest tightness and shortness of breath. Cardiovascular: Positive for leg swelling. Negative for chest pain and palpitations. Gastrointestinal: Negative for abdominal pain, constipation, diarrhea, nausea and vomiting. Genitourinary: Positive for dysuria, frequency and urgency. Negative for difficulty urinating and flank pain. Musculoskeletal: Negative for back pain, neck pain and neck stiffness. Skin: Negative for color change, pallor and rash. Neurological: Negative for dizziness, tremors, speech difficulty, weakness, light-headedness and headaches. PHYSICAL EXAM   (up to 7 for level 4, 8 or more forlevel 5)      INITIAL VITALS:   ED Triage Vitals   BP Temp Temp src Pulse Resp SpO2 Height Weight   157/95 98.5 oral 92 18 97 -- --       Physical Exam  Constitutional:       General: She is not in acute distress. Appearance: She is obese. She is not ill-appearing. HENT:      Head: Normocephalic and atraumatic. Right Ear: External ear normal.      Left Ear: External ear normal.      Nose: Nose normal. No rhinorrhea.    Eyes: Extraocular Movements: Extraocular movements intact. Pupils: Pupils are equal, round, and reactive to light. Cardiovascular:      Rate and Rhythm: Normal rate and regular rhythm. Pulses: Normal pulses. Heart sounds: No murmur heard. Pulmonary:      Effort: Pulmonary effort is normal.      Breath sounds: Normal breath sounds. Abdominal:      Palpations: Abdomen is soft. Tenderness: There is no abdominal tenderness. There is no guarding or rebound. Musculoskeletal:         General: Swelling present. Normal range of motion. Cervical back: Normal range of motion and neck supple. Comments: No LE swelling, Bilateral thigh swelling, non-pitting    Skin:     General: Skin is warm and dry. Neurological:      General: No focal deficit present. Mental Status: She is alert and oriented to person, place, and time.          DIFFERENTIAL  DIAGNOSIS     PLAN (LABS / IMAGING / EKG):  Orders Placed This Encounter   Procedures    Culture, Urine    Urinalysis with Microscopic       MEDICATIONS ORDERED:  Orders Placed This Encounter   Medications    DISCONTD: sulfamethoxazole-trimethoprim (BACTRIM DS;SEPTRA DS) 800-160 MG per tablet 1 tablet     Order Specific Question:   Antimicrobial Indications     Answer:   Urinary Tract Infection    DISCONTD: sulfamethoxazole-trimethoprim (BACTRIM DS;SEPTRA DS) 800-160 MG per tablet     Sig: Take 1 tablet by mouth 2 times daily for 7 days     Dispense:  14 tablet     Refill:  0    DISCONTD: nitrofurantoin, macrocrystal-monohydrate, (MACROBID) 100 MG capsule     Sig: Take 1 capsule by mouth 2 times daily for 10 days     Dispense:  20 capsule     Refill:  0    sulfamethoxazole-trimethoprim (BACTRIM DS) 800-160 MG per tablet     Sig: Take 1 tablet by mouth 2 times daily for 10 days     Dispense:  20 tablet     Refill:  0       DDX: Urinary tract infection, cystitis, chronic edema, lymphedema     Initial MDM/Plan: 60 y.o. female who presents with concerns for urinary tract infection and leg swelling. Patient has history of chronic lymphedema as well as recurrent UTIs. Symptoms of been ongoing for 4 days. Patient has taken her Lasix and hydrochlorothiazide this morning. No lower extremity swelling does have bilateral thigh swelling that is nonpitting in nature this is chronic. We will plan for urinalysis likely discharge    DIAGNOSTIC RESULTS / EMERGENCYDEPARTMENT COURSE / MDM     LABS:  Labs Reviewed   URINALYSIS WITH MICROSCOPIC - Abnormal; Notable for the following components:       Result Value    Leukocyte Esterase, Urine MODERATE (*)     Bacteria, UA MANY (*)     All other components within normal limits   CULTURE, URINE         RADIOLOGY:  No results found. EKG      All EKG's are interpreted by the Emergency Department Physicianwho either signs or Co-signs this chart in the absence of a cardiologist.    EMERGENCY DEPARTMENT COURSE:  ED Course as of 03/07/22 1056   Mon Mar 07, 2022   1003 Urine sent to lab    [CD]   1009 Leukocyte Esterase, Urine(!): MODERATE [CD]   1009 WBC, UA: 5 TO 10 [CD]   1013 Patient with findings suggestive of acute cystitis on urinalysis. Will discharge with antibiotics. [CD]   1019 Patient has allergy to Keflex. She is stating last time she was here she got Bactrim for her urinary tract infection which did cause her to have a rash so she does not want to be sent home with Bactrim. We will try and treat patient with nitrofurantoin. [CD]   9348 Patient is now requesting to be on Bactrim. She states her rash was likely from another medication and she would like to have the Bactrim over the nitrofurantoin. [CD]      ED Course User Index  [CD] Liam Camargo DO          PROCEDURES:  None    CONSULTS:  None    CRITICAL CARE:  Please see attending documentation    FINAL IMPRESSION      1.  Acute cystitis without hematuria          DISPOSITION / PLAN     DISPOSITION    Discharge home    PATIENT REFERRED TO:  Sherry Schmitz CARRIE [place]/ [address]                                                                             [address]  [phone]    Call in 2 days  For ED follow up    [facility]  [address] [place] [address]  [phone]  Go to   If symptoms worsen      DISCHARGE MEDICATIONS:  Discharge Medication List as of 3/7/2022 10:45 AM      START taking these medications    Details   sulfamethoxazole-trimethoprim (BACTRIM DS) 800-160 MG per tablet Take 1 tablet by mouth 2 times daily for 10 days, Disp-20 tablet, R-0Print             Gale Rai DO  Emergency Medicine Resident    (Please note that portions of this note were completed with a voice recognition program.Efforts were made to edit the dictations but occasionally words are mis-transcribed.)        Gale Rai DO  Resident  03/07/22 [address] [facility],   Resident  03/07/22 1056

## 2022-03-09 LAB
CULTURE: ABNORMAL
CULTURE: ABNORMAL
SPECIMEN DESCRIPTION: ABNORMAL

## 2022-03-10 NOTE — PROGRESS NOTES
At the time of [facility] visit to [address] [place] [address] Emergency Room on 3/7/22 a urine culture was obtained. It was positive for E faecalis, which is not covered by the Bactrim prescribed at discharge. Spoke to patient at 3/10/22 at 2:25 pm regarding need to start taking Macrobid instead. Macrobid 100 mg po bid x 7 days called into [facility] pharmacy on behalf of Dr. Randall Beatty. Instructed patient to  prescription and start taking new antibiotic.     Aftab Cisneros, SammD, PRADIP, BCPS 3/10/2022 2:31 PM

## 2022-03-28 ENCOUNTER — HOSPITAL ENCOUNTER (OUTPATIENT)
Dept: OCCUPATIONAL THERAPY | Age: 62
Setting detail: THERAPIES SERIES
Discharge: HOME | End: 2022-03-28
Payer: MEDICAID

## 2022-03-28 PROCEDURE — 97535 SELF CARE MNGMENT TRAINING: CPT

## 2022-04-20 ENCOUNTER — HOSPITAL ENCOUNTER (OUTPATIENT)
Dept: OCCUPATIONAL THERAPY | Age: 62
Setting detail: THERAPIES SERIES
Discharge: HOME | End: 2022-04-20

## 2022-04-20 NOTE — SIGNIFICANT EVENT
[x] [address] Occupational Therapy       [address] [facility], 1st Floor       Phone: [phone]       Fax: [phone] [] [facility] Occupational  Therapy at [address] [place] [address].  [place], [place]       Phone: [phone]       Fax: [phone]          Occupational Therapy Cancel/No Show note    Date: 2022  Patient: Jorge Parmar  : 1960  MRN: [id number]    Cancels/No Shows to date: 2    For today's appointment patient:    []  Cancelled    [] Rescheduled appointment    [x] No-show     Reason given by patient:    []  Patient ill    []  Conflicting appointment    [] No transportation      [] Conflict with work    [x] No reason given- states that she does not want to return for visits at this time.     [] Weather related    [] [id number]    [] Other:      Comments:       [] Next appointment was confirmed       Electronically signed by: Cooper Whittaker OT

## 2022-04-21 ENCOUNTER — HOSPITAL ENCOUNTER (OUTPATIENT)
Dept: CT IMAGING | Age: 62
Discharge: HOME | End: 2022-04-23
Payer: MEDICARE

## 2022-04-21 DIAGNOSIS — N39.0 RECURRENT UTI: ICD-10-CM

## 2022-04-21 PROCEDURE — 74176 CT ABD & PELVIS W/O CONTRAST: CPT

## 2022-04-30 ENCOUNTER — HOSPITAL ENCOUNTER (EMERGENCY)
Age: 62
Discharge: HOME | End: 2022-04-30
Attending: EMERGENCY MEDICINE
Payer: MEDICARE

## 2022-04-30 VITALS
HEIGHT: 66 IN | OXYGEN SATURATION: 97 % | TEMPERATURE: 98.3 F | RESPIRATION RATE: 16 BRPM | SYSTOLIC BLOOD PRESSURE: 154 MMHG | DIASTOLIC BLOOD PRESSURE: 86 MMHG | WEIGHT: 250 LBS | BODY MASS INDEX: 40.18 KG/M2 | HEART RATE: 90 BPM

## 2022-04-30 DIAGNOSIS — N30.00 ACUTE CYSTITIS WITHOUT HEMATURIA: Primary | ICD-10-CM

## 2022-04-30 LAB
-: ABNORMAL
BILIRUBIN URINE: NEGATIVE
CASTS UA: ABNORMAL /LPF (ref 0–8)
COLOR: YELLOW
EPITHELIAL CELLS UA: ABNORMAL /HPF (ref 0–5)
GLUCOSE URINE: NEGATIVE
KETONES, URINE: ABNORMAL
LEUKOCYTE ESTERASE, URINE: ABNORMAL
NITRITE, URINE: NEGATIVE
PH UA: 5.5 (ref 5–8)
PROTEIN UA: NEGATIVE
RBC UA: ABNORMAL /HPF (ref 0–4)
SPECIFIC GRAVITY UA: 1.02 (ref 1–1.03)
TURBIDITY: CLEAR
URINE HGB: NEGATIVE
UROBILINOGEN, URINE: NORMAL
WBC UA: ABNORMAL /HPF (ref 0–5)

## 2022-04-30 PROCEDURE — 87086 URINE CULTURE/COLONY COUNT: CPT

## 2022-04-30 PROCEDURE — 81001 URINALYSIS AUTO W/SCOPE: CPT

## 2022-04-30 PROCEDURE — 6370000000 HC RX 637 (ALT 250 FOR IP): Performed by: STUDENT IN AN ORGANIZED HEALTH CARE EDUCATION/TRAINING PROGRAM

## 2022-04-30 PROCEDURE — 99283 EMERGENCY DEPT VISIT LOW MDM: CPT

## 2022-04-30 RX ORDER — CIPROFLOXACIN 500 MG/1
250 TABLET, FILM COATED ORAL ONCE
Status: COMPLETED | OUTPATIENT
Start: 2022-04-30 | End: 2022-04-30

## 2022-04-30 RX ORDER — CIPROFLOXACIN 250 MG/1
250 TABLET, FILM COATED ORAL 2 TIMES DAILY
Qty: 14 TABLET | Refills: 0 | Status: SHIPPED | OUTPATIENT
Start: 2022-04-30 | End: 2022-05-07

## 2022-04-30 RX ORDER — PHENAZOPYRIDINE HYDROCHLORIDE 200 MG/1
200 TABLET, FILM COATED ORAL 3 TIMES DAILY PRN
Qty: 6 TABLET | Refills: 0 | Status: SHIPPED | OUTPATIENT
Start: 2022-04-30 | End: 2022-05-02

## 2022-04-30 RX ADMIN — CIPROFLOXACIN HYDROCHLORIDE 250 MG: 500 TABLET, FILM COATED ORAL at 13:41

## 2022-04-30 ASSESSMENT — PAIN SCALES - GENERAL: PAINLEVEL_OUTOF10: 5

## 2022-04-30 ASSESSMENT — ENCOUNTER SYMPTOMS
NAUSEA: 0
ABDOMINAL PAIN: 0
VOMITING: 0
RHINORRHEA: 0
BACK PAIN: 1
SHORTNESS OF BREATH: 0
DIARRHEA: 0
COUGH: 0

## 2022-04-30 ASSESSMENT — PAIN - FUNCTIONAL ASSESSMENT: PAIN_FUNCTIONAL_ASSESSMENT: 0-10

## 2022-04-30 NOTE — ED PROVIDER NOTES
[facility]  ED  Emergency Department  Senior Resident Attestation     Primary Care Physician  Jena Villanueva MD    I performed a history and physical examination of the patient and discussed management with the eliz resident. I reviewed the eliz residents note and agree with the documented findings and plan of care. Any areas of disagreement are noted on the chart. Case was then discussed with Faculty Attending Supervisor for additional medical management. PERTINENT ATTENDING PHYSICIAN COMMENTS:    HISTORY:   Jose Cruz Jackson is a 60 y.o. female who  has a past medical history of Acquired cystic kidney disease (6/21/2018), Anemia (10/5/2016), Arthritis, Asthma (1/4/2017), Bilateral leg and foot pain (02/2019), Degeneration of cervical intervertebral disc (6/21/2018), Depression (2/2/2016), Failed back syndrome (4/3/2018), History of recurrent UTI (urinary tract infection), Hyperlipidemia, Hypertension, Lumbar disc herniation with radiculopathy (6/4/2013), Obesity, Spinal stenosis, and Wound infection after surgery. and presents with complaint of low back pain, foul-smelling urine, increased urinary frequency. Patient has a history of recurrent UTIs including Enterococcus faecalis this year as well as E. coli in the past.  She reports symptoms similar to previous UTIs. Despite triage note she denies any flank pain, fevers, confusion, hematuria, abdominal pain, chest pain or shortness of breath. She does have follow-up with urology. Urology ordered a CT scan of abdomen and pelvis that was performed 9 days ago that showed no acute process, no kidney stones, and normal aortic caliber.     PHYSICAL:   Temp: 98.3 °F (36.8 °C),  Pulse: 90, Resp: 16, BP: (!) 154/86, SpO2: 97 %  Gen: Non-toxic, Afebrile  Neck: Supple  Cards: Regular rate and rhythm  Pulm: Lung sounds clear to auscultation  Abdomen: Soft, non-tender, non-distended  Skin: warm, dry  Extremities: pulses 2+ radial / dorsalis pedis, no clubbing, cyanosis, edema    IMPRESSION:   UTI    PLAN:   Urinalysis, treat empirically due to being symptomatic    CRITICAL CARE TIME:    None    Nasim Ball DO  Senior Resident Physician    (Please note that portions of this note were completed with a voice recognition program.  Efforts were made to edit the dictations but occasionally words are mis-transcribed.)        Nasim Ball DO  Resident  04/30/22 0002

## 2022-04-30 NOTE — ED PROVIDER NOTES
[address] [facility]     Emergency Department     Faculty Note/ Attestation      Pt Name: Daniela Ramirez                                       MRN: [id number]  [place] 1960  Date of evaluation: 4/30/2022    Patients PCP:    Nika Osborne MD      Attestation  I performed a history and physical examination of the patient and discussed management with the resident. I reviewed the residents note and agree with the documented findings and plan of care. Any areas of disagreement are noted on the chart. I was personally present for the key portions of any procedures. I have documented in the chart those procedures where I was not present during the key portions. I have reviewed the emergency nurses triage note. I agree with the chief complaint, past medical history, past surgical history, allergies, medications, social and family history as documented unless otherwise noted below. For Physician Assistant/ Nurse Practitioner cases/documentation I have personally evaluated this patient and have completed at least one if not all key elements of the E/M (history, physical exam, and MDM). Additional findings are as noted.       Initial Screens:        [place] Coma Scale  Eye Opening: Spontaneous  Best Verbal Response: Oriented  Best Motor Response: Obeys commands  Charisma Coma Scale Score: 15    Vitals:    Vitals:    04/30/22 1132 04/30/22 1149   BP: (!) 154/86    Pulse: 90    Resp: 16    Temp: 98.3 °F (36.8 °C)    TempSrc: Oral    SpO2: 97%    Weight:  250 lb (113.4 kg)   Height:  5' 6\" (1.676 m)       CHIEF COMPLAINT       Chief Complaint   Patient presents with    Flank Pain     pain lasting two days, prone to UTI, urine odor, frequent urination             DIAGNOSTIC RESULTS             RADIOLOGY:   No orders to display         LABS:  Labs Reviewed   CULTURE, URINE   URINALYSIS WITH MICROSCOPIC         EMERGENCY DEPARTMENT COURSE:     -------------------------  BP: (!) 154/86, Temp: 98.3 °F (36.8 °C), Pulse: 90, Resp: 16      Comments    Dysuria, hx of UTIs, previous imaging    She has no abdominal pain, flank pain, back pain, nausea vomiting or diarrhea, no fevers or chills, no other symptoms other than dysuria that is consistent with her previous UTIs. Urine shows small leukocytes, although clinically she has symptoms consistent with UTI. We will treat her with Cipro, previous cultures sensitive to that, follow-up with urology as previously scheduled.     (Please note that portions of this note were completed with a voice recognition program.  Efforts were made to edit the dictations but occasionally words are mis-transcribed.)      Hugh Kilgore MD,, MD  Attending Emergency Physician         Hugh Kilgore MD  04/30/22 9397

## 2022-04-30 NOTE — Clinical Note
Kim Mendieta was seen and treated in our emergency department on 4/30/2022. She may return to work on 05/01/2022. If you have any questions or concerns, please don't hesitate to call.       Yoly Leung MD

## 2022-04-30 NOTE — ED NOTES
Pt ambulated to restroom with personal wheel walker with a steady quick gait. Pt reported foul smelling urine with increased frequency x 2 days. Pt sts \"I am prone to UTIs\". Pt denied any other concerns at this time.       Maria Eugenia Bonilla RN  04/30/22 7126

## 2022-04-30 NOTE — ED PROVIDER NOTES
[facility] ED  Emergency Department Encounter  Emergency Medicine Resident     Pt Name: Octaviano Colindres  MRN: [id number]  [place] 1960  Date of evaluation: 4/30/22  PCP:  Seb Harrison MD    This patient was evaluated in the Emergency Department for symptoms described in the history of present illness. The patient was evaluated in the context of the global COVID-19 pandemic, which necessitated consideration that the patient might be at risk for infection with the SARS-CoV-2 virus that causes COVID-19. Institutional protocols and algorithms that pertain to the evaluation of patients at risk for COVID-19 are in a state of rapid change based on information released by regulatory bodies including the CDC and federal and state organizations. These policies and algorithms were followed during the patient's care in the ED. CHIEF COMPLAINT       Chief Complaint   Patient presents with    Flank Pain     pain lasting two days, prone to UTI, urine odor, frequent urination     HISTORY OFPRESENT ILLNESS  (Location/Symptom, Timing/Onset, Context/Setting, Quality, Duration, Modifying Factors,Severity.)      Octaviano Colindres is a 60 y.o. female who presents with increased urinary frequency x3 days, also a slight right lower back pain. No fevers at home. No dysuria or hematuria. Denies abdominal pain, chest pain or shortness of breath. Patient states that she has chronic UTIs and her last was 1 month ago. She states that she does have establishment with a urologist and is scheduled to see him on May 10 to discuss cystoscopy. UTI history:   3/7/2022 - Enterococcus faecalis. 10/22/2021 - E.  Coli.  3/9/2021- Morganella morganii and Enterococcus faecalis    PAST MEDICAL / SURGICAL / SOCIAL / FAMILY HISTORY      has a past medical history of Acquired cystic kidney disease, Anemia, Arthritis, Asthma, Bilateral leg and foot pain, Degeneration of cervical intervertebral disc, Depression, Failed back syndrome, History of recurrent UTI (urinary tract infection), Hyperlipidemia, Hypertension, Lumbar disc herniation with radiculopathy, Obesity, Spinal stenosis, and Wound infection after surgery. has a past surgical history that includes Foot surgery (Bilateral); Gallbladder surgery; Lumbar spine surgery (); Lumbar spine surgery (2012); Nerve Block (10/26/2015); Nerve Block (12/22/15); Spine surgery (2016); back surgery; and Vein Surgery (Bilateral, 2017). Social History     Socioeconomic History    Marital status: Single     Spouse name: Not on file    Number of children: Not on file    Years of education: Not on file    Highest education level: Not on file   Occupational History    Not on file   Tobacco Use    Smoking status: Former Smoker     Packs/day: 0.10     Years: 30.00     Pack years: 3.00     Types: Cigarettes     Quit date: 10/23/1988     Years since quittin.5    Smokeless tobacco: Never Used   Vaping Use    Vaping Use: Never used   Substance and Sexual Activity    Alcohol use: No     Alcohol/week: 0.0 standard drinks    Drug use: No    Sexual activity: Not Currently   Other Topics Concern    Not on file   Social History Narrative    Not on file     Social Determinants of Health     Financial Resource Strain: Low Risk     Difficulty of Paying Living Expenses: Not hard at all   Food Insecurity: No Food Insecurity    Worried About [address] in the Last Year: Never true    [address]  in the Last Year: Never true   Transportation Needs: No Transportation Needs    Lack of Transportation (Medical): No    Lack of Transportation (Non-Medical):  No   Physical Activity:     Days of Exercise per Week: Not on file    Minutes of Exercise per Session: Not on file   Stress:     Feeling of Stress : Not on file   Social Connections:     Frequency of Communication with Friends and Family: Not on file    Frequency of Social Gatherings with Friends and Family: Not on file    Attends Anabaptism Services: Not on file    Active Member of Clubs or Organizations: Not on file    Attends Club or Organization Meetings: Not on file    Marital Status: Not on file   Intimate Partner Violence:     Fear of Current or Ex-Partner: Not on file    Emotionally Abused: Not on file    Physically Abused: Not on file    Sexually Abused: Not on file   Housing Stability:     Unable to Pay for Housing in the Last Year: Not on file    Number of [place] in the Last Year: Not on file    Unstable Housing in the Last Year: Not on file       Family History   Problem Relation Age of Onset    Ovarian Cancer Mother     Heart Disease Father     Hypertension Father        Allergies:  Keflex [cephalexin] and Mobic [meloxicam]    Home Medications:  Prior to Admission medications    Medication Sig Start Date End Date Taking? Authorizing Provider   phenazopyridine (PYRIDIUM) 200 MG tablet Take 1 tablet by mouth 3 times daily as needed for Pain (bladder spasm/pain) 4/30/22 5/2/22 Yes Iliana Shaikh MD   ciprofloxacin (CIPRO) 250 MG tablet Take 1 tablet by mouth 2 times daily for 7 days 4/30/22 5/7/22 Yes Iliana Shaikh MD   ibuprofen (ADVIL;MOTRIN) 600 MG tablet TAKE 1 TABLET BY MOUTH EVERY 8 HOURS AS NEEDED FOR PAIN. 3/3/22   Micheal Maurer MD   hydroCHLOROthiazide (HYDRODIURIL) 50 MG tablet TAKE 1 TABLET BY MOUTH ONCE DAILY.  3/3/22   Micheal Maurer MD   Incontinence Supply Disposable (INCONTINENCE BRIEF LARGE) MISC Use as needed 2/10/22   Micheal Maurer MD   Incontinence Supply Disposable (ATTENDS UNDERPADS LARGE REG) MISC Use as needed 2/10/22   Micheal Maurer MD   Incontinence Supply Disposable (UNDERPADS SMALL) MISC 1 box by Does not apply route daily 2/10/22   Micheal Maurer MD   vitamin D (ERGOCALCIFEROL) 1.25 MG (90561 UT) CAPS capsule Take 1 capsule by mouth once a week 5/25/21   Micheal Maurer MD   vitamin D3 (CHOLECALCIFEROL) 25 MCG (1000 UT) TABS tablet Take 1 tablet by mouth daily 5/25/21   Alfred Mendiola MD   Coenzyme Q10 (CO Q 10) 10 MG CAPS Take 1 capsule by mouth daily 5/25/21   Alfred Mendiola MD   carvedilol (COREG) 3.125 MG tablet Take 1 tablet by mouth 2 times daily 5/25/21   Alfred Mendiola MD   atorvastatin (LIPITOR) 40 MG tablet Take 1 tablet by mouth daily 5/25/21   Alfred Mendiola MD   oxyCODONE-acetaminophen (PERCOCET) 5-325 MG per tablet Take 1 tablet by mouth every 6 hours as needed for Pain. Historical Provider, MD   furosemide (LASIX) 20 MG tablet Take 1 tablet by mouth daily  Patient taking differently: Take 20 mg by mouth every other day  1/14/21   Alfred Mendiola MD   Blood Pressure Monitoring (BLOOD PRESSURE CUFF) MISC Blood Pressure Monitoring (BLOOD PRESSURE CUFF) MISC Blood Pressure Monitoring (BLOOD PRESSURE CUFF) MISC Indications: Essential hypertension Use as directed 1 each 0 04/02/2020 Active 04-  [address] [place] [address] ([id number]) 4/2/20   Historical Provider, MD   VITAMIN E PO Take by mouth daily    Historical Provider, MD   aspirin 81 MG tablet Take 2 tablets by mouth daily 5/1/19   Dilan Sanchez MD   ascorbic acid (VITAMIN C) 500 MG tablet Take 1 tablet by mouth daily 5/1/19   Dilan Sanchez MD   Multiple Vitamin (THERAPEUTIC MULTIVITAMIN PO) Take 1 tablet by mouth    Historical Provider, MD       REVIEW OF SYSTEMS    (2-9 systems for level 4, 10 or more for level 5)      Review of Systems   Constitutional: Negative for activity change, appetite change and fever. HENT: Negative for congestion and rhinorrhea. Respiratory: Negative for cough and shortness of breath. Cardiovascular: Negative for chest pain. Gastrointestinal: Negative for abdominal pain, diarrhea, nausea and vomiting. Genitourinary: Positive for frequency. Negative for dysuria. Musculoskeletal: Positive for back pain (right lower back). Skin: Negative for rash and wound. Neurological: Negative for headaches.      PHYSICAL EXAM   (up to 7 for level 4, 8 or more for level 5)     INITIAL VITALS:    height is 5' 6\" (1.676 m) and weight is 250 lb (113.4 kg). Her oral temperature is 98.3 °F (36.8 °C). Her blood pressure is 154/86 (abnormal) and her pulse is 90. Her respiration is 16 and oxygen saturation is 97%. Physical Exam  Constitutional:       General: She is not in acute distress. Appearance: Normal appearance. She is not ill-appearing, toxic-appearing or diaphoretic. HENT:      Head: Normocephalic. Eyes:      Extraocular Movements: Extraocular movements intact. Pupils: Pupils are equal, round, and reactive to light. Cardiovascular:      Rate and Rhythm: Normal rate and regular rhythm. Pulses: Normal pulses. Heart sounds: Normal heart sounds. No murmur heard. Pulmonary:      Effort: Pulmonary effort is normal. No respiratory distress. Breath sounds: Normal breath sounds. No wheezing. Abdominal:      General: There is no distension. Palpations: Abdomen is soft. Tenderness: There is no abdominal tenderness. There is no right CVA tenderness, left CVA tenderness or guarding. Musculoskeletal:         General: Normal range of motion. Cervical back: No tenderness. Right lower leg: No edema. Left lower leg: No edema. Lymphadenopathy:      Cervical: No cervical adenopathy. Skin:     General: Skin is warm. Capillary Refill: Capillary refill takes less than 2 seconds. Neurological:      General: No focal deficit present. Mental Status: She is alert and oriented to person, place, and time.        DIFFERENTIAL  DIAGNOSIS     PLAN (LABS / IMAGING / EKG):  Orders Placed This Encounter   Procedures    Culture, Urine    Urinalysis with Microscopic     MEDICATIONS ORDERED:  Orders Placed This Encounter   Medications    phenazopyridine (PYRIDIUM) 200 MG tablet     Sig: Take 1 tablet by mouth 3 times daily as needed for Pain (bladder spasm/pain)     Dispense:  6 tablet     Refill:  0    ciprofloxacin (CIPRO) tablet 250 mg     Order Specific Question:   Antimicrobial Indications     Answer:   Urinary Tract Infection     Order Specific Question:   UTI duration of therapy     Answer:   7 days    ciprofloxacin (CIPRO) 250 MG tablet     Sig: Take 1 tablet by mouth 2 times daily for 7 days     Dispense:  14 tablet     Refill:  0     DDX: UTI, pyelonephritis    Initial MDM/Plan: 60 y.o. female who presents with increased urinary frequency and right low back pain x3 days. No dysuria or hematuria. Frequent UTI with last in March 2022. Patient is established with urologist.  Will obtain urinalysis and urine culture. Patient otherwise appears well and is afebrile. Will not obtain laboratory work at this time. DIAGNOSTIC RESULTS / EMERGENCY DEPARTMENT COURSE / MDM     LABS:  Labs Reviewed   URINALYSIS WITH MICROSCOPIC - Abnormal; Notable for the following components:       Result Value    Ketones, Urine TRACE (*)     Leukocyte Esterase, Urine SMALL (*)     All other components within normal limits   CULTURE, URINE       RADIOLOGY:  No results found. EKG  None    All EKG's are interpreted by the Emergency Department Physician who either signs or Co-signs this chart in the absence of a cardiologist.    EMERGENCY DEPARTMENT COURSE:  ED Course as of 05/02/22 1549   Sat Apr 30, 2022   1303 Urinalysis with Microscopic(!):    Color, UA Yellow   Turbidity UA Clear   Glucose, UA NEGATIVE   Bilirubin, Urine NEGATIVE   Ketones, Urine TRACE(!)   Specific [place], UA 1.024   Urine Hgb NEGATIVE   pH, UA 5.5   Protein, UA NEGATIVE   Urobilinogen, Urine Normal   Nitrite, Urine NEGATIVE   Leukocyte Esterase, Urine SMALL(!)   -        WBC, UA 5 TO 10   RBC, UA 0 TO 2   Casts UA 0 TO 2 HYALINE Reference range defined for non-centrifuged specimen. Epithelial Cells, UA 2 TO 5  Borderline urine. No bacteria seen, but with small amount of leukocyte esterase and WBC > epithelial cells. Given extensive history of UTI will treat.   [CP]      ED Course User Index  [CP] Jaden Young MD     Patient is a 69-year-old female presenting to the emergency department for evaluation of increased urinary frequency. Patient has history of multiple UTIs over the past year and states that typically her UTI starts out with increased frequency. At this time patient is denying any dysuria, hematuria fevers. She does note some right lower back pain but she states that this is fairly similar to her chronic back pain. Urinalysis was obtained which was borderline. No bacteria was seen however with small amount of leuk esterase and WBC greater than epithelial cells, will treat for UTI given history and current symptoms. Last month patient did take course of nitrofurantoin for UTI. Based on susceptibility from last months urine culture, Cipro was chosen as antibiotic of choice. Did discuss possible side effects of Cipro including increased risk for C. difficile/diarrhea. Recommended patient follow-up with her primary care physician in 2 to 3 days to discuss results of urine culture and need for adjustment in antibiotic choice. Patient is established with urology clinic and is scheduled to follow-up with them on May 10, 2022. PROCEDURES:  None    CONSULTS:  None    CRITICAL CARE:  Please see attending note    FINAL IMPRESSION      1. Acute cystitis without hematuria        DISPOSITION / PLAN     DISPOSITION      Home    PATIENTREFERRED TO:  No follow-up provider specified.     DISCHARGE MEDICATIONS:  Discharge Medication List as of 4/30/2022  1:16 PM      START taking these medications    Details   phenazopyridine (PYRIDIUM) 200 MG tablet Take 1 tablet by mouth 3 times daily as needed for Pain (bladder spasm/pain), Disp-6 tablet, R-0Print      ciprofloxacin (CIPRO) 250 MG tablet Take 1 tablet by mouth 2 times daily for 7 days, Disp-14 tablet, R-0Print             Yesenia Zafar MD  Emergency Medicine Resident    (Please note that portions of this note were completed with a voice recognition program.  Efforts were made to edit the dictations but occasionally words are mis-transcribed.)       Uri Mendoza MD  Resident  05/02/22 1274

## 2022-05-01 LAB
CULTURE: NORMAL
SPECIMEN DESCRIPTION: NORMAL

## 2022-05-19 NOTE — H&P
Pre-op History and Physical  Maranda Ty MD    Patient:  Jose Cruz Jackson  MRN: [id number]  YOB: 1960    HISTORY OF PRESENT ILLNESS:     The patient is a 60 y.o. female who presents with history of recurrent UTIs, negative CT A/P back in April. Here for Cystoscopy, retrograde pyelogram BILATERAL. Patient's old records, notes and chart reviewed and summarized above. Maranda Ty MD independently reviewed the images and verified the radiology reports from:    No results found. Past Medical History:    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    Anemia 10/5/2016    Arthritis     Asthma 1/4/2017    Bilateral leg and foot pain 02/2019    left much worse    Degeneration of cervical intervertebral disc 6/21/2018    Depression 2/2/2016    Failed back syndrome 4/3/2018    History of recurrent UTI (urinary tract infection)     Hyperlipidemia     Hypertension     Lumbar disc herniation with radiculopathy 6/4/2013    Obesity     Spinal stenosis     Wound infection after surgery        Past Surgical History:    Past Surgical History:   Procedure Laterality Date    BACK SURGERY      FOOT SURGERY Bilateral     heel spurs, plantar fascia release    GALLBLADDER SURGERY      LUMBAR [facility]GERY  [facility]  10/26/2015    caudal# 1 decadron 7.5 mg    NERVE BLOCK  12/22/15    caudal #2  celestone 9mg    SPINE SURGERY  07/24/2016    3 places     VEIN SURGERY Bilateral 03/2017       Medications Prior to Admission:    Prior to Admission medications    Medication Sig Start Date End Date Taking? Authorizing Provider   ibuprofen (ADVIL;MOTRIN) 600 MG tablet TAKE 1 TABLET BY MOUTH EVERY 8 HOURS AS NEEDED FOR PAIN. 3/3/22   Jena Villanueva MD   hydroCHLOROthiazide (HYDRODIURIL) 50 MG tablet TAKE 1 TABLET BY MOUTH ONCE DAILY.  3/3/22   Jena Villanueva MD   Incontinence Supply Disposable (INCONTINENCE BRIEF LARGE) MISC Use as needed 2/10/22   Al Jaffe MD   Incontinence Supply Disposable (ATTENDS UNDERPADS LARGE REG) MISC Use as needed 2/10/22   Al Jaffe MD   Incontinence Supply Disposable (UNDERPADS SMALL) MISC 1 box by Does not apply route daily 2/10/22   Al Jaffe MD   vitamin D (ERGOCALCIFEROL) 1.25 MG (25003 UT) CAPS capsule Take 1 capsule by mouth once a week 5/25/21   Al Jaffe MD   vitamin D3 (CHOLECALCIFEROL) 25 MCG (1000 UT) TABS tablet Take 1 tablet by mouth daily 5/25/21   Al Jaffe MD   Coenzyme Q10 (CO Q 10) 10 MG CAPS Take 1 capsule by mouth daily 5/25/21   Al Jaffe MD   carvedilol (COREG) 3.125 MG tablet Take 1 tablet by mouth 2 times daily 5/25/21   Al Jaffe MD   atorvastatin (LIPITOR) 40 MG tablet Take 1 tablet by mouth daily 5/25/21   Al Jaffe MD   oxyCODONE-acetaminophen (PERCOCET) 5-325 MG per tablet Take 1 tablet by mouth every 6 hours as needed for Pain.     Historical Provider, MD   furosemide (LASIX) 20 MG tablet Take 1 tablet by mouth daily  Patient taking differently: Take 20 mg by mouth every other day  1/14/21   Al Jaffe MD   Blood Pressure Monitoring (BLOOD PRESSURE CUFF) MISC Blood Pressure Monitoring (BLOOD PRESSURE CUFF) MISC Blood Pressure Monitoring (BLOOD PRESSURE CUFF) MISC Indications: Essential hypertension Use as directed 1 each 0 04/02/2020 Active 04-  [address] [place] [address] [address] ([id number]) 4/2/20   Historical Provider, MD   VITAMIN E PO Take by mouth daily    Historical Provider, MD   aspirin 81 MG tablet Take 2 tablets by mouth daily 5/1/19   Sandhya Marin MD   ascorbic acid (VITAMIN C) 500 MG tablet Take 1 tablet by mouth daily 5/1/19   Sandhya Marin MD   Multiple Vitamin (THERAPEUTIC MULTIVITAMIN PO) Take 1 tablet by mouth    Historical Provider, MD       Allergies:  Ciprofloxacin, Keflex [cephalexin], and Mobic [meloxicam]    Social History:    Social History     Socioeconomic History    Marital status: Single     Spouse name: Not on file    Number of children: Not on file    Years of education: Not on file    Highest education level: Not on file   Occupational History    Not on file   Tobacco Use    Smoking status: Former Smoker     Packs/day: 0.10     Years: 30.00     Pack years: 3.00     Types: Cigarettes     Quit date: 10/23/1988     Years since quittin.5    Smokeless tobacco: Never Used   Vaping Use    Vaping Use: Never used   Substance and Sexual Activity    Alcohol use: No     Alcohol/week: 0.0 standard drinks    Drug use: No    Sexual activity: Not Currently   Other Topics Concern    Not on file   Social History Narrative    Not on file     Social Determinants of Health     Financial Resource Strain: Low Risk     Difficulty of Paying Living Expenses: Not hard at all   Food Insecurity: No Food Insecurity    Worried About [address] [employer] in the Last Year: Never true    [address] [facility] in the Last Year: Never true   Transportation Needs: No Transportation Needs    Lack of Transportation (Medical): No    Lack of Transportation (Non-Medical):  No   Physical Activity:     Days of Exercise per Week: Not on file    Minutes of Exercise per Session: Not on file   Stress:     Feeling of Stress : Not on file   Social Connections:     Frequency of Communication with Friends and Family: Not on file    Frequency of Social Gatherings with Friends and Family: Not on file    Attends Mor[employer] Services: Not on file    Active Member of Clubs or Organizations: Not on file    Attends Club or Organization Meetings: Not on file    Marital Status: Not on file   Intimate Partner Violence:     Fear of Current or Ex-Partner: Not on file    Emotionally Abused: Not on file    Physically Abused: Not on file    Sexually Abused: Not on file   Housing Stability:     Unable to Pay for Housing in the Last Year: Not on file    Number of Places Lived in the Last Year: Not on file    Unstable Housing in the Last Year: Not on file       Family History:    Family History   Problem Relation Age of Onset    Ovarian Cancer Mother     Heart Disease Father     Hypertension Father        REVIEW OF SYSTEMS:  Constitutional: negative  Eyes: negative  Respiratory: negative  Cardiovascular: negative  Gastrointestinal: negative  Genitourinary: no acute issues  Musculoskeletal: negative  Skin: negative   Neurological: negative  Hematological/Lymphatic: negative  Psychological: negative    PHYSICAL EXAM:    Patient Vitals for the past 24 hrs:   BP Temp Temp src Pulse Resp SpO2   05/20/22 1303 (!) 159/83 98.2 °F (36.8 °C) Temporal 82 16 98 %     Constitutional: Patient in NAD  Neuro: Alert and oriented to person, place, and time  Psych: Mood and affect normal  Skin: Clean, dry, intact   Lungs: Respiratory effort normal, CTA  Cardiovascular:  Normal peripheral pulses; no murmur. Normal rhythm  Abdomen: Soft, non-tender, non-distended, no hepatosplenomegaly or hernia, CVA tenderness none  Bladder: Non-tender and non-disdended   : Non-tender, skin intact, no lesions       LABS:   No results for input(s): WBC, HGB, HCT, MCV, PLT in the last 72 hours. No results for input(s): NA, K, CL, CO2, PHOS, BUN, CREATININE, CA in the last 72 hours. No results found for: PSA      Urinalysis: No results for input(s): COLORU, PHUR, LABCAST, WBCUA, RBCUA, MUCUS, TRICHOMONAS, YEAST, BACTERIA, CLARITYU, SPECGRAV, LEUKOCYTESUR, UROBILINOGEN, Cristina Reveal in the last 72 hours.     Invalid input(s): NITRATE, GLUCOSEUKETONESUAMORPHOUS     -----------------------------------------------------------------    ASSESSMENT AND PLAN:    Impression:    Recurrent UTI    Patient Active Problem List   Diagnosis    Morbid obesity due to excess calories ([facility] [address]    Hyperlipemia    Chronic pelvic pain in female    Spinal stenosis at L4-L5 level    Lumbar disc herniation    Adjustment disorder with mixed anxiety and depressed mood    Anemia  Morbid obesity with BMI of 50.0-59.9, adult (HCC)    Bilateral stenosis of lateral recess of lumbar spine    Chronic pain of left knee    Angioedema    Congenital pes planus    Failed back surgical syndrome    Venous insufficiency of both lower extremities    Weakness of both lower extremities    Recurrent UTI    Chronic lumbar radiculopathy    Left leg weakness    History of lumbar surgery    Chronic bilateral low back pain with bilateral sciatica    Hemiplegia affecting left nondominant side (HCC)    Primary osteoarthritis of left knee       Plan: Cystoscopy, retrograde pyelogram BILATERAL in OR today. Consent obtained    The patient was counseled at length about the risks of awais Covid-19 during their perioperative period and any recovery window from their procedure. The patient was made aware that awais Covid-19  may worsen their prognosis for recovering from their procedure  and lend to a higher morbidity and/or mortality risk. All material risks, benefits, and reasonable alternatives including postponing the procedure were discussed. The patient does wish to proceed with the procedure at this time.         Lakhwinder Garnett MD  1:09 PM 5/20/2022

## 2022-05-20 ENCOUNTER — HOSPITAL ENCOUNTER (OUTPATIENT)
Age: 62
Setting detail: OUTPATIENT SURGERY
Discharge: HOME | End: 2022-05-20
Attending: UROLOGY | Admitting: UROLOGY
Payer: MEDICARE

## 2022-05-20 ENCOUNTER — ANESTHESIA EVENT (OUTPATIENT)
Dept: OPERATING ROOM | Age: 62
End: 2022-05-20
Payer: MEDICARE

## 2022-05-20 ENCOUNTER — APPOINTMENT (OUTPATIENT)
Dept: GENERAL RADIOLOGY | Age: 62
End: 2022-05-20
Attending: UROLOGY
Payer: MEDICARE

## 2022-05-20 ENCOUNTER — ANESTHESIA (OUTPATIENT)
Dept: OPERATING ROOM | Age: 62
End: 2022-05-20
Payer: MEDICARE

## 2022-05-20 VITALS
RESPIRATION RATE: 18 BRPM | OXYGEN SATURATION: 100 % | DIASTOLIC BLOOD PRESSURE: 71 MMHG | SYSTOLIC BLOOD PRESSURE: 141 MMHG | HEART RATE: 71 BPM | TEMPERATURE: 96.8 F

## 2022-05-20 LAB
GFR NON-AFRICAN AMERICAN: 57 ML/MIN
GFR SERPL CREATININE-BSD FRML MDRD: >60 ML/MIN
GFR SERPL CREATININE-BSD FRML MDRD: ABNORMAL ML/MIN/{1.73_M2}
GLUCOSE BLD-MCNC: 95 MG/DL (ref 74–100)
POC BUN: 17 MG/DL (ref 8–26)
POC CREATININE: 0.99 MG/DL (ref 0.51–1.19)
POC POTASSIUM: 3.8 MMOL/L (ref 3.5–4.5)

## 2022-05-20 PROCEDURE — 2500000003 HC RX 250 WO HCPCS: Performed by: ANESTHESIOLOGY

## 2022-05-20 PROCEDURE — C1758 CATHETER, URETERAL: HCPCS | Performed by: UROLOGY

## 2022-05-20 PROCEDURE — 2580000003 HC RX 258

## 2022-05-20 PROCEDURE — 7100000041 HC SPAR PHASE II RECOVERY - ADDTL 15 MIN: Performed by: UROLOGY

## 2022-05-20 PROCEDURE — 7100000040 HC SPAR PHASE II RECOVERY - FIRST 15 MIN: Performed by: UROLOGY

## 2022-05-20 PROCEDURE — 6370000000 HC RX 637 (ALT 250 FOR IP): Performed by: UROLOGY

## 2022-05-20 PROCEDURE — 6360000004 HC RX CONTRAST MEDICATION: Performed by: UROLOGY

## 2022-05-20 PROCEDURE — 82565 ASSAY OF CREATININE: CPT

## 2022-05-20 PROCEDURE — 82947 ASSAY GLUCOSE BLOOD QUANT: CPT

## 2022-05-20 PROCEDURE — 3700000001 HC ADD 15 MINUTES (ANESTHESIA): Performed by: UROLOGY

## 2022-05-20 PROCEDURE — 84520 ASSAY OF UREA NITROGEN: CPT

## 2022-05-20 PROCEDURE — 84132 ASSAY OF SERUM POTASSIUM: CPT

## 2022-05-20 PROCEDURE — 2709999900 HC NON-CHARGEABLE SUPPLY: Performed by: UROLOGY

## 2022-05-20 PROCEDURE — 3209999900 FLUORO FOR SURGICAL PROCEDURES

## 2022-05-20 PROCEDURE — 6360000002 HC RX W HCPCS: Performed by: ANESTHESIOLOGY

## 2022-05-20 PROCEDURE — 3600000002 HC SURGERY LEVEL 2 BASE: Performed by: UROLOGY

## 2022-05-20 PROCEDURE — 3600000012 HC SURGERY LEVEL 2 ADDTL 15MIN: Performed by: UROLOGY

## 2022-05-20 PROCEDURE — 3700000000 HC ANESTHESIA ATTENDED CARE: Performed by: UROLOGY

## 2022-05-20 PROCEDURE — 6360000002 HC RX W HCPCS: Performed by: PHYSICIAN ASSISTANT

## 2022-05-20 PROCEDURE — 2580000003 HC RX 258: Performed by: UROLOGY

## 2022-05-20 PROCEDURE — C1769 GUIDE WIRE: HCPCS | Performed by: UROLOGY

## 2022-05-20 RX ORDER — SODIUM CHLORIDE 9 MG/ML
25 INJECTION, SOLUTION INTRAVENOUS PRN
Status: DISCONTINUED | OUTPATIENT
Start: 2022-05-20 | End: 2022-05-20 | Stop reason: HOSPADM

## 2022-05-20 RX ORDER — HYDRALAZINE HYDROCHLORIDE 20 MG/ML
10 INJECTION INTRAMUSCULAR; INTRAVENOUS
Status: DISCONTINUED | OUTPATIENT
Start: 2022-05-20 | End: 2022-05-20 | Stop reason: HOSPADM

## 2022-05-20 RX ORDER — LIDOCAINE HYDROCHLORIDE 20 MG/ML
JELLY TOPICAL PRN
Status: DISCONTINUED | OUTPATIENT
Start: 2022-05-20 | End: 2022-05-20 | Stop reason: ALTCHOICE

## 2022-05-20 RX ORDER — DROPERIDOL 2.5 MG/ML
0.62 INJECTION, SOLUTION INTRAMUSCULAR; INTRAVENOUS
Status: DISCONTINUED | OUTPATIENT
Start: 2022-05-20 | End: 2022-05-20 | Stop reason: HOSPADM

## 2022-05-20 RX ORDER — TIZANIDINE HYDROCHLORIDE 4 MG/1
CAPSULE, GELATIN COATED ORAL
Status: ON HOLD | COMMUNITY
End: 2022-05-20 | Stop reason: HOSPADM

## 2022-05-20 RX ORDER — CIPROFLOXACIN 2 MG/ML
400 INJECTION, SOLUTION INTRAVENOUS
Status: DISCONTINUED | OUTPATIENT
Start: 2022-05-20 | End: 2022-05-20

## 2022-05-20 RX ORDER — FENTANYL CITRATE 50 UG/ML
INJECTION, SOLUTION INTRAMUSCULAR; INTRAVENOUS PRN
Status: DISCONTINUED | OUTPATIENT
Start: 2022-05-20 | End: 2022-05-20 | Stop reason: SDUPTHER

## 2022-05-20 RX ORDER — MAGNESIUM HYDROXIDE 1200 MG/15ML
LIQUID ORAL PRN
Status: DISCONTINUED | OUTPATIENT
Start: 2022-05-20 | End: 2022-05-20 | Stop reason: ALTCHOICE

## 2022-05-20 RX ORDER — SODIUM CHLORIDE, SODIUM LACTATE, POTASSIUM CHLORIDE, CALCIUM CHLORIDE 600; 310; 30; 20 MG/100ML; MG/100ML; MG/100ML; MG/100ML
INJECTION, SOLUTION INTRAVENOUS CONTINUOUS PRN
Status: DISCONTINUED | OUTPATIENT
Start: 2022-05-20 | End: 2022-05-20 | Stop reason: SDUPTHER

## 2022-05-20 RX ORDER — SODIUM CHLORIDE 0.9 % (FLUSH) 0.9 %
5-40 SYRINGE (ML) INJECTION EVERY 12 HOURS SCHEDULED
Status: DISCONTINUED | OUTPATIENT
Start: 2022-05-20 | End: 2022-05-20 | Stop reason: HOSPADM

## 2022-05-20 RX ORDER — PROPOFOL 10 MG/ML
INJECTION, EMULSION INTRAVENOUS CONTINUOUS PRN
Status: DISCONTINUED | OUTPATIENT
Start: 2022-05-20 | End: 2022-05-20 | Stop reason: SDUPTHER

## 2022-05-20 RX ORDER — METOCLOPRAMIDE HYDROCHLORIDE 5 MG/ML
10 INJECTION INTRAMUSCULAR; INTRAVENOUS
Status: DISCONTINUED | OUTPATIENT
Start: 2022-05-20 | End: 2022-05-20 | Stop reason: HOSPADM

## 2022-05-20 RX ORDER — PROPOFOL 10 MG/ML
INJECTION, EMULSION INTRAVENOUS PRN
Status: DISCONTINUED | OUTPATIENT
Start: 2022-05-20 | End: 2022-05-20

## 2022-05-20 RX ORDER — GENTAMICIN SULFATE 80 MG/50ML
80 INJECTION, SOLUTION INTRAVENOUS
Status: COMPLETED | OUTPATIENT
Start: 2022-05-20 | End: 2022-05-20

## 2022-05-20 RX ORDER — DIPHENHYDRAMINE HYDROCHLORIDE 50 MG/ML
12.5 INJECTION INTRAMUSCULAR; INTRAVENOUS
Status: DISCONTINUED | OUTPATIENT
Start: 2022-05-20 | End: 2022-05-20 | Stop reason: HOSPADM

## 2022-05-20 RX ORDER — MEPERIDINE HYDROCHLORIDE 50 MG/ML
12.5 INJECTION INTRAMUSCULAR; INTRAVENOUS; SUBCUTANEOUS EVERY 5 MIN PRN
Status: DISCONTINUED | OUTPATIENT
Start: 2022-05-20 | End: 2022-05-20 | Stop reason: HOSPADM

## 2022-05-20 RX ORDER — DULOXETIN HYDROCHLORIDE 20 MG/1
CAPSULE, DELAYED RELEASE ORAL
Status: ON HOLD | COMMUNITY
End: 2022-05-20 | Stop reason: HOSPADM

## 2022-05-20 RX ORDER — SODIUM CHLORIDE 0.9 % (FLUSH) 0.9 %
5-40 SYRINGE (ML) INJECTION PRN
Status: DISCONTINUED | OUTPATIENT
Start: 2022-05-20 | End: 2022-05-20 | Stop reason: HOSPADM

## 2022-05-20 RX ORDER — MIDAZOLAM HYDROCHLORIDE 1 MG/ML
INJECTION INTRAMUSCULAR; INTRAVENOUS PRN
Status: DISCONTINUED | OUTPATIENT
Start: 2022-05-20 | End: 2022-05-20 | Stop reason: SDUPTHER

## 2022-05-20 RX ORDER — MAGNESIUM HYDROXIDE 1200 MG/15ML
LIQUID ORAL CONTINUOUS PRN
Status: COMPLETED | OUTPATIENT
Start: 2022-05-20 | End: 2022-05-20

## 2022-05-20 RX ORDER — KETAMINE HYDROCHLORIDE 100 MG/ML
INJECTION, SOLUTION INTRAMUSCULAR; INTRAVENOUS PRN
Status: DISCONTINUED | OUTPATIENT
Start: 2022-05-20 | End: 2022-05-20 | Stop reason: SDUPTHER

## 2022-05-20 RX ADMIN — PROPOFOL 75 MCG/KG/MIN: 10 INJECTION, EMULSION INTRAVENOUS at 13:47

## 2022-05-20 RX ADMIN — FENTANYL CITRATE 50 MCG: 50 INJECTION, SOLUTION INTRAMUSCULAR; INTRAVENOUS at 13:51

## 2022-05-20 RX ADMIN — MIDAZOLAM HYDROCHLORIDE 1 MG: 1 INJECTION, SOLUTION INTRAMUSCULAR; INTRAVENOUS at 13:44

## 2022-05-20 RX ADMIN — KETAMINE HYDROCHLORIDE 50 MG: 100 INJECTION, SOLUTION, CONCENTRATE INTRAMUSCULAR; INTRAVENOUS at 13:59

## 2022-05-20 RX ADMIN — MIDAZOLAM HYDROCHLORIDE 1 MG: 1 INJECTION, SOLUTION INTRAMUSCULAR; INTRAVENOUS at 13:49

## 2022-05-20 RX ADMIN — GENTAMICIN SULFATE 80 MG: 80 INJECTION, SOLUTION INTRAVENOUS at 14:12

## 2022-05-20 RX ADMIN — FENTANYL CITRATE 50 MCG: 50 INJECTION, SOLUTION INTRAMUSCULAR; INTRAVENOUS at 13:45

## 2022-05-20 RX ADMIN — SODIUM CHLORIDE, POTASSIUM CHLORIDE, SODIUM LACTATE AND CALCIUM CHLORIDE: 600; 310; 30; 20 INJECTION, SOLUTION INTRAVENOUS at 13:30

## 2022-05-20 ASSESSMENT — PAIN SCALES - GENERAL
PAINLEVEL_OUTOF10: 0

## 2022-05-20 ASSESSMENT — PAIN - FUNCTIONAL ASSESSMENT: PAIN_FUNCTIONAL_ASSESSMENT: 0-10

## 2022-05-20 NOTE — ANESTHESIA POSTPROCEDURE EVALUATION
POST- ANESTHESIA EVALUATION       Pt Name: Octaviano Colindres  MRN: [id number]  YOB: 1960  Date of evaluation: 5/20/2022  Time:  4:43 PM      BP (!) 141/71   Pulse 71   Temp 96.8 °F (36 °C) (Temporal)   Resp 18   LMP 09/07/2007   SpO2 100%      Consciousness Level  Awake  Cardiopulmonary Status  Stable  Pain Adequately Treated YES  Nausea / Vomiting  NO  Adequate Hydration  YES  Anesthesia Related Complications NONE      Electronically signed by Antolin Serrano MD on 5/20/2022 at 4:43 PM       Department of Anesthesiology  Postprocedure Note    Patient: Octaviano Colindres  MRN: [id number]  YOB: 1960  Date of evaluation: 5/20/2022  Time:  4:43 PM     Procedure Summary     Date: 05/20/22 Room / Location: [address]    Anesthesia Start: 9275 Anesthesia Stop: 5476    Procedure: CYSTOSCOPY RETROGRADE PYELOGRAM, PELVIC EXAM, VAGINOSCOPY (Bilateral ) Diagnosis: (RECURRENT UTI'S)    Surgeons: Chanda Malik MD Responsible Provider: Antolin Serrano MD    Anesthesia Type: MAC ASA Status: 3          Anesthesia Type: No value filed. Caitlin Phase I: Caitlin Score: 10    Caitlin Phase II: Caitlin Score: 10    Last vitals: Reviewed and per EMR flowsheets.        Anesthesia Post Evaluation

## 2022-05-20 NOTE — ANESTHESIA PRE PROCEDURE
Department of Anesthesiology  Preprocedure Note       Name:  Georgene Brunner   Age:  60 y.o.  :  1960                                          MRN:  [id number]         Date:  2022      Surgeon: Yadi Delaney):  Nestor Lee MD    Procedure: Procedure(s):  CYSTOSCOPY RETROGRADE PYELOGRAM    Medications prior to admission:   Prior to Admission medications    Medication Sig Start Date End Date Taking? Authorizing Provider   ibuprofen (ADVIL;MOTRIN) 600 MG tablet TAKE 1 TABLET BY MOUTH EVERY 8 HOURS AS NEEDED FOR PAIN. 3/3/22   Terra Bauer MD   hydroCHLOROthiazide (HYDRODIURIL) 50 MG tablet TAKE 1 TABLET BY MOUTH ONCE DAILY. 3/3/22   Terra Bauer MD   Incontinence Supply Disposable (INCONTINENCE BRIEF LARGE) MISC Use as needed 2/10/22   Terra Bauer MD   Incontinence Supply Disposable (ATTENDS UNDERPADS LARGE REG) MISC Use as needed 2/10/22   Terra Bauer MD   Incontinence Supply Disposable (UNDERPADS SMALL) MISC 1 box by Does not apply route daily 2/10/22   Terra Bauer MD   vitamin D (ERGOCALCIFEROL) 1.25 MG (43775 UT) CAPS capsule Take 1 capsule by mouth once a week 21   Terra Bauer MD   vitamin D3 (CHOLECALCIFEROL) 25 MCG (1000 UT) TABS tablet Take 1 tablet by mouth daily 21   Terra Bauer MD   Coenzyme Q10 (CO Q 10) 10 MG CAPS Take 1 capsule by mouth daily 21   Terra Bauer MD   carvedilol (COREG) 3.125 MG tablet Take 1 tablet by mouth 2 times daily 21   Terra Bauer MD   atorvastatin (LIPITOR) 40 MG tablet Take 1 tablet by mouth daily 21   Terra Bauer MD   oxyCODONE-acetaminophen (PERCOCET) 5-325 MG per tablet Take 1 tablet by mouth every 6 hours as needed for Pain.     Historical Provider, MD   furosemide (LASIX) 20 MG tablet Take 1 tablet by mouth daily  Patient taking differently: Take 20 mg by mouth every other day  21   Terra Bauer MD   Blood Pressure Monitoring (BLOOD PRESSURE CUFF) MISC Blood Pressure Monitoring (BLOOD PRESSURE CUFF) MISC Blood Pressure Monitoring (BLOOD PRESSURE CUFF) MISC Indications: Essential hypertension Use as directed 1 each 0 04/02/2020 Active 04-  [address] [place] [address] ([id number]) 4/2/20   Historical Provider, MD   VITAMIN E PO Take by mouth daily    Historical Provider, MD   aspirin 81 MG tablet Take 2 tablets by mouth daily 5/1/19   Jl Merchant MD   ascorbic acid (VITAMIN C) 500 MG tablet Take 1 tablet by mouth daily 5/1/19   Jl Merchant MD   Multiple Vitamin (THERAPEUTIC MULTIVITAMIN PO) Take 1 tablet by mouth    Historical Provider, MD       Current medications:    No current facility-administered medications for this encounter. Allergies: Allergies   Allergen Reactions    Keflex [Cephalexin] Swelling    Mobic [Meloxicam]      Muscle cramping/spasm       Problem List:    Patient Active Problem List   Diagnosis Code    Morbid obesity due to excess calories ([facility]) E66.01    Hyperlipemia E78.5    Chronic pelvic pain in female R10.2, G89.29    Spinal stenosis at L4-L5 level M48.061    Lumbar disc herniation M51.26    Adjustment disorder with mixed anxiety and depressed mood F43.23    Anemia D64.9    Morbid obesity with BMI of 50.0-59.9, adult ([facility]) E66.01, Z68.43    Bilateral stenosis of lateral recess of lumbar spine M48.061    Chronic pain of left knee M25.562, G89.29    Angioedema T78. 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    Anemia 10/5/2016    Arthritis     Asthma 2017    Bilateral leg and foot pain 2019    left much worse    Degeneration of cervical intervertebral disc 2018    Depression 2016    Failed back syndrome 4/3/2018    History of recurrent UTI (urinary tract infection)     Hyperlipidemia     Hypertension     Lumbar disc herniation with radiculopathy 2013    Obesity     Spinal stenosis     Wound infection after surgery        Past Surgical History:        Procedure Laterality Date    BACK SURGERY      FOOT SURGERY Bilateral     heel spurs, plantar fascia release    GALLBLADDER SURGERY      LUMBAR SPINE SURGERY  [facility]    [facility]   Jamee Kale LUMBAR SPINE SURGERY  2012    Gideon Simonse Jorje NERVE BLOCK  10/26/2015    caudal# 1 decadron 7.5 mg    NERVE BLOCK  12/22/15    caudal #2  celestone 9mg    SPINE SURGERY  2016    3 places    [place] Bilateral 2017       Social History:    Social History     Tobacco Use    Smoking status: Former Smoker     Packs/day: 0.10     Years: 30.00     Pack years: 3.00     Types: Cigarettes     Quit date: 10/23/1988     Years since quittin.5    Smokeless tobacco: Never Used   Substance Use Topics    Alcohol use: No     Alcohol/week: 0.0 standard drinks                                Counseling given: Not Answered      Vital Signs (Current): There were no vitals filed for this visit.                                            BP Readings from Last 3 Encounters:   22 (!) 154/86   22 (!) 157/95   22 (!) 154/102       NPO Status:                                                                                 BMI:   Wt Readings from Last 3 Encounters:   22 250 lb (113.4 kg)   22 (!) 345 lb (156.5 kg)   22 (!) 345 lb (156.5 kg)     There is no height or weight on file to calculate BMI.    CBC:   Lab Results   Component Value Date    WBC 3.5 2022    RBC 4.44 2022    RBC 3.75 04/10/2012    HGB 13.3 2022    HCT 40.5 2022 MCV 91.2 01/29/2022    RDW 14.5 01/29/2022     01/29/2022     04/10/2012       CMP:   Lab Results   Component Value Date     01/29/2022    K 4.4 01/29/2022     01/29/2022    CO2 22 01/29/2022    BUN 9 01/29/2022    CREATININE 0.62 01/29/2022    GFRAA >60 01/29/2022    LABGLOM >60 01/29/2022    GLUCOSE 98 01/29/2022    GLUCOSE 87 04/18/2012    PROT 7.5 04/30/2021    PROT 6.5 04/18/2012    CALCIUM 9.2 01/29/2022    BILITOT 0.17 04/30/2021    ALKPHOS 111 04/30/2021    AST 20 04/30/2021    ALT 19 04/30/2021       POC Tests: No results for input(s): POCGLU, POCNA, POCK, POCCL, POCBUN, POCHEMO, POCHCT in the last 72 hours. Coags:   Lab Results   Component Value Date    PROTIME 10.0 05/28/2020    PROTIME 10.5 04/09/2012    INR 1.0 05/28/2020    APTT 29.0 05/28/2020       HCG (If Applicable):   Lab Results   Component Value Date    PREGTESTUR NEGATIVE 05/03/2016        ABGs: No results found for: PHART, PO2ART, ISC8BAN, KNI5KXQ, BEART, Q8RIXDOM     Type & Screen (If Applicable):  No results found for: LABABO, LABRH    Drug/Infectious Status (If Applicable):  No results found for: HIV, HEPCAB    COVID-19 Screening (If Applicable):   Lab Results   Component Value Date    COVID19 DETECTED 01/29/2022           Anesthesia Evaluation  Patient summary reviewed and Nursing notes reviewed no history of anesthetic complications:   Airway: Mallampati: I  TM distance: >3 FB   Neck ROM: full  Mouth opening: > = 3 FB Dental:          Pulmonary:normal exam    (+) asthma:                            Cardiovascular:    (+) hypertension:, hyperlipidemia                  Neuro/Psych:   (+) neuromuscular disease (hemiplegia of left side):, depression/anxiety             GI/Hepatic/Renal:   (+) morbid obesity          Endo/Other:                     Abdominal:             Vascular: Other Findings:           Anesthesia Plan      MAC     ASA 3       Induction: intravenous.     MIPS: Postoperative opioids intended and Prophylactic antiemetics administered. Anesthetic plan and risks discussed with patient. Plan discussed with CRNA.                   Josue Greer MD   5/20/2022

## 2022-05-20 NOTE — OP NOTE
Operative Note      Patient: Daquan Mehta  YOB: 1960  MRN: [id number]    Date of Procedure: 5/20/2022    Pre-Op Diagnosis: RECURRENT UTI    Post-Op Diagnosis: Same       Procedure(s):  CYSTOSCOPY RETROGRADE PYELOGRAM, PELVIC EXAM, VAGINOSCOPY    Surgeon(s):  Seth Whitman MD    Assistant:   Resident: Hannah Oates MD    Anesthesia: Monitor Anesthesia Care    Estimated Blood Loss (mL): Minimal    Complications: None    Specimens:   * No specimens in log *    Implants:  * No implants in log *      Drains: * No LDAs found *    Findings:   -Mild urethral stenosis  -Grade 2-3 bladder trabeculations suspicious for bladder outlet obstruction/voiding dysfunction  -Mild blunting of the right renal pelvis and calyces, but good drainage indicating no obvious obstruction on the left retrograde normal  -Grade 2 anterior vaginal prolapse on exam under anesthesia/vaginoscopy    Indications:  Patient is a 70-year-old female with a history of recurrent UTI who is here for further anatomic evaluation. Risks, benefits, and alternatives were discussed with the patient and she elected to proceed. Informed consent was obtained. Detailed Description of Procedure:   Patient was taken to the operating room and transferred to the operative table. MAC anesthesia was induced appropriately and preoperative antibiotics consisting of gentamicin 80 mg IV were administered. Patient was placed in dorsolithotomy position and prepped and draped in usual sterile fashion. Surgical timeout with 2 patient identifiers was performed. We began by assembling a 25 Turkmen rigid cystoscope with 30 degree lens and advancing this per urethra meatus. Meatus was noted to be slightly stenotic, however we were able to navigate into the bladder. Pan cystoscopy was then performed which revealed grade 2-3 bladder trabeculations without any other abnormalities to the bladder mucosa.   This was concerning for possible bladder outlet obstruction/voiding dysfunction. We then proceeded to cannulate the left ureteral orifice with a 5 [place] cone-tipped ureteral catheter and retrograde pyelogram was performed with a 50/50 mix of Isovue contrast demonstrating no evidence of hydronephrosis or filling defects. Calyces filled and were sharp. There was good efflux of contrast on removal of the catheter. In a similar fashion, we performed a right sided pyelogram demonstrating a normal caliber ureter with some slight dilation of the renal pelvis and blunting of the calyces. Contrast was noted to drain very well with no evidence of acute obstruction or removal of the ureteral catheter. We then proceeded to perform vaginoscopy and pelvic exam which revealed a grade 2 anterior vaginal prolapse with no other acute findings. Once this was complete, bladder was drained and the procedure was terminated. Patient tolerated procedure well without complication and was taken to PACU in good and stable condition. Dr. Evelio Harris was present and scrubbed for the duration of the procedure. Plan:  Patient will be discharged from postoperative recovery area and will follow-up in the office for further evaluation.       Electronically signed by Joshua Robins MD on 5/20/2022 at 2:24 PM

## 2022-05-23 ENCOUNTER — HOSPITAL ENCOUNTER (OUTPATIENT)
Dept: OCCUPATIONAL THERAPY | Age: 62
Setting detail: THERAPIES SERIES
Discharge: HOME | End: 2022-05-23

## 2022-05-23 NOTE — SIGNIFICANT EVENT
[x] [address] Occupational Therapy       [address], [address]       Phone: [phone]       Fax: [phone] [] [facility] at [address] [place] [address].  [place], [place]       Phone: [phone]       Fax: [phone]          Occupational Therapy Cancel/No Show note    Date: 2022  Patient: Georgene Brunner  : 1960  MRN: [id number]    Cancels/No Shows to date: 3    For today's appointment patient:    [x]  Cancelled    [] Rescheduled appointment    [] No-show     Reason given by patient:    []  Patient ill    []  Conflicting appointment    [] No transportation      [] Conflict with work    [x] No reason given    [] Weather related    [] COVID-19    [] Other:      Comments:       [] Next appointment was confirmed       Electronically signed by: Germain Crawford OT

## 2022-06-22 DIAGNOSIS — M54.16 CHRONIC LUMBAR RADICULOPATHY: ICD-10-CM

## 2022-06-22 NOTE — TELEPHONE ENCOUNTER
Request for Motrin.       Next Visit Date:  Future Appointments   Date Time Provider Mari Lowi   7/28/2022  1:30 PM Taras Castro MD [facility] [address] Maintenance   Topic Date Due    Cervical cancer screen  10/14/2017    Breast cancer screen  06/07/2021    Lipids  04/30/2022    COVID-19 Vaccine (1) 08/03/2022 (Originally 2/11/1965)    Shingles vaccine (1 of 2) 08/12/2022 (Originally 2/11/2009)    DTaP/Tdap/Td vaccine (1 - Tdap) 02/09/2023 (Originally 2/11/1978)    Depression Screen  08/17/2022    Annual Wellness Visit (AWV)  08/18/2022    Flu vaccine (Season Ended) 09/01/2022    Colorectal Cancer Screen  05/19/2024    Hepatitis C screen  Completed    HIV screen  Completed    Hepatitis A vaccine  Aged Out    Hepatitis B vaccine  Aged Out    Hib vaccine  Aged Out    Meningococcal (ACWY) vaccine  Aged Out    Pneumococcal 0-64 years Vaccine  Aged Out       Hemoglobin A1C (%)   Date Value   04/02/2020 5.6   10/23/2018 5.9   10/05/2015 5.4             ( goal A1C is < 7)   No results found for: LABMICR  LDL Cholesterol (mg/dL)   Date Value   04/30/2021 218 (H)       (goal LDL is <100)   AST (U/L)   Date Value   04/30/2021 20     ALT (U/L)   Date Value   04/30/2021 19     BUN (mg/dL)   Date Value   01/29/2022 9     BP Readings from Last 3 Encounters:   05/20/22 (!) 141/71   04/30/22 (!) 154/86   03/07/22 (!) 157/95          (goal 120/80)    All Future Testing planned in [facility]  Lab Frequency Next Occurrence   Reflux study/reflux venous insufficiency bilateral Once 11/17/2022   VL ARTERIAL PVR LOWER WO EXERCISE Once 07/24/2022         Patient Active Problem List:     Morbid obesity due to excess calories ([facility])     Hyperlipemia     Chronic pelvic pain in female     Spinal stenosis at L4-L5 level     Lumbar disc herniation     Adjustment disorder with mixed anxiety and depressed mood     Anemia     Morbid obesity with BMI of 50.0-59.9, adult ([address]     Bilateral stenosis of lateral recess of lumbar spine     Chronic pain of left knee     Angioedema     Congenital pes planus     Failed back surgical syndrome     Venous insufficiency of both lower extremities     Weakness of both lower extremities     Recurrent UTI     Chronic lumbar radiculopathy     Left leg weakness     History of lumbar surgery     Chronic bilateral low back pain with bilateral sciatica     Hemiplegia affecting left nondominant side (HCC)     Primary osteoarthritis of left knee

## 2022-06-23 RX ORDER — IBUPROFEN 600 MG/1
600 TABLET ORAL EVERY 8 HOURS PRN
Qty: 30 TABLET | Refills: 0 | Status: SHIPPED | OUTPATIENT
Start: 2022-06-23 | End: 2022-08-23

## 2022-07-25 ENCOUNTER — HOSPITAL ENCOUNTER (EMERGENCY)
Age: 62
Discharge: HOME | End: 2022-07-25
Attending: EMERGENCY MEDICINE
Payer: MEDICARE

## 2022-07-25 ENCOUNTER — HOSPITAL ENCOUNTER (OUTPATIENT)
Dept: VASCULAR LAB | Age: 62
Discharge: HOME | End: 2022-07-25
Payer: MEDICARE

## 2022-07-25 VITALS
OXYGEN SATURATION: 99 % | TEMPERATURE: 98.6 F | BODY MASS INDEX: 41.02 KG/M2 | SYSTOLIC BLOOD PRESSURE: 170 MMHG | RESPIRATION RATE: 16 BRPM | WEIGHT: 293 LBS | DIASTOLIC BLOOD PRESSURE: 83 MMHG | HEIGHT: 71 IN | HEART RATE: 91 BPM

## 2022-07-25 DIAGNOSIS — I73.9 PAD (PERIPHERAL ARTERY DISEASE) (HCC): ICD-10-CM

## 2022-07-25 DIAGNOSIS — N30.01 ACUTE CYSTITIS WITH HEMATURIA: Primary | ICD-10-CM

## 2022-07-25 LAB
-: ABNORMAL
BILIRUBIN URINE: NEGATIVE
CASTS UA: ABNORMAL /LPF (ref 0–8)
COLOR: YELLOW
EPITHELIAL CELLS UA: ABNORMAL /HPF (ref 0–5)
GLUCOSE URINE: NEGATIVE
KETONES, URINE: NEGATIVE
LEUKOCYTE ESTERASE, URINE: ABNORMAL
NITRITE, URINE: NEGATIVE
PH UA: 7 (ref 5–8)
PROTEIN UA: ABNORMAL
RBC UA: ABNORMAL /HPF (ref 0–4)
SPECIFIC GRAVITY UA: 1.02 (ref 1–1.03)
TURBIDITY: CLEAR
URINE HGB: NEGATIVE
UROBILINOGEN, URINE: NORMAL
WBC UA: ABNORMAL /HPF (ref 0–5)

## 2022-07-25 PROCEDURE — 87086 URINE CULTURE/COLONY COUNT: CPT

## 2022-07-25 PROCEDURE — 93923 UPR/LXTR ART STDY 3+ LVLS: CPT

## 2022-07-25 PROCEDURE — 99283 EMERGENCY DEPT VISIT LOW MDM: CPT

## 2022-07-25 PROCEDURE — 81001 URINALYSIS AUTO W/SCOPE: CPT

## 2022-07-25 PROCEDURE — 6370000000 HC RX 637 (ALT 250 FOR IP): Performed by: STUDENT IN AN ORGANIZED HEALTH CARE EDUCATION/TRAINING PROGRAM

## 2022-07-25 RX ORDER — NITROFURANTOIN 25; 75 MG/1; MG/1
100 CAPSULE ORAL ONCE
Status: COMPLETED | OUTPATIENT
Start: 2022-07-25 | End: 2022-07-25

## 2022-07-25 RX ORDER — NITROFURANTOIN 25; 75 MG/1; MG/1
100 CAPSULE ORAL 2 TIMES DAILY
Qty: 20 CAPSULE | Refills: 0 | Status: SHIPPED | OUTPATIENT
Start: 2022-07-25 | End: 2022-08-04

## 2022-07-25 RX ADMIN — NITROFURANTOIN MONOHYDRATE/MACROCRYSTALS 100 MG: 75; 25 CAPSULE ORAL at 23:00

## 2022-07-25 ASSESSMENT — ENCOUNTER SYMPTOMS
NAUSEA: 0
SHORTNESS OF BREATH: 0
ABDOMINAL PAIN: 0
DIARRHEA: 0
COLOR CHANGE: 0
CHEST TIGHTNESS: 0
COUGH: 0
TROUBLE SWALLOWING: 0
VOMITING: 0
BACK PAIN: 0

## 2022-07-25 ASSESSMENT — PAIN DESCRIPTION - LOCATION: LOCATION: BACK;LEG

## 2022-07-25 ASSESSMENT — PAIN DESCRIPTION - DESCRIPTORS: DESCRIPTORS: ACHING

## 2022-07-25 ASSESSMENT — PAIN SCALES - GENERAL: PAINLEVEL_OUTOF10: 9

## 2022-07-26 NOTE — ED PROVIDER NOTES
[facility]     Emergency Department     Faculty Attestation    I performed a history and physical examination of the patient and discussed management with the resident. I reviewed the resident´s note and agree with the documented findings and plan of care. Any areas of disagreement are noted on the chart. I was personally present for the key portions of any procedures. I have documented in the chart those procedures where I was not present during the key portions. I have reviewed the emergency nurses triage note. I agree with the chief complaint, past medical history, past surgical history, allergies, medications, social and family history as documented unless otherwise noted below. For Physician Assistant/ Nurse Practitioner cases/documentation I have personally evaluated this patient and have completed at least one if not all key elements of the E/M (history, physical exam, and MDM). Additional findings are as noted. Patient states that she has a urinary tract infection again, mild suprapubic pain, no guarding or rebounding, no pain McBurney's point, no CVA tenderness.      Elyse Garcia MD  07/25/22 [id number]

## 2022-07-26 NOTE — DISCHARGE INSTRUCTIONS
You have been seen in the ER today for urinary tract infection. Your urinalysis showed slight findings consistent with UTI, we will treat with macrobid for 10 days as previous tests showed good efficacy. If you begin to experience any symptoms such as chest pain shortness of breath nausea vomiting dizziness drowsiness abdominal pain loss of consciousness or any other symptoms you find concerning please return to the ED for follow-up evaluation. If you have been given medication please take them as prescribed for the pain. Do not take more medication than recommended at any given time. Please follow-up with your primary care provider within 5 to 7 days for continued care.

## 2022-07-26 NOTE — ED NOTES
Pt presents to ED with UTI symptoms. Pt states she has burning with urination, an odor, urinary frequency, and dark urine. Pt is alert, oriented, and ambulatory.       Anna Marie Casanova RN  07/25/22 [id number]

## 2022-07-26 NOTE — ED PROVIDER NOTES
[facility] ED  Emergency Department Encounter  EmergencyMedicine Resident     Pt Guy Zarate  MRN: [id number]  [place] 1960  Date of evaluation: 7/25/22  PCP:  Perez Cruz MD    This patient was evaluated in the Emergency Department for symptoms described in the history of present illness. The patient was evaluated in the context of the global COVID-19 pandemic, which necessitated consideration that the patient might be at risk for infection with the SARS-CoV-2 virus that causes COVID-19. Institutional protocols and algorithms that pertain to the evaluation of patients at risk for COVID-19 are in a state of rapid change based on information released by regulatory bodies including the CDC and federal and state organizations. These policies and algorithms were followed during the patient's care in the ED. CHIEF COMPLAINT       Chief Complaint   Patient presents with    Polyuria     Burning w urination    Back Pain    Leg Pain       HISTORY OF PRESENT ILLNESS  (Location/Symptom, Timing/Onset, Context/Setting, Quality, Duration, Modifying Factors, Severity.)      Francheska Hardwick is a 60 y.o. female who presents with polyuria, change in urine color and foul odor for the past several days. Patient states that she has had urinary tract infections in the past and her symptoms now feel consistent with another urinary tract infection. Patient has not had any fevers or chills, no nausea or vomiting. Patient has had mild back pain however no flank pain. Patient stated that last week she had some diarrhea however that has also since resolved. Patient has had cultures positive for E. coli and Enterococcus in the past.  Patient is scheduled to have follow-up with urology due to frequent urinary tract infections.     PAST MEDICAL / SURGICAL / SOCIAL / FAMILY HISTORY      has a past medical history of Acquired cystic kidney disease, Anemia, Arthritis, Asthma, Bilateral leg and foot pain, Degeneration of cervical intervertebral disc, Depression, Failed back syndrome, History of recurrent UTI (urinary tract infection), Hyperlipidemia, Hypertension, Lumbar disc herniation with radiculopathy, Obesity, Spinal stenosis, and Wound infection after surgery. has a past surgical history that includes Foot surgery (Bilateral); Gallbladder surgery; Lumbar spine surgery (); Lumbar spine surgery (2012); Nerve Block (10/26/2015); Nerve Block (12/22/15); Spine surgery (2016); back surgery; Vein Surgery (Bilateral, 2017); Cystocopy (Bilateral, 2022); and Cystoscopy (Bilateral, 2022). Social History     Socioeconomic History    Marital status: Single     Spouse name: Not on file    Number of children: Not on file    Years of education: Not on file    Highest education level: Not on file   Occupational History    Not on file   Tobacco Use    Smoking status: Former     Packs/day: 0.10     Years: 30.00     Pack years: 3.00     Types: Cigarettes     Quit date: 10/23/1988     Years since quittin.7    Smokeless tobacco: Never   Vaping Use    Vaping Use: Never used   Substance and Sexual Activity    Alcohol use: No     Alcohol/week: 0.0 standard drinks    Drug use: No    Sexual activity: Not Currently   Other Topics Concern    Not on file   Social History Narrative    Not on file     Social Determinants of Health     Financial Resource Strain: Low Risk     Difficulty of Paying Living Expenses: Not hard at all   Food Insecurity: No Food Insecurity    Worried About [address] in the Last Year: Never true    [address] in the Last Year: Never true   Transportation Needs: No Transportation Needs    Lack of Transportation (Medical): No    Lack of Transportation (Non-Medical):  No   Physical Activity: Not on file   Stress: Not on file   Social Connections: Not on file   Intimate Partner Violence: Not on file   Housing Stability: Not on file       Family History   Problem Relation Age of Onset    Ovarian Cancer Mother     Heart Disease Father     Hypertension Father        Allergies:  Ciprofloxacin, Keflex [cephalexin], and Mobic [meloxicam]    Home Medications:  Prior to Admission medications    Medication Sig Start Date End Date Taking? Authorizing Provider   nitrofurantoin, macrocrystal-monohydrate, (MACROBID) 100 MG capsule Take 1 capsule by mouth in the morning and 1 capsule before bedtime. Do all this for 10 days. 7/25/22 8/4/22 Yes Khurram King,    ibuprofen (ADVIL;MOTRIN) 600 MG tablet TAKE 1 TABLET BY MOUTH EVERY 8 HOURS AS NEEDED FOR PAIN. 6/23/22   Duncan Galicia MD   hydroCHLOROthiazide (HYDRODIURIL) 50 MG tablet TAKE 1 TABLET BY MOUTH ONCE DAILY. 3/3/22   Duncan Galicia MD   Incontinence Supply Disposable (INCONTINENCE BRIEF LARGE) MISC Use as needed 2/10/22   Duncan Galicia MD   Incontinence Supply Disposable (ATTENDS UNDERPADS LARGE REG) MISC Use as needed 2/10/22   Duncan Galicia MD   Incontinence Supply Disposable (UNDERPADS SMALL) MISC 1 box by Does not apply route daily 2/10/22   Duncan Galicia MD   vitamin D (ERGOCALCIFEROL) 1.25 MG (82586 UT) CAPS capsule Take 1 capsule by mouth once a week 5/25/21   Duncan Galicia MD   vitamin D3 (CHOLECALCIFEROL) 25 MCG (1000 UT) TABS tablet Take 1 tablet by mouth daily 5/25/21   Duncan Galicia MD   Coenzyme Q10 (CO Q 10) 10 MG CAPS Take 1 capsule by mouth daily 5/25/21   Duncan Galicia MD   carvedilol (COREG) 3.125 MG tablet Take 1 tablet by mouth 2 times daily 5/25/21   Duncan Galicia MD   atorvastatin (LIPITOR) 40 MG tablet Take 1 tablet by mouth daily 5/25/21   Duncan Galicia MD   oxyCODONE-acetaminophen (PERCOCET) 5-325 MG per tablet Take 1 tablet by mouth every 6 hours as needed for Pain.     Historical Provider, MD   furosemide (LASIX) 20 MG tablet Take 1 tablet by mouth daily  Patient taking differently: Take 20 mg by mouth every other day  1/14/21   Duncan Galicia MD   Blood Pressure Monitoring (BLOOD PRESSURE CUFF) MISC Blood Pressure Monitoring (BLOOD PRESSURE CUFF) MISC Blood Pressure Monitoring (BLOOD PRESSURE CUFF) MISC Indications: Essential hypertension Use as directed 1 each 0 04/02/2020 Active 04-  [address] [place] [address] ([id number]) 4/2/20   Historical Provider, MD   VITAMIN E PO Take by mouth daily    Historical Provider, MD   aspirin 81 MG tablet Take 2 tablets by mouth daily 5/1/19   Alexis Sarah MD   ascorbic acid (VITAMIN C) 500 MG tablet Take 1 tablet by mouth daily 5/1/19   Alexis Sarah MD   Multiple Vitamin (THERAPEUTIC MULTIVITAMIN PO) Take 1 tablet by mouth    Historical Provider, MD       REVIEW OF SYSTEMS    (2-9 systems for level 4, 10 or more for level 5)      Review of Systems   Constitutional:  Negative for chills, fatigue and fever. HENT:  Negative for congestion and trouble swallowing. Eyes:  Negative for visual disturbance. Respiratory:  Negative for cough, chest tightness and shortness of breath. Gastrointestinal:  Negative for abdominal pain, diarrhea, nausea and vomiting. Endocrine: Negative for polyuria. Genitourinary:  Positive for dysuria, frequency and urgency. Negative for flank pain and hematuria. Musculoskeletal:  Negative for back pain and myalgias. Skin:  Negative for color change. Allergic/Immunologic: Negative for immunocompromised state. Neurological:  Negative for dizziness, syncope, weakness, light-headedness and headaches. PHYSICAL EXAM   (up to 7 for level 4, 8 or more for level 5)      INITIAL VITALS:   BP (!) 170/83   Pulse 91   Temp 98.6 °F (37 °C) (Oral)   Resp 16   Ht 5' 11\" (1.803 m)   Wt (!) 340 lb (154.2 kg)   LMP 09/07/2007   SpO2 99%   BMI 47.42 kg/m²     Physical Exam  Constitutional:       General: She is not in acute distress. Appearance: Normal appearance. She is obese. She is not ill-appearing or toxic-appearing. HENT:      Head: Normocephalic and atraumatic. Nose: No congestion.       Mouth/Throat:      Mouth: Mucous membranes are moist.   Eyes:      Conjunctiva/sclera: Conjunctivae normal.   Cardiovascular:      Rate and Rhythm: Normal rate and regular rhythm. Pulses: Normal pulses. Heart sounds: Normal heart sounds. No murmur heard. No gallop. Pulmonary:      Effort: Pulmonary effort is normal. No respiratory distress. Breath sounds: Normal breath sounds. No stridor. No wheezing or rhonchi. Chest:      Chest wall: No tenderness. Abdominal:      General: Abdomen is flat. There is no distension. Palpations: There is no mass. Tenderness: no abdominal tenderness There is no guarding or rebound. Musculoskeletal:         General: No swelling, tenderness or deformity. Skin:     General: Skin is warm. Coloration: Skin is not jaundiced. Findings: No bruising. Neurological:      General: No focal deficit present. Mental Status: She is alert. DIFFERENTIAL  DIAGNOSIS     PLAN (LABS / IMAGING / EKG):  Orders Placed This Encounter   Procedures    Culture, Urine    Urinalysis with Microscopic       MEDICATIONS ORDERED:  Orders Placed This Encounter   Medications    nitrofurantoin, macrocrystal-monohydrate, (MACROBID) 100 MG capsule     Sig: Take 1 capsule by mouth in the morning and 1 capsule before bedtime. Do all this for 10 days.      Dispense:  20 capsule     Refill:  0         DDX: Urinary tract infection, PID, torsion, TOA, cervicitis, vaginitis,      DIAGNOSTIC RESULTS / EMERGENCY DEPARTMENT COURSE / MDM   LAB RESULTS:  Results for orders placed or performed during the hospital encounter of 07/25/22   Urinalysis with Microscopic   Result Value Ref Range    Color, UA Yellow Yellow    Turbidity UA Clear Clear    Glucose, Ur NEGATIVE NEGATIVE    Bilirubin Urine NEGATIVE NEGATIVE    Ketones, Urine NEGATIVE NEGATIVE    Specific Gravity, UA 1.025 1.005 - 1.030    Urine Hgb NEGATIVE NEGATIVE    pH, UA 7.0 5.0 - 8.0    Protein, UA TRACE (A) NEGATIVE    Urobilinogen, Urine Normal Normal    Nitrite, Urine NEGATIVE NEGATIVE    Leukocyte Esterase, Urine SMALL (A) NEGATIVE    -          WBC, UA 2 TO 5 0 - 5 /HPF    RBC, UA 2 TO 5 0 - 4 /HPF    Casts UA  0 - 8 /LPF     2 TO 5 HYALINE Reference range defined for non-centrifuged specimen. Epithelial Cells UA 2 TO 5 0 - 5 /HPF             Assessment/Plan: 60-year-old female presenting with urinary tract infection symptoms. Patient has had multiple urinary tract infections in the past, states that she has follow-up appointments with urology for additional care. Patient has had cultures with E. coli and Enterococcus both with unique sensitivities and specificity's. We will treat with Macrobid. Follow-up cultures, follow-up with primary care. Appropriate for discharge, hemodynamically stable. FINAL IMPRESSION      1. Acute cystitis with hematuria          DISPOSITION / PLAN     DISPOSITION Decision To Discharge 07/25/2022 10:49:26 PM  Discharge    PATIENT REFERRED TO:  [facility]  [address] [address]  [phone]  Go to   As needed    Gerardo Orozco MD  [facility] [address]  [phone]    Schedule an appointment as soon as possible for a visit in 1 week      DISCHARGE MEDICATIONS:  New Prescriptions    NITROFURANTOIN, MACROCRYSTAL-MONOHYDRATE, (MACROBID) 100 MG CAPSULE    Take 1 capsule by mouth in the morning and 1 capsule before bedtime. Do all this for 10 days.        Kulwinder Crews,   Emergency Medicine Resident    (Please note that portions of thisnote were completed with a voice recognition program.  Efforts were made to edit the dictations but occasionally words are mis-transcribed.)        Mariel Colbert DO  Resident  07/25/22 [id number]

## 2022-07-27 LAB
CULTURE: NORMAL
SPECIMEN DESCRIPTION: NORMAL

## 2022-07-28 ENCOUNTER — OFFICE VISIT (OUTPATIENT)
Dept: VASCULAR SURGERY | Age: 62
End: 2022-07-28
Payer: MEDICARE

## 2022-07-28 VITALS
SYSTOLIC BLOOD PRESSURE: 150 MMHG | DIASTOLIC BLOOD PRESSURE: 87 MMHG | OXYGEN SATURATION: 98 % | HEIGHT: 71 IN | HEART RATE: 94 BPM | TEMPERATURE: 97.2 F | BODY MASS INDEX: 41.02 KG/M2 | RESPIRATION RATE: 16 BRPM | WEIGHT: 293 LBS

## 2022-07-28 DIAGNOSIS — I87.2 LYMPHEDEMA DUE TO VENOUS INSUFFICIENCY: ICD-10-CM

## 2022-07-28 DIAGNOSIS — I89.0 LYMPHEDEMA DUE TO VENOUS INSUFFICIENCY: ICD-10-CM

## 2022-07-28 DIAGNOSIS — M51.9 INTERVERTEBRAL DISK DISEASE: ICD-10-CM

## 2022-07-28 DIAGNOSIS — I73.9 PAD (PERIPHERAL ARTERY DISEASE) (HCC): Primary | ICD-10-CM

## 2022-07-28 DIAGNOSIS — I87.2 SAPHENOFEMORAL VENOUS REFLUX: ICD-10-CM

## 2022-07-28 DIAGNOSIS — M48.062 NEUROGENIC CLAUDICATION DUE TO LUMBAR SPINAL STENOSIS: ICD-10-CM

## 2022-07-28 PROCEDURE — 99214 OFFICE O/P EST MOD 30 MIN: CPT | Performed by: SURGERY

## 2022-07-28 RX ORDER — GABAPENTIN 300 MG/1
300 CAPSULE ORAL 3 TIMES DAILY
Qty: 270 CAPSULE | Refills: 2 | Status: SHIPPED | OUTPATIENT
Start: 2022-07-28 | End: 2022-10-26

## 2022-07-28 NOTE — PROGRESS NOTES
Division of Vascular Surgery        Follow Up      Chief Complaint:      Follow up regarding leg swelling    History of Present Illness:      Yuriy James is a 60 y.o. woman who presents for follow up regarding her chronic lower extremity swelling along with numbness and tingling down her left greater then right leg. Continues to have pain in her legs, numbness/tingling like it is going to fall off. Her swelling is controlled with compression stockings, although she has notice her legs get really swollen at times. She tried using the lymphedema pumps, but t exacerbated her numbness/tingling sensation so she stop using it. The pain starts in her thighs and goes all the way down into her toes. The pain can get so severe that she can barely walk. She has been a bit frustrated and emotional in the office trying to get to source of her pain. She has been seen by orthopedic surgery as well and have explained that she would need total knee replacement due to degenerative changes which is also likely exacerbating some of her symptoms currently. She has been seen in the past by neurosurgery regarding degenerative disk disease as well but has not followed up in a while. Yuriy James is a 57 y.o. woman who presents with chronic bilateral, left greater then right lower extremity swelling, pain, numbness/tingling. 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 She has noticed swelling in her legs for several years, has noticed worsening symptoms over the last several months. She denies any prior trauma or DVT to her lower extremities. Discomfort in her legs she describes as heaviness and fatigues, worse towards the end of the day. She has been worried about the darkening of her skin, worried about poor circulation. She denies specific symptoms of claudication to the back of her calf.   She has been using a walker for the last 3 years, mainly because of balance issues after her spine surgeries. She is compliant with her compression stockings and has noticed difference in her lower legs. Its her upper legs and pelvis area where she feels it the most.          Vein ablation both legs 2015 and 2017 Dr. Alana Pop  Ven[facility] closure at [facility] vascular 2010  Dr. Shanique Hanley did venogram with IVUS in April 2021, 50% compression? May Thurner's unclear, but was told nothing needs to be done at this time.     Medical 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    Anemia 10/5/2016    Arthritis     Asthma 1/4/2017    Bilateral leg and foot pain 02/2019    left much worse    Degeneration of cervical intervertebral disc 6/21/2018    Depression 2/2/2016    Failed back syndrome 4/3/2018    History of recurrent UTI (urinary tract infection)     Hyperlipidemia     Hypertension     Lumbar disc herniation with radiculopathy 6/4/2013    Obesity     Spinal stenosis     Wound infection after surgery        Surgical History:     Past Surgical History:   Procedure Laterality Date    BACK SURGERY      CYSTOSCOPY Bilateral 05/20/2022    CYSTOSCOPY RETROGRADE PYELOGRAM, PELVIC EXAM, VAGINOSCOPY (Bilateral)    CYSTOSCOPY Bilateral 5/20/2022    CYSTOSCOPY RETROGRADE PYELOGRAM, PELVIC EXAM, VAGINOSCOPY performed by Cullen Lubin MD at [address] [place] [address] Bilateral     heel spurs, plantar fascia release    [facility]    LUMBAR SPINE SURGERY  4/2012    [address]  10/26/2015    caudal# 1 decadron 7.5 mg    NERVE BLOCK  12/22/15    caudal #2  celestone 9mg    SPINE SURGERY  07/24/2016    3 places     VEIN SURGERY Bilateral 03/2017       Family History:     Family History   Problem Relation Age of Onset    Ovarian Cancer Mother     Heart Disease Father     Hypertension Father        Allergies:       Ciprofloxacin, Keflex [cephalexin], and Mobic [meloxicam]    Medications:      Current Outpatient Medications   Medication Sig Dispense Refill    nitrofurantoin, macrocrystal-monohydrate, (MACROBID) 100 MG capsule Take 1 capsule by mouth in the morning and 1 capsule before bedtime. Do all this for 10 days. 20 capsule 0    ibuprofen (ADVIL;MOTRIN) 600 MG tablet TAKE 1 TABLET BY MOUTH EVERY 8 HOURS AS NEEDED FOR PAIN. 30 tablet 0    hydroCHLOROthiazide (HYDRODIURIL) 50 MG tablet TAKE 1 TABLET BY MOUTH ONCE DAILY. 30 tablet 5    Incontinence Supply Disposable (INCONTINENCE BRIEF LARGE) MISC Use as needed 1 each 0    Incontinence Supply Disposable (ATTENDS UNDERPADS LARGE REG) MISC Use as needed 1 each 0    Incontinence Supply Disposable (UNDERPADS SMALL) MISC 1 box by Does not apply route daily 1 each 5    vitamin D (ERGOCALCIFEROL) 1.25 MG (98848 UT) CAPS capsule Take 1 capsule by mouth once a week 4 capsule 5    vitamin D3 (CHOLECALCIFEROL) 25 MCG (1000 UT) TABS tablet Take 1 tablet by mouth daily 90 tablet 1    Coenzyme Q10 (CO Q 10) 10 MG CAPS Take 1 capsule by mouth daily 30 capsule 5    carvedilol (COREG) 3.125 MG tablet Take 1 tablet by mouth 2 times daily 60 tablet 3    atorvastatin (LIPITOR) 40 MG tablet Take 1 tablet by mouth daily 30 tablet 3    oxyCODONE-acetaminophen (PERCOCET) 5-325 MG per tablet Take 1 tablet by mouth every 6 hours as needed for Pain.       furosemide (LASIX) 20 MG tablet Take 1 tablet by mouth daily (Patient taking differently: Take 20 mg by mouth every other day) 60 tablet 3    Blood Pressure Monitoring (BLOOD PRESSURE CUFF) MISC Blood Pressure Monitoring (BLOOD PRESSURE CUFF) MISC Blood Pressure Monitoring (BLOOD PRESSURE CUFF) MISC Indications: Essential hypertension Use as directed 1 each 0 04/02/2020 Active 04-  [address] ([id number])      VITAMIN E PO Take by mouth daily      aspirin 81 MG tablet Take 2 tablets by mouth daily 60 tablet 3    ascorbic acid (VITAMIN C) 500 MG tablet Take 1 tablet by mouth daily 30 tablet 3    Multiple Vitamin (THERAPEUTIC MULTIVITAMIN PO) Take 1 tablet by mouth       Current Facility-Administered Medications   Medication Dose Route Frequency Provider Last Rate Last Admin    lidocaine 1 % injection 2 mL  2 mL Intra-artICUlar Once Elsa Cheek MD         Social History:     Tobacco:    reports that she quit smoking about 33 years ago. Her smoking use included cigarettes. She has a 3.00 pack-year smoking history. She has never used smokeless tobacco.  Alcohol:      reports no history of alcohol use. Drug Use:  reports no history of drug use. 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 HENT:  Negative for congestion. Eyes:  Negative for visual disturbance. Respiratory:  Negative for chest tightness and shortness of breath. Cardiovascular:  Positive for leg swelling. Negative for chest pain. Gastrointestinal:  Negative for abdominal pain. Endocrine: Negative. Genitourinary: Negative. Musculoskeletal:  Positive for arthralgias, back pain and gait problem. Skin:  Positive for color change. Negative for wound. Allergic/Immunologic: Negative. Neurological:  Positive for numbness. Negative for facial asymmetry, speech difficulty and weakness. Hematological: Negative. Psychiatric/Behavioral: Negative. Physical Exam:     Vitals:  BP (!) 150/87 (Site: Left Lower Arm, Position: Sitting, Cuff Size: Large Adult)   Pulse 94   Temp 97.2 °F (36.2 °C) (Temporal)   Resp 16   Ht 5' 11\" (1.803 m)   Wt (!) 327 lb (148.3 kg)   LMP 09/07/2007   SpO2 98%   BMI 45.61 kg/m²     Physical Exam  Constitutional:       Appearance: She is well-developed and well-groomed. Eyes:      Extraocular Movements: Extraocular movements intact. Conjunctiva/sclera: Conjunctivae normal.   Neck:      Vascular: No carotid bruit.    Cardiovascular:      Rate and Rhythm: Normal rate and regular rhythm. Pulses:           Dorsalis pedis pulses are 1+ on the right side and 1+ on the left side. Posterior tibial pulses are 1+ on the right side and 1+ on the left side. Pulmonary:      Effort: Pulmonary effort is normal. No respiratory distress. Abdominal:      Palpations: Abdomen is soft. Tenderness: There is no abdominal tenderness. Musculoskeletal:      Cervical back: Full passive range of motion without pain. Right upper leg: Swelling present. No edema or tenderness. Left upper leg: Swelling present. No edema or tenderness. Right lower leg: Swelling present. No tenderness. No edema. Left lower leg: Swelling present. No tenderness. No edema. Right foot: Normal capillary refill. No swelling or tenderness. Left foot: Normal capillary refill. No swelling or tenderness. Feet:      Right foot:      Skin integrity: No ulcer or skin breakdown. Left foot:      Skin integrity: No ulcer or skin breakdown. Comments: Darkening skin color to both feet, left greater then right  Skin:     General: Skin is warm. Capillary Refill: Capillary refill takes less than 2 seconds. Neurological:      Mental Status: She is alert and oriented to person, place, and time. GCS: GCS eye subscore is 4. GCS verbal subscore is 5. GCS motor subscore is 6. Sensory: Sensory deficit (severe neuropathy) present. Motor: Motor function is intact.    Psychiatric:         Mood and Affect: Mood normal.         Speech: Speech normal.         Behavior: Behavior normal.         Imaging/Labs:         Assessment and Plan:     Peripheral arterial disease, chronic lower extremity swelling, neurogenic claudication, degenerative disk disease with radiculopathy  Low suspicion that her mild PAD is causing her neuro symptoms  Will try Neurontin to see if that helps alleviate some of her discomfort  Will make referral back to neurosurgery to evaluate her degenerative disk disease noted on recent CT as a potential source for her nerve pain/radiculopathy  Continue aspirin and statins daily, along with continued smoking cessation  She will follow up in 6 months for re-evaluation    Electronically signed by Kathrin Conti MD on 7/28/22 at 1:29 PM EDT      [address] [place] [address],[address]: [phone]  C: [phone]  Email: [email or web address]

## 2022-08-07 PROBLEM — I87.2 SAPHENOFEMORAL VENOUS REFLUX: Status: ACTIVE | Noted: 2022-08-07

## 2022-08-07 PROBLEM — I87.2 LYMPHEDEMA DUE TO VENOUS INSUFFICIENCY: Status: ACTIVE | Noted: 2022-08-07

## 2022-08-07 PROBLEM — M48.062 NEUROGENIC CLAUDICATION DUE TO LUMBAR SPINAL STENOSIS: Status: ACTIVE | Noted: 2022-08-07

## 2022-08-07 PROBLEM — I89.0 LYMPHEDEMA DUE TO VENOUS INSUFFICIENCY: Status: ACTIVE | Noted: 2022-08-07

## 2022-08-07 PROBLEM — I73.9 PAD (PERIPHERAL ARTERY DISEASE) (HCC): Status: ACTIVE | Noted: 2022-08-07

## 2022-08-07 ASSESSMENT — ENCOUNTER SYMPTOMS
CHEST TIGHTNESS: 0
SHORTNESS OF BREATH: 0
COLOR CHANGE: 1
ALLERGIC/IMMUNOLOGIC NEGATIVE: 1
ABDOMINAL PAIN: 0
BACK PAIN: 1

## 2022-08-18 ENCOUNTER — OFFICE VISIT (OUTPATIENT)
Dept: INTERNAL MEDICINE | Age: 62
End: 2022-08-18
Payer: MEDICARE

## 2022-08-18 VITALS
SYSTOLIC BLOOD PRESSURE: 137 MMHG | HEIGHT: 70 IN | DIASTOLIC BLOOD PRESSURE: 69 MMHG | OXYGEN SATURATION: 99 % | HEART RATE: 72 BPM | BODY MASS INDEX: 41.95 KG/M2 | WEIGHT: 293 LBS

## 2022-08-18 DIAGNOSIS — Z12.39 ENCOUNTER FOR SCREENING FOR MALIGNANT NEOPLASM OF BREAST, UNSPECIFIED SCREENING MODALITY: ICD-10-CM

## 2022-08-18 DIAGNOSIS — I10 ESSENTIAL HYPERTENSION: Primary | ICD-10-CM

## 2022-08-18 DIAGNOSIS — M54.16 CHRONIC LUMBAR RADICULOPATHY: ICD-10-CM

## 2022-08-18 DIAGNOSIS — Z12.31 ENCOUNTER FOR SCREENING MAMMOGRAM FOR MALIGNANT NEOPLASM OF BREAST: ICD-10-CM

## 2022-08-18 DIAGNOSIS — E78.00 PURE HYPERCHOLESTEROLEMIA: ICD-10-CM

## 2022-08-18 DIAGNOSIS — R79.9 ABNORMAL FINDING OF BLOOD CHEMISTRY, UNSPECIFIED: ICD-10-CM

## 2022-08-18 DIAGNOSIS — E66.01 MORBID OBESITY DUE TO EXCESS CALORIES (HCC): ICD-10-CM

## 2022-08-18 DIAGNOSIS — I10 HYPERTENSION, UNSPECIFIED TYPE: ICD-10-CM

## 2022-08-18 PROBLEM — N18.30 CHRONIC RENAL DISEASE, STAGE III (HCC): Status: ACTIVE | Noted: 2022-08-18

## 2022-08-18 PROCEDURE — G8427 DOCREV CUR MEDS BY ELIG CLIN: HCPCS | Performed by: INTERNAL MEDICINE

## 2022-08-18 PROCEDURE — 99214 OFFICE O/P EST MOD 30 MIN: CPT | Performed by: INTERNAL MEDICINE

## 2022-08-18 PROCEDURE — 99211 OFF/OP EST MAY X REQ PHY/QHP: CPT | Performed by: INTERNAL MEDICINE

## 2022-08-18 PROCEDURE — G8417 CALC BMI ABV UP PARAM F/U: HCPCS | Performed by: INTERNAL MEDICINE

## 2022-08-18 PROCEDURE — 1036F TOBACCO NON-USER: CPT | Performed by: INTERNAL MEDICINE

## 2022-08-18 PROCEDURE — 3017F COLORECTAL CA SCREEN DOC REV: CPT | Performed by: INTERNAL MEDICINE

## 2022-08-18 RX ORDER — IBUPROFEN 600 MG/1
600 TABLET ORAL EVERY 8 HOURS PRN
Qty: 30 TABLET | Refills: 0 | Status: CANCELLED | OUTPATIENT
Start: 2022-08-18

## 2022-08-18 RX ORDER — BETAMETHASONE DIPROPIONATE 0.05 %
OINTMENT (GRAM) TOPICAL
COMMUNITY
Start: 2022-07-18

## 2022-08-18 RX ORDER — OXYBUTYNIN CHLORIDE 10 MG/1
10 TABLET, EXTENDED RELEASE ORAL DAILY
COMMUNITY
Start: 2022-06-14

## 2022-08-18 RX ORDER — HYDROCHLOROTHIAZIDE 50 MG/1
TABLET ORAL
Qty: 30 TABLET | Refills: 5 | Status: SHIPPED | OUTPATIENT
Start: 2022-08-18

## 2022-08-18 ASSESSMENT — PATIENT HEALTH QUESTIONNAIRE - PHQ9
SUM OF ALL RESPONSES TO PHQ QUESTIONS 1-9: 0
1. LITTLE INTEREST OR PLEASURE IN DOING THINGS: 0
2. FEELING DOWN, DEPRESSED OR HOPELESS: 0
SUM OF ALL RESPONSES TO PHQ QUESTIONS 1-9: 0
SUM OF ALL RESPONSES TO PHQ9 QUESTIONS 1 & 2: 0

## 2022-08-18 ASSESSMENT — ENCOUNTER SYMPTOMS
ALLERGIC/IMMUNOLOGIC NEGATIVE: 1
RESPIRATORY NEGATIVE: 1
ABDOMINAL DISTENTION: 1
EYES NEGATIVE: 1
CONSTIPATION: 1

## 2022-08-18 NOTE — PROGRESS NOTES
is also taking Motrin and Percocet. In the past she has been seen by pain management but nothing in the last 1 year. She also saw orthopedics for osteoarthritis of her left knee over a year ago. Patient continues to struggle with recurrent UTIs and urinary incontinence. She recently saw Dr. Unique Cervantes, who recommended patient undergo bladder lift procedure which she would like to hold off on at this time. Patient did complete another course of antibiotics for her UTI and continues to take cranberry pills daily. Patient's blood pressure is under good control with the use of Norvasc Coreg and Lasix. Patient also takes Lipitor for dyslipidemia and denies any muscular pain or GI side effects    Patient's allergies, medications, past medical, surgical, social and family histories were reviewed and updated as appropriate. ALLERGIES      Allergies   Allergen Reactions    Ciprofloxacin Swelling    Keflex [Cephalexin] Swelling    Mobic [Meloxicam]      Muscle cramping/spasm         MEDICATIONS:      Current Outpatient Medications   Medication Sig Dispense Refill    gabapentin (NEURONTIN) 300 MG capsule Take 1 capsule by mouth in the morning and 1 capsule at noon and 1 capsule before bedtime. Do all this for 90 days. 270 capsule 2    ibuprofen (ADVIL;MOTRIN) 600 MG tablet TAKE 1 TABLET BY MOUTH EVERY 8 HOURS AS NEEDED FOR PAIN. 30 tablet 0    hydroCHLOROthiazide (HYDRODIURIL) 50 MG tablet TAKE 1 TABLET BY MOUTH ONCE DAILY.  30 tablet 5    Incontinence Supply Disposable (INCONTINENCE BRIEF LARGE) MISC Use as needed 1 each 0    Incontinence Supply Disposable (ATTENDS UNDERPADS LARGE REG) MISC Use as needed 1 each 0    Incontinence Supply Disposable (UNDERPADS SMALL) MISC 1 box by Does not apply route daily 1 each 5    vitamin D (ERGOCALCIFEROL) 1.25 MG (72520 UT) CAPS capsule Take 1 capsule by mouth once a week 4 capsule 5    vitamin D3 (CHOLECALCIFEROL) 25 MCG (1000 UT) TABS tablet Take 1 tablet by mouth daily 90 tablet 1    Coenzyme Q10 (CO Q 10) 10 MG CAPS Take 1 capsule by mouth daily 30 capsule 5    carvedilol (COREG) 3.125 MG tablet Take 1 tablet by mouth 2 times daily 60 tablet 3    atorvastatin (LIPITOR) 40 MG tablet Take 1 tablet by mouth daily 30 tablet 3    oxyCODONE-acetaminophen (PERCOCET) 5-325 MG per tablet Take 1 tablet by mouth every 6 hours as needed for Pain.       furosemide (LASIX) 20 MG tablet Take 1 tablet by mouth daily (Patient taking differently: Take 20 mg by mouth every other day) 60 tablet 3    Blood Pressure Monitoring (BLOOD PRESSURE CUFF) MISC Blood Pressure Monitoring (BLOOD PRESSURE CUFF) MISC Blood Pressure Monitoring (BLOOD PRESSURE CUFF) MISC Indications: Essential hypertension Use as directed 1 each 0 04/02/2020 Active 04-  Joshua 28 ([id number])      VITAMIN E PO Take by mouth daily      aspirin 81 MG tablet Take 2 tablets by mouth daily 60 tablet 3    ascorbic acid (VITAMIN C) 500 MG tablet Take 1 tablet by mouth daily 30 tablet 3    Multiple Vitamin (THERAPEUTIC MULTIVITAMIN PO) Take 1 tablet by mouth       Current Facility-Administered Medications   Medication Dose Route Frequency Provider Last Rate Last Admin    lidocaine 1 % injection 2 mL  2 mL Intra-artICUlar Once Gisselle Massey MD           Patient Care Team:  Josey Interiano MD as PCP - General (Internal Medicine)  Josey Interiano MD as PCP - [facility]  Sandro Watkins MD (Dermatology)  [facility] (Neurosurgery)    PAST MEDICAL AND SURGICAL HISTORY:      Past Medical History:   Diagnosis Date    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    Anemia 10/5/2016    Arthritis     Asthma 1/4/2017    Bilateral leg and foot pain 02/2019    left much worse    Degeneration of cervical intervertebral disc 6/21/2018    Depression 2/2/2016    Failed back syndrome 4/3/2018    History of recurrent UTI (urinary tract infection)     Hyperlipidemia     Hypertension     Lumbar disc herniation with radiculopathy 6/4/2013 Obesity     Spinal stenosis     Wound infection after surgery      Past Surgical History:   Procedure Laterality Date    BACK SURGERY      CYSTOSCOPY Bilateral 2022    CYSTOSCOPY RETROGRADE PYELOGRAM, PELVIC EXAM, VAGINOSCOPY (Bilateral)    CYSTOSCOPY Bilateral 2022    CYSTOSCOPY RETROGRADE PYELOGRAM, PELVIC EXAM, VAGINOSCOPY performed by Jez Bowden MD at [address] [place] [address] Bilateral     heel spurs, plantar fascia release    GALLBLADDER SURGERY      LUMBAR SPINE SURGERY  1986    [facility]    LUMBAR SPINE SURGERY  2012    [facility]    NERVE BLOCK  10/26/2015    caudal# 1 decadron 7.5 mg    NERVE BLOCK  12/22/15    caudal #2  celestone 9mg    SPINE SURGERY  2016    3 places     VEIN SURGERY Bilateral 2017       SOCIAL HISTORY      Social History     Tobacco Use    Smoking status: Former     Packs/day: 0.10     Years: 30.00     Pack years: 3.00     Types: Cigarettes     Quit date: 10/23/1988     Years since quittin.8    Smokeless tobacco: Never   Substance Use Topics    Alcohol use: No     Alcohol/week: 0.0 standard drinks     Abiel Peraza  reports that she quit smoking about 33 years ago. Her smoking use included cigarettes. She has a 3.00 pack-year smoking history. She has never used smokeless tobacco.    FAMILY HISTORY:      Family History   Problem Relation Age of Onset    Ovarian Cancer Mother     Heart Disease Father     Hypertension Father        REVIEW OF SYSTEMS:    Review of Systems   Constitutional:  Positive for appetite change. HENT: Negative. Eyes: Negative. Respiratory: Negative. Cardiovascular:  Positive for leg swelling. Gastrointestinal:  Positive for abdominal distention and constipation. Endocrine: Negative. Genitourinary: Negative. Musculoskeletal:  Positive for arthralgias. Skin: Negative. Allergic/Immunologic: Negative. Neurological:  Positive for weakness and numbness. Hematological: Negative. Psychiatric/Behavioral: Negative. PHYSICAL EXAM:      Vitals:    08/18/22 1420   BP: 137/69   Pulse: 72   SpO2: 99%     BP Readings from Last 3 Encounters:   08/18/22 137/69   07/28/22 (!) 150/87   07/25/22 (!) 170/83       Physical Examination: General appearance - alert, well appearing, and in no distress and overweight  Mental status - alert, oriented to person, place, and time  Chest - clear to auscultation, no wheezes, rales or rhonchi, symmetric air entry  Heart - normal rate and regular rhythm  Back exam - limited range of motion  Neurological - normal muscle tone, no tremors, strength 5/5  Musculoskeletal - OA changes to knees.    Extremities - no pedal edema noted, , wearing stockings    LABORATORY FINDINGS:    CBC:   Lab Results   Component Value Date/Time    WBC 3.5 01/29/2022 07:24 PM    HGB 13.3 01/29/2022 07:24 PM     01/29/2022 07:24 PM     04/10/2012 05:39 AM     BMP:    Lab Results   Component Value Date/Time     01/29/2022 07:24 PM    K 4.4 01/29/2022 07:24 PM     01/29/2022 07:24 PM    CO2 22 01/29/2022 07:24 PM    BUN 9 01/29/2022 07:24 PM    CREATININE 0.99 05/20/2022 01:18 PM    CREATININE 0.62 01/29/2022 07:24 PM    GLUCOSE 98 01/29/2022 07:24 PM    GLUCOSE 87 04/18/2012 03:45 PM     Hemoglobin A1C:   Lab Results   Component Value Date/Time    LABA1C 5.6 04/02/2020 04:31 PM     Microalbumin Urine: No results found for: MICROALBUR  Lipid profile:   Lab Results   Component Value Date/Time    CHOL 321 04/30/2021 12:23 PM    TRIG 66 04/30/2021 12:23 PM    HDL 90 04/30/2021 12:23 PM     Thyroid functions:   Lab Results   Component Value Date/Time    TSH 2.71 04/02/2020 04:31 PM      Hepatic functions:   Lab Results   Component Value Date/Time    ALT 19 04/30/2021 12:23 PM    AST 20 04/30/2021 12:23 PM    PROT 7.5 04/30/2021 12:23 PM    PROT 6.5 04/18/2012 03:45 PM    BILITOT 0.17 04/30/2021 12:23 PM    BILIDIR <0.08 10/23/2018 04:50 PM    LABALBU 4.0 04/30/2021 12:23 PM    LABALBU 4.6 04/04/2012 08:25 PM     Urine Analysis: No results found for: [id number]:      Health Maintenance Due   Topic Date Due    COVID-19 Vaccine (1) Never done    Cervical cancer screen  10/14/2017    Breast cancer screen  06/07/2021    Lipids  04/30/2022    Depression Screen  08/17/2022    Annual Wellness Visit (AWV)  08/18/2022        Diagnosis Orders   1. Essential hypertension  Basic Metabolic Panel    hydroCHLOROthiazide (HYDRODIURIL) 50 MG tablet      2. Chronic lumbar radiculopathy        3. Encounter for screening for malignant neoplasm of breast, unspecified screening modality  MAURI DIGITAL SCREEN W OR WO CAD BILATERAL      4. Pure hypercholesterolemia  Lipid, Fasting      5. Morbid obesity due to excess calories (HCC)  Hemoglobin A1C      6. Encounter for screening mammogram for malignant neoplasm of breast   MAURI DIGITAL SCREEN W OR WO CAD BILATERAL      7. Abnormal finding of blood chemistry, unspecified   Hemoglobin A1C      8. Hypertension, unspecified type  hydroCHLOROthiazide (HYDRODIURIL) 50 MG tablet      No changes to her hypertension medication regimen at this time  Would encourage her to get soonest available appointment to neurosurgery  Will continue her follow-up with urogyn  Will have patient get appropriate name for equipment she is requiring at home through her  and call the office back  Patient and return to clinic in 3 months        FOLLOW UP:   1.  [facility] received counseling on the following healthy behaviors: nutrition and exercise    2. Reviewed prior labs and health maintenance. 3.  Discussed use, benefit, and side effects of prescribed medications. Barriers to medication compliance addressed. All patient questions answered. Pt voiced understanding. 4.  Continue current medications, diet and exercise. No orders of the defined types were placed in this encounter.          Completed Refills               Requested Prescriptions      No prescriptions requested or ordered in this encounter       5. Patient given educational materials - see patient instructions    6. Was a self-tracking handout given in paper form or via MyChart? Yes  If yes, see orders or list here. Orders Placed This Encounter   Procedures    MAURI DIGITAL SCREEN W OR WO CAD BILATERAL     Standing Status:   Future     Standing Expiration Date:   8/18/2023     Scheduling Instructions:      Every year for women age 36 and under 76     Order Specific Question:   Reason for exam:     Answer:   Screening    Lipid, Fasting     Standing Status:   Future     Standing Expiration Date:   0/71/4819    Basic Metabolic Panel     Please get this labwork done before your next visit. Standing Status:   Future     Standing Expiration Date:   8/17/2023    Hemoglobin A1C     Standing Status:   Future     Standing Expiration Date:   8/18/2023       No follow-ups on file. Patient voiced understanding and agreed to treatment plan. This note is created with the assistance of a speech-recognition program. While intending to generate a document that actually reflects the content of the visit, the document can still have some mistakes which may not have been identified and corrected by editing.       Dr Gerardo Orozco MD, [address]  Associate , Department of Internal Medicine  Resident Ambulatory Site Medical Director  [facility] Internal Medicine  [address] [facility],[address]Th Floor  Internal Medicine Clerkship - Alicia Campo                   8/18/2022, 3:02 PM

## 2022-08-23 ENCOUNTER — TELEPHONE (OUTPATIENT)
Dept: INTERNAL MEDICINE | Age: 62
End: 2022-08-23

## 2022-08-23 DIAGNOSIS — G89.29 CHRONIC BILATERAL LOW BACK PAIN WITH BILATERAL SCIATICA: ICD-10-CM

## 2022-08-23 DIAGNOSIS — M54.41 CHRONIC BILATERAL LOW BACK PAIN WITH BILATERAL SCIATICA: ICD-10-CM

## 2022-08-23 DIAGNOSIS — M54.42 CHRONIC BILATERAL LOW BACK PAIN WITH BILATERAL SCIATICA: ICD-10-CM

## 2022-08-23 DIAGNOSIS — M54.16 CHRONIC LUMBAR RADICULOPATHY: ICD-10-CM

## 2022-08-23 DIAGNOSIS — G81.94 HEMIPLEGIA AFFECTING LEFT NONDOMINANT SIDE, UNSPECIFIED ETIOLOGY, UNSPECIFIED HEMIPLEGIA TYPE (HCC): Primary | ICD-10-CM

## 2022-08-23 RX ORDER — IBUPROFEN 600 MG/1
TABLET ORAL
Qty: 30 TABLET | Refills: 0 | Status: SHIPPED | OUTPATIENT
Start: 2022-08-23 | End: 2022-11-03 | Stop reason: SDUPTHER

## 2022-08-23 NOTE — TELEPHONE ENCOUNTER
Pt called back, states she was talking with Jennifer People about wanting to get a chair or stool with wheels to help her out in the kitchen. Jennifer People advised pt to speak with her .  told pt that insurance would most likely deny anything of that nature d/t safety concerns, but suggested that pt try getting another rollator walker. Pt states her current rollator is a couple years old and needs repairs. Order has been pended, please review, print and sign. Will also need a brief note to submit with order.

## 2022-08-23 NOTE — TELEPHONE ENCOUNTER
E-scribe request from [id number] [facility] for Ibuprofen 600 mg. Patient has an appt on 11/22/22.       Health Maintenance   Topic Date Due    COVID-19 Vaccine (1) Never done    Cervical cancer screen  10/14/2017    Breast cancer screen  06/07/2021    Lipids  04/30/2022    Annual Wellness Visit (AWV)  08/18/2022    DTaP/Tdap/Td vaccine (1 - Tdap) 02/09/2023 (Originally 2/11/1979)    Shingles vaccine (1 of 2) 08/17/2023 (Originally 2/11/2009)    Flu vaccine (1) 09/01/2022    Depression Screen  08/18/2023    Colorectal Cancer Screen  05/19/2024    Hepatitis C screen  Completed    HIV screen  Completed    Hepatitis A vaccine  Aged Out    Hepatitis B vaccine  Aged Out    Hib vaccine  Aged Out    Meningococcal (ACWY) vaccine  Aged Out    Pneumococcal 0-64 years Vaccine  Aged Out             (applicable per patient's age: Cancer Screenings, Depression Screening, Fall Risk Screening, Immunizations)    Hemoglobin A1C (%)   Date Value   04/02/2020 5.6   10/23/2018 5.9   10/05/2015 5.4     LDL Cholesterol (mg/dL)   Date Value   04/30/2021 218 (H)     AST (U/L)   Date Value   04/30/2021 20     ALT (U/L)   Date Value   04/30/2021 19     BUN (mg/dL)   Date Value   01/29/2022 9      (goal A1C is < 7)   (goal LDL is <100) need 30-50% reduction from baseline     BP Readings from Last 3 Encounters:   08/18/22 137/69   07/28/22 (!) 150/87   07/25/22 (!) 170/83    (goal /80)      All Future Testing planned in [facility]:  Lab Frequency Next Occurrence   Reflux study/reflux venous insufficiency bilateral Once 11/17/2022   MAURI DIGITAL SCREEN W OR WO CAD BILATERAL Once 11/18/2022   Lipid, Fasting Once 53/66/3714   Basic Metabolic Panel Once 85/83/3660   Hemoglobin A1C Once 08/18/2022       Next Visit Date:  Future Appointments   Date Time Provider Mari Diop   10/3/2022  3:30 PM Lawyer Miles [facility]   11/22[facility]2022  2:20 [facility]   1/19/2023  2:00 PM [facility]            Patient Active Problem List:     Morbid obesity due to excess calories (HCC)     Hyperlipemia     Chronic pelvic pain in female     Spinal stenosis at L4-L5 level     Lumbar disc herniation     Adjustment disorder with mixed anxiety and depressed mood     Anemia     Morbid obesity with BMI of 50.0-59.9, adult (HCC)     Bilateral stenosis of lateral recess of lumbar spine     Chronic pain of left knee     Angioedema     Congenital pes planus     Failed back surgical syndrome     Venous insufficiency of both lower extremities     Weakness of both lower extremities     Recurrent UTI     Chronic lumbar radiculopathy     Left leg weakness     History of lumbar surgery     Chronic bilateral low back pain with bilateral sciatica     Hemiplegia affecting left nondominant side (HCC)     Primary osteoarthritis of left knee     Neurogenic claudication due to lumbar spinal stenosis     Saphenofemoral venous reflux     Lymphedema due to venous insufficiency     PAD (peripheral artery disease) ([address]     Chronic renal disease, stage III ([address] [360770]

## 2022-08-25 NOTE — TELEPHONE ENCOUNTER
Gus Saenz was evaluated today and a DME order was entered for a wheeled walker with seat because she requires this to successfully complete daily living tasks of personal cares, ambulating, and meal preparation. A wheeled walker with seat is necessary due to the patient's unsteady gait, upper body weakness, inability to  and ambulation device, ambulating only short distances by pushing a walker, and the need to sit for a short time before resuming ambulation. These tasks cannot be completed with a lesser ambulation device such as a cane, crutch, or standard walker. The need for this equipment was discussed with the patient and she understands and is in agreement.

## 2022-08-25 NOTE — TELEPHONE ENCOUNTER
DME order faxed to Jose G Avila at [facility], she has taken care of the patients lift chair in the past.  Confirmation received.

## 2022-08-26 ENCOUNTER — HOSPITAL ENCOUNTER (OUTPATIENT)
Age: 62
Setting detail: SPECIMEN
Discharge: HOME | End: 2022-08-26

## 2022-08-26 DIAGNOSIS — I10 ESSENTIAL HYPERTENSION: ICD-10-CM

## 2022-08-26 DIAGNOSIS — E66.01 MORBID OBESITY DUE TO EXCESS CALORIES (HCC): ICD-10-CM

## 2022-08-26 DIAGNOSIS — E78.00 PURE HYPERCHOLESTEROLEMIA: ICD-10-CM

## 2022-08-26 DIAGNOSIS — N18.30 STAGE 3 CHRONIC KIDNEY DISEASE, UNSPECIFIED WHETHER STAGE 3A OR 3B CKD (HCC): ICD-10-CM

## 2022-08-26 DIAGNOSIS — R79.9 ABNORMAL FINDING OF BLOOD CHEMISTRY, UNSPECIFIED: ICD-10-CM

## 2022-08-26 LAB
ANION GAP SERPL CALCULATED.3IONS-SCNC: 10 MMOL/L (ref 9–17)
BUN BLDV-MCNC: 9 MG/DL (ref 8–23)
CALCIUM SERPL-MCNC: 9.6 MG/DL (ref 8.6–10.4)
CHLORIDE BLD-SCNC: 103 MMOL/L (ref 98–107)
CHOLESTEROL, FASTING: 351 MG/DL
CHOLESTEROL/HDL RATIO: 3.9
CO2: 23 MMOL/L (ref 20–31)
CREAT SERPL-MCNC: 0.68 MG/DL (ref 0.5–0.9)
ESTIMATED AVERAGE GLUCOSE: 114 MG/DL
GFR AFRICAN AMERICAN: >60 ML/MIN
GFR NON-AFRICAN AMERICAN: >60 ML/MIN
GFR SERPL CREATININE-BSD FRML MDRD: ABNORMAL ML/MIN/{1.73_M2}
GLUCOSE BLD-MCNC: 84 MG/DL (ref 70–99)
HBA1C MFR BLD: 5.6 % (ref 4–6)
HDLC SERPL-MCNC: 91 MG/DL
LDL CHOLESTEROL: 249 MG/DL (ref 0–130)
POTASSIUM SERPL-SCNC: 3.6 MMOL/L (ref 3.7–5.3)
SODIUM BLD-SCNC: 136 MMOL/L (ref 135–144)
TRIGLYCERIDE, FASTING: 57 MG/DL

## 2022-08-27 DIAGNOSIS — E87.6 HYPOKALEMIA: Primary | ICD-10-CM

## 2022-08-29 DIAGNOSIS — E78.00 PURE HYPERCHOLESTEROLEMIA: ICD-10-CM

## 2022-08-29 RX ORDER — ATORVASTATIN CALCIUM 40 MG/1
TABLET, FILM COATED ORAL
Qty: 30 TABLET | Refills: 3 | Status: SHIPPED | OUTPATIENT
Start: 2022-08-29

## 2022-08-29 NOTE — TELEPHONE ENCOUNTER
E-scribe request for Lipitor. Please review and e-scribe if applicable.        Next Visit Date:  Future Appointments   Date Time Provider Mari Chikis   10/3/2022  3:30 PM Pat Phelps   11/22/2022  2:20 PM Leslye Dunbar MD [facility] Munira Phelps   1/19/2023  2:00 PM Rhett Ortiz MD [facility] Maintenance   Topic Date Due    COVID-19 Vaccine (1) Never done    Cervical cancer screen  10/14/2017    Breast cancer screen  06/07/2021    Annual Wellness Visit (AWV)  08/18/2022    DTaP/Tdap/Td vaccine (1 - Tdap) 02/09/2023 (Originally 2/11/1979)    Shingles vaccine (1 of 2) 08/17/2023 (Originally 2/11/2009)    Flu vaccine (1) 09/01/2022    Depression Screen  08/18/2023    Lipids  08/26/2023    Colorectal Cancer Screen  05/19/2024    Hepatitis C screen  Completed    HIV screen  Completed    Hepatitis A vaccine  Aged Out    Hepatitis B vaccine  Aged Out    Hib vaccine  Aged Out    Meningococcal (ACWY) vaccine  Aged Out    Pneumococcal 0-64 years Vaccine  Aged Out               (applicable per patient's age: Cancer Screenings, Depression Screening, Fall Risk Screening, Immunizations)    Hemoglobin A1C (%)   Date Value   08/26/2022 5.6   04/02/2020 5.6   10/23/2018 5.9     LDL Cholesterol (mg/dL)   Date Value   08/26/2022 249 (H)     AST (U/L)   Date Value   04/30/2021 20     ALT (U/L)   Date Value   04/30/2021 19     BUN (mg/dL)   Date Value   08/26/2022 9      (goal A1C is < 7)   (goal LDL is <100) need 30-50% reduction from baseline     BP Readings from Last 3 Encounters:   08/18/22 137/69   07/28/22 (!) 150/87   07/25/22 (!) 170/83    (goal /80)      All Future Testing planned in [facility]:  Lab Frequency Next Occurrence   Reflux study/reflux venous insufficiency bilateral Once 11/17/2022   MAURI DIGITAL SCREEN W OR WO CAD BILATERAL Once 11/18/2022   Potassium Once 09/03/2022            Patient Active Problem List:     Morbid obesity due to excess calories ([facility]

## 2022-09-01 ENCOUNTER — HOSPITAL ENCOUNTER (OUTPATIENT)
Age: 62
Setting detail: SPECIMEN
Discharge: HOME | End: 2022-09-01

## 2022-09-01 ENCOUNTER — TELEPHONE (OUTPATIENT)
Dept: INTERNAL MEDICINE | Age: 62
End: 2022-09-01

## 2022-09-01 DIAGNOSIS — E87.6 HYPOKALEMIA: ICD-10-CM

## 2022-09-01 LAB — POTASSIUM SERPL-SCNC: 3.8 MMOL/L (ref 3.7–5.3)

## 2022-09-01 NOTE — TELEPHONE ENCOUNTER
----- Message from Aravind Mendiola sent at 8/31/2022  1:16 PM EDT -----  Subject: Message to Provider    QUESTIONS  Information for Provider? PT is requesting a call back from Jessica Grande   regarding some paper work related to rollator walker, allow to pickup the   papers for the rollator walker; contact# [phone]  ---------------------------------------------------------------------------  --------------  [id number]  [phone]; OK to leave message on voicemail  ---------------------------------------------------------------------------  --------------  SCRIPT ANSWERS  Relationship to Patient?  Self

## 2022-09-09 ENCOUNTER — TELEPHONE (OUTPATIENT)
Dept: INTERNAL MEDICINE | Age: 62
End: 2022-09-09

## 2022-09-09 NOTE — TELEPHONE ENCOUNTER
----- Message from [facility] sent at 9/8/2022 12:03 PM EDT -----  Subject: Message to Provider    QUESTIONS  Information for Provider? Pt is asking for a call back ASAP from the nurse   Verónica Carr to discuss paperwork. She called last week and never received a   call back. ---------------------------------------------------------------------------  --------------  Chanel STEVENSON  [phone]; OK to leave message on voicemail  ---------------------------------------------------------------------------  --------------  SCRIPT ANSWERS  Relationship to Patient?  Self

## 2022-09-12 NOTE — TELEPHONE ENCOUNTER
Patient returned call to the office. Patient is requesting to  the DME order for the rollator. Printed DME order and put at the fron desk.

## 2022-09-23 ENCOUNTER — TELEPHONE (OUTPATIENT)
Dept: INTERNAL MEDICINE | Age: 62
End: 2022-09-23

## 2022-09-23 NOTE — TELEPHONE ENCOUNTER
----- Message from Raoul Coleman sent at 9/23/2022  3:05 PM EDT -----  Subject: Appointment Request    Reason for Call: Established Patient Appointment needed: Routine Existing   Condition Follow Up    QUESTIONS    Reason for appointment request? Available appointments did not meet   patient need     Additional Information for Provider?  Patient would like an in person appt   with Dr Faviola Simon , please call to schedule.   ---------------------------------------------------------------------------  --------------  [id number]  [phone]; OK to leave message on voicemail  ---------------------------------------------------------------------------  --------------  SCRIPT ANSWERS  FLOR[place] Screen: Mackenzie Current

## 2022-09-27 ENCOUNTER — TELEMEDICINE (OUTPATIENT)
Dept: INTERNAL MEDICINE | Age: 62
End: 2022-09-27
Payer: MEDICARE

## 2022-09-27 DIAGNOSIS — E66.01 MORBID OBESITY WITH BMI OF 50.0-59.9, ADULT (HCC): Primary | ICD-10-CM

## 2022-09-27 DIAGNOSIS — R60.9 EDEMA, UNSPECIFIED TYPE: ICD-10-CM

## 2022-09-27 PROBLEM — I87.2 VENOUS INSUFFICIENCY OF BOTH LOWER EXTREMITIES: Status: RESOLVED | Noted: 2018-10-23 | Resolved: 2022-09-27

## 2022-09-27 PROBLEM — R29.898 WEAKNESS OF BOTH LOWER EXTREMITIES: Status: RESOLVED | Noted: 2020-01-12 | Resolved: 2022-09-27

## 2022-09-27 PROCEDURE — 99442 PR PHYS/QHP TELEPHONE EVALUATION 11-20 MIN: CPT | Performed by: INTERNAL MEDICINE

## 2022-09-27 RX ORDER — ERGOCALCIFEROL 1.25 MG/1
50000 CAPSULE ORAL WEEKLY
COMMUNITY

## 2022-09-27 NOTE — PROGRESS NOTES
Jay Palacios is a 60 y.o. female evaluated via telephone on 9/27/2022 for No chief complaint on file. .        Documentation:  Edema  Patient complains of edema in both lower legs. The edema has been moderate. Onset of symptoms was several weeks ago, and patient reports symptoms have gradually worsened since that time. The edema is present all day. The patient states the problem is long-standing. The swelling has been aggravated by hot weather and increased salt intake. The swelling has been relieved by diuretics. Associated factors include: nothing. She is not very mobile due to her Back pain and she is seeing NS next week with the hope of MRI. Diagnosis Orders   1. Morbid obesity with BMI of 50.0-59.9, adult ([facility]        2. Edema, unspecified type        Asked to take Lasix daily. Rtc in 1 month        Total Time: minutes: 11-20 minutes    Jay Palacios was evaluated through a synchronous (real-time) audio encounter. Patient identification was verified at the start of the visit. She (or guardian if applicable) is aware that this is a billable service, which includes applicable co-pays. This visit was conducted with the patient's (and/or legal guardian's) verbal consent. She has not had a related appointment within my department in the past 7 days or scheduled within the next 24 hours. The patient was located at Home: [address] [place] [id number]. The provider was located at [facility] (Appt Dept): [address],  [address] [facility].     Note: not billable if this call serves to triage the patient into an appointment for the relevant concern    Tasneem Chapin MD

## 2022-10-03 ENCOUNTER — OFFICE VISIT (OUTPATIENT)
Dept: NEUROSURGERY | Age: 62
End: 2022-10-03
Payer: MEDICARE

## 2022-10-03 VITALS
WEIGHT: 293 LBS | OXYGEN SATURATION: 99 % | HEIGHT: 70 IN | BODY MASS INDEX: 41.95 KG/M2 | DIASTOLIC BLOOD PRESSURE: 60 MMHG | SYSTOLIC BLOOD PRESSURE: 120 MMHG | HEART RATE: 75 BPM

## 2022-10-03 DIAGNOSIS — E66.01 MORBID OBESITY WITH BMI OF 45.0-49.9, ADULT (HCC): ICD-10-CM

## 2022-10-03 DIAGNOSIS — G95.19 NEUROGENIC CLAUDICATION (HCC): Primary | ICD-10-CM

## 2022-10-03 PROCEDURE — G8427 DOCREV CUR MEDS BY ELIG CLIN: HCPCS | Performed by: NEUROLOGICAL SURGERY

## 2022-10-03 PROCEDURE — G8417 CALC BMI ABV UP PARAM F/U: HCPCS | Performed by: NEUROLOGICAL SURGERY

## 2022-10-03 PROCEDURE — 1036F TOBACCO NON-USER: CPT | Performed by: NEUROLOGICAL SURGERY

## 2022-10-03 PROCEDURE — 3017F COLORECTAL CA SCREEN DOC REV: CPT | Performed by: NEUROLOGICAL SURGERY

## 2022-10-03 PROCEDURE — 99204 OFFICE O/P NEW MOD 45 MIN: CPT | Performed by: NEUROLOGICAL SURGERY

## 2022-10-03 PROCEDURE — G8484 FLU IMMUNIZE NO ADMIN: HCPCS | Performed by: NEUROLOGICAL SURGERY

## 2022-10-03 NOTE — PROGRESS NOTES
[address]  [facility] [address], [address] [facility] [address]  Dept: [phone]    Patient:  Mark Corona  YOB: 1960  Date: 10/3/22    The patient is a 60 y.o. female who presents today for consult of the following problems:     Chief Complaint   Patient presents with    Follow-up             HPI:     Mark Corona is a 60 y.o. female on whom neurosurgical consultation was requested by Rosalin Bence, MD for management of significant axial lumbar pain as well as radiating pain down bilateral lower extremities as well as numbness in the lower extremities the left greater than the right side. Patient has been dealing with longstanding axial back pain bilateral paraspinal and spasmodic in nature along with numbness that is been progressive over the course of the last few years. Has had multiple prior surgeries with Dr. Sergio Werner at [facility] in [facility]. Does utilize a walker for the last 5 years denies any saddle anesthesia or bowel bladder continence. Does take a daily narcotic 1 to 2 tablets/day for management pain management has had remote injections. Had aquatic PT prior to 1-2yrs but nothing in last 2yrs. Does perform stretches and strengthening exercises daily. Previously seen in 2020 and recommended SCS by me. Worsened claudication symptoms since that time.      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    Anemia 10/5/2016    Arthritis     Asthma 1/4/2017    Bilateral leg and foot pain 02/2019    left much worse    Degeneration of cervical intervertebral disc 6/21/2018    Depression 2/2/2016    Failed back syndrome 4/3/2018    History of recurrent UTI (urinary tract infection)     Hyperlipidemia     Hypertension     Lumbar disc herniation with radiculopathy 6/4/2013    Obesity     Spinal stenosis     Wound infection after surgery      Past Surgical History:   Procedure Laterality Date    BACK SURGERY      CYSTOSCOPY Bilateral 05/20/2022    CYSTOSCOPY RETROGRADE PYELOGRAM, PELVIC EXAM, VAGINOSCOPY (Bilateral)    CYSTOSCOPY Bilateral 5/20/2022    CYSTOSCOPY RETROGRADE PYELOGRAM, PELVIC EXAM, VAGINOSCOPY performed by Erin Enriquez MD at [address] Bilateral     heel spurs, plantar fascia release    [facility]    LUMBAR SPINE SURGERY  4/2012    [facility]    NERVE BLOCK  10/26/2015    caudal# 1 decadron 7.5 mg    NERVE BLOCK  12/22/15    caudal #2  celestone 9mg    SPINE SURGERY  07/24/2016    3 places     VEIN SURGERY Bilateral 03/2017     Family History   Problem Relation Age of Onset    Ovarian Cancer Mother     Heart Disease Father     Hypertension Father      Current Outpatient Medications on File Prior to Visit   Medication Sig Dispense Refill    vitamin D (ERGOCALCIFEROL) 1.25 MG (18661 UT) CAPS capsule Take 50,000 Units by mouth once a week      atorvastatin (LIPITOR) 40 MG tablet TAKE 1 TABLET BY MOUTH ONCE DAILY. 30 tablet 3    ibuprofen (ADVIL;MOTRIN) 600 MG tablet TAKE ONE TABLET BY MOUTH EVERY 8 HOURS AS NEEDED FOR PAIN 30 tablet 0    oxybutynin (DITROPAN-XL) 10 MG extended release tablet Take 10 mg by mouth daily      hydroCHLOROthiazide (HYDRODIURIL) 50 MG tablet TAKE 1 TABLET BY MOUTH ONCE DAILY. 30 tablet 5    gabapentin (NEURONTIN) 300 MG capsule Take 1 capsule by mouth in the morning and 1 capsule at noon and 1 capsule before bedtime. Do all this for 90 days.  (Patient taking differently: Take 300 mg by mouth daily.) 270 capsule 2    Incontinence Supply Disposable (INCONTINENCE BRIEF LARGE) MISC Use as needed 1 each 0    Incontinence Supply Disposable (ATTENDS UNDERPADS LARGE REG) MISC Use as needed 1 each 0    Incontinence Supply Disposable (UNDERPADS SMALL) MISC 1 box by Does not apply route daily 1 each 5    vitamin D3 (CHOLECALCIFEROL) 25 MCG (1000 UT) TABS tablet Take 1 tablet by mouth daily 90 tablet 1    carvedilol (COREG) 3.125 MG tablet Take 1 tablet by mouth 2 times daily 60 tablet 3    oxyCODONE-acetaminophen (PERCOCET) 5-325 MG per tablet Take 1 tablet by mouth every 6 hours as needed for Pain. furosemide (LASIX) 20 MG tablet Take 1 tablet by mouth daily (Patient taking differently: Take 20 mg by mouth every other day) 60 tablet 3    Blood Pressure Monitoring (BLOOD PRESSURE CUFF) MISC Blood Pressure Monitoring (BLOOD PRESSURE CUFF) MISC Blood Pressure Monitoring (BLOOD PRESSURE CUFF) MISC Indications: Essential hypertension Use as directed 1 each 0 2020 Active 2020  [address] ([id number])      VITAMIN E PO Take by mouth daily      aspirin 81 MG tablet Take 2 tablets by mouth daily 60 tablet 3    ascorbic acid (VITAMIN C) 500 MG tablet Take 1 tablet by mouth daily 30 tablet 3    Multiple Vitamin (THERAPEUTIC MULTIVITAMIN PO) Take 1 tablet by mouth      betamethasone dipropionate (DIPROLENE) 0.05 % ointment  (Patient not taking: No sig reported)       Current Facility-Administered Medications on File Prior to Visit   Medication Dose Route Frequency Provider Last Rate Last Admin    lidocaine 1 % injection 2 mL  2 mL Intra-artICUlar Once Agustin MD Luana         Social History     Tobacco Use    Smoking status: Former     Packs/day: 0.10     Years: 30.00     Pack years: 3.00     Types: Cigarettes     Quit date: 10/23/1988     Years since quittin.9    Smokeless tobacco: Never   Vaping Use    Vaping Use: Never used   Substance Use Topics    Alcohol use: No     Alcohol/week: 0.0 standard drinks    Drug use: No       Allergies   Allergen Reactions    Ciprofloxacin Swelling    Keflex [Cephalexin] Swelling    Mobic [Meloxicam]      Muscle cramping/spasm       Review of Systems  ROS: back pain, numbness.      Physical Exam:      /60   Pulse 75   Ht 5' 10\" (1.778 m)   Wt (!) 320 lb (145.2 kg)   LMP 2007   SpO2 99%   BMI 45.92 kg/m²   Estimated body mass index is 45.92 kg/m² as calculated from the following:    Height as of this encounter: 5' 10\" (1.778 m). Weight as of this encounter: 320 lb (145.2 kg). General:  Francesca Morris is a 60y.o. year old female who appears her stated age. HEENT: Normocephalic atraumatic. Neck supple. Chest: regular rate; pulses equal. Equal chest rise and fall  Abdomen: Soft nondistended. Ext: DP equal with good capillary refill  Neuro    Mentation  Appropriate affect   oriented    Cranial Nerves:   Pupils equal and reactive to light  Extraocular motion intact  Face symmetric  No dysarthria  v1-3 sensation symmetric, masseter tone symmetric  Hearing symmetric and intact to finger rub    Sensation:   Diminished LLE    Motor  L deltoid 5/5; R deltoid 5/5  L biceps 5/5; R biceps 5/5  L triceps 5/5; R triceps 5/5  L wrist extension 5/5; R wrist extension 5/5  L intrinsics 5/5; R intrinsics 5/5     L iliopsoas 5/5 , R iliopsoas 5/5  L quadriceps 5/5; R quadriceps 5/5  L Dorsiflexion 5/5; R dorsiflexion 5/5  L Plantarflexion 5/5; R plantarflexion 5/5  L EHL 5/5; R EHL 5/5    Reflexes  L Brachioradialis 2+/4; R brachioradialis 2+/4  L Biceps 2+/4; R Biceps 2+/4  L Triceps 2+/4; R Triceps 2+/4  L Patellar 2+/4: R Patellar 2+/4  L Achilles 2+/4; R Achilles 2+/4    hoffmans L: neg  hoffmans R: neg  Clonus L: neg  Clonus R: neg  Babinski L: up  Babinski R; up    Studies Review:     MRI previously 2018 did show multilevel spondylosis but no critical central stenosis right-sided paracentral protrusion at L5-S1. Assessment and Plan:      1. Neurogenic claudication ([facility] [address]    2. Morbid obesity with BMI of 45.0-49.9, adult ([address]          Plan: Patient with progressive symptoms of neurogenic claudication including loss of sensation of the left greater than the right lower extremity as well as significant weakness. Warrants repeat MRI of the lumbar spine.   In the interim I did  her on weight loss and will refer her to bariatrics for evaluation by nutritionist as well as bariatric surgeon. Also will initiate physical therapy working on extension therapy specifically, core strengthening myofascial release and stretch maneuvers to help some of her lower extremity symptoms hopefully. Followup: No follow-ups on file. Prescriptions Ordered:  No orders of the defined types were placed in this encounter. Orders Placed:  Orders Placed This Encounter   Procedures    MRI LUMBAR SPINE WO CONTRAST     Standing Status:   Future     Standing Expiration Date:   10/3/2023     Order Specific Question:   Reason for exam:     Answer:   progressive claudication    [facility]     Referral Priority:   Routine     Referral Type:   Eval and Treat     Referral Reason:   Specialty Services Required     Requested Specialty:   Physical Therapist     Number of Visits Requested:   [facility] [address] Sandra Murrieta DO, Weight Management and [facility]     Referral Priority:   Routine     Referral Type:   Eval and Treat     Referral Reason:   Specialty Services Required     Referred to Provider:   Sol Mojica DO     Requested Specialty:   Bariatric Surgery     Number of Visits Requested:   1        Electronically signed by Dunia Miller DO on 10/3/2022 at 3:27 PM    Please note that this chart was generated using voice recognition Dragon dictation software. Although every effort was made to ensure the accuracy of this automated transcription, some errors in transcription may have occurred.

## 2022-10-18 ENCOUNTER — HOSPITAL ENCOUNTER (OUTPATIENT)
Dept: MRI IMAGING | Age: 62
Discharge: HOME | End: 2022-10-20
Payer: MEDICARE

## 2022-10-18 DIAGNOSIS — G95.19 NEUROGENIC CLAUDICATION (HCC): ICD-10-CM

## 2022-10-18 PROCEDURE — 72148 MRI LUMBAR SPINE W/O DYE: CPT

## 2022-10-20 DIAGNOSIS — M54.16 CHRONIC LUMBAR RADICULOPATHY: ICD-10-CM

## 2022-10-20 RX ORDER — IBUPROFEN 600 MG/1
TABLET ORAL
Qty: 30 TABLET | Refills: 5 | OUTPATIENT
Start: 2022-10-20

## 2022-10-20 NOTE — TELEPHONE ENCOUNTER
Patient calling requesting a refill on Ibuprofen 600 mg. Patient has an appt on 10/25/22.         Health Maintenance   Topic Date Due    COVID-19 Vaccine (1) Never done    Cervical cancer screen  10/14/2017    Breast cancer screen  06/07/2021    Flu vaccine (1) Never done    Annual Wellness Visit (AWV)  08/18/2022    DTaP/Tdap/Td vaccine (1 - Tdap) 02/09/2023 (Originally 2/11/1979)    Shingles vaccine (1 of 2) 08/17/2023 (Originally 2/11/2009)    Depression Screen  08/18/2023    Lipids  08/26/2023    Colorectal Cancer Screen  05/19/2024    Hepatitis C screen  Completed    HIV screen  Completed    Hepatitis A vaccine  Aged Out    Hib vaccine  Aged Out    Meningococcal (ACWY) vaccine  Aged Out    Pneumococcal 0-64 years Vaccine  Aged Out             (applicable per patient's age: Cancer Screenings, Depression Screening, Fall Risk Screening, Immunizations)    Hemoglobin A1C (%)   Date Value   08/26/2022 5.6   04/02/2020 5.6   10/23/2018 5.9     LDL Cholesterol (mg/dL)   Date Value   08/26/2022 249 (H)     AST (U/L)   Date Value   04/30/2021 20     ALT (U/L)   Date Value   04/30/2021 19     BUN (mg/dL)   Date Value   08/26/2022 9      (goal A1C is < 7)   (goal LDL is <100) need 30-50% reduction from baseline     BP Readings from Last 3 Encounters:   10/03/22 120/60   08/18/22 137/69   07/28/22 (!) 150/87    (goal /80)      All Future Testing planned in [facility]:  Lab Frequency Next Occurrence   Reflux study/reflux venous insufficiency bilateral Once 11/17/2022   MAURI DIGITAL SCREEN W OR WO CAD BILATERAL Once 11/18/2022       Next Visit Date:  Future Appointments   Date Time Provider Mari Diop   10/25/2022  1:20 PM [facility]   11/22/2022  2:20 PM Diaz Miles MD [facility] IM Denis Model   1/11/2023  2:00 PM Jodi Soulier, DO [facility]   1/19/2023  2:00 PM [facility]            Patient Active Problem List:     Morbid obesity due to excess calories ([facility] [address]     Hyperlipemia     Chronic pelvic pain in female     Spinal stenosis at L4-L5 level     Adjustment disorder with mixed anxiety and depressed mood     Anemia     Angioedema     Congenital pes planus     Failed back surgical syndrome     Recurrent UTI     History of lumbar surgery     Hemiplegia affecting left nondominant side (HCC)     Primary osteoarthritis of left knee     Saphenofemoral venous reflux     Lymphedema due to venous insufficiency     PAD (peripheral artery disease) ([facility] [address]

## 2022-10-24 DIAGNOSIS — M54.16 CHRONIC LUMBAR RADICULOPATHY: ICD-10-CM

## 2022-10-24 DIAGNOSIS — E87.6 HYPOKALEMIA: Primary | ICD-10-CM

## 2022-10-24 NOTE — TELEPHONE ENCOUNTER
E-scribe request from [id number] [place] [address] for Ibuprofen 600 mg. Patient has an appt on 10/25/22.       Health Maintenance   Topic Date Due    COVID-19 Vaccine (1) Never done    Cervical cancer screen  10/14/2017    Breast cancer screen  06/07/2021    Flu vaccine (1) Never done    Annual Wellness Visit (AWV)  08/18/2022    DTaP/Tdap/Td vaccine (1 - Tdap) 02/09/2023 (Originally 2/11/1978)    Shingles vaccine (1 of 2) 08/17/2023 (Originally 2/11/2009)    Depression Screen  08/18/2023    Lipids  08/26/2023    Colorectal Cancer Screen  05/19/2024    Hepatitis C screen  Completed    HIV screen  Completed    Hepatitis A vaccine  Aged Out    Hib vaccine  Aged Out    Meningococcal (ACWY) vaccine  Aged Out    Pneumococcal 0-64 years Vaccine  Aged Out             (applicable per patient's age: Cancer Screenings, Depression Screening, Fall Risk Screening, Immunizations)    Hemoglobin A1C (%)   Date Value   08/26/2022 5.6   04/02/2020 5.6   10/23/2018 5.9     LDL Cholesterol (mg/dL)   Date Value   08/26/2022 249 (H)     AST (U/L)   Date Value   04/30/2021 20     ALT (U/L)   Date Value   04/30/2021 19     BUN (mg/dL)   Date Value   08/26/2022 9      (goal A1C is < 7)   (goal LDL is <100) need 30-50% reduction from baseline     BP Readings from Last 3 Encounters:   10/03/22 120/60   08/18/22 137/69   07/28/22 (!) 150/87    (goal /80)      All Future Testing planned in [facility]:  Lab Frequency Next Occurrence   Reflux study/reflux venous insufficiency bilateral Once 11/17/2022   MAURI DIGITAL SCREEN W OR WO CAD BILATERAL Once 11/18/2022       Next Visit Date:  Future Appointments   Date Time Provider Mari Diop   10/25/2022  1:20 PM [facility]   11/22/2022  2:20 PM Alvaro Brennan MD [facility] IM Wilhemina Alcira   1/11/2023  2:00 PM DO Fernando Mcneal [facility]   1/19/2023  2:00 PM Javier Claudio MD [facility]            Patient Active Problem List:     Morbid obesity due to excess calories ([facility] [address]     Hyperlipemia     Chronic pelvic pain in female     Spinal stenosis at L4-L5 level     Adjustment disorder with mixed anxiety and depressed mood     Anemia     Angioedema     Congenital pes planus     Failed back surgical syndrome     Recurrent UTI     History of lumbar surgery     Hemiplegia affecting left nondominant side (HCC)     Primary osteoarthritis of left knee     Saphenofemoral venous reflux     Lymphedema due to venous insufficiency     PAD (peripheral artery disease) ([facility] [address]

## 2022-10-25 RX ORDER — IBUPROFEN 600 MG/1
TABLET ORAL
Qty: 30 TABLET | Refills: 3 | OUTPATIENT
Start: 2022-10-25

## 2022-10-27 NOTE — TELEPHONE ENCOUNTER
PC from patient asking why her IBU was denied, Per refill request it states: refill not appropriate needs labs done. Pt is asking what labs need completed as there are none that were ordered and none that are overdue.  Please advise

## 2022-10-28 NOTE — TELEPHONE ENCOUNTER
PC to patient - informed patient that lab work is needed prior to refill on medication.  Patient verbalized understanding

## 2022-11-01 ENCOUNTER — HOSPITAL ENCOUNTER (OUTPATIENT)
Age: 62
Setting detail: SPECIMEN
Discharge: HOME | End: 2022-11-01

## 2022-11-01 DIAGNOSIS — E87.6 HYPOKALEMIA: ICD-10-CM

## 2022-11-01 LAB
ALBUMIN SERPL-MCNC: 4.3 G/DL (ref 3.5–5.2)
ALBUMIN/GLOBULIN RATIO: 1.2 (ref 1–2.5)
ALP BLD-CCNC: 114 U/L (ref 35–104)
ALT SERPL-CCNC: 26 U/L (ref 5–33)
ANION GAP SERPL CALCULATED.3IONS-SCNC: 11 MMOL/L (ref 9–17)
AST SERPL-CCNC: 24 U/L
BILIRUB SERPL-MCNC: 0.3 MG/DL (ref 0.3–1.2)
BUN BLDV-MCNC: 14 MG/DL (ref 8–23)
CALCIUM SERPL-MCNC: 10.1 MG/DL (ref 8.6–10.4)
CHLORIDE BLD-SCNC: 102 MMOL/L (ref 98–107)
CO2: 27 MMOL/L (ref 20–31)
CREAT SERPL-MCNC: 0.75 MG/DL (ref 0.5–0.9)
GFR SERPL CREATININE-BSD FRML MDRD: >60 ML/MIN/1.73M2
GLUCOSE BLD-MCNC: 94 MG/DL (ref 70–99)
POTASSIUM SERPL-SCNC: 3.8 MMOL/L (ref 3.7–5.3)
SODIUM BLD-SCNC: 140 MMOL/L (ref 135–144)
TOTAL PROTEIN: 7.9 G/DL (ref 6.4–8.3)

## 2022-11-03 DIAGNOSIS — M54.16 CHRONIC LUMBAR RADICULOPATHY: ICD-10-CM

## 2022-11-03 RX ORDER — IBUPROFEN 600 MG/1
TABLET ORAL
Qty: 30 TABLET | Refills: 0 | Status: SHIPPED | OUTPATIENT
Start: 2022-11-03

## 2022-11-03 NOTE — TELEPHONE ENCOUNTER
Patient calling requesting a refill on Ibuprofen 600 mg. Patient states that she got her lab work done and is asking if she can have it filled. Medication pending.

## 2022-11-11 ENCOUNTER — TELEPHONE (OUTPATIENT)
Dept: INTERNAL MEDICINE | Age: 62
End: 2022-11-11

## 2022-11-11 DIAGNOSIS — I10 ESSENTIAL HYPERTENSION, BENIGN: Primary | ICD-10-CM

## 2022-11-11 DIAGNOSIS — N39.3 STRESS INCONTINENCE OF URINE: ICD-10-CM

## 2022-11-11 NOTE — TELEPHONE ENCOUNTER
Patient calling stating that she has been getting her incontinence supplies through [address] [place] [address] Road but they no longer carry the brand she likes. She is requesting new scripts and to be faxed to [facility]. Orders pending. Please print when in office. Patient is aware that attending is out of the office. [facility] Atten : Rosario Sethi  Fax # [phone].

## 2022-11-17 RX ORDER — UNDERPADS 23" X 36"
EACH MISCELLANEOUS
Qty: 100 EACH | Refills: 5 | Status: SHIPPED | OUTPATIENT
Start: 2022-11-17

## 2022-11-30 ENCOUNTER — HOSPITAL ENCOUNTER (EMERGENCY)
Age: 62
Discharge: HOME | End: 2022-11-30
Attending: EMERGENCY MEDICINE
Payer: MEDICARE

## 2022-11-30 VITALS
WEIGHT: 293 LBS | SYSTOLIC BLOOD PRESSURE: 130 MMHG | OXYGEN SATURATION: 99 % | BODY MASS INDEX: 44.63 KG/M2 | HEART RATE: 77 BPM | DIASTOLIC BLOOD PRESSURE: 86 MMHG | RESPIRATION RATE: 16 BRPM | TEMPERATURE: 97.3 F

## 2022-11-30 DIAGNOSIS — Z01.818 PREOP EXAMINATION: Primary | ICD-10-CM

## 2022-11-30 DIAGNOSIS — I82.403 DEEP VEIN THROMBOSIS (DVT) OF BOTH LOWER EXTREMITIES, UNSPECIFIED CHRONICITY, UNSPECIFIED VEIN (HCC): ICD-10-CM

## 2022-11-30 DIAGNOSIS — N30.00 ACUTE CYSTITIS WITHOUT HEMATURIA: Primary | ICD-10-CM

## 2022-11-30 LAB
BILIRUBIN URINE: NEGATIVE
COLOR: YELLOW
COMMENT UA: NORMAL
GLUCOSE URINE: NEGATIVE
KETONES, URINE: NEGATIVE
LEUKOCYTE ESTERASE, URINE: NEGATIVE
NITRITE, URINE: NEGATIVE
PH UA: 6 (ref 5–8)
PROTEIN UA: NEGATIVE
SPECIFIC GRAVITY UA: 1.01 (ref 1–1.03)
TURBIDITY: CLEAR
URINE HGB: NEGATIVE
UROBILINOGEN, URINE: NORMAL

## 2022-11-30 PROCEDURE — 99283 EMERGENCY DEPT VISIT LOW MDM: CPT

## 2022-11-30 PROCEDURE — 81003 URINALYSIS AUTO W/O SCOPE: CPT

## 2022-11-30 PROCEDURE — 87086 URINE CULTURE/COLONY COUNT: CPT

## 2022-11-30 PROCEDURE — 6370000000 HC RX 637 (ALT 250 FOR IP): Performed by: HEALTH CARE PROVIDER

## 2022-11-30 RX ORDER — NITROFURANTOIN 25; 75 MG/1; MG/1
100 CAPSULE ORAL ONCE
Status: COMPLETED | OUTPATIENT
Start: 2022-11-30 | End: 2022-11-30

## 2022-11-30 RX ORDER — NITROFURANTOIN 25; 75 MG/1; MG/1
100 CAPSULE ORAL 2 TIMES DAILY
Qty: 14 CAPSULE | Refills: 0 | Status: SHIPPED | OUTPATIENT
Start: 2022-11-30 | End: 2022-12-07

## 2022-11-30 RX ADMIN — NITROFURANTOIN MONOHYDRATE/MACROCRYSTALS 100 MG: 75; 25 CAPSULE ORAL at 14:24

## 2022-11-30 ASSESSMENT — PAIN DESCRIPTION - LOCATION: LOCATION: BACK

## 2022-11-30 ASSESSMENT — PAIN SCALES - GENERAL: PAINLEVEL_OUTOF10: 7

## 2022-11-30 ASSESSMENT — ENCOUNTER SYMPTOMS
BACK PAIN: 1
ABDOMINAL PAIN: 0
NAUSEA: 0
VOMITING: 0
SHORTNESS OF BREATH: 0

## 2022-11-30 NOTE — ED PROVIDER NOTES
Hayden Swenson  ED     Emergency Department     Faculty Attestation    I performed a history and physical examination of the patient and discussed management with the resident. I reviewed the residents note and agree with the documented findings and plan of care. Any areas of disagreement are noted on the chart. I was personally present for the key portions of any procedures. I have documented in the chart those procedures where I was not present during the key portions. I have reviewed the emergency nurses triage note. I agree with the chief complaint, past medical history, past surgical history, allergies, medications, social and family history as documented unless otherwise noted below. For Physician Assistant/ Nurse Practitioner cases/documentation I have personally evaluated this patient and have completed at least one if not all key elements of the E/M (history, physical exam, and MDM). Additional findings are as noted. With history of frequent UTIs presents because she says she feels like she is having another UTI. She says her symptoms started on Saturday. She complains of burning with urination as well as urinary frequency and some flank pain. She denies abdominal pain. She denies fever, chills, night sweats, nausea or vomiting. On exam, patient is sitting up in the bed and appears well. Lungs clear to auscultation bilaterally heart sounds are normal.  Abdomen is soft and nontender. There is mild bilateral CVA tenderness. No rebound or guarding is present. We will check a urinalysis and urine culture and plan to treat.       Norma Zapata MD  Attending Emergency  Physician            Leticia Corral MD  11/30/22 [id number]

## 2022-11-30 NOTE — ED PROVIDER NOTES
[facility] ED  Emergency Department Encounter  Emergency Medicine Resident     Pt Name: Anjana Pacheco  MRN: [id number]  Joangfsoheila 1960  Date of evaluation: 11/30/22  PCP:  Freedom Gomez MD    [address] [place] [address]       Chief Complaint   Patient presents with    Urinary Frequency     Back pain    Back Pain       HISTORY OFPRESENT ILLNESS  (Location/Symptom, Timing/Onset, Context/Setting, Quality, Duration, Modifying Factors,Severity.)      Anjana Pacheco is a 60 y.o. female history of frequent UTIs who presents with recurrent urinary symptoms. Symptoms started 3 days ago. Patient complains of dysuria, increased urinary urgency, increased urinary frequency and intermittent low back pain. Has been drinking cranberry juice and taking over-the-counter Azo, but denies any relief of symptoms. States her current symptoms are the exact same as previous UTIs. Patient does have history of multiple infections within the last year, including MDRO E. coli in October 2021. States she takes either Macrobid or Bactrim for her UTIs, as she does not tolerate Keflex: States she has diffuse swelling when she takes this medication. Patient did have a visit with Dr. Lenin Linares in urology proximately 1 month ago. Bladder lift procedure was discussed. Patient has made a follow-up appointment to see him in 1 month. PAST MEDICAL / SURGICAL / SOCIAL / FAMILY HISTORY      has a past medical history of Acquired cystic kidney disease, Anemia, Arthritis, Asthma, Bilateral leg and foot pain, Degeneration of cervical intervertebral disc, Depression, Failed back syndrome, History of recurrent UTI (urinary tract infection), Hyperlipidemia, Hypertension, Lumbar disc herniation with radiculopathy, Obesity, Spinal stenosis, and Wound infection after surgery. has a past surgical history that includes Foot surgery (Bilateral); Gallbladder surgery; Lumbar spine surgery (1986); Lumbar spine surgery (4/2012);  Nerve Block (10/26/2015); Nerve Block (12/22/15); Spine surgery (2016); back surgery; Vein Surgery (Bilateral, 2017); Cystocopy (Bilateral, 2022); and Cystoscopy (Bilateral, 2022). Social History     Socioeconomic History    Marital status: Single     Spouse name: Not on file    Number of children: Not on file    Years of education: Not on file    Highest education level: Not on file   Occupational History    Not on file   Tobacco Use    Smoking status: Former     Packs/day: 0.10     Years: 30.00     Pack years: 3.00     Types: Cigarettes     Quit date: 10/23/1988     Years since quittin.1    Smokeless tobacco: Never   Vaping Use    Vaping Use: Never used   Substance and Sexual Activity    Alcohol use: No     Alcohol/week: 0.0 standard drinks    Drug use: No    Sexual activity: Not Currently   Other Topics Concern    Not on file   Social History Narrative    Not on file     Social Determinants of Health     Financial Resource Strain: Low Risk     Difficulty of Paying Living Expenses: Not hard at all   Food Insecurity: No Food Insecurity    Worried About [address] in the Last Year: Never true    [address]  [employer] in the Last Year: Never true   Transportation Needs: No Transportation Needs    Lack of Transportation (Medical): No    Lack of Transportation (Non-Medical): No   Physical Activity: Not on file   Stress: Not on file   Social Connections: Not on file   Intimate Partner Violence: Not on file   Housing Stability: Not on file       Family History   Problem Relation Age of Onset    Ovarian Cancer Mother     Heart Disease Father     Hypertension Father         Allergies:  Ciprofloxacin, Keflex [cephalexin], and Mobic [meloxicam]    Home Medications:  Prior to Admission medications    Medication Sig Start Date End Date Taking?  Authorizing Provider   nitrofurantoin, macrocrystal-monohydrate, (MACROBID) 100 MG capsule Take 1 capsule by mouth 2 times daily for 7 days 22 Yes Pam Monteiro MD   Incontinence Supply Disposable MISC Use as needed for urine leakage 11/17/22   Barry Leavitt MD   Incontinence Supply Disposable (INCONTINENCE BRIEF LARGE) MISC Use as needed daily for urine leakage 11/17/22   Barry Leavitt MD   ibuprofen (ADVIL;MOTRIN) 600 MG tablet TAKE ONE TABLET BY MOUTH EVERY 8 HOURS AS NEEDED FOR PAIN 11/3/22   Barry Leavitt MD   vitamin D (ERGOCALCIFEROL) 1.25 MG (12234 UT) CAPS capsule Take 50,000 Units by mouth once a week    Historical Provider, MD   atorvastatin (LIPITOR) 40 MG tablet TAKE 1 TABLET BY MOUTH ONCE DAILY. 8/29/22   Barry Leavitt MD   oxybutynin (DITROPAN-XL) 10 MG extended release tablet Take 10 mg by mouth daily 6/14/22   Historical Provider, MD   betamethasone dipropionate (DIPROLENE) 0.05 % ointment  7/18/22   Historical Provider, MD   hydroCHLOROthiazide (HYDRODIURIL) 50 MG tablet TAKE 1 TABLET BY MOUTH ONCE DAILY. 8/18/22   Barry Leavitt MD   gabapentin (NEURONTIN) 300 MG capsule Take 1 capsule by mouth in the morning and 1 capsule at noon and 1 capsule before bedtime. Do all this for 90 days. Patient taking differently: Take 300 mg by mouth daily. 7/28/22 10/26/22  Yonis Birmingham MD   Incontinence Supply Disposable (INCONTINENCE BRIEF LARGE) MISC Use as needed 2/10/22   Barry Leavitt MD   Incontinence Supply Disposable (ATTENDS UNDERPADS LARGE REG) MISC Use as needed 2/10/22   Barry Leavitt MD   Incontinence Supply Disposable (UNDERPADS SMALL) MISC 1 box by Does not apply route daily 2/10/22   Barry Leavitt MD   vitamin D3 (CHOLECALCIFEROL) 25 MCG (1000 UT) TABS tablet Take 1 tablet by mouth daily 5/25/21   Barry Leavitt MD   carvedilol (COREG) 3.125 MG tablet Take 1 tablet by mouth 2 times daily 5/25/21   Barry Leavitt MD   oxyCODONE-acetaminophen (PERCOCET) 5-325 MG per tablet Take 1 tablet by mouth every 6 hours as needed for Pain.     Historical Provider, MD   furosemide (LASIX) 20 MG tablet Take 1 tablet by mouth daily  Patient taking differently: Take 20 mg by mouth every other day 1/14/21   Reyes Munroe MD   Blood Pressure Monitoring (BLOOD PRESSURE CUFF) MISC Blood Pressure Monitoring (BLOOD PRESSURE CUFF) MISC Blood Pressure Monitoring (BLOOD PRESSURE CUFF) MISC Indications: Essential hypertension Use as directed 1 each 0 04/02/2020 Active 04-  [address] [place] [address] ([id number]) 4/2/20   Historical Provider, MD   VITAMIN E PO Take by mouth daily    Historical Provider, MD   aspirin 81 MG tablet Take 2 tablets by mouth daily 5/1/19   Ed MD Shawn   ascorbic acid (VITAMIN C) 500 MG tablet Take 1 tablet by mouth daily 5/1/19   Ed MD Shawn   Multiple Vitamin (THERAPEUTIC MULTIVITAMIN PO) Take 1 tablet by mouth    Historical Provider, MD       REVIEW OFSYSTEMS    (2-9 systems for level 4, 10 or more for level 5)      Review of Systems   Constitutional:  Negative for chills and fever. Respiratory:  Negative for shortness of breath. Cardiovascular:  Negative for chest pain. Gastrointestinal:  Negative for abdominal pain, nausea and vomiting. Genitourinary:  Positive for difficulty urinating, dysuria and urgency. Negative for dyspareunia, hematuria, vaginal bleeding, vaginal discharge and vaginal pain. Musculoskeletal:  Positive for back pain. Skin:  Negative for pallor. Allergic/Immunologic: Negative for immunocompromised state. Neurological:  Negative for weakness and numbness. Hematological:  Does not bruise/bleed easily. PHYSICAL EXAM   (up to 7 for level 4, 8 or more forlevel 5)      INITIAL VITALS:     Vitals:    11/30/22 1356   BP: 130/86   Pulse: 77   Resp: 16   Temp: 36.3 °C   SpO2: 99%         Physical Exam  Vitals reviewed. Constitutional:       General: She is not in acute distress. Appearance: Normal appearance. She is not ill-appearing. HENT:      Head: Normocephalic and atraumatic.       Right Ear: Tympanic membrane, ear canal and external ear normal.      Left Ear: Tympanic membrane, ear canal and external ear normal.      Nose: Nose normal. No congestion. Mouth/Throat:      Mouth: Mucous membranes are moist.      Pharynx: Oropharynx is clear. Eyes:      Conjunctiva/sclera: Conjunctivae normal.      Pupils: Pupils are equal, round, and reactive to light. Cardiovascular:      Rate and Rhythm: Normal rate and regular rhythm. Pulses: Normal pulses. Heart sounds: Normal heart sounds. Pulmonary:      Effort: Pulmonary effort is normal.      Breath sounds: Normal breath sounds. Abdominal:      General: There is no distension. Palpations: Abdomen is soft. Tenderness: There is no abdominal tenderness. There is no right CVA tenderness or left CVA tenderness. Musculoskeletal:      Cervical back: Normal range of motion. No rigidity. Right lower leg: No edema. Left lower leg: No edema. Lymphadenopathy:      Cervical: No cervical adenopathy. Skin:     General: Skin is warm and dry. Capillary Refill: Capillary refill takes less than 2 seconds. Neurological:      General: No focal deficit present. Mental Status: She is alert and oriented to person, place, and time. Psychiatric:         Mood and Affect: Mood normal.         Behavior: Behavior normal.       DIFFERENTIAL  DIAGNOSIS     PLAN (LABS / IMAGING / EKG):  Orders Placed This Encounter   Procedures    Culture, Urine    Urinalysis with Reflex to Culture       MEDICATIONS ORDERED:  Orders Placed This Encounter   Medications    nitrofurantoin (macrocrystal-monohydrate) (MACROBID) capsule 100 mg     Order Specific Question:   Antimicrobial Indications     Answer:   Urinary Tract Infection    nitrofurantoin, macrocrystal-monohydrate, (MACROBID) 100 MG capsule     Sig: Take 1 capsule by mouth 2 times daily for 7 days     Dispense:  14 capsule     Refill:  0       DDX: Acute cystitis, pyelonephritis    Initial MDM/Plan: 60 y.o. female who presents with urinary tract symptoms.   Consistent with prior

## 2022-11-30 NOTE — ED NOTES
Pt states urine with strong odor  Pt states urine is dark mily  Pt denies blood in urine  Pt states she gets UTI frequently  Pt denies nausea  Pt denies fever  Pt denies chills     Emilia Willett LPN  70/58/53 0560

## 2022-11-30 NOTE — DISCHARGE INSTRUCTIONS
You were seen in the emergency department for urinary tract symptoms consistent with prior UTIs. Your urinalysis did not show acute findings consistent with urinary tract infection, but given your history we have prescribed you antibiotic biotics for treatment while your culture is pending. [facility] the 24 hours. Based on evaluation, you are safe for discharge. Please take your [place] exactly as prescribed until the course is completed. Continue to drink plenty of water. Please follow-up with your urologist as scheduled. For pain use acetaminophen (Tylenol) or ibuprofen (Motrin / Advil), unless prescribed medications that have acetaminophen or ibuprofen (or similar medications) in it. You can take over the counter acetaminophen tablets (1 - 2 tablets of the 500-mg strength every 6 hours) or ibuprofen tablets (2 tablets every 4 hours). PLEASE RETURN TO THE EMERGENCY DEPARTMENT IMMEDIATELY for worsening symptoms, inability to urinate, worsening of blood in your urine, or if you develop any concerning symptoms such as: high fever not relieved by acetaminophen (Tylenol) and/or ibuprofen (Motrin / Advil), chills, shortness of breath, chest pain, feeling of your heart fluttering or racing, persistent nausea and/or vomiting, vomiting up blood, blood in your stool, loss of consciousness, numbness, weakness or tingling in the arms or legs or change in color of the extremities, changes in mental status, persistent headache, blurry vision, loss of bladder / bowel.

## 2022-12-01 ENCOUNTER — TELEMEDICINE (OUTPATIENT)
Dept: INTERNAL MEDICINE | Age: 62
End: 2022-12-01
Payer: MEDICARE

## 2022-12-01 DIAGNOSIS — M54.16 CHRONIC LUMBAR RADICULOPATHY: ICD-10-CM

## 2022-12-01 DIAGNOSIS — E66.01 MORBID OBESITY WITH BMI OF 50.0-59.9, ADULT (HCC): ICD-10-CM

## 2022-12-01 DIAGNOSIS — I10 ESSENTIAL HYPERTENSION, BENIGN: Primary | ICD-10-CM

## 2022-12-01 PROCEDURE — 99441 PR PHYS/QHP TELEPHONE EVALUATION 5-10 MIN: CPT | Performed by: INTERNAL MEDICINE

## 2022-12-01 ASSESSMENT — PATIENT HEALTH QUESTIONNAIRE - PHQ9
SUM OF ALL RESPONSES TO PHQ9 QUESTIONS 1 & 2: 0
SUM OF ALL RESPONSES TO PHQ QUESTIONS 1-9: 0
2. FEELING DOWN, DEPRESSED OR HOPELESS: 0
SUM OF ALL RESPONSES TO PHQ QUESTIONS 1-9: 0
SUM OF ALL RESPONSES TO PHQ QUESTIONS 1-9: 0
1. LITTLE INTEREST OR PLEASURE IN DOING THINGS: 0
DEPRESSION UNABLE TO ASSESS: PT REFUSES
SUM OF ALL RESPONSES TO PHQ QUESTIONS 1-9: 0

## 2022-12-01 NOTE — PROGRESS NOTES
Celeste Betts is a 60 y.o. female evaluated via telephone on 12/1/2022 for Numbness (Both legs tight swelling, pain, tightness)  . Documentation:  She is reporting worsening leg pain. She has neurogenic claudication and has seen NS, who recommended bariatrics and weight loss as well. She is scheduled to see Vascular next week and has DVT scan as well. Was seen in ED for dysuria yesterday. UA was negative, she took one dose of macrobid. Was also seen by Orthopedics and needs left knee replacement. She needs to work on weight loss for this. BP is reporting as normal at home. She is on HCTZ,  coreg, lasix daily. Diagnosis Orders   1. Essential hypertension, benign        2. Morbid obesity with BMI of 50.0-59.9, adult ([facility] [address]        3. Chronic lumbar radiculopathy          Rtc in 4 months      Total Time: minutes: 5-10 minutes    Celeste Betts was evaluated through a synchronous (real-time) audio encounter. Patient identification was verified at the start of the visit. She (or guardian if applicable) is aware that this is a billable service, which includes applicable co-pays. This visit was conducted with the patient's (and/or legal guardian's) verbal consent. She has not had a related appointment within my department in the past 7 days or scheduled within the next 24 hours. The patient was located at Home: [address] [place] [phone]. The provider was located at [facility] (Appt Dept): [address],  [address] [facility].     Note: not billable if this call serves to triage the patient into an appointment for the relevant concern    Gena Platt MD

## 2022-12-02 LAB
CULTURE: NORMAL
SPECIMEN DESCRIPTION: NORMAL

## 2022-12-05 ENCOUNTER — HOSPITAL ENCOUNTER (OUTPATIENT)
Dept: VASCULAR LAB | Age: 62
Discharge: HOME | End: 2022-12-05
Payer: MEDICARE

## 2022-12-05 ENCOUNTER — TELEPHONE (OUTPATIENT)
Dept: VASCULAR SURGERY | Age: 62
End: 2022-12-05

## 2022-12-05 DIAGNOSIS — I82.403 DEEP VEIN THROMBOSIS (DVT) OF BOTH LOWER EXTREMITIES, UNSPECIFIED CHRONICITY, UNSPECIFIED VEIN (HCC): ICD-10-CM

## 2022-12-05 DIAGNOSIS — Z01.818 PREOP EXAMINATION: ICD-10-CM

## 2022-12-05 PROCEDURE — 93970 EXTREMITY STUDY: CPT

## 2022-12-08 ENCOUNTER — OFFICE VISIT (OUTPATIENT)
Dept: VASCULAR SURGERY | Age: 62
End: 2022-12-08
Payer: MEDICARE

## 2022-12-08 VITALS
HEART RATE: 86 BPM | DIASTOLIC BLOOD PRESSURE: 72 MMHG | SYSTOLIC BLOOD PRESSURE: 130 MMHG | HEIGHT: 71 IN | BODY MASS INDEX: 41.02 KG/M2 | OXYGEN SATURATION: 99 % | RESPIRATION RATE: 20 BRPM | WEIGHT: 293 LBS | TEMPERATURE: 98.2 F

## 2022-12-08 DIAGNOSIS — M17.12 PRIMARY OSTEOARTHRITIS OF LEFT KNEE: ICD-10-CM

## 2022-12-08 DIAGNOSIS — I87.2 LYMPHEDEMA DUE TO VENOUS INSUFFICIENCY: ICD-10-CM

## 2022-12-08 DIAGNOSIS — M25.562 LEFT KNEE PAIN, UNSPECIFIED CHRONICITY: Primary | ICD-10-CM

## 2022-12-08 DIAGNOSIS — M79.606 PAIN AND SWELLING OF LOWER EXTREMITY, UNSPECIFIED LATERALITY: ICD-10-CM

## 2022-12-08 DIAGNOSIS — M79.89 PAIN AND SWELLING OF LOWER EXTREMITY, UNSPECIFIED LATERALITY: ICD-10-CM

## 2022-12-08 DIAGNOSIS — I89.0 LYMPHEDEMA DUE TO VENOUS INSUFFICIENCY: ICD-10-CM

## 2022-12-08 PROCEDURE — G8417 CALC BMI ABV UP PARAM F/U: HCPCS | Performed by: SURGERY

## 2022-12-08 PROCEDURE — 99214 OFFICE O/P EST MOD 30 MIN: CPT | Performed by: SURGERY

## 2022-12-08 PROCEDURE — 1036F TOBACCO NON-USER: CPT | Performed by: SURGERY

## 2022-12-08 PROCEDURE — G8484 FLU IMMUNIZE NO ADMIN: HCPCS | Performed by: SURGERY

## 2022-12-08 PROCEDURE — G8428 CUR MEDS NOT DOCUMENT: HCPCS | Performed by: SURGERY

## 2022-12-08 PROCEDURE — 3017F COLORECTAL CA SCREEN DOC REV: CPT | Performed by: SURGERY

## 2022-12-08 NOTE — PROGRESS NOTES
Division of Vascular Surgery        Follow Up      Chief Complaint:      Pain and swelling in her thighs    History of Present Illness:      Celeste Betts is a 60 y.o. woman who presents for evaluation of pain, tightness and swelling in her thighs. This is a chronic issue with her, she does wear compression stockings that seem to help at times. Her main issues seem to be in her left knee. Numbness/tingling, feels like her knee is giving out on her    No calf swelling or feet    Saw dr Moris Mtz in 2021 had been evaluated had fluid aspirated knee. She would like to see orthopedic surgeon again for re-evaluation of her knee if she needs replacement or not. Celeste Betts is a 60 y.o. woman who presents for follow up regarding her chronic lower extremity swelling along with numbness and tingling down her left greater then right leg. Continues to have pain in her legs, numbness/tingling like it is going to fall off. Her swelling is controlled with compression stockings, although she has notice her legs get really swollen at times. She tried using the lymphedema pumps, but t exacerbated her numbness/tingling sensation so she stop using it. The pain starts in her thighs and goes all the way down into her toes. The pain can get so severe that she can barely walk. She has been a bit frustrated and emotional in the office trying to get to source of her pain. She has been seen by orthopedic surgery as well and have explained that she would need total knee replacement due to degenerative changes which is also likely exacerbating some of her symptoms currently. She has been seen in the past by neurosurgery regarding degenerative disk disease as well but has not followed up in a while. Celeste Betts is a 57 y.o. woman who presents with chronic bilateral, left greater then right lower extremity swelling, pain, numbness/tingling.   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 She has noticed swelling in her legs for several years, has noticed worsening symptoms over the last several months. She denies any prior trauma or DVT to her lower extremities. Discomfort in her legs she describes as heaviness and fatigues, worse towards the end of the day. She has been worried about the darkening of her skin, worried about poor circulation. She denies specific symptoms of claudication to the back of her calf. She has been using a walker for the last 3 years, mainly because of balance issues after her spine surgeries. She is compliant with her compression stockings and has noticed difference in her lower legs. Its her upper legs and pelvis area where she feels it the most.        Vein ablation both legs 2015 and 2017 Dr. Jason Lawson  Venous closure at [facility] vascular 2010  Dr. Justo Villanueva did venogram with IVUS in April 2021, 50% compression? May Thurner's unclear, but was told nothing needs to be done at this time.     Medical 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    Anemia 10/5/2016    Arthritis     Asthma 1/4/2017    Bilateral leg and foot pain 02/2019    left much worse    Degeneration of cervical intervertebral disc 6/21/2018    Depression 2/2/2016    Failed back syndrome 4/3/2018    History of recurrent UTI (urinary tract infection)     Hyperlipidemia     Hypertension     Lumbar disc herniation with radiculopathy 6/4/2013    Obesity     Spinal stenosis     Wound infection after surgery        Surgical History:     Past Surgical History:   Procedure Laterality Date    BACK SURGERY      CYSTOSCOPY Bilateral 05/20/2022    CYSTOSCOPY RETROGRADE PYELOGRAM, PELVIC EXAM, VAGINOSCOPY (Bilateral)    CYSTOSCOPY Bilateral 5/20/2022    CYSTOSCOPY RETROGRADE PYELOGRAM, PELVIC EXAM, VAGINOSCOPY performed by Sean Tompkins MD at [address] [place] [address] Bilateral     heel spurs, plantar fascia release [facility]    LUMBAR SPINE SURGERY  4/2012    Eulice Fortune    NERVE BLOCK  10/26/2015    caudal# 1 decadron 7.5 mg    NERVE BLOCK  12/22/15    caudal #2  celestone 9mg    SPINE SURGERY  07/24/2016    3 places     VEIN SURGERY Bilateral 03/2017       Family History:     Family History   Problem Relation Age of Onset    Ovarian Cancer Mother     Heart Disease Father     Hypertension Father        Allergies:       Ciprofloxacin, Keflex [cephalexin], and Mobic [meloxicam]    Medications:      Current Outpatient Medications   Medication Sig Dispense Refill    Incontinence Supply Disposable MISC Use as needed for urine leakage 100 each 5    Incontinence Supply Disposable (INCONTINENCE BRIEF LARGE) MISC Use as needed daily for urine leakage 100 each 5    ibuprofen (ADVIL;MOTRIN) 600 MG tablet TAKE ONE TABLET BY MOUTH EVERY 8 HOURS AS NEEDED FOR PAIN 30 tablet 0    vitamin D (ERGOCALCIFEROL) 1.25 MG (79873 UT) CAPS capsule Take 50,000 Units by mouth once a week      atorvastatin (LIPITOR) 40 MG tablet TAKE 1 TABLET BY MOUTH ONCE DAILY. 30 tablet 3    oxybutynin (DITROPAN-XL) 10 MG extended release tablet Take 10 mg by mouth daily      hydroCHLOROthiazide (HYDRODIURIL) 50 MG tablet TAKE 1 TABLET BY MOUTH ONCE DAILY. 30 tablet 5    Incontinence Supply Disposable (INCONTINENCE BRIEF LARGE) MISC Use as needed 1 each 0    Incontinence Supply Disposable (ATTENDS UNDERPADS LARGE REG) MISC Use as needed 1 each 0    Incontinence Supply Disposable (UNDERPADS SMALL) MISC 1 box by Does not apply route daily 1 each 5    vitamin D3 (CHOLECALCIFEROL) 25 MCG (1000 UT) TABS tablet Take 1 tablet by mouth daily 90 tablet 1    carvedilol (COREG) 3.125 MG tablet Take 1 tablet by mouth 2 times daily 60 tablet 3    oxyCODONE-acetaminophen (PERCOCET) 5-325 MG per tablet Take 1 tablet by mouth every 6 hours as needed for Pain.       furosemide (LASIX) 20 MG tablet Take 1 tablet by mouth daily (Patient taking differently: Take 20 mg by mouth every other day) 60 tablet 3    Blood Pressure Monitoring (BLOOD PRESSURE CUFF) MISC Blood Pressure Monitoring (BLOOD PRESSURE CUFF) MISC Blood Pressure Monitoring (BLOOD PRESSURE CUFF) MISC Indications: Essential hypertension Use as directed 1 each 0 04/02/2020 Active 04-  Joshua Ardon ([id number])      VITAMIN E PO Take by mouth daily      aspirin 81 MG tablet Take 2 tablets by mouth daily 60 tablet 3    ascorbic acid (VITAMIN C) 500 MG tablet Take 1 tablet by mouth daily 30 tablet 3    Multiple Vitamin (THERAPEUTIC MULTIVITAMIN PO) Take 1 tablet by mouth      gabapentin (NEURONTIN) 300 MG capsule Take 1 capsule by mouth in the morning and 1 capsule at noon and 1 capsule before bedtime. Do all this for 90 days. (Patient taking differently: Take 300 mg by mouth daily.) 270 capsule 2     Current Facility-Administered Medications   Medication Dose Route Frequency Provider Last Rate Last Admin    lidocaine 1 % injection 2 mL  2 mL Intra-artICUlar Once Comfort Days, MD         Social History:     Tobacco:    reports that she quit smoking about 34 years ago. Her smoking use included cigarettes. She has a 3.00 pack-year smoking history. She has never used smokeless tobacco.  Alcohol:      reports no history of alcohol use. Drug Use:  reports no history of drug use. 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 HENT:  Negative for congestion. Eyes:  Negative for visual disturbance. Respiratory:  Negative for chest tightness and shortness of breath. Cardiovascular:  Positive for leg swelling. Negative for chest pain. Gastrointestinal:  Negative for abdominal pain. Endocrine: Negative. Genitourinary: Negative. Musculoskeletal:  Positive for arthralgias, back pain and gait problem. Skin:  Negative for color change and wound. Allergic/Immunologic: Negative. Neurological:  Positive for numbness. Negative for facial asymmetry, speech difficulty and weakness. Hematological: Negative. Psychiatric/Behavioral: Negative. Physical Exam:     Vitals:  /72 (Site: Right Upper Arm, Position: Sitting, Cuff Size: Large Adult)   Pulse 86   Temp 98.2 °F (36.8 °C) (Temporal)   Resp 20   Ht 5' 11\" (1.803 m)   Wt (!) 320 lb (145.2 kg)   LMP 09/07/2007   SpO2 99%   BMI 44.63 kg/m²     Physical Exam  Constitutional:       Appearance: She is well-developed and well-groomed. Eyes:      Extraocular Movements: Extraocular movements intact. Conjunctiva/sclera: Conjunctivae normal.   Neck:      Vascular: No carotid bruit. Cardiovascular:      Rate and Rhythm: Normal rate and regular rhythm. Pulses:           Dorsalis pedis pulses are 1+ on the right side and 1+ on the left side. Posterior tibial pulses are 1+ on the right side and 1+ on the left side. Pulmonary:      Effort: Pulmonary effort is normal. No respiratory distress. Abdominal:      Palpations: Abdomen is soft. Tenderness: There is no abdominal tenderness. Musculoskeletal:      Cervical back: Full passive range of motion without pain. Right upper leg: Swelling present. No edema or tenderness. Left upper leg: Swelling present. No edema or tenderness. Right lower leg: No swelling or tenderness. No edema. Left lower leg: No swelling or tenderness. No edema. Right foot: Normal capillary refill. No swelling or tenderness. Left foot: Normal capillary refill. No swelling or tenderness. Feet:      Right foot:      Skin integrity: No ulcer or skin breakdown. Left foot:      Skin integrity: No ulcer or skin breakdown. Comments: Darkening skin color to both feet, left greater then right  Skin:     General: Skin is warm. Capillary Refill: Capillary refill takes less than 2 seconds.    Neurological:      Mental Status: She is alert and oriented to person, place, and time. GCS: GCS eye subscore is 4. GCS verbal subscore is 5. GCS motor subscore is 6. Sensory: Sensory deficit (severe neuropathy) present. Motor: Motor function is intact. Psychiatric:         Mood and Affect: Mood normal.         Speech: Speech normal.         Behavior: Behavior normal.         Imaging/Labs:     Venous duplex from 12/5/22 was reviewed and did not reveal any deep or superficial venous thrombosis    Assessment and Plan:     Peripheral arterial disease, chronic lower extremity swelling, neurogenic claudication, degenerative disk disease with radiculopathy, left knee arthritis  Suspect a lot of her pain and discomfort is related to neurogenic and arthritic  The lymphedema pumps make it worse and told her to hold off for now and to try again when her knee is feeling better  Ideally getting her into better compression, potentially thigh highs if she is willing to try them, otherwise continue with her current compression stockings  She is going to follow up with her orthopedic surgeon to get her left knee re-evaluated  She will follow up as needed with me regarding her lower extremity swelling    Electronically signed by Lazaro Kirkpatrick MD on 12/8/22 at 3:54 PM [address],[facility] North: [phone]  C: [phone]  Email: [email or web address]

## 2022-12-19 ENCOUNTER — OFFICE VISIT (OUTPATIENT)
Dept: ORTHOPEDIC SURGERY | Age: 62
End: 2022-12-19
Payer: MEDICARE

## 2022-12-19 VITALS — BODY MASS INDEX: 41.02 KG/M2 | WEIGHT: 293 LBS | HEIGHT: 71 IN

## 2022-12-19 DIAGNOSIS — M17.12 PRIMARY OSTEOARTHRITIS OF LEFT KNEE: Primary | ICD-10-CM

## 2022-12-19 PROCEDURE — 1036F TOBACCO NON-USER: CPT | Performed by: ORTHOPAEDIC SURGERY

## 2022-12-19 PROCEDURE — 99212 OFFICE O/P EST SF 10 MIN: CPT | Performed by: ORTHOPAEDIC SURGERY

## 2022-12-19 PROCEDURE — 3017F COLORECTAL CA SCREEN DOC REV: CPT | Performed by: ORTHOPAEDIC SURGERY

## 2022-12-19 PROCEDURE — G8484 FLU IMMUNIZE NO ADMIN: HCPCS | Performed by: ORTHOPAEDIC SURGERY

## 2022-12-19 PROCEDURE — G8417 CALC BMI ABV UP PARAM F/U: HCPCS | Performed by: ORTHOPAEDIC SURGERY

## 2022-12-19 PROCEDURE — G8427 DOCREV CUR MEDS BY ELIG CLIN: HCPCS | Performed by: ORTHOPAEDIC SURGERY

## 2022-12-19 NOTE — PROGRESS NOTES
This patient who has previously been seen for primary osteoarthritis of the left knee is returning here because of pain. She says since she has been elevated here and therefore she wanted to have another x-ray to be checked out. She complains of pain in the thigh as well as the knee and the leg. She has already seen the vascular surgeon who reassured her that there is no vascular lesion to explain her symptoms. Her last visit it was discussed with her that she should try and lose 50 pounds before we consider knee replacement. She weighs 348 pounds. She says she has not made too much effort of losing. She is going to concentrate on it more now. Examination: Her knee examination still shows excellent motion. There is no effusion or instability. X-rays: Further 3 views were obtained of the knee and they do not show any significant change from the previous showing tricompartmental osteoarthrosis. No not seen on the previous x-rays clearly but seen in the one before that there is a large lateral femoral condyle spur osteophyte formation. There is also significant patellofemoral degenerative changes with large osteophyte formation. The alignment appears satisfactory. Diagnosis: Primary tricompartmental osteoarthritis left knee. Treatment: Continue nonoperative treatment and we will see her again in 4 weeks time to see if she has lost any weight.

## 2022-12-26 ASSESSMENT — ENCOUNTER SYMPTOMS
CHEST TIGHTNESS: 0
SHORTNESS OF BREATH: 0
BACK PAIN: 1
ALLERGIC/IMMUNOLOGIC NEGATIVE: 1
ABDOMINAL PAIN: 0
COLOR CHANGE: 0

## 2023-01-10 DIAGNOSIS — M54.16 CHRONIC LUMBAR RADICULOPATHY: ICD-10-CM

## 2023-01-10 DIAGNOSIS — N39.3 STRESS BLADDER INCONTINENCE, FEMALE: ICD-10-CM

## 2023-01-10 DIAGNOSIS — G81.94 LEFT HEMIPARESIS (HCC): Primary | ICD-10-CM

## 2023-01-10 DIAGNOSIS — N39.41 URGENCY INCONTINENCE: ICD-10-CM

## 2023-01-10 RX ORDER — IBUPROFEN 600 MG/1
TABLET ORAL
Qty: 30 TABLET | Refills: 3 | Status: SHIPPED | OUTPATIENT
Start: 2023-01-10

## 2023-01-10 NOTE — TELEPHONE ENCOUNTER
Pt called in stating script for incontinence supplies was worded incorrectly. Pt needs script to read \"pull-ons\" or \"undergarment. \" Previous script was for briefs/diapers. Script pended, please print when in office and sign. Thank you.     Pt needs script faxed to [facility] at [phone] (alternate fax number is [phone])

## 2023-01-12 ENCOUNTER — TELEPHONE (OUTPATIENT)
Dept: INTERNAL MEDICINE | Age: 63
End: 2023-01-12

## 2023-01-12 NOTE — TELEPHONE ENCOUNTER
Writer called pt to ask where she want us to sent her incontinence supply to [facility] , scanned original Rx in pt media,  thanks

## 2023-01-19 DIAGNOSIS — I10 HYPERTENSION, UNSPECIFIED TYPE: ICD-10-CM

## 2023-01-19 NOTE — TELEPHONE ENCOUNTER
Request for lasix.   Next maurice on 3/30/23    Next Visit Date:  Future Appointments   Date Time Provider Mari Chikis   1/25/2023 11:00 AM Albertina Hanna Neuro Jose Luis Griffith   1/30/2023 11:50 AM Lino Stewart MD ORTHO SPECIA [facility]   2/6/2023  2:15 PM [place], DO Sports Med [facility]astrian[facility]ley Griffith   3/30/2023  3:00 PM Duncan Galicia MD [facility] IM [address] Maintenance   Topic Date Due    COVID-19 Vaccine (1) Never done    Cervical cancer screen  10/14/2017    Breast cancer screen  06/07/2021    Flu vaccine (1) Never done    Annual Wellness Visit (AWV)  08/18/2022    DTaP/Tdap/Td vaccine (1 - Tdap) 02/09/2023 (Originally 2/11/1978)    Shingles vaccine (1 of 2) 08/17/2023 (Originally 2/11/2009)    Lipids  08/26/2023    Depression Screen  12/01/2023    Colorectal Cancer Screen  05/19/2024    Hepatitis C screen  Completed    HIV screen  Completed    Hepatitis A vaccine  Aged Out    Hib vaccine  Aged Out    Meningococcal (ACWY) vaccine  Aged Out    Pneumococcal 0-64 years Vaccine  Aged Out       Hemoglobin A1C (%)   Date Value   08/26/2022 5.6   04/02/2020 5.6   10/23/2018 5.9             ( goal A1C is < 7)   No results found for: LABMICR  LDL Cholesterol (mg/dL)   Date Value   08/26/2022 249 (H)       (goal LDL is <100)   AST (U/L)   Date Value   11/01/2022 24     ALT (U/L)   Date Value   11/01/2022 26     BUN (mg/dL)   Date Value   11/01/2022 14     BP Readings from Last 3 Encounters:   12/08/22 130/72   11/30/22 130/86   10/03/22 120/60          (goal 120/80)    All Future Testing planned in [facility]  Lab Frequency Next Occurrence   MAURI DIGITAL SCREEN W OR WO CAD BILATERAL Once 11/18/2022         Patient Active Problem List:     Morbid obesity due to excess calories (HCC)     Hyperlipemia     Chronic pelvic pain in female     Spinal stenosis at L4-L5 level     Adjustment disorder with mixed anxiety and depressed mood     Anemia     Angioedema     Congenital pes planus     Failed back surgical syndrome Recurrent UTI     History of lumbar surgery     Hemiplegia affecting left nondominant side (HCC)     Primary osteoarthritis of left knee     Saphenofemoral venous reflux     Lymphedema due to venous insufficiency     PAD (peripheral artery disease) ([address]

## 2023-01-24 RX ORDER — FUROSEMIDE 20 MG/1
TABLET ORAL
Qty: 60 TABLET | Refills: 3 | Status: SHIPPED | OUTPATIENT
Start: 2023-01-24

## 2023-01-25 ENCOUNTER — OFFICE VISIT (OUTPATIENT)
Dept: NEUROSURGERY | Age: 63
End: 2023-01-25
Payer: MEDICARE

## 2023-01-25 VITALS
HEIGHT: 71 IN | OXYGEN SATURATION: 99 % | DIASTOLIC BLOOD PRESSURE: 70 MMHG | BODY MASS INDEX: 41.02 KG/M2 | HEART RATE: 87 BPM | TEMPERATURE: 97.3 F | WEIGHT: 293 LBS | SYSTOLIC BLOOD PRESSURE: 112 MMHG

## 2023-01-25 DIAGNOSIS — E66.01 MORBID OBESITY WITH BMI OF 45.0-49.9, ADULT (HCC): ICD-10-CM

## 2023-01-25 DIAGNOSIS — M47.14 THORACIC MYELOPATHY: ICD-10-CM

## 2023-01-25 DIAGNOSIS — G95.19 NEUROGENIC CLAUDICATION (HCC): Primary | ICD-10-CM

## 2023-01-25 PROCEDURE — G8427 DOCREV CUR MEDS BY ELIG CLIN: HCPCS | Performed by: NEUROLOGICAL SURGERY

## 2023-01-25 PROCEDURE — G8417 CALC BMI ABV UP PARAM F/U: HCPCS | Performed by: NEUROLOGICAL SURGERY

## 2023-01-25 PROCEDURE — G8484 FLU IMMUNIZE NO ADMIN: HCPCS | Performed by: NEUROLOGICAL SURGERY

## 2023-01-25 PROCEDURE — 99213 OFFICE O/P EST LOW 20 MIN: CPT | Performed by: NEUROLOGICAL SURGERY

## 2023-01-25 PROCEDURE — 3017F COLORECTAL CA SCREEN DOC REV: CPT | Performed by: NEUROLOGICAL SURGERY

## 2023-01-25 PROCEDURE — 1036F TOBACCO NON-USER: CPT | Performed by: NEUROLOGICAL SURGERY

## 2023-01-25 NOTE — PROGRESS NOTES
[address]  [facility] [address], [address] [facility] [address]  Dept: [phone]    Patient:  Parag Dominique  YOB: 1960  Date: 1/25/23    The patient is a 60 y.o. female who presents today for consult of the following problems:     Chief Complaint   Patient presents with    Other    Neurologic Problem             HPI:     Parag Dominique is a 60 y.o. female on whom neurosurgical consultation was requested by Cornelio Carreno MD for management of thoracic myelopathy as well as lower extremity abnormalities numbness. 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 .  Denies any upper extremity dexterity issues including buttoning writing tying shoes or feeding herself. No issues with fine motor movements of the arms. Has had prior falls and utilizes a walker to ambulate. Does worse describe urinary retention and infrequent incontinence. No saddle anesthesia.       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    Anemia 10/5/2016    Arthritis     Asthma 1/4/2017    Bilateral leg and foot pain 02/2019    left much worse    Degeneration of cervical intervertebral disc 6/21/2018    Depression 2/2/2016    Failed back syndrome 4/3/2018    History of recurrent UTI (urinary tract infection)     Hyperlipidemia     Hypertension     Lumbar disc herniation with radiculopathy 6/4/2013    Obesity     Spinal stenosis     Wound infection after surgery      Past Surgical History:   Procedure Laterality Date    BACK SURGERY      CYSTOSCOPY Bilateral 05/20/2022    CYSTOSCOPY RETROGRADE PYELOGRAM, PELVIC EXAM, VAGINOSCOPY (Bilateral)    CYSTOSCOPY Bilateral 5/20/2022    CYSTOSCOPY RETROGRADE PYELOGRAM, PELVIC EXAM, VAGINOSCOPY performed by Frankey Poster, MD at [facility] [address] [place] [address] Bilateral     heel spurs, plantar fascia release    [facility]    LUMBAR SPINE SURGERY  4/2012    Joel Flick    NERVE BLOCK  10/26/2015    caudal# 1 decadron 7.5 mg    NERVE BLOCK  12/22/15    caudal #2  celestone 9mg    SPINE SURGERY  07/24/2016    3 places     VEIN SURGERY Bilateral 03/2017     Family History   Problem Relation Age of Onset    Ovarian Cancer Mother     Heart Disease Father     Hypertension Father      Current Outpatient Medications on File Prior to Visit   Medication Sig Dispense Refill    furosemide (LASIX) 20 MG tablet TAKE 1 TABLET BY MOUTH ONCE DAILY. 60 tablet 3    Incontinence Supply Disposable MISC Use pull-ons XXL during the day for incontinence and bed pads at night. 100 each 5    ibuprofen (ADVIL;MOTRIN) 600 MG tablet TAKE ONE TABLET BY MOUTH EVERY 8 HOURS AS NEEDED FOR PAIN 30 tablet 3    Incontinence Supply Disposable MISC Use as needed for urine leakage 100 each 5    Incontinence Supply Disposable (INCONTINENCE BRIEF LARGE) MISC Use as needed daily for urine leakage 100 each 5    vitamin D (ERGOCALCIFEROL) 1.25 MG (88219 UT) CAPS capsule Take 50,000 Units by mouth once a week      atorvastatin (LIPITOR) 40 MG tablet TAKE 1 TABLET BY MOUTH ONCE DAILY. 30 tablet 3    oxybutynin (DITROPAN-XL) 10 MG extended release tablet Take 10 mg by mouth daily      hydroCHLOROthiazide (HYDRODIURIL) 50 MG tablet TAKE 1 TABLET BY MOUTH ONCE DAILY.  30 tablet 5    Incontinence Supply Disposable (INCONTINENCE BRIEF LARGE) MISC Use as needed 1 each 0    Incontinence Supply Disposable (ATTENDS UNDERPADS LARGE REG) MISC Use as needed 1 each 0    Incontinence Supply Disposable (UNDERPADS SMALL) MISC 1 box by Does not apply route daily 1 each 5    vitamin D3 (CHOLECALCIFEROL) 25 MCG (1000 UT) TABS tablet Take 1 tablet by mouth daily 90 tablet 1    carvedilol (COREG) 3.125 MG tablet Take 1 tablet by mouth 2 times daily 60 tablet 3    oxyCODONE-acetaminophen (PERCOCET) 5-325 MG per tablet Take 1 tablet by mouth every 6 hours as needed for Pain. Blood Pressure Monitoring (BLOOD PRESSURE CUFF) MISC Blood Pressure Monitoring (BLOOD PRESSURE CUFF) MISC Blood Pressure Monitoring (BLOOD PRESSURE CUFF) MISC Indications: Essential hypertension Use as directed 1 each 0 2020 Active 2020  [address] ([id number])      VITAMIN E PO Take by mouth daily      aspirin 81 MG tablet Take 2 tablets by mouth daily 60 tablet 3    ascorbic acid (VITAMIN C) 500 MG tablet Take 1 tablet by mouth daily 30 tablet 3    Multiple Vitamin (THERAPEUTIC MULTIVITAMIN PO) Take 1 tablet by mouth      gabapentin (NEURONTIN) 300 MG capsule Take 1 capsule by mouth in the morning and 1 capsule at noon and 1 capsule before bedtime. Do all this for 90 days.  (Patient taking differently: Take 300 mg by mouth daily.) 270 capsule 2     Current Facility-Administered Medications on File Prior to Visit   Medication Dose Route Frequency Provider Last Rate Last Admin    lidocaine 1 % injection 2 mL  2 mL Intra-artICUlar Once Bienvenido Bravo MD         Social History     Tobacco Use    Smoking status: Former     Packs/day: 0.10     Years: 30.00     Pack years: 3.00     Types: Cigarettes     Quit date: 10/23/1988     Years since quittin.2    Smokeless tobacco: Never   Vaping Use    Vaping Use: Never used   Substance Use Topics    Alcohol use: No     Alcohol/week: 0.0 standard drinks    Drug use: No       Allergies   Allergen Reactions    Ciprofloxacin Swelling    Keflex [Cephalexin] Swelling    Mobic [Meloxicam]      Muscle cramping/spasm       Review of Systems  ROS: incontinence, weakness, numbness, pain    Physical Exam:      /70 (Site: Right Upper Arm, Position: Sitting, Cuff Size: Large Adult)   Pulse 87   Temp 97.3 °F (36.3 °C) (Temporal)   Ht 5' 11\" (1.803 m)   Wt (!) 345 lb (156.5 kg)   LMP 09/07/2007   SpO2 99%   BMI 48.12 kg/m²   Estimated body mass index is 48.12 kg/m² as calculated from the following:    Height as of this encounter: 5' 11\" (1.803 m). Weight as of this encounter: 345 lb (156.5 kg). General:  Gisela Meléndez is a 60y.o. year old female who appears her stated age. HEENT: Normocephalic atraumatic. Neck supple. Chest: regular rate; pulses equal. Equal chest rise and fall  Abdomen: Soft nondistended. Ext: DP equal with good capillary refill  Neuro    Mentation  Appropriate affect   oriented    Cranial Nerves:   Pupils equal and reactive to light  Extraocular motion intact  Face symmetric  No dysarthria  v1-3 sensation symmetric, masseter tone symmetric  Hearing symmetric and intact to finger rub    Sensation:   Abnormality of sensation LE  - approximate T12/L1 sensory level    Motor  L deltoid 5/5; R deltoid 5/5  L biceps 5/5; R biceps 5/5  L triceps 5/5; R triceps 5/5  L wrist extension 5/5; R wrist extension 5/5  L intrinsics 5/5; R intrinsics 5/5     L iliopsoas 5/5 , R iliopsoas 5/5  L quadriceps 5/5; R quadriceps 5/5  LLE DF and PF 4+    Reflexes  +2-3beat clonus L +3/4 patellars     Studies Review:     Mri L -mild to moderate stenosis at L1-2 no significant stenosis otherwise. Area of myelomalacia at the thoracolumbar junction. Assessment and Plan:      1. Neurogenic claudication ([address]    2. Morbid obesity with BMI of 45.0-49.9, adult ([address]    3. Thoracic myelopathy          Plan: clinically has thoracic myelopathy & area of myelomalacia at T/L junction. Will obtain MRI T to eval for new or persistent stenosis correlating with thoracic myelopathy    Followup: No follow-ups on file. Prescriptions Ordered:  No orders of the defined types were placed in this encounter. Orders Placed:  No orders of the defined types were placed in this encounter.        Electronically signed by Jojo Cuevas DO on 1/25/2023 at 1:23 PM    Please note that this chart was generated using voice recognition Dragon dictation software. Although every effort was made to ensure the accuracy of this automated transcription, some errors in transcription may have occurred.

## 2023-01-31 DIAGNOSIS — M25.561 RIGHT KNEE PAIN, UNSPECIFIED CHRONICITY: Primary | ICD-10-CM

## 2023-02-01 ENCOUNTER — TELEPHONE (OUTPATIENT)
Dept: INTERNAL MEDICINE | Age: 63
End: 2023-02-01

## 2023-02-01 NOTE — TELEPHONE ENCOUNTER
PC to [facility] no answer LM to contact office, per message [facility] is requesting an 2nd diagnosis it looks like the form per scanned into media 1/26/2023 has 2 diagnosis listed.

## 2023-02-01 NOTE — TELEPHONE ENCOUNTER
----- Message from [facility] sent at 1/31/2023 11:29 AM EST -----  Subject: Medication Problem    Medication: Incontinence Supply Disposable (INCONTINENCE BRIEF LARGE) MISC  Dosage: When needed  Ordering Provider: veronica    Question/Problem: Patient would like a call from Winifred or Cee   concerning her incontinence supplies from [employer]. (Not the pharmacy   listed below.) The insurance has to have a secondary diagnosis on the   prescription. The office already has the 2 main fax numbers and this is an   alternate fax directly to [facility]. Fax to the other numbers   you already have also. Corinne's ext. is [phone]. Please call to advise.        Pharmacy: [facility], [address] [phone]    ---------------------------------------------------------------------------  --------------  Kailyn WHITMORE  [phone]; OK to leave message on voicemail  ---------------------------------------------------------------------------  --------------    SCRIPT ANSWERS  Relationship to Patient: Self

## 2023-02-06 ENCOUNTER — TELEPHONE (OUTPATIENT)
Dept: INTERNAL MEDICINE | Age: 63
End: 2023-02-06

## 2023-02-24 ENCOUNTER — HOSPITAL ENCOUNTER (OUTPATIENT)
Dept: MRI IMAGING | Age: 63
Discharge: HOME | End: 2023-02-26
Payer: MEDICARE

## 2023-02-24 DIAGNOSIS — M47.14 THORACIC MYELOPATHY: ICD-10-CM

## 2023-02-24 PROCEDURE — 72146 MRI CHEST SPINE W/O DYE: CPT

## 2023-02-27 ENCOUNTER — TELEPHONE (OUTPATIENT)
Dept: INTERNAL MEDICINE | Age: 63
End: 2023-02-27

## 2023-02-27 NOTE — TELEPHONE ENCOUNTER
Pt calling the office requesting a new order for her incontinence supply, pt states that her medicaid insurance not going to accept the R32. Code for the secondary diagnosis code it need to be,  \"stress urge mixed code\" need to be on [employer] form on secondary code, pt states.

## 2023-03-08 ENCOUNTER — OFFICE VISIT (OUTPATIENT)
Dept: NEUROSURGERY | Age: 63
End: 2023-03-08
Payer: MEDICARE

## 2023-03-08 VITALS
HEIGHT: 71 IN | SYSTOLIC BLOOD PRESSURE: 137 MMHG | HEART RATE: 78 BPM | WEIGHT: 293 LBS | OXYGEN SATURATION: 98 % | BODY MASS INDEX: 41.02 KG/M2 | DIASTOLIC BLOOD PRESSURE: 74 MMHG

## 2023-03-08 DIAGNOSIS — M47.14 THORACIC MYELOPATHY: ICD-10-CM

## 2023-03-08 DIAGNOSIS — E66.01 MORBID OBESITY WITH BMI OF 45.0-49.9, ADULT (HCC): Primary | ICD-10-CM

## 2023-03-08 PROCEDURE — G8484 FLU IMMUNIZE NO ADMIN: HCPCS | Performed by: NEUROLOGICAL SURGERY

## 2023-03-08 PROCEDURE — G8427 DOCREV CUR MEDS BY ELIG CLIN: HCPCS | Performed by: NEUROLOGICAL SURGERY

## 2023-03-08 PROCEDURE — 3017F COLORECTAL CA SCREEN DOC REV: CPT | Performed by: NEUROLOGICAL SURGERY

## 2023-03-08 PROCEDURE — 1036F TOBACCO NON-USER: CPT | Performed by: NEUROLOGICAL SURGERY

## 2023-03-08 PROCEDURE — G8417 CALC BMI ABV UP PARAM F/U: HCPCS | Performed by: NEUROLOGICAL SURGERY

## 2023-03-08 PROCEDURE — 99213 OFFICE O/P EST LOW 20 MIN: CPT | Performed by: NEUROLOGICAL SURGERY

## 2023-03-08 NOTE — PROGRESS NOTES
[address]  [facility] [address], [address] [address]  Dept: [phone]    Patient:  Juana Mccullough  YOB: 1960  Date: 3/8/23    The patient is a 58 y.o. female who presents today for consult of the following problems:     Chief Complaint   Patient presents with    Neurologic Problem     Neurogenic claudication              HPI:     Juana Mccullough is a 58 y.o. female on whom neurosurgical consultation was requested by Dontae Oates MD for management of thoracic myelopathy as well as lumbar spondylosis. Patient still with persistent numbness and radiating pain in the left lower extremity nondermatomal distribution. Denies any saddle anesthesia or bowel bladder incontinence with exception of some urge incontinence as far as her bladder. Has been having issues for a number of years and has been utilizing a walker for approximate 4 to 5 years. Did have remote facet decompression as well as lumbar areas of decompression previously. Specifically states that she gets tightness throughout her left lower extremity inhibiting her ability to ambulate and mobilize the left leg.       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    Anemia 10/5/2016    Arthritis     Asthma 1/4/2017    Bilateral leg and foot pain 02/2019    left much worse    Degeneration of cervical intervertebral disc 6/21/2018    Depression 2/2/2016    Failed back syndrome 4/3/2018    History of recurrent UTI (urinary tract infection)     Hyperlipidemia     Hypertension     Lumbar disc herniation with radiculopathy 6/4/2013    Obesity     Spinal stenosis     Wound infection after surgery      Past Surgical History:   Procedure Laterality Date    BACK SURGERY      CYSTOSCOPY Bilateral 05/20/2022    CYSTOSCOPY RETROGRADE PYELOGRAM, PELVIC EXAM, VAGINOSCOPY (Bilateral) CYSTOSCOPY Bilateral 5/20/2022    CYSTOSCOPY RETROGRADE PYELOGRAM, PELVIC EXAM, VAGINOSCOPY performed by Charo Holliday MD at [facility] Bilateral     heel spurs, plantar fascia release    [facility]    LUMBAR SPINE SURGERY  4/2012    [place] Face    NERVE BLOCK  10/26/2015    caudal# 1 decadron 7.5 mg    NERVE BLOCK  12/22/15    caudal #2  celestone 9mg    SPINE SURGERY  07/24/2016    3 places     VEIN SURGERY Bilateral 03/2017     Family History   Problem Relation Age of Onset    Ovarian Cancer Mother     Heart Disease Father     Hypertension Father      Current Outpatient Medications on File Prior to Visit   Medication Sig Dispense Refill    furosemide (LASIX) 20 MG tablet TAKE 1 TABLET BY MOUTH ONCE DAILY. 60 tablet 3    Incontinence Supply Disposable MISC Use pull-ons XXL during the day for incontinence and bed pads at night. 100 each 5    ibuprofen (ADVIL;MOTRIN) 600 MG tablet TAKE ONE TABLET BY MOUTH EVERY 8 HOURS AS NEEDED FOR PAIN 30 tablet 3    Incontinence Supply Disposable MISC Use as needed for urine leakage 100 each 5    Incontinence Supply Disposable (INCONTINENCE BRIEF LARGE) MISC Use as needed daily for urine leakage 100 each 5    vitamin D (ERGOCALCIFEROL) 1.25 MG (55317 UT) CAPS capsule Take 50,000 Units by mouth once a week      atorvastatin (LIPITOR) 40 MG tablet TAKE 1 TABLET BY MOUTH ONCE DAILY. 30 tablet 3    oxybutynin (DITROPAN-XL) 10 MG extended release tablet Take 10 mg by mouth daily      hydroCHLOROthiazide (HYDRODIURIL) 50 MG tablet TAKE 1 TABLET BY MOUTH ONCE DAILY.  30 tablet 5    Incontinence Supply Disposable (INCONTINENCE BRIEF LARGE) MISC Use as needed 1 each 0    Incontinence Supply Disposable (ATTENDS UNDERPADS LARGE REG) MISC Use as needed 1 each 0    Incontinence Supply Disposable (UNDERPADS SMALL) MISC 1 box by Does not apply route daily 1 each 5    vitamin D3 (CHOLECALCIFEROL) 25 MCG (1000 UT) TABS tablet Take 1 tablet by mouth daily 90 tablet 1    carvedilol (COREG) 3.125 MG tablet Take 1 tablet by mouth 2 times daily 60 tablet 3    oxyCODONE-acetaminophen (PERCOCET) 5-325 MG per tablet Take 1 tablet by mouth every 6 hours as needed for Pain. Blood Pressure Monitoring (BLOOD PRESSURE CUFF) MISC Blood Pressure Monitoring (BLOOD PRESSURE CUFF) MISC Blood Pressure Monitoring (BLOOD PRESSURE CUFF) MISC Indications: Essential hypertension Use as directed 1 each 0 2020 Active 2020  [address] ([id number])      VITAMIN E PO Take by mouth daily      aspirin 81 MG tablet Take 2 tablets by mouth daily 60 tablet 3    ascorbic acid (VITAMIN C) 500 MG tablet Take 1 tablet by mouth daily 30 tablet 3    Multiple Vitamin (THERAPEUTIC MULTIVITAMIN PO) Take 1 tablet by mouth      gabapentin (NEURONTIN) 300 MG capsule Take 1 capsule by mouth in the morning and 1 capsule at noon and 1 capsule before bedtime. Do all this for 90 days. (Patient taking differently: Take 300 mg by mouth daily.) 270 capsule 2     Current Facility-Administered Medications on File Prior to Visit   Medication Dose Route Frequency Provider Last Rate Last Admin    lidocaine 1 % injection 2 mL  2 mL Intra-artICUlar Once  MD Ruben         Social History     Tobacco Use    Smoking status: Former     Packs/day: 0.10     Years: 30.00     Pack years: 3.00     Types: Cigarettes     Quit date: 10/23/1988     Years since quittin.3    Smokeless tobacco: Never   Vaping Use    Vaping Use: Never used   Substance Use Topics    Alcohol use: No     Alcohol/week: 0.0 standard drinks    Drug use: No       Allergies   Allergen Reactions    Ciprofloxacin Swelling    Keflex [Cephalexin] Swelling    Mobic [Meloxicam]      Muscle cramping/spasm       Review of Systems  ROS: Pain. Left lower extremity numbness pain.     Physical Exam:      /74   Pulse 78   Ht 5' 11\" (1.803 m)   Wt (!) 345 lb (156.5 kg)   LMP 2007   SpO2 98%   BMI 48.12 kg/m² Estimated body mass index is 48.12 kg/m² as calculated from the following:    Height as of this encounter: 5' 11\" (1.803 m). Weight as of this encounter: 345 lb (156.5 kg). General:  Juan M Daily is a 58y.o. year old female who appears her stated age. HEENT: Normocephalic atraumatic. Neck supple. Chest: regular rate; pulses equal. Equal chest rise and fall  Abdomen: Soft nondistended. Ext: DP equal with good capillary refill  Neuro    Mentation  Appropriate affect   oriented    Cranial Nerves:   Pupils equal and reactive to light  Extraocular motion intact  Face symmetric  No dysarthria  v1-3 sensation symmetric, masseter tone symmetric  Hearing symmetric and intact to finger rub    Sensation:   Diminished sensation LLE    Motor  L deltoid 5/5; R deltoid 5/5  L biceps 5/5; R biceps 5/5  L triceps 5/5; R triceps 5/5  L wrist extension 5/5; R wrist extension 5/5  L intrinsics 5/5; R intrinsics 5/5     L iliopsoas 5/5 , R iliopsoas 5/5  L quadriceps 5/5; R quadriceps 5/5  L Dorsiflexion 5/5; R dorsiflexion 5/5  L Plantarflexion 5/5; R plantarflexion 5/5  L EHL 5/5; R EHL 5/5    Reflexes  L Brachioradialis 2+/4; R brachioradialis 2+/4  L Biceps 2+/4; R Biceps 2+/4  L Triceps 2+/4; R Triceps 2+/4  L Patellar 2+/4: R Patellar 2+/4  L Achilles 2+/4; R Achilles 2+/4    hoffmans L: neg  hoffmans R: neg  Clonus L: neg  Clonus R: neg  Babinski L: up  Babinski R; up    Studies Review:     MRI thoracic spine poor quality but distinct area of central stenosis above her prior decompression. Area of myelomalacia caudal.    MRI lumbar spine with multiple areas of spondylosis no area of significant claudication. Assessment and Plan:      1. Morbid obesity with BMI of 45.0-49.9, adult ([address]    2. Thoracic myelopathy          Plan: Patient morbid obesity which I did relay significantly contributes to surgical risk.   She has been prior operated on by her previous surgeon at [facility] and likely needs adjacent level thoracic decompression just above this. I will refer her back to her surgeon for evaluation and help her assist in making the appointment    Followup: No follow-ups on file. Prescriptions Ordered:  No orders of the defined types were placed in this encounter. Orders Placed:  Orders Placed This Encounter   Procedures    External Referral To Neurosurgery     Referral Priority:   Routine     Referral Type:   Eval and Treat     Referral Reason:   Specialty Services Required     Requested Specialty:   Neurosurgery     Number of Visits Requested:   1        Electronically signed by Shira Kebede DO on 3/8/2023 at 10:53 AM    Please note that this chart was generated using voice recognition Dragon dictation software. Although every effort was made to ensure the accuracy of this automated transcription, some errors in transcription may have occurred.

## 2023-03-09 DIAGNOSIS — M54.16 CHRONIC LUMBAR RADICULOPATHY: ICD-10-CM

## 2023-03-09 RX ORDER — IBUPROFEN 600 MG/1
TABLET ORAL
Qty: 30 TABLET | Refills: 3 | Status: SHIPPED | OUTPATIENT
Start: 2023-03-09

## 2023-03-09 NOTE — TELEPHONE ENCOUNTER
Health Maintenance   Topic Date Due    COVID-19 Vaccine (1) Never done    DTaP/Tdap/Td vaccine (1 - Tdap) Never done    Cervical cancer screen  10/14/2017    Breast cancer screen  06/07/2021    Flu vaccine (1) Never done    Annual Wellness Visit (AWV)  08/18/2022    Shingles vaccine (1 of 2) 08/17/2023 (Originally 2/11/2009)    Lipids  08/26/2023    Depression Screen  12/01/2023    Colorectal Cancer Screen  05/19/2024    Hepatitis C screen  Completed    HIV screen  Completed    Hepatitis A vaccine  Aged Out    Hib vaccine  Aged Out    Meningococcal (ACWY) vaccine  Aged Out    Pneumococcal 0-64 years Vaccine  Aged Out             (applicable per patient's age: Cancer Screenings, Depression Screening, Fall Risk Screening, Immunizations)    Hemoglobin A1C (%)   Date Value   08/26/2022 5.6   04/02/2020 5.6   10/23/2018 5.9     LDL Cholesterol (mg/dL)   Date Value   08/26/2022 249 (H)     AST (U/L)   Date Value   11/01/2022 24     ALT (U/L)   Date Value   11/01/2022 26     BUN (mg/dL)   Date Value   11/01/2022 14      (goal A1C is < 7)   (goal LDL is <100) need 30-50% reduction from baseline     BP Readings from Last 3 Encounters:   03/08/23 137/74   01/25/23 112/70   12/08/22 130/72    (goal /80)      All Future Testing planned in [facility]:  Lab Frequency Next Occurrence   MAURI DIGITAL SCREEN W OR WO CAD BILATERAL Once 11/18/2022   XR KNEE RIGHT (3 VIEWS) Once 07/31/2023       Next Visit Date:  Future Appointments   Date Time Provider Mari Diop   3/30/2023  3:00 PM [facility]   4/3/2023  3:15 PM [place]   4/19/2023  3:30 PM Amor Love DO [facility]            Patient Active Problem List:     Morbid obesity due to excess calories ([address]     Hyperlipemia     Chronic pelvic pain in female     Spinal stenosis at L4-L5 level     Adjustment disorder with mixed anxiety and depressed mood     Anemia     Angioedema     Congenital pes planus Failed back surgical syndrome     Recurrent UTI     History of lumbar surgery     Hemiplegia affecting left nondominant side (HCC)     Primary osteoarthritis of left knee     Saphenofemoral venous reflux     Lymphedema due to venous insufficiency     PAD (peripheral artery disease) ([address]

## 2023-03-27 NOTE — PATIENT INSTRUCTIONS
CTS Consult    Patient Care Team:  Tiffany Morales MD as PCP - General (Family Medicine)  Tisha Barth MD as Consulting Physician (Ophthalmology)      Reason for Consult:  Right great toe wound    HPI  Patient is a 68 y.o. 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  MRI brain revealed a large right frontal lobe lesion consistent with meningioma and patient underwent preoperative embolization with PVA particles and patient underwent right frontal craniotomy for resection of dural mass with Dr. Mckee on 3/21. While in hospital, patient was noted to have a right great toe wound for which we have been asked to evaluate.  MATTHEW's from 3/12/23 documented as normal.  Patient reports wound to right great toe has been there for approximately 2 years and he cannot recall any injury to the toe.  He denies pain, fever, chills.      Review of Systems    All other systems are reviewed and are negative unless otherwise stated in HPI.    History  Past Medical History:   Diagnosis Date   • Arthritis    • Hepatitis A    • Hypertension    • Melanoma (HCC) 2017    retina     Past Surgical History:   Procedure Laterality Date   • CRANIOTOMY FOR TUMOR N/A 3/21/2023    Procedure: CRANIOTOMY FOR TUMOR STEREOTACTIC WITH STEALTH;  Surgeon: Marlon Mckee MD;  Location: [facility] OR;  Service: Neurosurgery;  Laterality: N/A;   • EMBOLIZATION CEREBRAL N/A 3/20/2023    Procedure: Tumor Emoblization radial access;  Surgeon: Ozzy Sebastian MD;  Location: [facility] CATH INVASIVE LOCATION;  Service: Interventional Radiology;  Laterality: N/A;   • EYE SURGERY  2017    EYE CANCER RIGHT EYE   • FINGER SURGERY Left     Pointer finger  Medications e-scribe to pharmacy of pt's choice. An order for cologuard was placed by your physician, the company will be contacting within the next 2 days and will mail the test. Please contact the clinic at [phone] if not heard from in 1 week   Patient to return to clinic 3 months (office will call and schedule appt). AVS reviewed and mailed to pt. It is very important for your care that you keep your appointment. If for some reason you are unable to keep your appointment it is equally important that you call our office at [phone] to cancel your appointment and reschedule. Failure to do so may result in your termination from our practice.   MB re-attached in 3rd Grade   • TONSILLECTOMY       Family History   Problem Relation Age of Onset   • Diabetes Mother    • Heart disease Father      Social History     Tobacco Use   • Smoking status: Former     Packs/day: 1.00     Years: 15.00     Pack years: 15.00     Types: Cigarettes     Start date: 3/26/1981     Quit date: 1990     Years since quittin.2   • Smokeless tobacco: Former     Quit date: 1990   Vaping Use   • Vaping Use: Never used   Substance Use Topics   • Alcohol use: Not Currently     Comment: Quit Sept 10, 2022, had been using for 50 years   • Drug use: No     Medications Prior to Admission   Medication Sig Dispense Refill Last Dose   • levothyroxine (Synthroid) 125 MCG tablet Take 1 tablet by mouth Daily. 90 tablet 1    • lisinopril-hydrochlorothiazide (PRINZIDE,ZESTORETIC) 20-12.5 MG per tablet Take 1 tablet by mouth Daily. 90 tablet 2      Allergies:  Patient has no known allergies.    Objective    Vital Signs  Temp:  [97.9 °F (36.6 °C)-99.2 °F (37.3 °C)] 97.9 °F (36.6 °C)  Heart Rate:  [66-84] 66  Resp:  [18-20] 18  BP: (118-146)/(81-99) 137/94    Physical Exam:  General Appearance: alert, appears stated age and cooperative  Head: normocephalic, without obvious abnormality and atraumatic  Neck: no adenopathy, suppple, trachea midline, no thyromegaly, no carotid bruit and no JVD  Lungs: clear to auscultation, respirations regular, respirations even and respirations unlabored  Heart: regular rhythm & normal rate, normal S1, S2, no murmur, no mary jane, no rub and no click  Abdomen: normal bowel sounds, no masses and soft non-tender  Extremities: moves extremities well and no edema  Pulses: Pulses palpable and equal bilaterally  Skin: right great toe with chronic appearing calcification/callous on distal aspect of toe.  No erythema, drainage or fluctuance  Neurologic: Mental Status orientated to person, place, time and situation          Data Review:  Results from last 7 days   Lab Units  03/24/23  0422   WBC 10*3/mm3 14.02*   HEMOGLOBIN g/dL 11.8*   HEMATOCRIT % 34.5*   PLATELETS 10*3/mm3 215     Results from last 7 days   Lab Units 03/24/23  0422   SODIUM mmol/L 131*   POTASSIUM mmol/L 4.3   CHLORIDE mmol/L 97*   CO2 mmol/L 30.0*   BUN mg/dL 20   CREATININE mg/dL 0.41*   GLUCOSE mg/dL 128*   CALCIUM mg/dL 7.6*     Coagulation: No results found for: INR, APTT  Cardiac markers:     ABGs:       Invalid input(s): PO2      Imaging Results (Last 72 Hours)     ** No results found for the last 72 hours. **          Carotid Duplex 3/12/23:  Interpretation Summary       •  No evidence of hemodynamically significant stenosis of bilateral carotid arteries.  •  Intimal thickening and mild bilateral scattered plaque is noted.      Echo 3/12/23:   Interpretation Summary       •  Left ventricular systolic function is normal. Estimated left ventricular EF = 55%  •  Biatrial enlargement.  •  Calcified aortic valve with mild aortic stenosis, mean gradient 10 mmHg, BANG 1.6 cm².  •  Moderate aortic insufficiency.  •  Mild mitral regurgitation.  •  Mild tricuspid regurgitation with normal RVSP.  •  Mild dilation of the ascending aorta (4.2 cm) is present.      Assessment/Plan:    Patient may require imaging of right great toe and/or biopsy. Will discuss with Dr. Flores.      Loan Pa PA-C  03/27/23  08:30 EDT

## 2023-04-03 ENCOUNTER — OFFICE VISIT (OUTPATIENT)
Dept: ORTHOPEDIC SURGERY | Age: 63
End: 2023-04-03

## 2023-04-03 VITALS — WEIGHT: 293 LBS | HEIGHT: 71 IN | BODY MASS INDEX: 41.02 KG/M2

## 2023-04-03 DIAGNOSIS — M17.11 PRIMARY OSTEOARTHRITIS OF RIGHT KNEE: ICD-10-CM

## 2023-04-03 DIAGNOSIS — M17.12 PRIMARY OSTEOARTHRITIS OF LEFT KNEE: Primary | ICD-10-CM

## 2023-04-03 RX ORDER — BUPIVACAINE HYDROCHLORIDE 2.5 MG/ML
2 INJECTION, SOLUTION INFILTRATION; PERINEURAL ONCE
Status: COMPLETED | OUTPATIENT
Start: 2023-04-03 | End: 2023-04-03

## 2023-04-03 RX ORDER — METHYLPREDNISOLONE ACETATE 80 MG/ML
80 INJECTION, SUSPENSION INTRA-ARTICULAR; INTRALESIONAL; INTRAMUSCULAR; SOFT TISSUE ONCE
Status: COMPLETED | OUTPATIENT
Start: 2023-04-03 | End: 2023-04-03

## 2023-04-03 RX ADMIN — BUPIVACAINE HYDROCHLORIDE 5 MG: 2.5 INJECTION, SOLUTION INFILTRATION; PERINEURAL at 18:45

## 2023-04-03 RX ADMIN — METHYLPREDNISOLONE ACETATE 80 MG: 80 INJECTION, SUSPENSION INTRA-ARTICULAR; INTRALESIONAL; INTRAMUSCULAR; SOFT TISSUE at 18:45

## 2023-04-03 ASSESSMENT — ENCOUNTER SYMPTOMS
ABDOMINAL PAIN: 0
SHORTNESS OF BREATH: 0
COUGH: 0

## 2023-04-03 NOTE — PROGRESS NOTES
tenderness, no swelling, no laxity with valgus or varus stress at 0 and 30 degrees. Radiology:     Left Knee XR: Primary tricompartmental osteoarthritis of left knee. Right Knee XR:    Procedure:  KNEE INJECTION PROCEDURE NOTE:  The patient was identified. The left knee was confirmed with the patient. After a sterile prep with Betadine the knee was injected using an inferior lateral joint line approach with a mixture of 2 mL of 0.25% Marcaine and 80 mg of Depo-Medrol. Patient tolerated the procedure well without post injection complications. I instructed the patient to call our office immediately if they have any swelling or increased pain at the injection site. ASSESSMENT:     1. Primary osteoarthritis of left knee    2. Primary osteoarthritis of right knee           PLAN:    Patient tolerated left knee injection today without complications. Had a long discussion with patient about the importance of weight loss as she prepares for he possibility of needing a total knee replacement in the future. We explained to the patient that by decreasing her BMI she is lowering her risk of perioperative infection and increasing her overall success of healing. Patient agrees and is willing to have a discussion with weight management to talk about ways for potential weight loss. We discussed the natural etiologies of primary osteoarthritis of the knees, as well as the potential treatment options for this diagnosis, including conservative treatment in the form of NSAIDs, corticosteroid injections and physical therapy. We also discussed possible surgical intervention in the future if these conservative therapies do not work. Patient is agreeable today to try corticosteroid injection into the left knee and trying formal physical therapy to help with improving range of motion and strengthening. We discussed addressing the right knee at a later date if it becomes symptomatic in the future.     We will see the patient

## 2023-04-25 ENCOUNTER — TELEMEDICINE (OUTPATIENT)
Dept: INTERNAL MEDICINE | Age: 63
End: 2023-04-25

## 2023-04-25 DIAGNOSIS — I10 HYPERTENSION, UNSPECIFIED TYPE: ICD-10-CM

## 2023-04-25 DIAGNOSIS — G81.94 HEMIPLEGIA AFFECTING LEFT NONDOMINANT SIDE, UNSPECIFIED ETIOLOGY, UNSPECIFIED HEMIPLEGIA TYPE (HCC): ICD-10-CM

## 2023-04-25 DIAGNOSIS — I73.9 PAD (PERIPHERAL ARTERY DISEASE) (HCC): ICD-10-CM

## 2023-04-25 DIAGNOSIS — E66.01 MORBID OBESITY DUE TO EXCESS CALORIES (HCC): Primary | ICD-10-CM

## 2023-04-25 RX ORDER — AMOXICILLIN 250 MG
2 CAPSULE ORAL DAILY
COMMUNITY
Start: 2014-06-27

## 2023-04-25 SDOH — ECONOMIC STABILITY: HOUSING INSECURITY
IN THE LAST 12 MONTHS, WAS THERE A TIME WHEN YOU DID NOT HAVE A STEADY PLACE TO SLEEP OR SLEPT IN A SHELTER (INCLUDING NOW)?: NO

## 2023-04-25 SDOH — ECONOMIC STABILITY: INCOME INSECURITY: HOW HARD IS IT FOR YOU TO PAY FOR THE VERY BASICS LIKE FOOD, HOUSING, MEDICAL CARE, AND HEATING?: NOT HARD AT ALL

## 2023-04-25 SDOH — ECONOMIC STABILITY: FOOD INSECURITY: WITHIN THE PAST 12 MONTHS, YOU WORRIED THAT YOUR FOOD WOULD RUN OUT BEFORE YOU GOT MONEY TO BUY MORE.: SOMETIMES TRUE

## 2023-04-25 SDOH — ECONOMIC STABILITY: FOOD INSECURITY: WITHIN THE PAST 12 MONTHS, THE FOOD YOU BOUGHT JUST DIDN'T LAST AND YOU DIDN'T HAVE MONEY TO GET MORE.: SOMETIMES TRUE

## 2023-04-25 ASSESSMENT — PATIENT HEALTH QUESTIONNAIRE - PHQ9
SUM OF ALL RESPONSES TO PHQ QUESTIONS 1-9: 0
SUM OF ALL RESPONSES TO PHQ9 QUESTIONS 1 & 2: 0
SUM OF ALL RESPONSES TO PHQ QUESTIONS 1-9: 0
2. FEELING DOWN, DEPRESSED OR HOPELESS: 0
1. LITTLE INTEREST OR PLEASURE IN DOING THINGS: 0

## 2023-04-25 NOTE — PROGRESS NOTES
Omkar De Oliveira is a 58 y.o. female evaluated via telephone on 4/25/2023 for Hypertension  . Documentation:  She is working towards relief of her neurogenic issues. She will see her NS from [facility] next month. She is working on weight loss. Obesity  Patient complains of obesity. Patient cites health as reasons for wanting to lose weight. History of Weight Loss Efforts  Greatest amount of weight lost: 10 lb over 1/2 months  Successful weight loss techniques attempted: self-directed dieting      Current Exercise Habits  daily home exercises    Current Eating Habits  Number of regular meals per day: 1  Number of snacking episodes per day: 0  Who shops for food? patient and partner  Who prepares food? patient    Binge behavior?: no  Anorexic behavior? no  Eating precipitated by stress? no  Guilt feelings associated with eating? no     Diagnosis Orders   1. Morbid obesity due to excess calories [facility])  She is referred to bariatrics and she is working at home now, motivated to lose weight due to worsening arthritis and spine issues      2. Hypertension, unspecified type  stable      3. Hemiplegia affecting left nondominant side, unspecified etiology, unspecified hemiplegia type ([facility]  Will see NS next month for continued workup      4. PAD (peripheral artery disease) ([facility]  Will see Vascular prn            Total Time: minutes: 11-20 minutes    Omkar De Oliveira was evaluated through a synchronous (real-time) audio encounter. Patient identification was verified at the start of the visit. She (or guardian if applicable) is aware that this is a billable service, which includes applicable co-pays. This visit was conducted with the patient's (and/or legal guardian's) verbal consent. She has not had a related appointment within my department in the past 7 days or scheduled within the next 24 hours. The patient was located at Home: [address] [place] [phone].   The provider was located at

## 2023-05-01 DIAGNOSIS — I73.9 PAD (PERIPHERAL ARTERY DISEASE) (HCC): Primary | ICD-10-CM

## 2023-05-08 ENCOUNTER — OFFICE VISIT (OUTPATIENT)
Dept: PAIN MANAGEMENT | Age: 63
End: 2023-05-08
Payer: MEDICARE

## 2023-05-08 VITALS — BODY MASS INDEX: 41.02 KG/M2 | WEIGHT: 293 LBS | HEIGHT: 71 IN

## 2023-05-08 DIAGNOSIS — G89.29 OTHER CHRONIC PAIN: ICD-10-CM

## 2023-05-08 DIAGNOSIS — M47.817 LUMBOSACRAL SPONDYLOSIS WITHOUT MYELOPATHY: ICD-10-CM

## 2023-05-08 DIAGNOSIS — Z79.891 ENCOUNTER FOR LONG-TERM OPIATE ANALGESIC USE: ICD-10-CM

## 2023-05-08 DIAGNOSIS — M96.1 POSTLAMINECTOMY SYNDROME OF LUMBAR REGION: Primary | ICD-10-CM

## 2023-05-08 PROCEDURE — G8417 CALC BMI ABV UP PARAM F/U: HCPCS | Performed by: PAIN MEDICINE

## 2023-05-08 PROCEDURE — 99214 OFFICE O/P EST MOD 30 MIN: CPT | Performed by: PAIN MEDICINE

## 2023-05-08 PROCEDURE — 3017F COLORECTAL CA SCREEN DOC REV: CPT | Performed by: PAIN MEDICINE

## 2023-05-08 PROCEDURE — 1036F TOBACCO NON-USER: CPT | Performed by: PAIN MEDICINE

## 2023-05-08 PROCEDURE — G8427 DOCREV CUR MEDS BY ELIG CLIN: HCPCS | Performed by: PAIN MEDICINE

## 2023-05-08 ASSESSMENT — ENCOUNTER SYMPTOMS
BACK PAIN: 1
BOWEL INCONTINENCE: 0

## 2023-05-08 NOTE — PROGRESS NOTES
HPI:     Back Pain  This is a chronic problem. The current episode started more than 1 year ago. The problem occurs constantly. The problem is unchanged. The pain is present in the lumbar spine and gluteal. The quality of the pain is described as aching and burning. The pain radiates to the left knee, right thigh, right knee, right foot, left thigh and left foot. The pain is at a severity of 9/10. The pain is severe. The pain is The same all the time. The symptoms are aggravated by bending, lying down, position, twisting, standing and sitting. Stiffness is present All day. Pertinent negatives include no bladder incontinence or bowel incontinence. She has tried bed rest, home exercises, heat, ice, walking, muscle relaxant and NSAIDs for the symptoms. MRI of the lumbar spine with previous lumbar decompression as well as degenerative changes. MRI of thoracic spine with previous decompression, degenerative changes and possible cord changes. She seen neurosurgery locally, who did refer her back to her operating physician at [facility]. Has appt pending next month. Saw my colleague at [facility], did not recommend opioid therapy. Saw [facility] pain mgmt as well. States she was discharged for missed appts. H/o lumbar infections after surgery. Patient denies any new neurological symptoms. Nobowel or bladder incontinence, no weakness, and no falling. Review of OARRS does not show any aberrant prescription behavior.      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

## 2023-05-16 ENCOUNTER — HOSPITAL ENCOUNTER (OUTPATIENT)
Dept: VASCULAR LAB | Age: 63
Discharge: HOME | End: 2023-05-16
Payer: MEDICARE

## 2023-05-16 DIAGNOSIS — I73.9 PAD (PERIPHERAL ARTERY DISEASE) (HCC): ICD-10-CM

## 2023-05-16 PROCEDURE — 93923 UPR/LXTR ART STDY 3+ LVLS: CPT

## 2023-05-25 ENCOUNTER — OFFICE VISIT (OUTPATIENT)
Dept: VASCULAR SURGERY | Age: 63
End: 2023-05-25
Payer: MEDICARE

## 2023-05-25 VITALS
SYSTOLIC BLOOD PRESSURE: 149 MMHG | DIASTOLIC BLOOD PRESSURE: 81 MMHG | HEART RATE: 102 BPM | OXYGEN SATURATION: 99 % | BODY MASS INDEX: 41.02 KG/M2 | TEMPERATURE: 97.2 F | WEIGHT: 293 LBS | HEIGHT: 71 IN | RESPIRATION RATE: 18 BRPM

## 2023-05-25 DIAGNOSIS — I87.2 LYMPHEDEMA DUE TO VENOUS INSUFFICIENCY: ICD-10-CM

## 2023-05-25 DIAGNOSIS — G62.9 NEUROPATHY: ICD-10-CM

## 2023-05-25 DIAGNOSIS — I73.9 PAD (PERIPHERAL ARTERY DISEASE) (HCC): Primary | ICD-10-CM

## 2023-05-25 DIAGNOSIS — I89.0 LYMPHEDEMA DUE TO VENOUS INSUFFICIENCY: ICD-10-CM

## 2023-05-25 PROCEDURE — 99214 OFFICE O/P EST MOD 30 MIN: CPT | Performed by: SURGERY

## 2023-05-25 PROCEDURE — 3017F COLORECTAL CA SCREEN DOC REV: CPT | Performed by: SURGERY

## 2023-05-25 PROCEDURE — G8417 CALC BMI ABV UP PARAM F/U: HCPCS | Performed by: SURGERY

## 2023-05-25 PROCEDURE — 1036F TOBACCO NON-USER: CPT | Performed by: SURGERY

## 2023-05-25 PROCEDURE — G8427 DOCREV CUR MEDS BY ELIG CLIN: HCPCS | Performed by: SURGERY

## 2023-05-25 RX ORDER — GABAPENTIN 300 MG/1
300 CAPSULE ORAL 3 TIMES DAILY
Qty: 90 CAPSULE | Refills: 2 | Status: SHIPPED | OUTPATIENT
Start: 2023-05-25 | End: 2023-08-23

## 2023-05-31 ASSESSMENT — ENCOUNTER SYMPTOMS
ABDOMINAL PAIN: 0
BACK PAIN: 1
COLOR CHANGE: 0
CHEST TIGHTNESS: 0
SHORTNESS OF BREATH: 0
ALLERGIC/IMMUNOLOGIC NEGATIVE: 1

## 2023-07-21 ENCOUNTER — TELEPHONE (OUTPATIENT)
Dept: INTERNAL MEDICINE | Age: 63
End: 2023-07-21

## 2023-07-21 NOTE — TELEPHONE ENCOUNTER
Patient call stating she spoke to her [facility]  and she stated that they faxed an order for a commode but did not receive anything back and they told her that she needs a virtual visit also. Informed patient that I have not received anything from [facility]. She asked me to call either Kimberley Ramirez at [phone] or Noe Norman at [phone] or [facility] at [phone]. Spoke to JULIETTE and she stated that she needed a f2f for the commode and she can take it to any DME store and get the commode. She stated that there was no form that was faxed and the patient only needed a script.

## 2023-08-01 ENCOUNTER — OFFICE VISIT (OUTPATIENT)
Dept: INTERNAL MEDICINE | Age: 63
End: 2023-08-01
Payer: MEDICARE

## 2023-08-01 VITALS
HEIGHT: 71 IN | DIASTOLIC BLOOD PRESSURE: 76 MMHG | SYSTOLIC BLOOD PRESSURE: 138 MMHG | BODY MASS INDEX: 41.02 KG/M2 | WEIGHT: 293 LBS

## 2023-08-01 DIAGNOSIS — Z00.00 MEDICARE ANNUAL WELLNESS VISIT, SUBSEQUENT: Primary | ICD-10-CM

## 2023-08-01 PROCEDURE — G0439 PPPS, SUBSEQ VISIT: HCPCS | Performed by: INTERNAL MEDICINE

## 2023-08-01 PROCEDURE — 3017F COLORECTAL CA SCREEN DOC REV: CPT | Performed by: INTERNAL MEDICINE

## 2023-08-01 ASSESSMENT — LIFESTYLE VARIABLES
HOW MANY STANDARD DRINKS CONTAINING ALCOHOL DO YOU HAVE ON A TYPICAL DAY: PATIENT DOES NOT DRINK
HOW OFTEN DO YOU HAVE A DRINK CONTAINING ALCOHOL: NEVER

## 2023-08-01 NOTE — PROGRESS NOTES
Medicare Annual Wellness Visit    Valente Jimenez is here for Medicare AWV (Last AWV 8/17/2021)    Assessment & Plan   Medicare annual wellness visit, subsequent  Recommendations for Preventive Services Due: see orders and patient instructions/AVS.  Recommended screening schedule for the next 5-10 years is provided to the patient in written form: see Patient Instructions/AVS.     No follow-ups on file. Subjective       Patient's complete Health Risk Assessment and screening values have been reviewed and are found in Flowsheets. The following problems were reviewed today and where indicated follow up appointments were made and/or referrals ordered. Positive Risk Factor Screenings with Interventions:    Fall Risk:  Do you feel unsteady or are you worried about falling? : (!) yes (pt states she is unsteady, uses walker at all times)  2 or more falls in past year?: no  Fall with injury in past year?: no     Interventions:    Patient advised to follow-up in this office for further evaluation and treatment             Opioid Risk: (Low risk score <55) Opioid risk score: 26    Patient is low risk for opioid use disorder or overdose. Last PDMP [place] as Reviewed:  Review User Review Instant Review Result   [place] 5/8/2023  3:41 PM     Reviewed PDMP [1]     Last Controlled Substance Monitoring Documentation      [facility] Office Visit from 1/13/2021 in [facility] [place]   Periodic Controlled Substance Monitoring No signs of potential drug abuse or diversion identified. , Possible medication side effects, risk of tolerance/dependence & alternative treatments discussed. filed at 01/13/2021 1526             Self-assessment of health:   In general, how would you say your health is?: (!) Poor (r/t chronic health conditions, pain)    Interventions:  Patient advised to follow-up in this office for further evaluation and treatment    General HRA Questions:  Select all that apply: (!) New or

## 2023-08-01 NOTE — PATIENT INSTRUCTIONS
Personalized Preventive Plan for Carly Bustamante - 8/1/2023  Medicare offers a range of preventive health benefits. Some of the tests and screenings are paid in full while other may be subject to a deductible, co-insurance, and/or copay. Some of these benefits include a comprehensive review of your medical history including lifestyle, illnesses that may run in your family, and various assessments and screenings as appropriate. After reviewing your medical record and screening and assessments performed today your provider may have ordered immunizations, labs, imaging, and/or referrals for you. A list of these orders (if applicable) as well as your Preventive Care list are included within your After Visit Summary for your review. Other Preventive Recommendations:    A preventive eye exam performed by an eye specialist is recommended every 1-2 years to screen for glaucoma; cataracts, macular degeneration, and other eye disorders. A preventive dental visit is recommended every 6 months. Try to get at least 150 minutes of exercise per week or 10,000 steps per day on a pedometer . Order or download the FREE \"Exercise & Physical Activity: Your Everyday Guide\" from [employer]. Call 2-429.766.2916 or search [employer] online. You need 5644-0571 mg of calcium and 5677-1962 IU of vitamin D per day. It is possible to meet your calcium requirement with diet alone, but a vitamin D supplement is usually necessary to meet this goal.  When exposed to the sun, use a sunscreen that protects against both UVA and UVB radiation with an SPF of 30 or greater. Reapply every 2 to 3 hours or after sweating, drying off with a towel, or swimming. Always wear a seat belt when traveling in a car. Always wear a helmet when riding a bicycle or motorcycle.

## 2023-08-04 ENCOUNTER — CLINICAL DOCUMENTATION (OUTPATIENT)
Dept: SPIRITUAL SERVICES | Age: 63
End: 2023-08-04

## 2023-08-04 NOTE — ACP (ADVANCE CARE PLANNING)
Advance Care Planning   Ambulatory ACP Specialist Patient Outreach    Date:  8/3/2023    ACP Specialist:  Frank Barney    Outreach call to patient in follow-up to ACP Specialist referral from:Rebekah More MD    [x] PCP  [] Provider   [] Ambulatory Care Management [] Other     For:                  [x] Advance Directive Assistance              [] Complete Portable DNR order              [] Complete POST/POLST/MOST              [] Code Status Discussion             [] Discuss Goals of Care             [x] Early ACP Decision-Making              [x] Other (Specify) Patient is interested in more information. Date Referral Received:8/2/23    Next Step:   [] ACP scheduled conversation  [x] Outreach again in one week               [] Email / Mail ACP Info Sheets  [] Email / Mail Advance Directive   [] Closing referral.  Routing closure to referring provider/staff and to ACP Specialist . [] Closure letter mailed to patient with invitation to contact ACP Specialist if / when ready. [] Other (Specify here):         [] At this time, Healthcare Decision Maker Is:        [] Primary agent named in scanned advance directive. [] Legal Next of Kin. [x] Unable to determine legal decision maker at this time. Outreaches:       [x] 1st -  Date:  8/3/23               Intervention:  [] Spoke with Patient   [x] Left Voice mail [] Email / Mail    [] [employer]  [] Other [id number]) : Outcomes: Left detailed VM for Patient to return call. Will make another outreach in 1 week. [] 2nd -  Date:                 Intervention:  [] Spoke with Patient  [] Left Voice mail [] Email / Mail    [] [employer]  [] Other [id number]) : Outcomes:                [] 3rd -  Date:                Intervention:  [] Spoke with Patient   [] Left Voice mail [] Email / Mail    [] [employer]  [] Other [id number]) :        Outcomes:           []  Additional Outreach -  Date:     (Specify Dates & special

## 2023-08-22 ENCOUNTER — OFFICE VISIT (OUTPATIENT)
Dept: INTERNAL MEDICINE | Age: 63
End: 2023-08-22
Payer: MEDICARE

## 2023-08-22 VITALS
TEMPERATURE: 98.1 F | HEIGHT: 71 IN | OXYGEN SATURATION: 98 % | SYSTOLIC BLOOD PRESSURE: 132 MMHG | DIASTOLIC BLOOD PRESSURE: 88 MMHG | WEIGHT: 293 LBS | BODY MASS INDEX: 41.02 KG/M2 | HEART RATE: 88 BPM

## 2023-08-22 DIAGNOSIS — Z12.31 ENCOUNTER FOR SCREENING MAMMOGRAM FOR MALIGNANT NEOPLASM OF BREAST: ICD-10-CM

## 2023-08-22 DIAGNOSIS — N39.3 STRESS INCONTINENCE OF URINE: ICD-10-CM

## 2023-08-22 DIAGNOSIS — M96.1 FAILED BACK SURGICAL SYNDROME: Chronic | ICD-10-CM

## 2023-08-22 DIAGNOSIS — E66.01 MORBID OBESITY DUE TO EXCESS CALORIES (HCC): Primary | ICD-10-CM

## 2023-08-22 PROCEDURE — 1036F TOBACCO NON-USER: CPT | Performed by: INTERNAL MEDICINE

## 2023-08-22 PROCEDURE — G8427 DOCREV CUR MEDS BY ELIG CLIN: HCPCS | Performed by: INTERNAL MEDICINE

## 2023-08-22 PROCEDURE — G8417 CALC BMI ABV UP PARAM F/U: HCPCS | Performed by: INTERNAL MEDICINE

## 2023-08-22 PROCEDURE — 99213 OFFICE O/P EST LOW 20 MIN: CPT | Performed by: INTERNAL MEDICINE

## 2023-08-22 PROCEDURE — 3017F COLORECTAL CA SCREEN DOC REV: CPT | Performed by: INTERNAL MEDICINE

## 2023-08-22 ASSESSMENT — ENCOUNTER SYMPTOMS
ALLERGIC/IMMUNOLOGIC NEGATIVE: 1
CONSTIPATION: 1
RESPIRATORY NEGATIVE: 1
EYES NEGATIVE: 1

## 2023-08-22 NOTE — PROGRESS NOTES
addressed. All patient questions answered. Pt voiced understanding. 4.  Continue current medications, diet and exercise. No orders of the defined types were placed in this encounter. Completed Refills               Requested Prescriptions      No prescriptions requested or ordered in this encounter       5. Patient given educational materials - see patient instructions    6. Was a self-tracking handout given in paper form or via [employer]? Yes  If yes, see orders or list here. Orders Placed This Encounter   Procedures    MAURI DIGITAL SCREEN W OR WO CAD BILATERAL     Standing Status:   Future     Standing Expiration Date:   8/22/2024     Scheduling Instructions:      Every year for women age 36 and under 76     Order Specific Question:   Reason for exam:     Answer:   Screening    DME Order for Bedside Commode as OP     You must complete the order parameters below and add the medical necessity documentation for this DME in a separate note. Commode Chair Extra Wide/Heavy Duty patient weight >300lbs    Current patient weight: Weight - Scale: (!) 337 lb 9.6 oz (153.1 kg)  Current patient height: Height: 5' 11\" (180.3 cm)  Diagnosis: s/p lumbar laminectomy  Duration: Purchase       No follow-ups on file. Patient voiced understanding and agreed to treatment plan. This note is created with the assistance of a speech-recognition program. While intending to generate a document that actually reflects the content of the visit, the document can still have some mistakes which may not have been identified and corrected by editing.       Dr Roslyn Barrios MD, [facility]  Associate , Department of Internal Medicine  Resident Ambulatory Site Medical Director  [address] [facility] Internal Medicine  [facility]  Internal Medicine Clerkship - Vicki Nguyen                   8/22/2023, 3:55 PM

## 2023-08-29 ENCOUNTER — OFFICE VISIT (OUTPATIENT)
Dept: ORTHOPEDIC SURGERY | Age: 63
End: 2023-08-29

## 2023-08-29 VITALS — BODY MASS INDEX: 41.02 KG/M2 | OXYGEN SATURATION: 98 % | HEIGHT: 71 IN | WEIGHT: 293 LBS | RESPIRATION RATE: 16 BRPM

## 2023-08-29 DIAGNOSIS — M17.12 PRIMARY OSTEOARTHRITIS OF LEFT KNEE: Primary | ICD-10-CM

## 2023-08-29 RX ORDER — LIDOCAINE HYDROCHLORIDE 10 MG/ML
2 INJECTION, SOLUTION INFILTRATION; PERINEURAL ONCE
Status: DISCONTINUED | OUTPATIENT
Start: 2023-08-29 | End: 2023-08-29

## 2023-08-29 RX ORDER — METHYLPREDNISOLONE ACETATE 80 MG/ML
80 INJECTION, SUSPENSION INTRA-ARTICULAR; INTRALESIONAL; INTRAMUSCULAR; SOFT TISSUE ONCE
Status: DISCONTINUED | OUTPATIENT
Start: 2023-08-29 | End: 2023-08-29

## 2023-08-29 NOTE — PROGRESS NOTES
Band-Aid was placed. The patient tolerated the procedure without difficulty. Adverse reactions of the injection was discussed with the patient including signs of infection (increasing pain, redness, swelling) and the patient was instructed to call immediately with any of these symptoms. Ultrasound images of the injection site were recorded throughout the procedure and are saved on the SD card which is stored in the [employer] ultrasound unit. All images were downloaded and stored in patient's chart for permanent record. Plan:   Patient should return to the clinic as needed. I had a long discussion with the patient that if the cortisone injection is only lasting a few days and is really not working. It needs to be lasting few months at least.  The patient has opted for a Durolane injection today in the office. These injections can be done every 6 months if they decrease some of her pain. Eventually the only thing is can work is a knee replacement. The patient states she is going to be working on her BMI so that would be an option when she is ready to do it. The patient can take Tylenol for pain if needed. The patient will call the office immediately with any problems. Orders Placed This Encounter   Medications    DISCONTD: methylPREDNISolone acetate (DEPO-MEDROL) injection 80 mg    DISCONTD: lidocaine 1 % injection 2 mL    sodium hyaluronate (DUROLANE) injection PRSY 60 mg       No orders of the defined types were placed in this encounter.           Electronically signed by Becca Amos PA-C, on 8/29/2023 at 7:08 PM

## 2023-09-12 ENCOUNTER — TELEPHONE (OUTPATIENT)
Dept: INTERNAL MEDICINE | Age: 63
End: 2023-09-12

## 2023-09-12 DIAGNOSIS — M96.1 FAILED BACK SURGICAL SYNDROME: Primary | ICD-10-CM

## 2023-09-12 DIAGNOSIS — G81.94 LEFT HEMIPARESIS (HCC): ICD-10-CM

## 2023-09-12 NOTE — TELEPHONE ENCOUNTER
Patient calling stating that the script she has for a rollator walker has  and is requesting a new order. Order pending.

## 2023-10-06 NOTE — TELEPHONE ENCOUNTER
Received PC from pt stating she is not going to be able to  the paperwork from the office, pt requesting it be mailed to her. Writer opened envelope, order for rollator walker. PC back to pt letting her know this order has to be sent with demographics and last chart notes to a DME company. Writer asked if there was a specific company pt would like writer to fax information to. Pt states she wants everything mailed to her so she can shop around. Writer let pt know we are unable to legally mail her chart notes that would be needed for this order to be processed. Pt is requesting to speak with Denzel Everett MA but unfortunately she has left the office for the day. Writer will send a message requesting Denzel Everett reach out to pt. Pt requested writer keep order on hand in pick-up drawer for now.

## 2023-10-23 DIAGNOSIS — I10 HYPERTENSION, UNSPECIFIED TYPE: ICD-10-CM

## 2023-10-23 DIAGNOSIS — I10 ESSENTIAL HYPERTENSION: ICD-10-CM

## 2023-10-23 NOTE — TELEPHONE ENCOUNTER
Request for   Requested Prescriptions     Pending Prescriptions Disp Refills    hydroCHLOROthiazide (HYDRODIURIL) 50 MG tablet [Pharmacy Med Name: HYDROCHLOROTHIAZIDE 50MG TABS] 30 tablet 2     Sig: TAKE ONE TABLET BY MOUTH ONCE A DAY    . Please review and e-scribe to pharmacy listed in chart if appropriate. Thank you.       Last Visit Date: 8/22/2023  Next Visit Date: 11/14/2023    Future Appointments   Date Time Provider [address]   11/14/2023  1:40 PM Dom Frey MD [facility] IM [facility]   5/30/2024  1:00 PM Marley Naylor MD heartvasc [address] Maintenance   Topic Date Due    COVID-19 Vaccine (1) Never done    Cervical cancer screen  10/14/2017    Breast cancer screen  06/07/2021    Flu vaccine (1) Never done    DTaP/Tdap/Td vaccine (1 - Tdap) 04/19/2024 (Originally 2/11/1979)    Shingles vaccine (1 of 2) 08/21/2024 (Originally 2/11/2009)    Colorectal Cancer Screen  05/19/2024    Depression Screen  08/01/2024    Annual Wellness Visit (AWV)  08/01/2024    Lipids  08/26/2027    Hepatitis C screen  Completed    HIV screen  Completed    Hepatitis A vaccine  Aged Out    Hepatitis B vaccine  Aged Out    Hib vaccine  Aged Out    Meningococcal (ACWY) vaccine  Aged Out    Pneumococcal 0-64 years Vaccine  Aged Out       Hemoglobin A1C (%)   Date Value   08/26/2022 5.6   04/02/2020 5.6   10/23/2018 5.9             ( goal A1C is < 7)   No components found for: \"LABMICR\"  LDL Cholesterol (mg/dL)   Date Value   08/26/2022 249 (H)       (goal LDL is <100)   AST (U/L)   Date Value   11/01/2022 24     ALT (U/L)   Date Value   11/01/2022 26     BUN (mg/dL)   Date Value   11/01/2022 14     BP Readings from Last 3 Encounters:   08/22/23 132/88   08/01/23 138/76   05/25/23 (!) 149/81          (goal 120/80)    All Future Testing planned in [facility]  Lab Frequency Next Occurrence   Vascular lower arterial complete physiologic Doppler at rest Once 05/23/2024   MAURI DIGITAL SCREEN W OR WO CAD BILATERAL Once

## 2023-10-24 RX ORDER — HYDROCHLOROTHIAZIDE 50 MG/1
TABLET ORAL
Qty: 30 TABLET | Refills: 2 | Status: SHIPPED | OUTPATIENT
Start: 2023-10-24

## 2024-01-12 ENCOUNTER — TELEPHONE (OUTPATIENT)
Dept: INTERNAL MEDICINE | Age: 64
End: 2024-01-12

## 2024-01-12 DIAGNOSIS — M48.00 SPINAL STENOSIS, UNSPECIFIED SPINAL REGION: ICD-10-CM

## 2024-01-12 DIAGNOSIS — M19.90 OSTEOARTHRITIS, UNSPECIFIED OSTEOARTHRITIS TYPE, UNSPECIFIED SITE: Primary | ICD-10-CM

## 2024-01-12 NOTE — TELEPHONE ENCOUNTER
Patient called the office and asked for a referral for pain Management. She stated that she would like this referral for Spinal Stenosis, Osteoarthritis and Neuropathy. She spoke to Dr. Reji Coronel's office and was told to get this referral before she can be seen. Fax number is [phone]. I cannot find the referral to pend. Please advise

## 2024-01-23 DIAGNOSIS — M25.511 RIGHT SHOULDER PAIN, UNSPECIFIED CHRONICITY: Primary | ICD-10-CM

## 2024-01-24 ENCOUNTER — OFFICE VISIT (OUTPATIENT)
Dept: ORTHOPEDIC SURGERY | Age: 64
End: 2024-01-24
Payer: MEDICAID

## 2024-01-24 VITALS — RESPIRATION RATE: 15 BRPM | HEIGHT: 71 IN | OXYGEN SATURATION: 100 % | BODY MASS INDEX: 41.02 KG/M2 | WEIGHT: 293 LBS

## 2024-01-24 DIAGNOSIS — M25.811 SHOULDER IMPINGEMENT, RIGHT: Primary | ICD-10-CM

## 2024-01-24 DIAGNOSIS — M75.21 BICEPS TENDONITIS ON RIGHT: ICD-10-CM

## 2024-01-24 PROCEDURE — G8427 DOCREV CUR MEDS BY ELIG CLIN: HCPCS | Performed by: ORTHOPAEDIC SURGERY

## 2024-01-24 PROCEDURE — G8417 CALC BMI ABV UP PARAM F/U: HCPCS | Performed by: ORTHOPAEDIC SURGERY

## 2024-01-24 PROCEDURE — 99244 OFF/OP CNSLTJ NEW/EST MOD 40: CPT | Performed by: ORTHOPAEDIC SURGERY

## 2024-01-24 PROCEDURE — G8484 FLU IMMUNIZE NO ADMIN: HCPCS | Performed by: ORTHOPAEDIC SURGERY

## 2024-01-24 RX ORDER — METHYLPREDNISOLONE 4 MG/1
TABLET ORAL
Qty: 1 KIT | Refills: 0 | Status: SHIPPED | OUTPATIENT
Start: 2024-01-24

## 2024-01-25 DIAGNOSIS — M54.16 CHRONIC LUMBAR RADICULOPATHY: ICD-10-CM

## 2024-01-25 DIAGNOSIS — I10 HYPERTENSION, UNSPECIFIED TYPE: ICD-10-CM

## 2024-01-25 RX ORDER — IBUPROFEN 600 MG/1
TABLET ORAL
Qty: 30 TABLET | Refills: 1 | Status: SHIPPED | OUTPATIENT
Start: 2024-01-25

## 2024-01-25 RX ORDER — FUROSEMIDE 20 MG/1
20 TABLET ORAL DAILY
Qty: 60 TABLET | Refills: 1 | Status: SHIPPED | OUTPATIENT
Start: 2024-01-25

## 2024-01-25 NOTE — TELEPHONE ENCOUNTER
Escribe refill request from [facility] for Ibuprofen 600 mg and Furosemide 20 mg    Last visit: 8/22/23  Last Med refill: 1/24/23  Does patient have enough medication for 72 hours: No:     Next Visit Date: 2/1/24  Future Appointments   Date Time Provider Department Center   1/29/2024  3:00 PM Connie Smith, PT [facility]   2/1/2024  2:00 PM Rebekah Elise MD [facility] IM [facility]   3/6/2024  2:00 PM Benny Wilson,  Sports Med [facility]   5/30/2024  1:00 PM [facility]       Health Maintenance   Topic Date Due    COVID-19 Vaccine (1) Never done    Cervical cancer screen  10/14/2017    Respiratory Syncytial Virus (RSV) Pregnant or age 60 yrs+ (1 - 1-dose 60+ series) Never done    Breast cancer screen  06/07/2021    Flu vaccine (1) Never done    DTaP/Tdap/Td vaccine (1 - Tdap) 04/19/2024 (Originally 2/11/1979)    Shingles vaccine (1 of 2) 08/21/2024 (Originally 2/11/2009)    Colorectal Cancer Screen  05/19/2024    Depression Screen  08/01/2024    Annual Wellness Visit (Medicare)  08/01/2024    Lipids  08/26/2027    Hepatitis C screen  Completed    HIV screen  Completed    Hepatitis A vaccine  Aged Out    Hepatitis B vaccine  Aged Out    Hib vaccine  Aged Out    Polio vaccine  Aged Out    Meningococcal (ACWY) vaccine  Aged Out    Pneumococcal 0-64 years Vaccine  Aged Out       Hemoglobin A1C (%)   Date Value   08/26/2022 5.6   04/02/2020 5.6   10/23/2018 5.9             ( goal A1C is < 7)   No components found for: \"LABMICR\"  LDL Cholesterol (mg/dL)   Date Value   08/26/2022 249 (H)   04/30/2021 218 (H)       (goal LDL is <100)   AST (U/L)   Date Value   11/01/2022 24     ALT (U/L)   Date Value   11/01/2022 26     BUN (mg/dL)   Date Value   11/01/2022 14     BP Readings from Last 3 Encounters:   08/22/23 132/88   08/01/23 138/76   05/25/23 (!) 149/81          (goal 120/80)    All Future Testing planned in [facility]  Lab Frequency Next Occurrence   Vascular lower  show

## 2024-01-29 ENCOUNTER — HOSPITAL ENCOUNTER (OUTPATIENT)
Dept: PHYSICAL THERAPY | Age: 64
Setting detail: THERAPIES SERIES
Discharge: HOME | End: 2024-01-29
Payer: MEDICARE

## 2024-01-29 PROCEDURE — 97110 THERAPEUTIC EXERCISES: CPT

## 2024-01-29 PROCEDURE — 97161 PT EVAL LOW COMPLEX 20 MIN: CPT

## 2024-01-29 ASSESSMENT — ENCOUNTER SYMPTOMS
ABDOMINAL PAIN: 0
EYE DISCHARGE: 0
ROS SKIN COMMENTS: NEGATIVE FOR RASH
SHORTNESS OF BREATH: 0

## 2024-01-29 NOTE — PROGRESS NOTES
[facility] PHYSICIANS [facility] ORTHOPEDICS AND SPORTS MEDICINE  [address]  [place] [address]  Dept: [phone]    Ambulatory Orthopedic Consult      CHIEF COMPLAINT:    Chief Complaint   Patient presents with    Shoulder Pain     Right shoulder pain       HISTORY OF PRESENT ILLNESS:      The patient is a 63 y.o. female who is being seen at the request of  Rebekah Elise MD for consultation and evaluation of right shoulder pain.  The patient states that she has had 4 months of progressively worsening right shoulder pain with inciting trauma.  She notes catching with pain that radiates to the deltoid and the biceps.  She initially hurt her shoulder when her walker gave away and she hit her shoulder on a wall.  She is right-hand dominant and has had no specific treatment for her shoulder so far.      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    Anemia 10/5/2016    Arthritis     Asthma 1/4/2017    Bilateral leg and foot pain 02/2019    left much worse    Degeneration of cervical intervertebral disc 6/21/2018    Depression 2/2/2016    Failed back syndrome 4/3/2018    History of recurrent UTI (urinary tract infection)     Hyperlipidemia     Hypertension     Lumbar disc herniation with radiculopathy 6/4/2013    Obesity     Spinal stenosis     Wound infection after surgery        Past Surgical History:    Past Surgical History:   Procedure Laterality Date    BACK SURGERY      CYSTOSCOPY Bilateral 05/20/2022    CYSTOSCOPY RETROGRADE PYELOGRAM, PELVIC EXAM, VAGINOSCOPY (Bilateral)    CYSTOSCOPY Bilateral 5/20/2022    CYSTOSCOPY RETROGRADE PYELOGRAM, PELVIC EXAM, VAGINOSCOPY performed by Jeremy Rajan MD at [facility] OR    FOOT SURGERY Bilateral     heel spurs, plantar fascia release    GALLBLADDER SURGERY      LUMBAR SPINE [facility]GERY  [place]    [facility]    LUMBAR SPINE SURGERY  4/2012    [facility]

## 2024-01-29 NOTE — CONSULTS
in: 3:10pm    Time Out: 3:55pm    Electronically signed by: Connie Smith PT        Physician Signature:________________________________Date:__________________  By signing above or cosigning this note, I have reviewed this plan of care and certify a need for medically necessary rehabilitation services.     *PLEASE SIGN ABOVE AND FAX BACK ALL PAGES*

## 2024-02-01 ENCOUNTER — OFFICE VISIT (OUTPATIENT)
Dept: INTERNAL MEDICINE | Age: 64
End: 2024-02-01
Payer: MEDICARE

## 2024-02-01 VITALS
HEIGHT: 71 IN | TEMPERATURE: 98.1 F | HEART RATE: 78 BPM | DIASTOLIC BLOOD PRESSURE: 78 MMHG | OXYGEN SATURATION: 98 % | BODY MASS INDEX: 41.02 KG/M2 | SYSTOLIC BLOOD PRESSURE: 128 MMHG | WEIGHT: 293 LBS

## 2024-02-01 DIAGNOSIS — G95.19 OTHER VASCULAR MYELOPATHIES (HCC): ICD-10-CM

## 2024-02-01 DIAGNOSIS — I10 HYPERTENSION, UNSPECIFIED TYPE: ICD-10-CM

## 2024-02-01 DIAGNOSIS — G81.94 HEMIPLEGIA AFFECTING LEFT NONDOMINANT SIDE, UNSPECIFIED ETIOLOGY, UNSPECIFIED HEMIPLEGIA TYPE (HCC): ICD-10-CM

## 2024-02-01 DIAGNOSIS — N28.1 RENAL CYST: Primary | ICD-10-CM

## 2024-02-01 DIAGNOSIS — I73.9 PAD (PERIPHERAL ARTERY DISEASE) (HCC): ICD-10-CM

## 2024-02-01 PROCEDURE — G8427 DOCREV CUR MEDS BY ELIG CLIN: HCPCS | Performed by: INTERNAL MEDICINE

## 2024-02-01 PROCEDURE — 1036F TOBACCO NON-USER: CPT | Performed by: INTERNAL MEDICINE

## 2024-02-01 PROCEDURE — 99214 OFFICE O/P EST MOD 30 MIN: CPT | Performed by: INTERNAL MEDICINE

## 2024-02-01 PROCEDURE — 3074F SYST BP LT 130 MM HG: CPT | Performed by: INTERNAL MEDICINE

## 2024-02-01 PROCEDURE — 99212 OFFICE O/P EST SF 10 MIN: CPT | Performed by: INTERNAL MEDICINE

## 2024-02-01 PROCEDURE — G8417 CALC BMI ABV UP PARAM F/U: HCPCS | Performed by: INTERNAL MEDICINE

## 2024-02-01 PROCEDURE — 3017F COLORECTAL CA SCREEN DOC REV: CPT | Performed by: INTERNAL MEDICINE

## 2024-02-01 PROCEDURE — G8484 FLU IMMUNIZE NO ADMIN: HCPCS | Performed by: INTERNAL MEDICINE

## 2024-02-01 PROCEDURE — 3078F DIAST BP <80 MM HG: CPT | Performed by: INTERNAL MEDICINE

## 2024-02-01 ASSESSMENT — PATIENT HEALTH QUESTIONNAIRE - PHQ9
SUM OF ALL RESPONSES TO PHQ QUESTIONS 1-9: 0
1. LITTLE INTEREST OR PLEASURE IN DOING THINGS: 0
SUM OF ALL RESPONSES TO PHQ9 QUESTIONS 1 & 2: 0
2. FEELING DOWN, DEPRESSED OR HOPELESS: 0
SUM OF ALL RESPONSES TO PHQ QUESTIONS 1-9: 0

## 2024-02-01 NOTE — PROGRESS NOTES
3. Body mass index (BMI) 45.0-49.9, adult ([facility])        4. Hemiplegia affecting left nondominant side, unspecified etiology, unspecified hemiplegia type ([facility])        5. PAD (peripheral artery disease) ([facility])        6. Hypertension, unspecified type        Patient's MRI from the summer showed bilateral renal cysts which have been noted in the chart from 2015.  Will obtain a ultrasound to evaluate size and any changes from the 2015 imaging.  Patient is to continue to follow-up with neurosurgery and pain management.  Her weakness and immobility is largely due to the chronic back pain and nerve impingements.  She continues to use compression stockings and follows with vascular  Her blood pressure is well-controlled on hydrochlorothiazide and she uses Lasix for her lower extremity edema.  This may be exacerbating the incontinence/overactive bladder symptoms and we have discussed with her options to perhaps look at a different tx options.  She currently feels comfortable with her medications and we can reevaluate at a later time    Rtc in 6 months    FOLLOW UP:   1.  Ifeoma received counseling on the following healthy behaviors: nutrition and exercise    2.  Reviewed prior labs and health maintenance.      3.  Discussed use, benefit, and side effects of prescribed medications.  Barriers to medication compliance addressed.  All patient questions answered.  Pt voiced understanding.     4.  Continue current medications, diet and exercise.            No orders of the defined types were placed in this encounter.         Completed Refills               Requested Prescriptions      No prescriptions requested or ordered in this encounter       5. Patient given educational materials - see patient instructions    6. Was a self-tracking handout given in paper form or via [employer]? Yes  If yes, see orders or list here.      Orders Placed This Encounter   Procedures    US RENAL COMPLETE     This procedure can be scheduled via [employer].

## 2024-02-02 ENCOUNTER — HOSPITAL ENCOUNTER (OUTPATIENT)
Dept: PHYSICAL THERAPY | Age: 64
Setting detail: THERAPIES SERIES
Discharge: HOME | End: 2024-02-02

## 2024-02-02 NOTE — FLOWSHEET NOTE
[] [facility]  Outpatient Rehabilitation &  Therapy  [address]  [phone]  [phone] [] [facility]  Outpatient Rehabilitation &  Therapy  [address]  P: [phone]  F: [phone] [] [facility]  Outpatient Rehabilitation &  Therapy  [address]  P: [phone]  F: [phone] [] [facility]  Outpatient Rehabilitation &  Therapy  [address]  [phone]  [phone] [] [facility]  Outpatient Rehabilitation &  Therapy  [address]   P: [phone]  F: [phone]  [] [facility]  Outpatient Rehabilitation &  Therapy  [address]  P: [phone]  F: [phone] [] [facility]  Outpatient Rehabilitation &  Therapy  [address]  P: [phone]  F: [phone] [] [facility]  Outpatient Rehabilitation &  Therapy  [address]  P: [phone]  F: [phone] [] [facility]  Outpatient Rehabilitation &  Therapy  [address]  P: [phone]  F: [phone]  [] [facility] Outpatient Rehabilitation &  Therapy  [address]  P: [phone]  F: [phone]     Therapy Cancel/No Show note    Date: 2024  Patient: Ifeoma Martinez  : 1960  MRN: [id number]    Cancels/No Shows to date: 0    For today's appointment patient:    [x]  Cancelled    [] Rescheduled appointment    [] No-show     Reason given by patient:    []  Patient ill    []  Conflicting appointment    [] No transportation      [] Conflict with work    [x] No reason given    [] Weather related    [] COVID-19    [] Other:      Comments:        [x] Next appointment was confirmed    Electronically signed by: Connie Smith, PT

## 2024-02-05 ENCOUNTER — HOSPITAL ENCOUNTER (OUTPATIENT)
Dept: PHYSICAL THERAPY | Age: 64
Setting detail: THERAPIES SERIES
Discharge: HOME | End: 2024-02-05

## 2024-02-05 NOTE — FLOWSHEET NOTE
[] [facility]  Outpatient Rehabilitation &  Therapy  [address]  [phone]  [phone] [] [facility]  Outpatient Rehabilitation &  Therapy  [address]  P: [phone]  F: [phone] [] [facility]  Outpatient Rehabilitation &  Therapy  [address]  P: [phone]  F: [phone] [] [facility]  Outpatient Rehabilitation &  Therapy  [address]  [phone]  [phone] [] [facility]  Outpatient Rehabilitation &  Therapy  [address]   P: [phone]  F: [phone]  [] [facility]  Outpatient Rehabilitation &  Therapy  [address]  P: [phone]  F: [phone] [] [facility]  Outpatient Rehabilitation &  Therapy  [address]  P: [phone]  F: [phone] [] [facility]  Outpatient Rehabilitation &  Therapy  [address]  P: [phone]  F: [phone] [] [facility]  Outpatient Rehabilitation &  Therapy  [address]  P: [phone]  F: [phone]  [] [facility] Outpatient Rehabilitation &  Therapy  [address]  P: [phone]  F: [phone]     Therapy Cancel/No Show note    Date: 2024  Patient: Ifeoma Martinez  : 1960  MRN: [id number]    Cancels/No Shows to date: 0    For today's appointment patient:    [x]  Cancelled    [] Rescheduled appointment    [] No-show     Reason given by patient:    []  Patient ill    []  Conflicting appointment    [] No transportation      [] Conflict with work    [] No reason given    [] Weather related    [] COVID-19    [] Other:      Comments:  Pt called to speak to therapist.  States that she is in more pain trying to do exercises with her shoulder.  Pt would like to discontinue therapy at this time and contact her doctor to see if she

## 2024-02-06 ENCOUNTER — TELEPHONE (OUTPATIENT)
Dept: ORTHOPEDIC SURGERY | Age: 64
End: 2024-02-06

## 2024-02-06 NOTE — TELEPHONE ENCOUNTER
Dr. Wilson patient calling, doctor wanted her to do PT before the MRI but it's making her pain worse.  Did he want her to get the MRI now?  Call back is [phone].

## 2024-02-07 NOTE — TELEPHONE ENCOUNTER
Writer attempted to contact the patient to inform her of the information below. If the patient calls back please provide SALVADOR Velez response below.    Thank you.

## 2024-02-09 ENCOUNTER — HOSPITAL ENCOUNTER (OUTPATIENT)
Dept: PHYSICAL THERAPY | Age: 64
Setting detail: THERAPIES SERIES
Discharge: HOME | End: 2024-02-09

## 2024-02-16 ENCOUNTER — TELEPHONE (OUTPATIENT)
Dept: INTERNAL MEDICINE | Age: 64
End: 2024-02-16

## 2024-02-20 NOTE — TELEPHONE ENCOUNTER
Phone call to patient.  Informed that the handicap script was ready.  Patient will be in to  the script.

## 2024-02-22 ENCOUNTER — HOSPITAL ENCOUNTER (OUTPATIENT)
Dept: ULTRASOUND IMAGING | Age: 64
Discharge: HOME | End: 2024-02-24
Attending: INTERNAL MEDICINE
Payer: MEDICARE

## 2024-02-22 DIAGNOSIS — N28.1 RENAL CYST: ICD-10-CM

## 2024-02-22 PROCEDURE — 76770 US EXAM ABDO BACK WALL COMP: CPT

## 2024-02-25 NOTE — TELEPHONE ENCOUNTER
----- Message from Janny Gee MD sent at 11/30/2022 12:20 AM EST -----  Venous duplex bilateral    ----- Message -----  From: Shila Rhodes MA  Sent: 11/29/2022   2:40 PM EST  To: Janny Gee MD, #    Patient called and stated that she would like to be seen sooner than [place] due to severe pain in her legs along with them being warm and swollen, is there any testing that you would want, I did not see anything in your last note? She said that she feels like she needs testing. 116

## 2024-03-17 NOTE — DISCHARGE SUMMARY
[x] [facility]  Outpatient Rehabilitation &  Therapy  [address]  [phone]  [phone] [] [facility]  Outpatient Rehabilitation &  Therapy  [address]  P: [phone]  F: [phone] [] [facility]  Outpatient Rehabilitation &  Therapy  [address]  P: [phone]  F: [phone] [] [facility]  Outpatient Rehabilitation &  Therapy  [address]  [phone]  [phone] [] [facility]  Outpatient Rehabilitation &  Therapy  [address]   P: [phone]  F: [phone]  [] [facility]  Outpatient Rehabilitation &  Therapy  [address]  P: [phone]  F: [phone] [] [facility]  Outpatient Rehabilitation &  Therapy  [address]  P: [phone]  F: [phone] [] [facility]  Outpatient Rehabilitation &  Therapy  [address]  P: [phone]  F: [phone] [] [facility]  Outpatient Rehabilitation &  Therapy  [address]  P: [phone]  F: [phone]  [] [facility] Outpatient Rehabilitation &  Therapy  [address]  P: [phone]  F: [phone]        Physical Therapy Discharge Note    Date: 3/17/2024      Patient: Ifeoma Martinez  : 1960  MRN: [id number]: Benny Wilson DO                                  Insurance: [employer]ICA[employer] [[id number]] (BMN)  Medical Diagnosis: Shoulder impingement, right (M25.811), Biceps tendonitis on right (M75.21)                       Rehab Codes: M25.511, M25.611, M62.81  Onset Date: 10/29/23             Next 's appt: 3/2024     Total visits attended: 1  Cancels/No shows:   Date of initial visit: 24                Date of final visit: 24      Subjective:  Refer to most recent note in

## 2024-03-29 ENCOUNTER — TELEPHONE (OUTPATIENT)
Dept: INTERNAL MEDICINE | Age: 64
End: 2024-03-29

## 2024-03-29 DIAGNOSIS — E66.01 MORBID OBESITY WITH BMI OF 45.0-49.9, ADULT (HCC): Primary | ICD-10-CM

## 2024-03-29 NOTE — TELEPHONE ENCOUNTER
Received PC from pt requesting referral to medication weight management program. Writer does not see referral for medication weight loss.    PC to weight management office at [phone], confirmed referral goes in for Valentina Mcmillan CNP as she is the only practitioner handling medication for weight loss. Pt has choice of [facility] [place].    Pt would like [place] location, referral pended. Please route encounter back to me so I can notify pt.

## 2024-05-10 ENCOUNTER — HOSPITAL ENCOUNTER (OUTPATIENT)
Age: 64
Discharge: HOME | End: 2024-05-10
Payer: MEDICARE

## 2024-05-10 ENCOUNTER — OFFICE VISIT (OUTPATIENT)
Age: 64
End: 2024-05-10
Payer: MEDICARE

## 2024-05-10 VITALS
SYSTOLIC BLOOD PRESSURE: 155 MMHG | TEMPERATURE: 97.8 F | RESPIRATION RATE: 18 BRPM | WEIGHT: 293 LBS | BODY MASS INDEX: 47 KG/M2 | HEART RATE: 81 BPM | OXYGEN SATURATION: 98 % | DIASTOLIC BLOOD PRESSURE: 68 MMHG

## 2024-05-10 DIAGNOSIS — E78.00 PURE HYPERCHOLESTEROLEMIA: ICD-10-CM

## 2024-05-10 DIAGNOSIS — E66.01 MORBID OBESITY WITH BMI OF 45.0-49.9, ADULT (HCC): ICD-10-CM

## 2024-05-10 DIAGNOSIS — E66.01 MORBID OBESITY WITH BMI OF 45.0-49.9, ADULT (HCC): Primary | ICD-10-CM

## 2024-05-10 LAB
T4 FREE SERPL-MCNC: 0.9 NG/DL (ref 0.9–1.7)
TSH SERPL DL<=0.05 MIU/L-ACNC: 1.88 UIU/ML (ref 0.3–5)

## 2024-05-10 PROCEDURE — G8417 CALC BMI ABV UP PARAM F/U: HCPCS | Performed by: NURSE PRACTITIONER

## 2024-05-10 PROCEDURE — 99204 OFFICE O/P NEW MOD 45 MIN: CPT | Performed by: NURSE PRACTITIONER

## 2024-05-10 PROCEDURE — G8400 PT W/DXA NO RESULTS DOC: HCPCS | Performed by: NURSE PRACTITIONER

## 2024-05-10 PROCEDURE — 36415 COLL VENOUS BLD VENIPUNCTURE: CPT

## 2024-05-10 PROCEDURE — 1036F TOBACCO NON-USER: CPT | Performed by: NURSE PRACTITIONER

## 2024-05-10 PROCEDURE — 1090F PRES/ABSN URINE INCON ASSESS: CPT | Performed by: NURSE PRACTITIONER

## 2024-05-10 PROCEDURE — 84439 ASSAY OF FREE THYROXINE: CPT

## 2024-05-10 PROCEDURE — 1123F ACP DISCUSS/DSCN MKR DOCD: CPT | Performed by: NURSE PRACTITIONER

## 2024-05-10 PROCEDURE — G8427 DOCREV CUR MEDS BY ELIG CLIN: HCPCS | Performed by: NURSE PRACTITIONER

## 2024-05-10 PROCEDURE — 84443 ASSAY THYROID STIM HORMONE: CPT

## 2024-05-10 PROCEDURE — 3017F COLORECTAL CA SCREEN DOC REV: CPT | Performed by: NURSE PRACTITIONER

## 2024-05-10 NOTE — PATIENT INSTRUCTIONS
[facility]  Address: [address] [place] [id number]  Phone: [phone]    Medication: Semaglutide    Call on Monday afternoon or Tuesday to confirm that they have received your prescription and talk with them about payment

## 2024-05-10 NOTE — PROGRESS NOTES
option at this time.      6. Will have dietician meet patient at follow up appointments.    7. Potential side effects of the medication discussed. Discussed dosing, administration of medication.    8. Follow up: 4 weeks and as needed. Patient verbalized understanding of all instructions given.

## 2024-05-13 ENCOUNTER — TELEPHONE (OUTPATIENT)
Dept: BARIATRICS/WEIGHT MGMT | Age: 64
End: 2024-05-13

## 2024-05-13 NOTE — TELEPHONE ENCOUNTER
She has decided not to go with he Wegovy after doing some research. She wanted to know if there are any other avenues to Wegovy other than the 2 alternatives discussed at her appointment. She would like to speak with you

## 2024-05-13 NOTE — TELEPHONE ENCOUNTER
There are no other options. Weight loss medication options:  stimulant weight loss pill that I did not recommend because of her age and hypertension history   the injectable weight loss medications which are all in the same drug class which I ordered for her    We do have the GLB program and bariatric surgery option

## 2024-06-11 ENCOUNTER — HOSPITAL ENCOUNTER (OUTPATIENT)
Dept: VASCULAR LAB | Age: 64
Discharge: HOME | End: 2024-06-13
Attending: SURGERY
Payer: MEDICARE

## 2024-06-11 ENCOUNTER — TELEPHONE (OUTPATIENT)
Dept: VASCULAR SURGERY | Age: 64
End: 2024-06-11

## 2024-06-11 DIAGNOSIS — I73.9 PAD (PERIPHERAL ARTERY DISEASE) (HCC): ICD-10-CM

## 2024-06-11 LAB
VAS LEFT ABI: 0.8
VAS LEFT ANKLE BP: 136 MMHG
VAS LEFT ARM BP: 179 MMHG
VAS LEFT DORSALIS PEDIS BP: 143 MMHG
VAS LEFT PTA BP: 135 MMHG
VAS LEFT TBI: 1.42
VAS LEFT TOE PRESSURE: 254 MMHG
VAS RIGHT ABI: 0.82
VAS RIGHT ANKLE BP: 133 MMHG
VAS RIGHT ARM BP: 166 MMHG
VAS RIGHT DORSALIS PEDIS BP: 147 MMHG
VAS RIGHT PTA BP: 133 MMHG
VAS RIGHT TBI: 0.6
VAS RIGHT TOE PRESSURE: 107 MMHG

## 2024-06-11 PROCEDURE — 93923 UPR/LXTR ART STDY 3+ LVLS: CPT

## 2024-06-11 NOTE — TELEPHONE ENCOUNTER
Patient called today stating that her legs are very painful, she is wondering if you could order a PVR study for her?

## 2024-07-11 ENCOUNTER — OFFICE VISIT (OUTPATIENT)
Dept: VASCULAR SURGERY | Age: 64
End: 2024-07-11
Payer: MEDICARE

## 2024-07-11 VITALS
HEART RATE: 88 BPM | HEIGHT: 71 IN | OXYGEN SATURATION: 95 % | WEIGHT: 293 LBS | SYSTOLIC BLOOD PRESSURE: 149 MMHG | DIASTOLIC BLOOD PRESSURE: 71 MMHG | BODY MASS INDEX: 41.02 KG/M2 | TEMPERATURE: 97.8 F

## 2024-07-11 DIAGNOSIS — I73.9 PAD (PERIPHERAL ARTERY DISEASE) (HCC): Primary | ICD-10-CM

## 2024-07-11 DIAGNOSIS — I87.2 LYMPHEDEMA DUE TO VENOUS INSUFFICIENCY: ICD-10-CM

## 2024-07-11 DIAGNOSIS — I89.0 LYMPHEDEMA DUE TO VENOUS INSUFFICIENCY: ICD-10-CM

## 2024-07-11 DIAGNOSIS — R29.818 NEUROGENIC CLAUDICATION: ICD-10-CM

## 2024-07-11 PROCEDURE — 1036F TOBACCO NON-USER: CPT | Performed by: SURGERY

## 2024-07-11 PROCEDURE — G8427 DOCREV CUR MEDS BY ELIG CLIN: HCPCS | Performed by: SURGERY

## 2024-07-11 PROCEDURE — 99213 OFFICE O/P EST LOW 20 MIN: CPT | Performed by: SURGERY

## 2024-07-11 PROCEDURE — G8417 CALC BMI ABV UP PARAM F/U: HCPCS | Performed by: SURGERY

## 2024-07-11 PROCEDURE — G8400 PT W/DXA NO RESULTS DOC: HCPCS | Performed by: SURGERY

## 2024-07-11 PROCEDURE — 1090F PRES/ABSN URINE INCON ASSESS: CPT | Performed by: SURGERY

## 2024-07-11 PROCEDURE — 1123F ACP DISCUSS/DSCN MKR DOCD: CPT | Performed by: SURGERY

## 2024-07-11 PROCEDURE — 3017F COLORECTAL CA SCREEN DOC REV: CPT | Performed by: SURGERY

## 2024-07-11 RX ORDER — BIOTIN 1 MG
1000 TABLET ORAL
COMMUNITY

## 2024-07-11 RX ORDER — MAGNESIUM 30 MG
30 TABLET ORAL 2 TIMES DAILY
COMMUNITY

## 2024-07-11 NOTE — PROGRESS NOTES
8.6-50 MG per tablet Take 2 tablets by mouth daily (Patient not taking: Reported on 7/11/2024)      oxybutynin (DITROPAN-XL) 10 MG extended release tablet Take 1 tablet by mouth daily (Patient not taking: Reported on 2/1/2024)      carvedilol (COREG) 3.125 MG tablet Take 1 tablet by mouth 2 times daily (Patient not taking: Reported on 7/11/2024) 60 tablet 3     Current Facility-Administered Medications   Medication Dose Route Frequency Provider Last Rate Last Admin    lidocaine 1 % injection 2 mL  2 mL Intra-artICUlar Once Xu Garces MD         Social History:     Tobacco:    reports that she quit smoking about 35 years ago. Her smoking use included cigarettes. She started smoking about 35 years ago. She smoked an average of 0.1 packs per day. She has never used smokeless tobacco.  Alcohol:      reports no history of alcohol use.  Drug Use:  reports no history of drug use.  Occupation:   for years, nursing assistant, lots oflifting     Review of Systems:     Review of Systems   Constitutional:  Negative for chills and fever.   HENT:  Negative for congestion.    Eyes:  Negative for visual disturbance.   Respiratory:  Negative for chest tightness and shortness of breath.    Cardiovascular:  Positive for leg swelling. Negative for chest pain.   Gastrointestinal:  Negative for abdominal pain.   Endocrine: Negative.    Genitourinary: Negative.    Musculoskeletal:  Positive for arthralgias, back pain and gait problem.   Skin:  Negative for color change and wound.   Allergic/Immunologic: Negative.    Neurological:  Positive for numbness. Negative for facial asymmetry, speech difficulty and weakness.   Hematological: Negative.    Psychiatric/Behavioral: Negative.       Physical Exam:     Vitals:  BP (!) 149/71 (Site: Left Lower Arm, Position: Sitting, Cuff Size: Medium Adult)   Pulse 88   Temp 97.8 °F (36.6 °C) (Temporal)   Ht 1.803 m (5' 11\")   Wt (!) 154.2 kg (339 lb 14.4 oz)   LMP 09/07/2007

## 2024-07-25 PROBLEM — R29.818 NEUROGENIC CLAUDICATION: Status: ACTIVE | Noted: 2024-07-25

## 2024-07-25 ASSESSMENT — ENCOUNTER SYMPTOMS
ABDOMINAL PAIN: 0
ALLERGIC/IMMUNOLOGIC NEGATIVE: 1
CHEST TIGHTNESS: 0
BACK PAIN: 1
SHORTNESS OF BREATH: 0
COLOR CHANGE: 0

## 2024-07-31 ENCOUNTER — TELEPHONE (OUTPATIENT)
Dept: INTERNAL MEDICINE | Age: 64
End: 2024-07-31

## 2024-07-31 DIAGNOSIS — M54.16 CHRONIC LUMBAR RADICULOPATHY: ICD-10-CM

## 2024-07-31 RX ORDER — IBUPROFEN 600 MG/1
TABLET ORAL
Qty: 30 TABLET | Refills: 1 | Status: SHIPPED | OUTPATIENT
Start: 2024-07-31

## 2024-07-31 NOTE — TELEPHONE ENCOUNTER
Last visit: 2/1/24  Last Med refill: 1/25/24  Does patient have enough medication for 72 hours: No:     Next Visit Date: LVM for patient to call and schedule an appt  Future Appointments   Date Time Provider Department Center   7/17/2025  3:00 PM [facility]       Health Maintenance   Topic Date Due    COVID-19 Vaccine (1) Never done    DTaP/Tdap/Td vaccine (1 - Tdap) Never done    DEXA (modify frequency per FRAX score)  Never done    Cervical cancer screen  10/14/2017    Respiratory Syncytial Virus (RSV) Pregnant or age 60 yrs+ (1 - 1-dose 60+ series) Never done    Breast cancer screen  06/07/2021    Pneumococcal 65+ years Vaccine (1 of 1 - PCV) Never done    Colorectal Cancer Screen  05/19/2024    Shingles vaccine (1 of 2) 08/21/2024 (Originally 2/11/2009)    Annual Wellness Visit (Medicare)  08/01/2024    Flu vaccine (1) 08/01/2024    Depression Screen  02/01/2025    Lipids  08/26/2027    Hepatitis C screen  Completed    HIV screen  Completed    Hepatitis A vaccine  Aged Out    Hepatitis B vaccine  Aged Out    Hib vaccine  Aged Out    Polio vaccine  Aged Out    Meningococcal (ACWY) vaccine  Aged Out       Hemoglobin A1C (%)   Date Value   08/26/2022 5.6   04/02/2020 5.6   10/23/2018 5.9             ( goal A1C is < 7)   No components found for: \"LABMICR\"  No components found for: \"LDLCHOLESTEROL\", \"LDLCALC\"    (goal LDL is <100)   AST (U/L)   Date Value   11/01/2022 24     ALT (U/L)   Date Value   11/01/2022 26     BUN (mg/dL)   Date Value   11/01/2022 14     BP Readings from Last 3 Encounters:   07/11/24 (!) 149/71   05/10/24 (!) 155/68   02/01/24 128/78          (goal 120/80)    All Future Testing planned in [facility]  Lab Frequency Next Occurrence   MAURI DIGITAL SCREEN W OR WO CAD BILATERAL Once 11/30/2023   Vascular lower arterial complete physiologic Doppler at rest Once 07/10/2025               Patient Active Problem List:     Morbid obesity due to excess calories (HCC)

## 2024-08-05 RX ORDER — GABAPENTIN 300 MG/1
300 CAPSULE ORAL 3 TIMES DAILY
Qty: 90 CAPSULE | Refills: 2 | OUTPATIENT
Start: 2024-08-05 | End: 2024-11-03

## 2024-08-08 ENCOUNTER — TELEPHONE (OUTPATIENT)
Dept: VASCULAR SURGERY | Age: 64
End: 2024-08-08

## 2024-08-14 RX ORDER — GABAPENTIN 300 MG/1
300 CAPSULE ORAL 3 TIMES DAILY
Qty: 90 CAPSULE | Refills: 2 | OUTPATIENT
Start: 2024-08-14 | End: 2024-11-12

## 2024-08-20 ENCOUNTER — APPOINTMENT (OUTPATIENT)
Dept: CT IMAGING | Age: 64
End: 2024-08-20
Payer: MEDICARE

## 2024-08-20 ENCOUNTER — HOSPITAL ENCOUNTER (EMERGENCY)
Age: 64
Discharge: HOME | End: 2024-08-20
Attending: EMERGENCY MEDICINE
Payer: MEDICARE

## 2024-08-20 VITALS
RESPIRATION RATE: 17 BRPM | DIASTOLIC BLOOD PRESSURE: 81 MMHG | SYSTOLIC BLOOD PRESSURE: 152 MMHG | HEART RATE: 96 BPM | BODY MASS INDEX: 45.33 KG/M2 | WEIGHT: 293 LBS | TEMPERATURE: 97.2 F | OXYGEN SATURATION: 100 %

## 2024-08-20 DIAGNOSIS — N30.00 ACUTE CYSTITIS WITHOUT HEMATURIA: Primary | ICD-10-CM

## 2024-08-20 LAB
ALBUMIN SERPL-MCNC: 4.5 G/DL (ref 3.5–5.2)
ALBUMIN/GLOB SERPL: 1 {RATIO} (ref 1–2.5)
ALP SERPL-CCNC: 115 U/L (ref 35–104)
ALT SERPL-CCNC: 22 U/L (ref 10–35)
ANION GAP SERPL CALCULATED.3IONS-SCNC: 12 MMOL/L (ref 9–16)
AST SERPL-CCNC: 25 U/L (ref 10–35)
BASOPHILS # BLD: 0.04 K/UL (ref 0–0.2)
BASOPHILS NFR BLD: 1 % (ref 0–2)
BILIRUB SERPL-MCNC: 0.3 MG/DL (ref 0–1.2)
BILIRUB UR QL STRIP: NEGATIVE
BUN SERPL-MCNC: 19 MG/DL (ref 8–23)
CALCIUM SERPL-MCNC: 9.9 MG/DL (ref 8.6–10.4)
CHLORIDE SERPL-SCNC: 107 MMOL/L (ref 98–107)
CLARITY UR: ABNORMAL
CO2 SERPL-SCNC: 22 MMOL/L (ref 20–31)
COLOR UR: ABNORMAL
CREAT SERPL-MCNC: 0.7 MG/DL (ref 0.5–0.9)
CRYSTALS URNS MICRO: ABNORMAL /HPF
CRYSTALS URNS MICRO: ABNORMAL /HPF
EOSINOPHIL # BLD: 0.07 K/UL (ref 0–0.44)
EOSINOPHILS RELATIVE PERCENT: 1 % (ref 1–4)
EPI CELLS #/AREA URNS HPF: ABNORMAL /HPF (ref 0–5)
ERYTHROCYTE [DISTWIDTH] IN BLOOD BY AUTOMATED COUNT: 14.5 % (ref 11.8–14.4)
GFR, ESTIMATED: >90 ML/MIN/1.73M2
GLUCOSE SERPL-MCNC: 124 MG/DL (ref 74–99)
GLUCOSE UR STRIP-MCNC: NEGATIVE MG/DL
HCT VFR BLD AUTO: 41.8 % (ref 36.3–47.1)
HGB BLD-MCNC: 13.7 G/DL (ref 11.9–15.1)
HGB UR QL STRIP.AUTO: NEGATIVE
IMM GRANULOCYTES # BLD AUTO: <0.03 K/UL (ref 0–0.3)
IMM GRANULOCYTES NFR BLD: 0 %
KETONES UR STRIP-MCNC: ABNORMAL MG/DL
LEUKOCYTE ESTERASE UR QL STRIP: ABNORMAL
LYMPHOCYTES NFR BLD: 2.35 K/UL (ref 1.1–3.7)
LYMPHOCYTES RELATIVE PERCENT: 34 % (ref 24–43)
MCH RBC QN AUTO: 29.4 PG (ref 25.2–33.5)
MCHC RBC AUTO-ENTMCNC: 32.8 G/DL (ref 28.4–34.8)
MCV RBC AUTO: 89.7 FL (ref 82.6–102.9)
MONOCYTES NFR BLD: 0.54 K/UL (ref 0.1–1.2)
MONOCYTES NFR BLD: 8 % (ref 3–12)
MUCOUS THREADS URNS QL MICRO: ABNORMAL
NEUTROPHILS NFR BLD: 56 % (ref 36–65)
NEUTS SEG NFR BLD: 3.92 K/UL (ref 1.5–8.1)
NITRITE UR QL STRIP: NEGATIVE
NRBC BLD-RTO: 0 PER 100 WBC
PH UR STRIP: 5.5 [PH] (ref 5–8)
PLATELET # BLD AUTO: 277 K/UL (ref 138–453)
PMV BLD AUTO: 10.6 FL (ref 8.1–13.5)
POTASSIUM SERPL-SCNC: 3.6 MMOL/L (ref 3.7–5.3)
PROT SERPL-MCNC: 8.1 G/DL (ref 6.6–8.7)
PROT UR STRIP-MCNC: ABNORMAL MG/DL
RBC # BLD AUTO: 4.66 M/UL (ref 3.95–5.11)
RBC # BLD: ABNORMAL 10*6/UL
RBC #/AREA URNS HPF: ABNORMAL /HPF (ref 0–2)
SODIUM SERPL-SCNC: 141 MMOL/L (ref 136–145)
SP GR UR STRIP: 1.03 (ref 1–1.03)
UROBILINOGEN UR STRIP-ACNC: NORMAL EU/DL (ref 0–1)
WBC #/AREA URNS HPF: ABNORMAL /HPF (ref 0–5)
WBC OTHER # BLD: 6.9 K/UL (ref 3.5–11.3)

## 2024-08-20 PROCEDURE — 87186 SC STD MICRODIL/AGAR DIL: CPT

## 2024-08-20 PROCEDURE — 6370000000 HC RX 637 (ALT 250 FOR IP): Performed by: EMERGENCY MEDICINE

## 2024-08-20 PROCEDURE — 80053 COMPREHEN METABOLIC PANEL: CPT

## 2024-08-20 PROCEDURE — 87086 URINE CULTURE/COLONY COUNT: CPT

## 2024-08-20 PROCEDURE — 99284 EMERGENCY DEPT VISIT MOD MDM: CPT

## 2024-08-20 PROCEDURE — 74176 CT ABD & PELVIS W/O CONTRAST: CPT

## 2024-08-20 PROCEDURE — 86403 PARTICLE AGGLUT ANTBDY SCRN: CPT

## 2024-08-20 PROCEDURE — 85025 COMPLETE CBC W/AUTO DIFF WBC: CPT

## 2024-08-20 PROCEDURE — 81001 URINALYSIS AUTO W/SCOPE: CPT

## 2024-08-20 PROCEDURE — 87077 CULTURE AEROBIC IDENTIFY: CPT

## 2024-08-20 RX ORDER — SULFAMETHOXAZOLE AND TRIMETHOPRIM 800; 160 MG/1; MG/1
1 TABLET ORAL 2 TIMES DAILY
Qty: 14 TABLET | Refills: 0 | Status: SHIPPED | OUTPATIENT
Start: 2024-08-20 | End: 2024-08-27

## 2024-08-20 RX ORDER — SULFAMETHOXAZOLE AND TRIMETHOPRIM 800; 160 MG/1; MG/1
1 TABLET ORAL ONCE
Status: COMPLETED | OUTPATIENT
Start: 2024-08-20 | End: 2024-08-20

## 2024-08-20 RX ORDER — SULFAMETHOXAZOLE AND TRIMETHOPRIM 800; 160 MG/1; MG/1
1 TABLET ORAL 2 TIMES DAILY
Qty: 14 TABLET | Refills: 0 | Status: SHIPPED | OUTPATIENT
Start: 2024-08-20 | End: 2024-08-20

## 2024-08-20 RX ORDER — ACETAMINOPHEN 500 MG
1000 TABLET ORAL ONCE
Status: COMPLETED | OUTPATIENT
Start: 2024-08-20 | End: 2024-08-20

## 2024-08-20 RX ADMIN — ACETAMINOPHEN 1000 MG: 500 TABLET ORAL at 14:15

## 2024-08-20 RX ADMIN — SULFAMETHOXAZOLE AND TRIMETHOPRIM 1 TABLET: 800; 160 TABLET ORAL at 15:48

## 2024-08-20 ASSESSMENT — ENCOUNTER SYMPTOMS
SHORTNESS OF BREATH: 0
NAUSEA: 0
ABDOMINAL PAIN: 1
VOMITING: 0

## 2024-08-20 ASSESSMENT — PAIN DESCRIPTION - LOCATION: LOCATION: OTHER (COMMENT)

## 2024-08-20 ASSESSMENT — PAIN - FUNCTIONAL ASSESSMENT: PAIN_FUNCTIONAL_ASSESSMENT: NONE - DENIES PAIN

## 2024-08-20 NOTE — ED PROVIDER NOTES
[facility]     Emergency Department     Faculty Attestation    I performed a history and physical examination of the patient and discussed management with the resident. I reviewed the resident’s note and agree with the documented findings and plan of care. Any areas of disagreement are noted on the chart. I was personally present for the key portions of any procedures. I have documented in the chart those procedures where I was not present during the key portions. I have reviewed the emergency nurses triage note. I agree with the chief complaint, past medical history, past surgical history, allergies, medications, social and family history as documented unless otherwise noted below. Documentation of the HPI, Physical Exam and Medical Decision Making performed by medical students or scribes is based on my personal performance of the HPI, PE and MDM. For Physician Assistant/ Nurse Practitioner cases/documentation I have personally evaluated this patient and have completed at least one if not all key elements of the E/M (history, physical exam, and MDM). Additional findings are as noted.    Vital signs:   Vitals:    08/20/24 1348   BP: (!) 182/94   Pulse: 96   Resp: 17   Temp: 97.2 °F (36.2 °C)   SpO2: 100%      64-year-old female here with dysuria, frequency, right flank pain, and right lower abdominal pain. History of recurrent UTI. No history of stones. No nausea, vomiting, fever. No vaginal bleeding or discharge. Prior cystoscopy and cholecystectomy.  On exam, the patient is alert and afebrile.  Her abdomen is soft, with tenderness in the right lower quadrant.  No rebound or guarding.  Bowel sounds normal.  No right CVA tenderness on my exam.  Plan for urinalysis and labs.            Keesha Molina M.D,  Attending Emergency  Physician            Keesha Molina MD  08/20/24 5085

## 2024-08-20 NOTE — ED PROVIDER NOTES
[facility] ED  Emergency Department Encounter  Emergency Medicine Resident     Pt Name:Ifeoma Martinez  MRN: [id number]  Birthdate 1960  Date of evaluation: 8/20/24  PCP:  Rebekah Elise MD  Note Started: 2:08 PM EDT      CHIEF COMPLAINT       Chief Complaint   Patient presents with    Dysuria    Urinary Frequency    Flank Pain     Right side       HISTORY OF PRESENT ILLNESS  (Location/Symptom, Timing/Onset, Context/Setting, Quality, Duration, Modifying Factors, Severity.)      Ifeoma Martinez is a 64 y.o. female who presents with dysuria, foul-smelling urine, right lower quadrant groin pain and flank pain.  Patient states her symptoms been ongoing for the last 3 days.  She states she has a history of recurrent UTI.  Per chart review patient has had cystoscopy for recurrent UTIs and anatomic evaluation in the past.  At that time she was found to have mild urethral stenosis and Trevicta speculations suspicious for bladder outlet obstruction and voiding dysfunction.    PAST MEDICAL / SURGICAL / SOCIAL / FAMILY HISTORY      has a past medical history of Acquired cystic kidney disease, Anemia, Arthritis, Asthma, Bilateral leg and foot pain, Degeneration of cervical intervertebral disc, Depression, Failed back syndrome, History of recurrent UTI (urinary tract infection), Hyperlipidemia, Hypertension, Lumbar disc herniation with radiculopathy, Obesity, Spinal stenosis, and Wound infection after surgery.       has a past surgical history that includes Foot surgery (Bilateral); Gallbladder surgery; Lumbar spine surgery (1986); Lumbar spine surgery (4/2012); Nerve Block (10/26/2015); Nerve Block (12/22/15); Spine surgery (07/24/2016); back surgery; Vein Surgery (Bilateral, 03/2017); Cystocopy (Bilateral, 05/20/2022); and Cystoscopy (Bilateral, 5/20/2022).      Social History     Socioeconomic History    Marital status: Single     Spouse name: Not on file    Number of children: Not on file    Years

## 2024-08-20 NOTE — DISCHARGE INSTRUCTIONS
You were seen in the Emergency Department and diagnosed with a urinary tract infection [UTI].  You are being discharged on Bactrim.  Ensure you take it twice daily for 5 days, treat completely intact.  There is no evidence of a kidney stone at this time.     As we discussed, your urine from today cultured however this takes a few days.  Susceptibility of the bacteria will be tested against Bactrim.  If the bacteria is resent to Bactrim, you will receive phone call with a prescription for a new antibiotic however Bactrim is broad working and should work on most UTIs.    Return to the emergency department if you have worsening pain, fever, or any other acute medical concern.    Continue taking your medication as prescribed

## 2024-08-21 LAB
MICROORGANISM SPEC CULT: ABNORMAL
MICROORGANISM SPEC CULT: ABNORMAL
SPECIMEN DESCRIPTION: ABNORMAL

## 2024-08-22 NOTE — PROGRESS NOTES
Reviewed patient's urine culture - culture positive for E. Coli and GBS.  Patient was discharged on bactrim, and culture is sensitive to prescribed medication.  Antibiotic prescribed at discharge is appropriate - no changes made to antibiotic regimen.     Signed Jocelyn Ayoub, PharmD Candidate

## 2024-09-25 ENCOUNTER — OFFICE VISIT (OUTPATIENT)
Dept: INTERNAL MEDICINE | Age: 64
End: 2024-09-25
Payer: MEDICARE

## 2024-09-25 VITALS
SYSTOLIC BLOOD PRESSURE: 132 MMHG | BODY MASS INDEX: 41.02 KG/M2 | WEIGHT: 293 LBS | HEIGHT: 71 IN | DIASTOLIC BLOOD PRESSURE: 82 MMHG | HEART RATE: 76 BPM

## 2024-09-25 DIAGNOSIS — I10 ESSENTIAL HYPERTENSION: ICD-10-CM

## 2024-09-25 DIAGNOSIS — Z00.00 MEDICARE ANNUAL WELLNESS VISIT, SUBSEQUENT: Primary | ICD-10-CM

## 2024-09-25 DIAGNOSIS — I89.0 LYMPHEDEMA DUE TO VENOUS INSUFFICIENCY: ICD-10-CM

## 2024-09-25 DIAGNOSIS — Z12.31 BREAST CANCER SCREENING BY MAMMOGRAM: ICD-10-CM

## 2024-09-25 DIAGNOSIS — I87.2 LYMPHEDEMA DUE TO VENOUS INSUFFICIENCY: ICD-10-CM

## 2024-09-25 DIAGNOSIS — M17.12 PRIMARY OSTEOARTHRITIS OF LEFT KNEE: ICD-10-CM

## 2024-09-25 DIAGNOSIS — I10 HYPERTENSION, UNSPECIFIED TYPE: ICD-10-CM

## 2024-09-25 DIAGNOSIS — G95.19 OTHER VASCULAR MYELOPATHIES (HCC): ICD-10-CM

## 2024-09-25 PROCEDURE — 99212 OFFICE O/P EST SF 10 MIN: CPT | Performed by: INTERNAL MEDICINE

## 2024-09-25 RX ORDER — FUROSEMIDE 20 MG
20 TABLET ORAL DAILY
Qty: 60 TABLET | Refills: 1 | Status: SHIPPED | OUTPATIENT
Start: 2024-09-25

## 2024-09-25 RX ORDER — OXYCODONE AND ACETAMINOPHEN 5; 325 MG/1; MG/1
1 TABLET ORAL 2 TIMES DAILY
COMMUNITY
Start: 2024-09-24

## 2024-09-25 RX ORDER — HYDROCHLOROTHIAZIDE 50 MG/1
TABLET ORAL
Qty: 30 TABLET | Refills: 2 | Status: SHIPPED | OUTPATIENT
Start: 2024-09-25

## 2024-09-25 SDOH — ECONOMIC STABILITY: FOOD INSECURITY: WITHIN THE PAST 12 MONTHS, YOU WORRIED THAT YOUR FOOD WOULD RUN OUT BEFORE YOU GOT MONEY TO BUY MORE.: NEVER TRUE

## 2024-09-25 SDOH — ECONOMIC STABILITY: FOOD INSECURITY: WITHIN THE PAST 12 MONTHS, THE FOOD YOU BOUGHT JUST DIDN'T LAST AND YOU DIDN'T HAVE MONEY TO GET MORE.: NEVER TRUE

## 2024-09-25 SDOH — ECONOMIC STABILITY: INCOME INSECURITY: HOW HARD IS IT FOR YOU TO PAY FOR THE VERY BASICS LIKE FOOD, HOUSING, MEDICAL CARE, AND HEATING?: NOT HARD AT ALL

## 2024-09-25 ASSESSMENT — PATIENT HEALTH QUESTIONNAIRE - PHQ9
4. FEELING TIRED OR HAVING LITTLE ENERGY: MORE THAN HALF THE DAYS
SUM OF ALL RESPONSES TO PHQ QUESTIONS 1-9: 13
7. TROUBLE CONCENTRATING ON THINGS, SUCH AS READING THE NEWSPAPER OR WATCHING TELEVISION: SEVERAL DAYS
1. LITTLE INTEREST OR PLEASURE IN DOING THINGS: SEVERAL DAYS
8. MOVING OR SPEAKING SO SLOWLY THAT OTHER PEOPLE COULD HAVE NOTICED. OR THE OPPOSITE, BEING SO FIGETY OR RESTLESS THAT YOU HAVE BEEN MOVING AROUND A LOT MORE THAN USUAL: NEARLY EVERY DAY
6. FEELING BAD ABOUT YOURSELF - OR THAT YOU ARE A FAILURE OR HAVE LET YOURSELF OR YOUR FAMILY DOWN: NOT AT ALL
SUM OF ALL RESPONSES TO PHQ QUESTIONS 1-9: 13
5. POOR APPETITE OR OVEREATING: MORE THAN HALF THE DAYS
SUM OF ALL RESPONSES TO PHQ QUESTIONS 1-9: 13
9. THOUGHTS THAT YOU WOULD BE BETTER OFF DEAD, OR OF HURTING YOURSELF: NOT AT ALL
2. FEELING DOWN, DEPRESSED OR HOPELESS: SEVERAL DAYS
3. TROUBLE FALLING OR STAYING ASLEEP: NEARLY EVERY DAY
SUM OF ALL RESPONSES TO PHQ QUESTIONS 1-9: 13
10. IF YOU CHECKED OFF ANY PROBLEMS, HOW DIFFICULT HAVE THESE PROBLEMS MADE IT FOR YOU TO DO YOUR WORK, TAKE CARE OF THINGS AT HOME, OR GET ALONG WITH OTHER PEOPLE: NOT DIFFICULT AT ALL
SUM OF ALL RESPONSES TO PHQ9 QUESTIONS 1 & 2: 2

## 2024-09-26 ENCOUNTER — CLINICAL DOCUMENTATION (OUTPATIENT)
Dept: SPIRITUAL SERVICES | Age: 64
End: 2024-09-26

## 2024-10-01 DIAGNOSIS — Z13.820 ENCOUNTER FOR OSTEOPOROSIS SCREENING IN ASYMPTOMATIC POSTMENOPAUSAL PATIENT: ICD-10-CM

## 2024-10-01 DIAGNOSIS — Z78.0 ENCOUNTER FOR OSTEOPOROSIS SCREENING IN ASYMPTOMATIC POSTMENOPAUSAL PATIENT: ICD-10-CM

## 2024-10-01 DIAGNOSIS — Z13.820 OSTEOPOROSIS SCREENING: Primary | ICD-10-CM

## 2024-10-10 ENCOUNTER — TELEPHONE (OUTPATIENT)
Dept: INTERNAL MEDICINE | Age: 64
End: 2024-10-10

## 2024-10-10 DIAGNOSIS — Z12.11 COLON CANCER SCREENING: Primary | ICD-10-CM

## 2024-10-10 NOTE — TELEPHONE ENCOUNTER
[employer] calling saying pt icd code not correct , z12.11 and z12.12     Please call [phone]   With reference number [id number]

## 2024-10-15 ENCOUNTER — OFFICE VISIT (OUTPATIENT)
Dept: INTERNAL MEDICINE | Age: 64
End: 2024-10-15
Payer: MEDICARE

## 2024-10-15 VITALS
HEIGHT: 71 IN | SYSTOLIC BLOOD PRESSURE: 122 MMHG | BODY MASS INDEX: 41.02 KG/M2 | DIASTOLIC BLOOD PRESSURE: 82 MMHG | OXYGEN SATURATION: 98 % | HEART RATE: 92 BPM | WEIGHT: 293 LBS

## 2024-10-15 DIAGNOSIS — R09.82 POST-NASAL DRIP: ICD-10-CM

## 2024-10-15 DIAGNOSIS — I89.0 LYMPHEDEMA DUE TO VENOUS INSUFFICIENCY: ICD-10-CM

## 2024-10-15 DIAGNOSIS — E46 PROTEIN-CALORIE MALNUTRITION, UNSPECIFIED SEVERITY (HCC): ICD-10-CM

## 2024-10-15 DIAGNOSIS — N30.00 ACUTE CYSTITIS WITHOUT HEMATURIA: Primary | ICD-10-CM

## 2024-10-15 DIAGNOSIS — I87.2 LYMPHEDEMA DUE TO VENOUS INSUFFICIENCY: ICD-10-CM

## 2024-10-15 PROCEDURE — G8484 FLU IMMUNIZE NO ADMIN: HCPCS

## 2024-10-15 PROCEDURE — G8400 PT W/DXA NO RESULTS DOC: HCPCS

## 2024-10-15 PROCEDURE — G8427 DOCREV CUR MEDS BY ELIG CLIN: HCPCS

## 2024-10-15 PROCEDURE — 99213 OFFICE O/P EST LOW 20 MIN: CPT

## 2024-10-15 PROCEDURE — 1036F TOBACCO NON-USER: CPT

## 2024-10-15 PROCEDURE — 1123F ACP DISCUSS/DSCN MKR DOCD: CPT

## 2024-10-15 PROCEDURE — 3017F COLORECTAL CA SCREEN DOC REV: CPT

## 2024-10-15 PROCEDURE — G8417 CALC BMI ABV UP PARAM F/U: HCPCS

## 2024-10-15 PROCEDURE — 1090F PRES/ABSN URINE INCON ASSESS: CPT

## 2024-10-15 RX ORDER — FLUTICASONE PROPIONATE 50 MCG
2 SPRAY, SUSPENSION (ML) NASAL DAILY
Qty: 16 G | Refills: 0 | Status: SHIPPED | OUTPATIENT
Start: 2024-10-15

## 2024-10-15 RX ORDER — SULFAMETHOXAZOLE/TRIMETHOPRIM 800-160 MG
1 TABLET ORAL 2 TIMES DAILY
Qty: 14 TABLET | Refills: 0 | Status: SHIPPED | OUTPATIENT
Start: 2024-10-15 | End: 2024-10-22

## 2024-10-15 RX ORDER — CETIRIZINE HYDROCHLORIDE 10 MG/1
10 TABLET ORAL DAILY
Qty: 10 TABLET | Refills: 0 | Status: SHIPPED | OUTPATIENT
Start: 2024-10-15 | End: 2024-10-25

## 2024-10-15 NOTE — PROGRESS NOTES
An order for nutritional supplements was ordered for patient today. The patient is not able to take in enough calories through food and has a diagnosis of Chronic lymphedema and protein calorie malnutrition requiring the need for nutritional support.    Ash Terrazas MD  Internal Medicine Resident, PGY-2    
Palpations: Abdomen is soft.   Musculoskeletal:         General: Normal range of motion.   Skin:     General: Skin is dry.      Findings: No rash.   Neurological:      Mental Status: She is alert and oriented to person, place, and time.           ________________________________________________________________________  Diagnostic findings:  CBC:  Lab Results   Component Value Date/Time    WBC 6.9 08/20/2024 02:10 PM    HGB 13.7 08/20/2024 02:10 PM     08/20/2024 02:10 PM     04/10/2012 05:39 AM       BMP:    Lab Results   Component Value Date/Time     08/20/2024 02:10 PM    K 3.6 08/20/2024 02:10 PM     08/20/2024 02:10 PM    CO2 22 08/20/2024 02:10 PM    BUN 19 08/20/2024 02:10 PM    CREATININE 0.7 08/20/2024 02:10 PM    GLUCOSE 124 08/20/2024 02:10 PM    GLUCOSE 87 04/18/2012 03:45 PM       HEMOGLOBIN A1C:   Lab Results   Component Value Date/Time    LABA1C 5.6 08/26/2022 02:05 PM       FASTING LIPID PANEL:  Lab Results   Component Value Date    CHOL 321 (H) 04/30/2021    HDL 91 08/26/2022    TRIG 66 04/30/2021     ________________________________________________________________________  Assessment and Plan:  Ifeoma was seen today for cough and frequent/recurrent uti.    Diagnoses and all orders for this visit:    Acute cystitis without hematuria  -     sulfamethoxazole-trimethoprim (BACTRIM DS;SEPTRA DS) 800-160 MG per tablet; Take 1 tablet by mouth 2 times daily for 7 days  -advised patient to call office in a week if symptoms dont resolve with bactrim,   -advised to visit ER if any chills/decreased urine output     Post-nasal drip  -     fluticasone (FLONASE) 50 MCG/ACT nasal spray; 2 sprays by Each Nostril route daily  -     cetirizine (ZYRTEC) 10 MG tablet; Take 1 tablet by mouth daily for 10 days  -she may need PPI if this dosent work, cough does not seem infective at this point,            ________________________________________________________________________  Follow up and

## 2024-11-01 ENCOUNTER — TELEPHONE (OUTPATIENT)
Dept: INTERNAL MEDICINE | Age: 64
End: 2024-11-01

## 2024-11-01 NOTE — TELEPHONE ENCOUNTER
VM received from [facility] with [employer] re: nutrition shakes that were ordered: high protein, low calorie - [facility] states they do not have an equivalent for this request. Pt is requesting regular Ensure shakes be dispensed, but these are 250 calories each. [facility] further states there is discrepancy in chart notes: one place says pt not able to take in enough calories, another place indicates the supplements are for protein malnutrition. In addition, pt's BMI is 47.72 so they want to clarify if pt should have Ensure shakes that are 250 calories each.    Call back to [facility] with [employer] at [phone] x. [phone], no answer. Left HIPAA compliant message identifying self and nature of call, requested call back to writer, phone number given.

## 2024-11-04 NOTE — TELEPHONE ENCOUNTER
Please review previous note and advise. Is it ok for pt to receive regular Ensure (250 calories/bottle) or would you like us to send the high protein Ensure order to an alternate company?

## 2024-11-05 NOTE — TELEPHONE ENCOUNTER
PC received from [facility] with [employer] needing clarification for supplement order. Writer discussed with PCP as she is in office. PCP would like to discuss this with pt directly at appt on 11/20/24.    PC to pt to update her on plan to discuss need for supplements at appt on 11/20/24, pt verbalized understanding and agreement with plan.

## 2024-11-07 LAB — NONINV COLON CA DNA+OCC BLD SCRN STL QL: NEGATIVE

## 2024-11-13 DIAGNOSIS — M54.16 CHRONIC LUMBAR RADICULOPATHY: ICD-10-CM

## 2024-11-13 NOTE — TELEPHONE ENCOUNTER
..Request for   Requested Prescriptions     Pending Prescriptions Disp Refills    ibuprofen (ADVIL;MOTRIN) 600 MG tablet [Pharmacy Med Name: IBUPROFEN 600MG TABS] 30 tablet 0     Sig: TAKE ONE TABLET BY MOUTH EVERY 8 HOURS AS NEEDED FOR PAIN    .      Please review and e-scribe to pharmacy listed in chart if appropriate. Thank you.      Last Visit Date: 10/15/2024  Next Visit Date: 11/20/2024    Future Appointments   Date Time Provider Department Center   11/20/2024  2:00 PM Rebekah Elise MD [facility] DEP   11/29/2024  2:00 PM STA SCR MAMMO RM 2 STAZ MAMMO STA Radiolog   11/29/2024  2:30 PM STA DEXA RM STAZ MAMMO STA Radiolog   12/18/2024  2:20 PM Rebekah Elise MD [facility] DEP   7/17/2025  3:00 PM Rhett Dumont MD [facility]       Health Maintenance   Topic Date Due    DEXA (modify frequency per FRAX score)  Never done    Cervical cancer screen  10/14/2017    Respiratory Syncytial Virus (RSV) Pregnant or age 60 yrs+ (1 - 1-dose 60+ series) Never done    Breast cancer screen  06/07/2021    Pneumococcal 65+ years Vaccine (1 of 1 - PCV) Never done    Flu vaccine (1) Never done    COVID-19 Vaccine (1 - 2023-24 season) Never done    DTaP/Tdap/Td vaccine (1 - Tdap) 08/19/2025 (Originally 2/11/1978)    Shingles vaccine (1 of 2) 09/25/2025 (Originally 2/11/2009)    Diabetes screen  08/26/2025    Depression Monitoring  09/25/2025    Annual Wellness Visit (Medicare)  09/26/2025    Lipids  08/26/2027    Colorectal Cancer Screen  10/30/2027    Hepatitis C screen  Completed    HIV screen  Completed    Hepatitis A vaccine  Aged Out    Hepatitis B vaccine  Aged Out    Hib vaccine  Aged Out    Polio vaccine  Aged Out    Meningococcal (ACWY) vaccine  Aged Out    Depression Screen  Discontinued       Hemoglobin A1C (%)   Date Value   08/26/2022 5.6   04/02/2020 5.6   10/23/2018 5.9             ( goal A1C is < 7)   No components found for: \"LABMICR\"  No components found for: \"LDLCHOLESTEROL\",

## 2024-11-18 RX ORDER — IBUPROFEN 600 MG/1
TABLET, FILM COATED ORAL
Qty: 30 TABLET | Refills: 0 | Status: SHIPPED | OUTPATIENT
Start: 2024-11-18

## 2024-11-20 ENCOUNTER — OFFICE VISIT (OUTPATIENT)
Dept: INTERNAL MEDICINE | Age: 64
End: 2024-11-20
Payer: MEDICARE

## 2024-11-20 VITALS
OXYGEN SATURATION: 98 % | TEMPERATURE: 97.1 F | HEART RATE: 88 BPM | HEIGHT: 69 IN | BODY MASS INDEX: 43.4 KG/M2 | WEIGHT: 293 LBS | DIASTOLIC BLOOD PRESSURE: 83 MMHG | SYSTOLIC BLOOD PRESSURE: 138 MMHG

## 2024-11-20 DIAGNOSIS — M96.1 FAILED BACK SURGICAL SYNDROME: ICD-10-CM

## 2024-11-20 DIAGNOSIS — N30.00 ACUTE CYSTITIS WITHOUT HEMATURIA: ICD-10-CM

## 2024-11-20 DIAGNOSIS — M54.16 CHRONIC LUMBAR RADICULOPATHY: Primary | ICD-10-CM

## 2024-11-20 DIAGNOSIS — I10 ESSENTIAL HYPERTENSION: ICD-10-CM

## 2024-11-20 PROBLEM — N39.0 RECURRENT UTI: Status: RESOLVED | Noted: 2020-01-12 | Resolved: 2024-11-20

## 2024-11-20 PROCEDURE — 3017F COLORECTAL CA SCREEN DOC REV: CPT | Performed by: INTERNAL MEDICINE

## 2024-11-20 PROCEDURE — 99213 OFFICE O/P EST LOW 20 MIN: CPT | Performed by: INTERNAL MEDICINE

## 2024-11-20 PROCEDURE — 1090F PRES/ABSN URINE INCON ASSESS: CPT | Performed by: INTERNAL MEDICINE

## 2024-11-20 PROCEDURE — G8400 PT W/DXA NO RESULTS DOC: HCPCS | Performed by: INTERNAL MEDICINE

## 2024-11-20 PROCEDURE — G8427 DOCREV CUR MEDS BY ELIG CLIN: HCPCS | Performed by: INTERNAL MEDICINE

## 2024-11-20 PROCEDURE — 3075F SYST BP GE 130 - 139MM HG: CPT | Performed by: INTERNAL MEDICINE

## 2024-11-20 PROCEDURE — 3079F DIAST BP 80-89 MM HG: CPT | Performed by: INTERNAL MEDICINE

## 2024-11-20 PROCEDURE — G8417 CALC BMI ABV UP PARAM F/U: HCPCS | Performed by: INTERNAL MEDICINE

## 2024-11-20 PROCEDURE — 1036F TOBACCO NON-USER: CPT | Performed by: INTERNAL MEDICINE

## 2024-11-20 PROCEDURE — 1123F ACP DISCUSS/DSCN MKR DOCD: CPT | Performed by: INTERNAL MEDICINE

## 2024-11-20 PROCEDURE — G8484 FLU IMMUNIZE NO ADMIN: HCPCS | Performed by: INTERNAL MEDICINE

## 2024-11-20 ASSESSMENT — PATIENT HEALTH QUESTIONNAIRE - PHQ9
3. TROUBLE FALLING OR STAYING ASLEEP: NOT AT ALL
8. MOVING OR SPEAKING SO SLOWLY THAT OTHER PEOPLE COULD HAVE NOTICED. OR THE OPPOSITE, BEING SO FIGETY OR RESTLESS THAT YOU HAVE BEEN MOVING AROUND A LOT MORE THAN USUAL: NOT AT ALL
SUM OF ALL RESPONSES TO PHQ QUESTIONS 1-9: 0
10. IF YOU CHECKED OFF ANY PROBLEMS, HOW DIFFICULT HAVE THESE PROBLEMS MADE IT FOR YOU TO DO YOUR WORK, TAKE CARE OF THINGS AT HOME, OR GET ALONG WITH OTHER PEOPLE: NOT DIFFICULT AT ALL
SUM OF ALL RESPONSES TO PHQ QUESTIONS 1-9: 0
SUM OF ALL RESPONSES TO PHQ QUESTIONS 1-9: 0
4. FEELING TIRED OR HAVING LITTLE ENERGY: NOT AT ALL
7. TROUBLE CONCENTRATING ON THINGS, SUCH AS READING THE NEWSPAPER OR WATCHING TELEVISION: NOT AT ALL
5. POOR APPETITE OR OVEREATING: NOT AT ALL
1. LITTLE INTEREST OR PLEASURE IN DOING THINGS: NOT AT ALL
SUM OF ALL RESPONSES TO PHQ QUESTIONS 1-9: 0
9. THOUGHTS THAT YOU WOULD BE BETTER OFF DEAD, OR OF HURTING YOURSELF: NOT AT ALL
6. FEELING BAD ABOUT YOURSELF - OR THAT YOU ARE A FAILURE OR HAVE LET YOURSELF OR YOUR FAMILY DOWN: NOT AT ALL
SUM OF ALL RESPONSES TO PHQ9 QUESTIONS 1 & 2: 0
2. FEELING DOWN, DEPRESSED OR HOPELESS: NOT AT ALL

## 2024-11-20 ASSESSMENT — ENCOUNTER SYMPTOMS
NAUSEA: 0
VOMITING: 0
ALLERGIC/IMMUNOLOGIC NEGATIVE: 1
BLOOD IN STOOL: 0
ANAL BLEEDING: 0
EYES NEGATIVE: 1
ABDOMINAL DISTENTION: 0
RESPIRATORY NEGATIVE: 1
DIARRHEA: 0
RECTAL PAIN: 0
CONSTIPATION: 1
ABDOMINAL PAIN: 0

## 2024-11-20 NOTE — PROGRESS NOTES
Screening Questionnaire for Pediatric Immunization     Is the child sick today?   No    Does the child have allergies to medications, food a vaccine component, or latex?   No    Has the child had a serious reaction to a vaccine in the past?   No    Has the child had a health problem with lung, heart, kidney or metabolic disease (e.g., diabetes), asthma, or a blood disorder?  Is he/she on long-term aspirin therapy?   No    If the child to be vaccinated is 2 through 4 years of age, has a healthcare provider told you that the child had wheezing or asthma in the  past 12 months?   No   If your child is a baby, have you ever been told he or she has had intussusception ?   No    Has the child, sibling or parent had a seizure, has the child had brain or other nervous system problems?   No    Does the child have cancer, leukemia, AIDS, or any immune system          problem?   No    In the past 3 months, has the child taken medications that affect the immune system such as prednisone, other steroids, or anticancer drugs; drugs for the treatment of rheumatoid arthritis, Crohn s disease, or psoriasis; or had radiation treatments?   No   In the past year, has the child received a transfusion of blood or blood products, or been given immune (gamma) globulin or an antiviral drug?   No    Is the child/teen pregnant or is there a chance that she could become         pregnant during the next month?   No    Has the child received any vaccinations in the past 4 weeks?   No      Immunization questionnaire answers were all negative.      [place] doesn't apply on this patient    [place] self[place]screening [place] given to patient.    Per orders of Dr. Nichole, injection of Dtap, Flu Vaccine, HIB, and PVC 13 given by Caron Lynn. Patient instructed to remain in clinic for 20 minutes afterwards, and to report any adverse reaction to me immediately.    Screening performed by Caron Lynn on 1/25/2017 at 4:55 PM.     08/20/2024 02:10 PM    GLUCOSE 87 04/18/2012 03:45 PM     Hemoglobin A1C:   Lab Results   Component Value Date/Time    LABA1C 5.6 08/26/2022 02:05 PM     Microalbumin Urine: No results found for: \"MICROALBUR\"  Lipid profile:   Lab Results   Component Value Date/Time    CHOL 321 04/30/2021 12:23 PM    TRIG 66 04/30/2021 12:23 PM    HDL 91 08/26/2022 02:05 PM     Thyroid functions:   Lab Results   Component Value Date/Time    TSH 1.88 05/10/2024 01:49 PM      Hepatic functions:   Lab Results   Component Value Date/Time    ALT 22 08/20/2024 02:10 PM    AST 25 08/20/2024 02:10 PM    BILITOT 0.3 08/20/2024 02:10 PM    BILIDIR <0.08 10/23/2018 04:50 PM     Urine Analysis: No results found for: \"LABURIN\"    HEALTH MAINTENANCE:      Health Maintenance Due   Topic Date Due    DEXA (modify frequency per FRAX score)  Never done    Cervical cancer screen  10/14/2017    Respiratory Syncytial Virus (RSV) Pregnant or age 60 yrs+ (1 - Risk 60-74 years 1-dose series) Never done    Breast cancer screen  06/07/2021    Pneumococcal 65+ years Vaccine (1 of 1 - PCV) Never done    Flu vaccine (1) Never done    COVID-19 Vaccine (1 - 2023-24 season) Never done       ASSESSMENT AND PLAN:       Diagnosis Orders   1. Chronic lumbar radiculopathy        2. Essential hypertension        3. Failed back surgical syndrome        4. Acute cystitis without hematuria            FOLLOW UP:   1.  Ifeoma received counseling on the following healthy behaviors: nutrition and exercise    2.  Reviewed prior labs and health maintenance.      3.  Discussed use, benefit, and side effects of prescribed medications.  Barriers to medication compliance addressed.  All patient questions answered.  Pt voiced understanding.     4.  Continue current medications, diet and exercise.            No orders of the defined types were placed in this encounter.         Completed Refills               Requested Prescriptions      No prescriptions requested or ordered in this

## 2024-11-29 ENCOUNTER — HOSPITAL ENCOUNTER (OUTPATIENT)
Dept: MAMMOGRAPHY | Age: 64
Discharge: HOME | End: 2024-12-01
Attending: INTERNAL MEDICINE
Payer: COMMERCIAL

## 2024-11-29 VITALS
HEIGHT: 69 IN | BODY MASS INDEX: 43.4 KG/M2 | HEIGHT: 69 IN | WEIGHT: 293 LBS | WEIGHT: 293 LBS | BODY MASS INDEX: 43.4 KG/M2

## 2024-11-29 DIAGNOSIS — Z13.820 OSTEOPOROSIS SCREENING: ICD-10-CM

## 2024-11-29 DIAGNOSIS — Z12.31 BREAST CANCER SCREENING BY MAMMOGRAM: ICD-10-CM

## 2024-11-29 DIAGNOSIS — Z78.0 ENCOUNTER FOR OSTEOPOROSIS SCREENING IN ASYMPTOMATIC POSTMENOPAUSAL PATIENT: ICD-10-CM

## 2024-11-29 DIAGNOSIS — Z00.00 MEDICARE ANNUAL WELLNESS VISIT, SUBSEQUENT: ICD-10-CM

## 2024-11-29 DIAGNOSIS — Z13.820 ENCOUNTER FOR OSTEOPOROSIS SCREENING IN ASYMPTOMATIC POSTMENOPAUSAL PATIENT: ICD-10-CM

## 2024-11-29 PROCEDURE — 77080 DXA BONE DENSITY AXIAL: CPT

## 2024-11-29 PROCEDURE — 77063 BREAST TOMOSYNTHESIS BI: CPT

## 2025-01-12 ENCOUNTER — HOSPITAL ENCOUNTER (EMERGENCY)
Age: 65
Discharge: HOME | End: 2025-01-12
Attending: EMERGENCY MEDICINE
Payer: COMMERCIAL

## 2025-01-12 VITALS
SYSTOLIC BLOOD PRESSURE: 180 MMHG | OXYGEN SATURATION: 98 % | HEART RATE: 86 BPM | DIASTOLIC BLOOD PRESSURE: 88 MMHG | WEIGHT: 293 LBS | TEMPERATURE: 97.9 F | RESPIRATION RATE: 16 BRPM | BODY MASS INDEX: 48.73 KG/M2

## 2025-01-12 DIAGNOSIS — N39.0 URINARY TRACT INFECTION WITHOUT HEMATURIA, SITE UNSPECIFIED: Primary | ICD-10-CM

## 2025-01-12 LAB
ANION GAP SERPL CALCULATED.3IONS-SCNC: 11 MMOL/L (ref 9–16)
BACTERIA URNS QL MICRO: ABNORMAL
BASOPHILS # BLD: 0.05 K/UL (ref 0–0.2)
BASOPHILS NFR BLD: 1 % (ref 0–2)
BILIRUB UR QL STRIP: NEGATIVE
BUN SERPL-MCNC: 14 MG/DL (ref 8–23)
CALCIUM SERPL-MCNC: 10 MG/DL (ref 8.6–10.4)
CASTS #/AREA URNS LPF: ABNORMAL /LPF (ref 0–8)
CHLORIDE SERPL-SCNC: 108 MMOL/L (ref 98–107)
CLARITY UR: CLEAR
CO2 SERPL-SCNC: 23 MMOL/L (ref 20–31)
COLOR UR: YELLOW
CREAT SERPL-MCNC: 0.7 MG/DL (ref 0.6–0.9)
EOSINOPHIL # BLD: 0.11 K/UL (ref 0–0.44)
EOSINOPHILS RELATIVE PERCENT: 2 % (ref 1–4)
EPI CELLS #/AREA URNS HPF: ABNORMAL /HPF (ref 0–5)
ERYTHROCYTE [DISTWIDTH] IN BLOOD BY AUTOMATED COUNT: 14 % (ref 11.8–14.4)
GFR, ESTIMATED: >90 ML/MIN/1.73M2
GLUCOSE SERPL-MCNC: 110 MG/DL (ref 74–99)
GLUCOSE UR STRIP-MCNC: NEGATIVE MG/DL
HCT VFR BLD AUTO: 41.6 % (ref 36.3–47.1)
HGB BLD-MCNC: 13.9 G/DL (ref 11.9–15.1)
HGB UR QL STRIP.AUTO: NEGATIVE
IMM GRANULOCYTES # BLD AUTO: <0.03 K/UL (ref 0–0.3)
IMM GRANULOCYTES NFR BLD: 0 %
KETONES UR STRIP-MCNC: NEGATIVE MG/DL
LEUKOCYTE ESTERASE UR QL STRIP: ABNORMAL
LYMPHOCYTES NFR BLD: 2.1 K/UL (ref 1.1–3.7)
LYMPHOCYTES RELATIVE PERCENT: 43 % (ref 24–43)
MCH RBC QN AUTO: 29.9 PG (ref 25.2–33.5)
MCHC RBC AUTO-ENTMCNC: 33.4 G/DL (ref 28.4–34.8)
MCV RBC AUTO: 89.5 FL (ref 82.6–102.9)
MONOCYTES NFR BLD: 0.34 K/UL (ref 0.1–1.2)
MONOCYTES NFR BLD: 7 % (ref 3–12)
NEUTROPHILS NFR BLD: 47 % (ref 36–65)
NEUTS SEG NFR BLD: 2.25 K/UL (ref 1.5–8.1)
NITRITE UR QL STRIP: NEGATIVE
NRBC BLD-RTO: 0 PER 100 WBC
PH UR STRIP: 6.5 [PH] (ref 5–8)
PLATELET # BLD AUTO: 259 K/UL (ref 138–453)
PMV BLD AUTO: 10.8 FL (ref 8.1–13.5)
POTASSIUM SERPL-SCNC: 4.4 MMOL/L (ref 3.7–5.3)
PROT UR STRIP-MCNC: NEGATIVE MG/DL
RBC # BLD AUTO: 4.65 M/UL (ref 3.95–5.11)
RBC #/AREA URNS HPF: ABNORMAL /HPF (ref 0–4)
SODIUM SERPL-SCNC: 142 MMOL/L (ref 136–145)
SP GR UR STRIP: 1.01 (ref 1–1.03)
UROBILINOGEN UR STRIP-ACNC: NORMAL EU/DL (ref 0–1)
WBC #/AREA URNS HPF: ABNORMAL /HPF (ref 0–5)
WBC OTHER # BLD: 4.9 K/UL (ref 3.5–11.3)

## 2025-01-12 PROCEDURE — 81001 URINALYSIS AUTO W/SCOPE: CPT

## 2025-01-12 PROCEDURE — 99283 EMERGENCY DEPT VISIT LOW MDM: CPT

## 2025-01-12 PROCEDURE — 6370000000 HC RX 637 (ALT 250 FOR IP)

## 2025-01-12 PROCEDURE — 87086 URINE CULTURE/COLONY COUNT: CPT

## 2025-01-12 PROCEDURE — 80048 BASIC METABOLIC PNL TOTAL CA: CPT

## 2025-01-12 PROCEDURE — 85025 COMPLETE CBC W/AUTO DIFF WBC: CPT

## 2025-01-12 RX ORDER — SULFAMETHOXAZOLE AND TRIMETHOPRIM 800; 160 MG/1; MG/1
1 TABLET ORAL ONCE
Status: COMPLETED | OUTPATIENT
Start: 2025-01-12 | End: 2025-01-12

## 2025-01-12 RX ORDER — SULFAMETHOXAZOLE AND TRIMETHOPRIM 800; 160 MG/1; MG/1
1 TABLET ORAL 2 TIMES DAILY
Qty: 5 TABLET | Refills: 0 | Status: SHIPPED | OUTPATIENT
Start: 2025-01-12 | End: 2025-01-15

## 2025-01-12 RX ADMIN — SULFAMETHOXAZOLE AND TRIMETHOPRIM 1 TABLET: 800; 160 TABLET ORAL at 18:41

## 2025-01-12 ASSESSMENT — PAIN DESCRIPTION - LOCATION: LOCATION: GROIN

## 2025-01-12 ASSESSMENT — PAIN - FUNCTIONAL ASSESSMENT: PAIN_FUNCTIONAL_ASSESSMENT: 0-10

## 2025-01-12 ASSESSMENT — PAIN DESCRIPTION - FREQUENCY: FREQUENCY: INTERMITTENT

## 2025-01-12 ASSESSMENT — PAIN DESCRIPTION - DESCRIPTORS: DESCRIPTORS: BURNING

## 2025-01-12 ASSESSMENT — PAIN DESCRIPTION - PAIN TYPE: TYPE: ACUTE PAIN

## 2025-01-12 ASSESSMENT — LIFESTYLE VARIABLES
HOW OFTEN DO YOU HAVE A DRINK CONTAINING ALCOHOL: NEVER
HOW MANY STANDARD DRINKS CONTAINING ALCOHOL DO YOU HAVE ON A TYPICAL DAY: PATIENT DOES NOT DRINK

## 2025-01-12 ASSESSMENT — PAIN SCALES - GENERAL: PAINLEVEL_OUTOF10: 3

## 2025-01-12 NOTE — DISCHARGE INSTRUCTIONS
Call today or tomorrow to follow up with Rebekah Elise MD  in 2 days.    Take your medication as indicated, if you are given an antibiotic then make sure you get the prescription filled and take the antibiotics until finished.  Drink plenty of fluids while taking the antibiotics.  Avoid drinking alcohol while taking antibiotics.     If you were given the medication pyridium - do not wear any contacts for the next week since this medication will turn your tears an orange color and will stain the contacts.  Use ibuprofen or Tylenol (unless prescribed medications that have Tylenol in it) for pain.  You can take over the counter Ibuprofen (advil) tablets (4 every 8 hours or 3 every 6 hours or 2 every 4 hours)    [facility], [facility], [facility] has some antibiotics for free; [facility] and [facility] has a 4 dollar prescription plan for some antibiotics.    Return to the Emergency Department for fever > 101.5, inability to urinate, burning when you urinate, increase in the number of times or if you have an urgency to urinate, any other care or concern.

## 2025-01-12 NOTE — ED PROVIDER NOTES
[facility]  Emergency Department  Faculty Attestation     I performed a history and physical examination of the patient and discussed management with the resident. I reviewed the resident’s note and agree with the documented findings and plan of care. Any areas of disagreement are noted on the chart. I was personally present for the key portions of any procedures. I have documented in the chart those procedures where I was not present during the key portions. I have reviewed the emergency nurses triage note. I agree with the chief complaint, past medical history, past surgical history, allergies, medications, social and family history as documented unless otherwise noted below.    For Physician Assistant/ Nurse Practitioner cases/documentation I have personally evaluated this patient and have completed at least one if not all key elements of the E/M (history, physical exam, and MDM). Additional findings are as noted.    Preliminary note started at 5:50 PM EST    Primary Care Physician:  Rebekah Elise MD    Screenings:  [unfilled]    CHIEF COMPLAINT       Chief Complaint   Patient presents with    Dysuria       RECENT VITALS:   BP (!) 180/88   Pulse 86   Temp 97.9 °F (36.6 °C)   Resp 16   LMP 09/07/2007   SpO2 98%     LABS:  Labs Reviewed   CULTURE, URINE   CBC WITH AUTO DIFFERENTIAL   BASIC METABOLIC PANEL   URINALYSIS WITH MICROSCOPIC       Radiology  No orders to display       Attending Physician Additional  Notes    Patient gets frequent UTIs, having dysuria since yesterday, has chronic nocturia but more frequency over the past several days and has been having mild left flank pain for least a week.  No fever chills or sweats.  No nausea vomiting.  No abdominal pain.  She has urinary incontinence as well as a bladder prolapse.  She does follow with urology.  She has had MDRO in the past but does not recall being admitted for IV antibiotics.  On exam she nontoxic  Cell Phone/PDA (specify)/Clothing

## 2025-01-13 NOTE — ED PROVIDER NOTES
[facility] EMERGENCY DEPARTMENT  Emergency Department Encounter  Emergency Medicine Resident     Pt Name:Ifeoma Martinez  MRN: [id number]  Birthdate 1960  Date of evaluation: 1/12/25  PCP:  Rebekah Elise MD  Note Started: 7:35 PM EST      CHIEF COMPLAINT       Chief Complaint   Patient presents with    Dysuria       HISTORY OF PRESENT ILLNESS  (Location/Symptom, Timing/Onset, Context/Setting, Quality, Duration, Modifying Factors, Severity.)      Ifeoma Martinez is a 64 y.o. female who presents with dysuria, frequency, urgency as well as malodorous urine.  Patient reports that her symptoms have been ongoing for about 2 weeks now.  She had tried over-the-counter Azo without improvement in her symptoms.  She reports she has a history of frequent UTIs and this feels similar to her previous UTIs.  She also has been experiencing some mild flank pain for the past 2 days.  She denies any fever, chills, sweats, nausea, vomiting, abdominal pain, changes in her bowel habits, hematuria, vaginal discharge    PAST MEDICAL / SURGICAL / SOCIAL / FAMILY HISTORY      has a past medical history of Acquired cystic kidney disease, Anemia, Arthritis, Asthma, Bilateral leg and foot pain, Degeneration of cervical intervertebral disc, Depression, Failed back syndrome, History of recurrent UTI (urinary tract infection), Hyperlipidemia, Hypertension, Lumbar disc herniation with radiculopathy, Obesity, Spinal stenosis, and Wound infection after surgery.     has a past surgical history that includes Foot surgery (Bilateral); Gallbladder surgery; Lumbar spine surgery (1986); Lumbar spine surgery (4/2012); Nerve Block (10/26/2015); Nerve Block (12/22/15); Spine surgery (07/24/2016); back surgery; Vein Surgery (Bilateral, 03/2017); Cystocopy (Bilateral, 05/20/2022); and Cystoscopy (Bilateral, 5/20/2022).    Social History     Socioeconomic History    Marital status: Single     Spouse name: Not on file    Number of children:

## 2025-01-14 LAB
MICROORGANISM SPEC CULT: NORMAL
SERVICE CMNT-IMP: NORMAL
SPECIMEN DESCRIPTION: NORMAL

## 2025-01-15 NOTE — PROGRESS NOTES
At the time of Ifeoma SHIRA JuárezMartinez visit to [facility] Emergency Room on 1/12/25 a urine culture was obtained.    It was positive for several species of bacteria.  Spoke to patient at 5:05pm 1/15/25 regarding current symptoms. Patient stated that there has been no improvement. Patient also stated that with previous UTIs, she has received 14-day supplies of antibiotics.    Instructed patient to return to the ED to collect another urine sample to determine most appropriate antibiotic treatment. Patient stated she will arrive in the morning on 1/16/24.    Sarah Beth Koroma  5:09pm 1/15/25

## 2025-01-16 ENCOUNTER — HOSPITAL ENCOUNTER (EMERGENCY)
Age: 65
Discharge: HOME | End: 2025-01-16
Attending: EMERGENCY MEDICINE
Payer: MEDICARE

## 2025-01-16 VITALS
HEART RATE: 96 BPM | OXYGEN SATURATION: 100 % | TEMPERATURE: 97.7 F | DIASTOLIC BLOOD PRESSURE: 81 MMHG | SYSTOLIC BLOOD PRESSURE: 182 MMHG | RESPIRATION RATE: 18 BRPM

## 2025-01-16 DIAGNOSIS — N12 PYELONEPHRITIS: Primary | ICD-10-CM

## 2025-01-16 LAB
ANION GAP SERPL CALCULATED.3IONS-SCNC: 11 MMOL/L (ref 9–16)
BACTERIA URNS QL MICRO: ABNORMAL
BASOPHILS # BLD: 0.04 K/UL (ref 0–0.2)
BASOPHILS NFR BLD: 1 % (ref 0–2)
BILIRUB UR QL STRIP: NEGATIVE
BUN SERPL-MCNC: 17 MG/DL (ref 8–23)
CALCIUM SERPL-MCNC: 10.2 MG/DL (ref 8.6–10.4)
CASTS #/AREA URNS LPF: ABNORMAL /LPF (ref 0–8)
CHLORIDE SERPL-SCNC: 106 MMOL/L (ref 98–107)
CLARITY UR: CLEAR
CO2 SERPL-SCNC: 23 MMOL/L (ref 20–31)
COLOR UR: YELLOW
CREAT SERPL-MCNC: 1 MG/DL (ref 0.6–0.9)
EOSINOPHIL # BLD: 0.23 K/UL (ref 0–0.44)
EOSINOPHILS RELATIVE PERCENT: 5 % (ref 1–4)
EPI CELLS #/AREA URNS HPF: ABNORMAL /HPF (ref 0–5)
ERYTHROCYTE [DISTWIDTH] IN BLOOD BY AUTOMATED COUNT: 14 % (ref 11.8–14.4)
GFR, ESTIMATED: 63 ML/MIN/1.73M2
GLUCOSE SERPL-MCNC: 109 MG/DL (ref 74–99)
GLUCOSE UR STRIP-MCNC: NEGATIVE MG/DL
HCT VFR BLD AUTO: 42 % (ref 36.3–47.1)
HGB BLD-MCNC: 13.9 G/DL (ref 11.9–15.1)
HGB UR QL STRIP.AUTO: NEGATIVE
IMM GRANULOCYTES # BLD AUTO: <0.03 K/UL (ref 0–0.3)
IMM GRANULOCYTES NFR BLD: 0 %
KETONES UR STRIP-MCNC: NEGATIVE MG/DL
LEUKOCYTE ESTERASE UR QL STRIP: ABNORMAL
LYMPHOCYTES NFR BLD: 1.64 K/UL (ref 1.1–3.7)
LYMPHOCYTES RELATIVE PERCENT: 38 % (ref 24–43)
MCH RBC QN AUTO: 29.4 PG (ref 25.2–33.5)
MCHC RBC AUTO-ENTMCNC: 33.1 G/DL (ref 28.4–34.8)
MCV RBC AUTO: 89 FL (ref 82.6–102.9)
MONOCYTES NFR BLD: 0.37 K/UL (ref 0.1–1.2)
MONOCYTES NFR BLD: 9 % (ref 3–12)
NEUTROPHILS NFR BLD: 47 % (ref 36–65)
NEUTS SEG NFR BLD: 2.07 K/UL (ref 1.5–8.1)
NITRITE UR QL STRIP: NEGATIVE
NRBC BLD-RTO: 0 PER 100 WBC
PH UR STRIP: 7 [PH] (ref 5–8)
PLATELET # BLD AUTO: 255 K/UL (ref 138–453)
PMV BLD AUTO: 10.8 FL (ref 8.1–13.5)
POTASSIUM SERPL-SCNC: 4 MMOL/L (ref 3.7–5.3)
PROT UR STRIP-MCNC: NEGATIVE MG/DL
RBC # BLD AUTO: 4.72 M/UL (ref 3.95–5.11)
RBC #/AREA URNS HPF: ABNORMAL /HPF (ref 0–4)
SODIUM SERPL-SCNC: 140 MMOL/L (ref 136–145)
SP GR UR STRIP: 1.01 (ref 1–1.03)
UROBILINOGEN UR STRIP-ACNC: NORMAL EU/DL (ref 0–1)
WBC #/AREA URNS HPF: ABNORMAL /HPF (ref 0–5)
WBC OTHER # BLD: 4.4 K/UL (ref 3.5–11.3)

## 2025-01-16 PROCEDURE — 99283 EMERGENCY DEPT VISIT LOW MDM: CPT

## 2025-01-16 PROCEDURE — 87086 URINE CULTURE/COLONY COUNT: CPT

## 2025-01-16 PROCEDURE — 87186 SC STD MICRODIL/AGAR DIL: CPT

## 2025-01-16 PROCEDURE — 80048 BASIC METABOLIC PNL TOTAL CA: CPT

## 2025-01-16 PROCEDURE — 6370000000 HC RX 637 (ALT 250 FOR IP)

## 2025-01-16 PROCEDURE — 85025 COMPLETE CBC W/AUTO DIFF WBC: CPT

## 2025-01-16 PROCEDURE — 87088 URINE BACTERIA CULTURE: CPT

## 2025-01-16 PROCEDURE — 87077 CULTURE AEROBIC IDENTIFY: CPT

## 2025-01-16 PROCEDURE — 81001 URINALYSIS AUTO W/SCOPE: CPT

## 2025-01-16 RX ORDER — SULFAMETHOXAZOLE AND TRIMETHOPRIM 800; 160 MG/1; MG/1
1 TABLET ORAL ONCE
Status: COMPLETED | OUTPATIENT
Start: 2025-01-16 | End: 2025-01-16

## 2025-01-16 RX ORDER — SULFAMETHOXAZOLE AND TRIMETHOPRIM 800; 160 MG/1; MG/1
1 TABLET ORAL 2 TIMES DAILY
Qty: 14 TABLET | Refills: 0 | Status: SHIPPED | OUTPATIENT
Start: 2025-01-16 | End: 2025-01-23

## 2025-01-16 RX ADMIN — SULFAMETHOXAZOLE AND TRIMETHOPRIM 1 TABLET: 800; 160 TABLET ORAL at 14:55

## 2025-01-16 ASSESSMENT — ENCOUNTER SYMPTOMS
NAUSEA: 0
ABDOMINAL PAIN: 0
VOMITING: 0

## 2025-01-16 NOTE — ED PROVIDER NOTES
place, and time.       DDX/DIAGNOSTIC RESULTS / EMERGENCY DEPARTMENT COURSE / MDM     Medical Decision Making  64-year-old female with history of frequent UTIs who was told to come in by our pharmacist as her urine culture on 1/12 showed several types of bacteria and patient still having symptoms despite completing the course of Bactrim that she was discharged on.  Patient still having dysuria, increased urinary frequency, and urgency.  She is still having some right flank pain.  She denies any abdominal pain.  No nausea or vomiting.  No fevers or chills.  Patient is nontoxic-appearing.  She is hypertensive on arrival.  Other vital signs are stable.  No fever noted.  Abdomen soft, nontender, nondistended.  No rebound or guarding.  Patient does have some mild right flank pain.  Will repeat basic labs and repeat urine.  Will discuss with ID if they feel that patient needs to be admitted for IV antibiotics or if they have an outpatient regimen that they recommend.    Amount and/or Complexity of Data Reviewed  Labs: ordered. Decision-making details documented in ED Course.    Risk  Prescription drug management.    Critical Care  Total time providing critical care: 0 minutes      EMERGENCY DEPARTMENT COURSE:  ED Course as of 01/16/25 1456   Thu Jan 16, 2025   1319 WBC: 4.4 [KR]   1319 Hemoglobin Quant: 13.9 [KR]   1333 Creatinine(!): 1.0  Mild elevation, baseline appears to be around 0.7 [KR]   1410 Nitrite, Urine: NEGATIVE [KR]   1410 Leukocyte Esterase, Urine(!): SMALL [KR]   1410 WBC, UA: 0 TO 2 [KR]   1410 Bacteria, UA: None [KR]   1449 Spoke with Dr. Osorio.  Since patient has had improvement on her urinalysis, Bactrim has clearly been working.  She suggests another 7 days of Bactrim and we will follow-up on second urine culture from today's urine collection. [KR]      ED Course User Index  [KR] Aguilar Barfield MD       CONSULTS:  IP CONSULT TO INFECTIOUS DISEASES    CRITICAL CARE:  There was significant risk of  life threatening deterioration of patient's condition requiring my direct management. Critical care time 0 minutes, excluding any documented procedures.    FINAL IMPRESSION      1. Pyelonephritis          DISPOSITION / PLAN     DISPOSITION Decision To Discharge 01/16/2025 02:49:54 PM   DISPOSITION CONDITION Stable           PATIENT REFERRED TO:  Rebekah Elise MD  [facility] [address]  [phone]    Schedule an appointment as soon as possible for a visit in 2 days  Follow-up recent ER visit      DISCHARGE MEDICATIONS:  Current Discharge Medication List        START taking these medications    Details   sulfamethoxazole-trimethoprim (BACTRIM DS;SEPTRA DS) 800-160 MG per tablet Take 1 tablet by mouth 2 times daily for 7 days  Qty: 14 tablet, Refills: 0             Aguilar Barfield MD  Emergency Medicine Resident    (Please note that portions of this note were completed with a voice recognition program.  Efforts were made to edit the dictations but occasionally words are mis-transcribed.)

## 2025-01-16 NOTE — DISCHARGE INSTRUCTIONS
You were seen in the emergency department as the pharmacist called to have you come back since your urine culture grew bacteria but no specific bug and you were still having symptoms.  Your vital signs were stable.  No fever noted.    Lab work did not show any elevated white blood cell count.  Your urine is actually improved from 1/12 so the Macrobid must be working.    I did speak with one of our infectious disease doctors who recommends another 7 days of Macrobid.    Please take this twice daily as prescribed and complete the entire antibiotic course.    We will follow-up on your repeat urine culture.    Please follow-up with your primary care provider in 1 week given recent ER visit.  Please return to the ER if your symptoms do not improve after another week of antibiotics or if you develop any fevers, abdominal pain, nausea, vomiting, or any other concerning symptoms.

## 2025-01-16 NOTE — ED TRIAGE NOTES
Pt to ed c/o uti and flank pain. Per pt she was called to come back to the ed for results of her urine sample. Pt has been having recurrent uti's and the symptoms are not getting better with oral antibiotics. Pt has increased dysuria and right sided flank pain.     Pt is alert and oriented x4. Ambulatory to room. Pt in gown. Iv placed, labs drawn. Call light in reach. Will continue with plan of care.

## 2025-01-16 NOTE — ED PROVIDER NOTES
[facility] EMERGENCY DEPARTMENT     Emergency Department     Faculty Attestation        I performed a history and physical examination of the patient and discussed management with the resident. I reviewed the resident’s note and agree with the documented findings and plan of care. Any areas of disagreement are noted on the chart. I was personally present for the key portions of any procedures. I have documented in the chart those procedures where I was not present during the key portions. I have reviewed the emergency nurses triage note. I agree with the chief complaint, past medical history, past surgical history, allergies, medications, social and family history as documented unless otherwise noted below.    For mid-level providers such as nurse practitioners as well as physicians assistants:    I have personally seen and evaluated the patient.    I find the patient's history and physical exam are consistent with NP/PA documentation.  I agree with the care provided, treatment rendered, disposition, & follow-up plan.     Additional findings are as noted.    Vital Signs: BP (!) 182/81   Pulse 96   Temp 97.7 °F (36.5 °C)   Resp 18   LMP 09/07/2007   SpO2 100%   PCP:  Rebekah Elise MD    Pertinent Comments:     Patient presents with continued dysuria she was seen here on 12 January and diagnosed with UTI and present put on Bactrim.  She has multiple different allergies complicating her situation urine culture grew out multiple different organisms.  She is afebrile nontoxic with no systemic symptoms and abdomen soft nontender opiate urinalysis urine culture laboratory studies, discussion with ID      Critical Care  None          Greg Mena MD    Attending Emergency Medicine Physician            Javi Mena MD  01/16/25 [id number]

## 2025-01-18 LAB
MICROORGANISM SPEC CULT: ABNORMAL
SERVICE CMNT-IMP: ABNORMAL
SPECIMEN DESCRIPTION: ABNORMAL

## 2025-01-20 NOTE — PROGRESS NOTES
At the time of Ifeomavalerie Juárezace visit to [facility] Emergency Room on 1/16 a urine culture was obtained.    It was positive for Proteus mirabilis.  Spoke to patient at 1/20 regarding need to start taking ampicillin.      Ampicillin 500 mg q6h for 7 days called into [facility] pharmacy on behalf of Dr. Sims.  Instructed patient to  prescription and start taking new antibiotic.    uJd Quijano.D., BCCCP

## 2025-01-21 ENCOUNTER — TELEPHONE (OUTPATIENT)
Dept: INTERNAL MEDICINE | Age: 65
End: 2025-01-21

## 2025-01-21 NOTE — TELEPHONE ENCOUNTER
----- Message from Arley PARKS sent at 1/21/2025  9:20 AM EST -----  Regarding: [facility] Appointment Request  E[facility] Appointment Request    Patient needs appointment for E[facility] Appointment Type: ED Follow-Up.    Patient Requested Dates(s): anything earlier than in the Feb 18   Patient Requested Time: anytime of the day   Provider Name: Rebekah Elise MD  Reason for Appointment Request: Established Patient - Available appointments did not meet patient need  --------------------------------------------------------------------------------------------------------------------------    Relationship to Patient: Self     Call Back Information: OK to leave message on voicemail  Preferred Call Back Number: Phone [phone]

## 2025-01-22 ENCOUNTER — OFFICE VISIT (OUTPATIENT)
Dept: INTERNAL MEDICINE | Age: 65
End: 2025-01-22
Payer: MEDICARE

## 2025-01-22 VITALS
DIASTOLIC BLOOD PRESSURE: 88 MMHG | OXYGEN SATURATION: 99 % | HEART RATE: 65 BPM | SYSTOLIC BLOOD PRESSURE: 139 MMHG | TEMPERATURE: 97 F | HEIGHT: 67 IN | WEIGHT: 293 LBS | BODY MASS INDEX: 45.99 KG/M2

## 2025-01-22 DIAGNOSIS — N30.00 ACUTE CYSTITIS WITHOUT HEMATURIA: Primary | ICD-10-CM

## 2025-01-22 PROCEDURE — 1090F PRES/ABSN URINE INCON ASSESS: CPT | Performed by: INTERNAL MEDICINE

## 2025-01-22 PROCEDURE — 1123F ACP DISCUSS/DSCN MKR DOCD: CPT | Performed by: INTERNAL MEDICINE

## 2025-01-22 PROCEDURE — 99213 OFFICE O/P EST LOW 20 MIN: CPT | Performed by: INTERNAL MEDICINE

## 2025-01-22 PROCEDURE — 1036F TOBACCO NON-USER: CPT | Performed by: INTERNAL MEDICINE

## 2025-01-22 PROCEDURE — G8417 CALC BMI ABV UP PARAM F/U: HCPCS | Performed by: INTERNAL MEDICINE

## 2025-01-22 PROCEDURE — G8427 DOCREV CUR MEDS BY ELIG CLIN: HCPCS | Performed by: INTERNAL MEDICINE

## 2025-01-22 PROCEDURE — G8399 PT W/DXA RESULTS DOCUMENT: HCPCS | Performed by: INTERNAL MEDICINE

## 2025-01-22 PROCEDURE — 3017F COLORECTAL CA SCREEN DOC REV: CPT | Performed by: INTERNAL MEDICINE

## 2025-01-22 RX ORDER — AMPICILLIN 500 MG/1
500 CAPSULE ORAL 4 TIMES DAILY
COMMUNITY
Start: 2025-01-20

## 2025-01-22 SDOH — ECONOMIC STABILITY: FOOD INSECURITY: WITHIN THE PAST 12 MONTHS, THE FOOD YOU BOUGHT JUST DIDN'T LAST AND YOU DIDN'T HAVE MONEY TO GET MORE.: NEVER TRUE

## 2025-01-22 SDOH — ECONOMIC STABILITY: FOOD INSECURITY: WITHIN THE PAST 12 MONTHS, YOU WORRIED THAT YOUR FOOD WOULD RUN OUT BEFORE YOU GOT MONEY TO BUY MORE.: NEVER TRUE

## 2025-01-22 ASSESSMENT — ENCOUNTER SYMPTOMS
EYES NEGATIVE: 1
GASTROINTESTINAL NEGATIVE: 1
ALLERGIC/IMMUNOLOGIC NEGATIVE: 1
RESPIRATORY NEGATIVE: 1

## 2025-01-22 ASSESSMENT — PATIENT HEALTH QUESTIONNAIRE - PHQ9
1. LITTLE INTEREST OR PLEASURE IN DOING THINGS: NOT AT ALL
SUM OF ALL RESPONSES TO PHQ QUESTIONS 1-9: 0
2. FEELING DOWN, DEPRESSED OR HOPELESS: NOT AT ALL
SUM OF ALL RESPONSES TO PHQ9 QUESTIONS 1 & 2: 0
SUM OF ALL RESPONSES TO PHQ QUESTIONS 1-9: 0

## 2025-01-22 NOTE — PROGRESS NOTES
[facility]   Progress Note    Visit Information    Have you changed or started any medications since your last visit including any over-the-counter medicines, vitamins, or herbal medicines? no   Are you having any side effects from any of your medications? -  no  Have you stopped taking any of your medications? Is so, why? -  no    Have you seen any other physician or provider since your last visit? No  Have you had any other diagnostic tests since your last visit? No  Have you been seen in the emergency room and/or had an admission to a hospital since we last saw you? No  Have you had your routine dental cleaning in the past 6 months? no    Have you activated your [employer] Messaging account? If not, what are your barriers? No:      Patient Care Team:  Rebekah Elise MD as PCP - General (Internal Medicine)  Rebekah Elise MD as PCP - Empaneled Provider  Mauricio Nieto MD (Dermatology)  Dez Mistry (Neurosurgery)    Medical History Review  Past Medical, Family, and Social History reviewed and does not contribute to the patient presenting condition    Health Maintenance   Topic Date Due    Cervical cancer screen  10/14/2017    Respiratory Syncytial Virus (RSV) Pregnant or age 60 yrs+ (1 - Risk 60-74 years 1-dose series) Never done    Pneumococcal 65+ years Vaccine (1 of 1 - PCV) Never done    Flu vaccine (1) Never done    COVID-19 Vaccine (1 - 2023-24 season) Never done    DTaP/Tdap/Td vaccine (1 - Tdap) 08/19/2025 (Originally [birth year]11/1979)    Shingles vaccine (1 of 2) 09/25/2025 (Originally 2/11/2009)    Diabetes screen  08/26/2025    Annual Wellness Visit (Medicare)  09/26/2025    Depression Screen  11/20/2025    Breast cancer screen  11/29/2026    Lipids  08/26/2027    Colorectal Cancer Screen  10/30/2027    DEXA (modify frequency per FRAX score)  Completed    Hepatitis C screen  Completed    HIV screen  Completed    Hepatitis A vaccine  Aged Out    Hepatitis B vaccine  Aged Out    Hib

## 2025-01-29 DIAGNOSIS — M54.16 CHRONIC LUMBAR RADICULOPATHY: ICD-10-CM

## 2025-01-29 RX ORDER — IBUPROFEN 600 MG/1
TABLET, FILM COATED ORAL
Qty: 30 TABLET | Refills: 0 | Status: SHIPPED | OUTPATIENT
Start: 2025-01-29

## 2025-01-29 RX ORDER — IBUPROFEN 600 MG/1
TABLET, FILM COATED ORAL
Qty: 30 TABLET | Refills: 0 | OUTPATIENT
Start: 2025-01-29

## 2025-01-29 NOTE — TELEPHONE ENCOUNTER
Last visit: 1/21/25  Last Med refill: 11/18/24  Does patient have enough medication for 72 hours: No:     Next Visit Date: 2/25/25  Future Appointments   Date Time Provider Department Center   2/25/2025  2:40 PM Rebekah Elise MD [facility]   7/17/2025  3:00 PM [facility]       Health Maintenance   Topic Date Due    Cervical cancer screen  10/14/2017    Respiratory Syncytial Virus (RSV) Pregnant or age 60 yrs+ (1 - Risk 60-74 years 1-dose series) Never done    Pneumococcal 65+ years Vaccine (1 of 1 - PCV) Never done    Flu vaccine (1) Never done    COVID-19 Vaccine (1 - 2023-24 season) Never done    DTaP/Tdap/Td vaccine (1 - Tdap) 08/19/2025 (Originally [birth year]/11/1978)    Shingles vaccine (1 of 2) 09/25/2025 (Originally 2/11/2009)    Diabetes screen  08/26/2025    Annual Wellness Visit (Medicare)  09/26/2025    Depression Screen  01/22/2026    Breast cancer screen  11/29/2026    Lipids  08/26/2027    Colorectal Cancer Screen  10/30/2027    DEXA (modify frequency per FRAX score)  Completed    Hepatitis C screen  Completed    HIV screen  Completed    Hepatitis A vaccine  Aged Out    Hepatitis B vaccine  Aged Out    Hib vaccine  Aged Out    Polio vaccine  Aged Out    Meningococcal (ACWY) vaccine  Aged Out    Depression Monitoring  Discontinued       Hemoglobin A1C (%)   Date Value   08/26/2022 5.6   04/02/2020 5.6   10/23/2018 5.9             ( goal A1C is < 7)   No components found for: \"LABMICR\"  No components found for: \"LDLCHOLESTEROL\", \"LDLCALC\"    (goal LDL is <100)   AST (U/L)   Date Value   08/20/2024 25     ALT (U/L)   Date Value   08/20/2024 22     BUN (mg/dL)   Date Value   01/16/2025 17     BP Readings from Last 3 Encounters:   01/22/25 139/88   01/16/25 (!) 182/81   01/12/25 (!) 180/88          (goal 120/80)    All Future Testing planned in [facility]  Lab Frequency Next Occurrence   Vascular lower arterial complete physiologic Doppler at rest Once 07/10/2025   DEXA

## 2025-02-03 ENCOUNTER — HOSPITAL ENCOUNTER (OUTPATIENT)
Age: 65
Setting detail: SPECIMEN
Discharge: HOME | End: 2025-02-03

## 2025-02-03 DIAGNOSIS — N30.00 ACUTE CYSTITIS WITHOUT HEMATURIA: ICD-10-CM

## 2025-02-04 ENCOUNTER — TRANSCRIBE ORDERS (OUTPATIENT)
Dept: ADMINISTRATIVE | Age: 65
End: 2025-02-04

## 2025-02-04 DIAGNOSIS — N39.0 RECURRENT UTI: Primary | ICD-10-CM

## 2025-02-04 LAB
BACTERIA URNS QL MICRO: NORMAL
BILIRUB UR QL STRIP: NEGATIVE
CASTS #/AREA URNS LPF: NORMAL /LPF (ref 0–8)
CLARITY UR: CLEAR
COLOR UR: YELLOW
EPI CELLS #/AREA URNS HPF: NORMAL /HPF (ref 0–5)
GLUCOSE UR STRIP-MCNC: NEGATIVE MG/DL
HGB UR QL STRIP.AUTO: NEGATIVE
KETONES UR STRIP-MCNC: ABNORMAL MG/DL
LEUKOCYTE ESTERASE UR QL STRIP: ABNORMAL
NITRITE UR QL STRIP: NEGATIVE
PH UR STRIP: 5.5 [PH] (ref 5–8)
PROT UR STRIP-MCNC: ABNORMAL MG/DL
RBC #/AREA URNS HPF: NORMAL /HPF (ref 0–4)
SP GR UR STRIP: 1.02 (ref 1–1.03)
UROBILINOGEN UR STRIP-ACNC: NORMAL EU/DL (ref 0–1)
WBC #/AREA URNS HPF: NORMAL /HPF (ref 0–5)

## 2025-02-06 ENCOUNTER — TELEPHONE (OUTPATIENT)
Dept: INTERNAL MEDICINE | Age: 65
End: 2025-02-06

## 2025-02-06 DIAGNOSIS — R35.0 FREQUENCY OF URINATION: Primary | ICD-10-CM

## 2025-02-12 NOTE — TELEPHONE ENCOUNTER
Spoke with patient, discussed in detail her urine studies from the third compared to January 16.  She is concerned that the leukocyte esterase is still present even though it is in trace amounts and I did educate her on the different possibilities other than infection that could lead to trace amount leukocyte esterase such as inflammation.  She does report that the urine continues to be dark and she has increased frequency although no burning with urination or foul smell. ordered repeat urine in a few weeks.  She understands to complete the testing and we will follow-up after that.

## 2025-03-03 ENCOUNTER — HOSPITAL ENCOUNTER (OUTPATIENT)
Age: 65
Discharge: HOME | End: 2025-03-03
Payer: MEDICARE

## 2025-03-03 DIAGNOSIS — R35.0 FREQUENCY OF URINATION: ICD-10-CM

## 2025-03-03 LAB
BILIRUB UR QL STRIP: NEGATIVE
CLARITY UR: CLEAR
COLOR UR: YELLOW
COMMENT: NORMAL
GLUCOSE UR STRIP-MCNC: NEGATIVE MG/DL
HGB UR QL STRIP.AUTO: NEGATIVE
KETONES UR STRIP-MCNC: NEGATIVE MG/DL
LEUKOCYTE ESTERASE UR QL STRIP: NEGATIVE
NITRITE UR QL STRIP: NEGATIVE
PH UR STRIP: 6.5 [PH] (ref 5–8)
PROT UR STRIP-MCNC: NEGATIVE MG/DL
SP GR UR STRIP: 1.01 (ref 1–1.03)
UROBILINOGEN UR STRIP-ACNC: NORMAL EU/DL (ref 0–1)

## 2025-03-03 PROCEDURE — 81003 URINALYSIS AUTO W/O SCOPE: CPT

## 2025-03-06 ENCOUNTER — HOSPITAL ENCOUNTER (OUTPATIENT)
Dept: CT IMAGING | Age: 65
Discharge: HOME | End: 2025-03-08
Attending: UROLOGY
Payer: MEDICARE

## 2025-03-06 DIAGNOSIS — N39.0 RECURRENT UTI: ICD-10-CM

## 2025-03-06 PROCEDURE — 74176 CT ABD & PELVIS W/O CONTRAST: CPT

## 2025-04-02 ENCOUNTER — TELEPHONE (OUTPATIENT)
Dept: INTERNAL MEDICINE | Age: 65
End: 2025-04-02

## 2025-04-21 ENCOUNTER — HOSPITAL ENCOUNTER (EMERGENCY)
Age: 65
Discharge: HOME | End: 2025-04-21
Attending: EMERGENCY MEDICINE
Payer: MEDICARE

## 2025-04-21 VITALS
DIASTOLIC BLOOD PRESSURE: 94 MMHG | OXYGEN SATURATION: 95 % | RESPIRATION RATE: 21 BRPM | SYSTOLIC BLOOD PRESSURE: 167 MMHG | HEART RATE: 89 BPM | WEIGHT: 293 LBS | BODY MASS INDEX: 45.99 KG/M2 | TEMPERATURE: 97.5 F | HEIGHT: 67 IN

## 2025-04-21 DIAGNOSIS — R30.0 DYSURIA: Primary | ICD-10-CM

## 2025-04-21 LAB
ANION GAP SERPL CALCULATED.3IONS-SCNC: 13 MMOL/L (ref 9–16)
BACTERIA URNS QL MICRO: ABNORMAL
BASOPHILS # BLD: 0.05 K/UL (ref 0–0.2)
BASOPHILS NFR BLD: 1 % (ref 0–2)
BILIRUB UR QL STRIP: NEGATIVE
BUN SERPL-MCNC: 11 MG/DL (ref 8–23)
CALCIUM SERPL-MCNC: 9.7 MG/DL (ref 8.6–10.4)
CASTS #/AREA URNS LPF: ABNORMAL /LPF (ref 0–8)
CHLORIDE SERPL-SCNC: 104 MMOL/L (ref 98–107)
CLARITY UR: CLEAR
CO2 SERPL-SCNC: 22 MMOL/L (ref 20–31)
COLOR UR: YELLOW
CREAT SERPL-MCNC: 0.9 MG/DL (ref 0.6–0.9)
EOSINOPHIL # BLD: 0.19 K/UL (ref 0–0.44)
EOSINOPHILS RELATIVE PERCENT: 4 % (ref 1–4)
EPI CELLS #/AREA URNS HPF: ABNORMAL /HPF (ref 0–5)
ERYTHROCYTE [DISTWIDTH] IN BLOOD BY AUTOMATED COUNT: 14.4 % (ref 11.8–14.4)
GFR, ESTIMATED: 71 ML/MIN/1.73M2
GLUCOSE SERPL-MCNC: 94 MG/DL (ref 74–99)
GLUCOSE UR STRIP-MCNC: NEGATIVE MG/DL
HCT VFR BLD AUTO: 43.6 % (ref 36.3–47.1)
HGB BLD-MCNC: 14.3 G/DL (ref 11.9–15.1)
HGB UR QL STRIP.AUTO: NEGATIVE
IMM GRANULOCYTES # BLD AUTO: <0.03 K/UL (ref 0–0.3)
IMM GRANULOCYTES NFR BLD: 0 %
KETONES UR STRIP-MCNC: ABNORMAL MG/DL
LEUKOCYTE ESTERASE UR QL STRIP: ABNORMAL
LYMPHOCYTES NFR BLD: 2.2 K/UL (ref 1.1–3.7)
LYMPHOCYTES RELATIVE PERCENT: 43 % (ref 24–43)
MCH RBC QN AUTO: 29.2 PG (ref 25.2–33.5)
MCHC RBC AUTO-ENTMCNC: 32.8 G/DL (ref 28.4–34.8)
MCV RBC AUTO: 89 FL (ref 82.6–102.9)
MONOCYTES NFR BLD: 0.34 K/UL (ref 0.1–1.2)
MONOCYTES NFR BLD: 7 % (ref 3–12)
NEUTROPHILS NFR BLD: 45 % (ref 36–65)
NEUTS SEG NFR BLD: 2.31 K/UL (ref 1.5–8.1)
NITRITE UR QL STRIP: NEGATIVE
NRBC BLD-RTO: 0 PER 100 WBC
PH UR STRIP: 5.5 [PH] (ref 5–8)
PLATELET # BLD AUTO: 286 K/UL (ref 138–453)
PMV BLD AUTO: 10.1 FL (ref 8.1–13.5)
POTASSIUM SERPL-SCNC: 4.1 MMOL/L (ref 3.7–5.3)
PROT UR STRIP-MCNC: NEGATIVE MG/DL
RBC # BLD AUTO: 4.9 M/UL (ref 3.95–5.11)
RBC #/AREA URNS HPF: ABNORMAL /HPF (ref 0–4)
SODIUM SERPL-SCNC: 139 MMOL/L (ref 136–145)
SP GR UR STRIP: 1.02 (ref 1–1.03)
UROBILINOGEN UR STRIP-ACNC: NORMAL EU/DL (ref 0–1)
WBC #/AREA URNS HPF: ABNORMAL /HPF (ref 0–5)
WBC OTHER # BLD: 5.1 K/UL (ref 3.5–11.3)

## 2025-04-21 PROCEDURE — 87086 URINE CULTURE/COLONY COUNT: CPT

## 2025-04-21 PROCEDURE — 80048 BASIC METABOLIC PNL TOTAL CA: CPT

## 2025-04-21 PROCEDURE — 85025 COMPLETE CBC W/AUTO DIFF WBC: CPT

## 2025-04-21 PROCEDURE — 99283 EMERGENCY DEPT VISIT LOW MDM: CPT

## 2025-04-21 PROCEDURE — 81001 URINALYSIS AUTO W/SCOPE: CPT

## 2025-04-21 RX ORDER — PHENAZOPYRIDINE HYDROCHLORIDE 100 MG/1
100 TABLET, FILM COATED ORAL 3 TIMES DAILY PRN
Qty: 9 TABLET | Refills: 0 | Status: SHIPPED | OUTPATIENT
Start: 2025-04-21 | End: 2025-04-24

## 2025-04-21 NOTE — ED NOTES
Pt comes to ED with c/o dysuria. Pt states having long hx UTI's, pyelonephritis, about two months ago started having dysuria, burning and foul odor, no vaginal symptoms, hx of cystocele and urethral stricture, scheduled for procedure. Pt denies CP, SOB, fever, NVD, abdominal pain. Pt a/o x4, resting on stretcher, RR even and unlabored, call light within reach.

## 2025-04-21 NOTE — ED PROVIDER NOTES
[facility]     Emergency Department     Faculty Note/ Attestation      12:40 PM EDT  Pt Name: Ifeoma Martinez                                       MRN: [id number]  Birthdate 1960  Date of evaluation: 4/21/2025  Patients PCP:    Rebekah Elise MD    Attestation  I performed a history and physical examination of the patient/ or directly observed  and discussed management with the resident. I reviewed the resident’s note and agree with the documented findings and plan of care. Any areas of disagreement are noted on the chart. I was personally present for the key portions of any procedures. I have documented in the chart those procedures where I was not present during the key portions. I have reviewed the emergency nurses triage note. I agree with the chief complaint, past medical history, past surgical history, allergies, medications, social and family history as documented unless otherwise noted below.    For Physician Assistant/ Nurse Practitioner cases/documentation I have personally evaluated this patient and have completed at least one if not all key elements of the E/M (history, physical exam, and MDM). Additional findings are as noted.       Initial Screens:     -------------------------------------     ----------------------------------------  [place]isma Coma Scale  Eye Opening: Spontaneous  Best Verbal Response: Oriented  Best Motor Response: Obeys commands  [place] Coma Scale Score: 15  ------------------------------------------------------------------------------------------------------------  Vitals:    Vitals:    04/21/25 1036 04/21/25 1037   BP: (!) 167/94    Pulse: (!) 110    Resp: 21    Temp: 97.5 °F (36.4 °C)    TempSrc: Oral    SpO2: 95%    Weight:  (!) 154.2 kg (340 lb)   Height:  1.702 m (5' 7\")       Chief Complaint      Chief Complaint   Patient presents with    Urinary Tract Infection    Dysuria          height is 1.702 m (5' 7\") and weight is 154.2 kg (340 lb) 
pill for breakthrough.  Work on cutting back to nightly if able to and eventually off 5/25/23 7/11/24  Rhett Dumont MD   Incontinence Supply Disposable MISC Use pull-ons XXL during the day for incontinence and bed pads at night. 1/12/23   Rebekah Elise MD   vitamin D3 (CHOLECALCIFEROL) 25 MCG (1000 UT) TABS tablet Take 1 tablet by mouth daily 5/25/21   Rebekah Elise MD   Blood Pressure Monitoring (BLOOD PRESSURE CUFF) MISC  4/2/20   Josias Solano MD   VITAMIN E PO Take by mouth daily    Josias Solano MD   aspirin 81 MG tablet Take 2 tablets by mouth daily 5/1/19   Rhiannon Lindsay MD   ascorbic acid (VITAMIN C) 500 MG tablet Take 1 tablet by mouth daily 5/1/19   Rhiannon Lindsay MD   Multiple Vitamin (THERAPEUTIC MULTIVITAMIN PO) Take 1 tablet by mouth    Josias Solano MD     REVIEW OF SYSTEMS       See HPI    PHYSICAL EXAM      INITIAL VITALS:   BP (!) 167/94   Pulse 89   Temp 97.5 °F (36.4 °C) (Oral)   Resp 21   Ht 1.702 m (5' 7\")   Wt (!) 154.2 kg (340 lb)   LMP 09/07/2007   SpO2 95%   BMI 53.25 kg/m²     Physical Exam  Constitutional:       Appearance: Normal appearance. She is not ill-appearing.   HENT:      Mouth/Throat:      Mouth: Mucous membranes are moist.   Eyes:      Extraocular Movements: Extraocular movements intact.   Cardiovascular:      Rate and Rhythm: Normal rate and regular rhythm.      Pulses: Normal pulses.      Heart sounds: Normal heart sounds.   Pulmonary:      Breath sounds: Normal breath sounds.   Abdominal:      Palpations: Abdomen is soft.      Tenderness: There is no abdominal tenderness. There is no right CVA tenderness, left CVA tenderness, guarding or rebound.   Musculoskeletal:      Cervical back: Neck supple.   Skin:     General: Skin is warm.      Capillary Refill: Capillary refill takes less than 2 seconds.   Neurological:      Mental Status: She is alert.       DDX/DIAGNOSTIC RESULTS / EMERGENCY DEPARTMENT COURSE / MDM     Medical

## 2025-04-21 NOTE — DISCHARGE INSTRUCTIONS
You were seen in the emergency department today for complaints of pain and burning when you urinate.  As well as foul-smelling urine.  Your urinalysis came back negative for UTI.  Your labs all came back normal, you did not have a white blood cell count that was elevated, you did not have any abnormal renal function.  You do not have a UTI.  Your symptoms could be from an early UTI that is not yet apparent on your urine sample.  Please make sure that you follow-up with your urologist in the clinic.  Please return here for develop any worsening symptoms.  Please also follow-up with your primary doctor.

## 2025-04-22 LAB
MICROORGANISM SPEC CULT: NORMAL
SPECIMEN DESCRIPTION: NORMAL

## 2025-04-29 ENCOUNTER — TELEMEDICINE (OUTPATIENT)
Dept: INTERNAL MEDICINE | Age: 65
End: 2025-04-29
Payer: MEDICARE

## 2025-04-29 ENCOUNTER — TELEPHONE (OUTPATIENT)
Dept: INTERNAL MEDICINE | Age: 65
End: 2025-04-29

## 2025-04-29 DIAGNOSIS — R60.0 EDEMA OF BOTH LEGS: ICD-10-CM

## 2025-04-29 DIAGNOSIS — I50.9 CONGESTIVE HEART FAILURE, UNSPECIFIED HF CHRONICITY, UNSPECIFIED HEART FAILURE TYPE (HCC): Primary | ICD-10-CM

## 2025-04-29 DIAGNOSIS — I10 HYPERTENSION, UNSPECIFIED TYPE: ICD-10-CM

## 2025-04-29 PROCEDURE — 99447 NTRPROF PH1/NTRNET/EHR 11-20: CPT | Performed by: INTERNAL MEDICINE

## 2025-04-29 RX ORDER — FUROSEMIDE 40 MG/1
40 TABLET ORAL DAILY
Qty: 30 TABLET | Refills: 0 | Status: SHIPPED | OUTPATIENT
Start: 2025-04-29

## 2025-04-29 NOTE — PROGRESS NOTES
On this date 4/29/2025 I have spent 15 min reviewing previous notes, test results, and other pertinent medical information with the patient, discussing the diagnosis and importance of compliance with the treatment plan as well as documenting on the day of the visit.    Edema: Patient complains of a 1 month(s) history of persistent edema which has been unchanged with time. Location of edema: bilateral lower extremities.  The edema is present all day.  The swelling has been aggravated by nothing, relieved by nothing, and has been associated with  leg pain .  Patient denies abdominal pain, shortness of breath, and weight gain.    Diagnosis Orders   1. Congestive heart failure, unspecified HF chronicity, unspecified heart failure type (HCC)  TSH reflex to FT4    Creatinine, Random Urine    Protein, urine, random    Comprehensive Metabolic Panel    Echo (TTE) complete (PRN contrast/bubble/strain/3D)      2. Hypertension, unspecified type  furosemide (LASIX) 40 MG tablet      3. Edema of both legs  Echo (TTE) complete (PRN contrast/bubble/strain/3D)      Increase patient's Lasix from 20 mg to 40  Last echo was in 2019 that showed grade 1 diastolic dysfunction.  Obtain labs and repeat echo  Return to clinic in 1 week    Ifeoma Martinez was evaluated through a synchronous (real-time) audio encounter. Patient identification was verified at the start of the visit. She (or guardian if applicable) is aware that this is a billable service, which includes applicable co-pays. This visit was conducted with the patient's (and/or legal guardian's) verbal consent. She has not had a related appointment within my department in the past 7 days or scheduled within the next 24 hours.   The patient was located at Home: [address] [address].  The provider was located at Facility (Appt Dept): [address] [place] [address].  Confirm you are appropriately licensed, registered, or certified to deliver care in

## 2025-04-30 NOTE — TELEPHONE ENCOUNTER
Spoke with patient yesterday and she states at her appointment her dose of lasix was doubled and Is wondering if she will be prescribed a potassium supplement because \"lasix pushes potassium out of the body\". Please advise

## 2025-05-01 ENCOUNTER — HOSPITAL ENCOUNTER (OUTPATIENT)
Dept: VASCULAR LAB | Age: 65
Discharge: HOME | End: 2025-05-03
Attending: SURGERY
Payer: MEDICARE

## 2025-05-01 DIAGNOSIS — I73.9 PAD (PERIPHERAL ARTERY DISEASE): ICD-10-CM

## 2025-05-01 LAB
VAS LEFT ARM BP: 196 MMHG
VAS LEFT DORSALIS PEDIS BP: 255 MMHG
VAS LEFT PTA BP: 255 MMHG
VAS LEFT TBI: 0.48
VAS LEFT TOE PRESSURE: 94 MMHG
VAS RIGHT ARM BP: 196 MMHG
VAS RIGHT DORSALIS PEDIS BP: 255 MMHG
VAS RIGHT PTA BP: 255 MMHG
VAS RIGHT TBI: 0.54
VAS RIGHT TOE PRESSURE: 106 MMHG

## 2025-05-01 PROCEDURE — 93923 UPR/LXTR ART STDY 3+ LVLS: CPT

## 2025-05-06 ENCOUNTER — TELEPHONE (OUTPATIENT)
Age: 65
End: 2025-05-06

## 2025-05-06 NOTE — TELEPHONE ENCOUNTER
Called the patient to get her rescheduled for her missed appointment today but she was unable to be reached by phone. Writer left a message asking the patient to return the call to the office.

## 2025-05-08 ENCOUNTER — HOSPITAL ENCOUNTER (OUTPATIENT)
Age: 65
Discharge: HOME | End: 2025-05-08
Attending: INTERNAL MEDICINE
Payer: MEDICARE

## 2025-05-08 ENCOUNTER — HOSPITAL ENCOUNTER (OUTPATIENT)
Age: 65
Discharge: HOME | End: 2025-05-10
Attending: INTERNAL MEDICINE
Payer: MEDICARE

## 2025-05-08 DIAGNOSIS — I50.9 CONGESTIVE HEART FAILURE, UNSPECIFIED HF CHRONICITY, UNSPECIFIED HEART FAILURE TYPE (HCC): ICD-10-CM

## 2025-05-08 DIAGNOSIS — R60.0 EDEMA OF BOTH LEGS: ICD-10-CM

## 2025-05-08 LAB
ALBUMIN SERPL-MCNC: 4.2 G/DL (ref 3.5–5.2)
ALBUMIN/GLOB SERPL: 1.2 {RATIO} (ref 1–2.5)
ALP SERPL-CCNC: 129 U/L (ref 35–104)
ALT SERPL-CCNC: 32 U/L (ref 10–35)
ANION GAP SERPL CALCULATED.3IONS-SCNC: 14 MMOL/L (ref 9–16)
AST SERPL-CCNC: 32 U/L (ref 10–35)
BILIRUB SERPL-MCNC: 0.2 MG/DL (ref 0–1.2)
BUN SERPL-MCNC: 13 MG/DL (ref 8–23)
CALCIUM SERPL-MCNC: 10 MG/DL (ref 8.6–10.4)
CHLORIDE SERPL-SCNC: 104 MMOL/L (ref 98–107)
CO2 SERPL-SCNC: 22 MMOL/L (ref 20–31)
CREAT SERPL-MCNC: 0.6 MG/DL (ref 0.6–0.9)
CREAT UR-MCNC: 85.2 MG/DL (ref 28–217)
ECHO AO ROOT DIAM: 2.8 CM
ECHO AV AREA PEAK VELOCITY: 2.3 CM2
ECHO AV AREA VTI: 2.4 CM2
ECHO AV MEAN GRADIENT: 2 MMHG
ECHO AV MEAN VELOCITY: 0.6 M/S
ECHO AV PEAK GRADIENT: 4 MMHG
ECHO AV PEAK VELOCITY: 1 M/S
ECHO AV VELOCITY RATIO: 0.7
ECHO AV VTI: 23.9 CM
ECHO EST RA PRESSURE: 3 MMHG
ECHO LA AREA 2C: 15.4 CM2
ECHO LA AREA 4C: 13.2 CM2
ECHO LA DIAMETER: 2 CM
ECHO LA MAJOR AXIS: 4.9 CM
ECHO LA MINOR AXIS: 4.8 CM
ECHO LA TO AORTIC ROOT RATIO: 0.71
ECHO LA VOL BP: 33 ML (ref 22–52)
ECHO LA VOL MOD A2C: 40 ML (ref 22–52)
ECHO LA VOL MOD A4C: 27 ML (ref 22–52)
ECHO LV E' LATERAL VELOCITY: 7.62 CM/S
ECHO LV E' SEPTAL VELOCITY: 5.87 CM/S
ECHO LV EDV A2C: 46 ML
ECHO LV EDV A4C: 80 ML
ECHO LV EF PHYSICIAN: 55 %
ECHO LV EJECTION FRACTION A2C: 54 %
ECHO LV EJECTION FRACTION A4C: 48 %
ECHO LV EJECTION FRACTION BIPLANE: 52 % (ref 55–100)
ECHO LV ESV A2C: 21 ML
ECHO LV ESV A4C: 42 ML
ECHO LV FRACTIONAL SHORTENING: 33 % (ref 28–44)
ECHO LV INTERNAL DIMENSION DIASTOLIC: 4.3 CM (ref 3.9–5.3)
ECHO LV INTERNAL DIMENSION SYSTOLIC: 2.9 CM
ECHO LV IVSD: 0.9 CM (ref 0.6–0.9)
ECHO LV MASS 2D: 123.3 G (ref 67–162)
ECHO LV POSTERIOR WALL DIASTOLIC: 0.9 CM (ref 0.6–0.9)
ECHO LV RELATIVE WALL THICKNESS RATIO: 0.42
ECHO LVOT AREA: 3.1 CM2
ECHO LVOT AV VTI INDEX: 0.76
ECHO LVOT DIAM: 2 CM
ECHO LVOT MEAN GRADIENT: 1 MMHG
ECHO LVOT PEAK GRADIENT: 2 MMHG
ECHO LVOT PEAK VELOCITY: 0.7 M/S
ECHO LVOT SV: 56.8 ML
ECHO LVOT VTI: 18.1 CM
ECHO MV A VELOCITY: 0.62 M/S
ECHO MV AREA VTI: 3 CM2
ECHO MV E DECELERATION TIME (DT): 136 MS
ECHO MV E VELOCITY: 0.5 M/S
ECHO MV E/A RATIO: 0.81
ECHO MV E/E' LATERAL: 6.56
ECHO MV E/E' RATIO (AVERAGED): 7.54
ECHO MV E/E' SEPTAL: 8.52
ECHO MV LVOT VTI INDEX: 1.06
ECHO MV MAX VELOCITY: 0.8 M/S
ECHO MV MEAN GRADIENT: 1 MMHG
ECHO MV MEAN VELOCITY: 0.6 M/S
ECHO MV PEAK GRADIENT: 2 MMHG
ECHO MV VTI: 19.1 CM
ECHO RV FREE WALL PEAK S': 11.1 CM/S
ECHO RV INTERNAL DIMENSION: 3.2 CM
ECHO RV TAPSE: 1.8 CM (ref 1.7–?)
GFR, ESTIMATED: >90 ML/MIN/1.73M2
GLUCOSE SERPL-MCNC: 108 MG/DL (ref 74–99)
POTASSIUM SERPL-SCNC: 4 MMOL/L (ref 3.7–5.3)
PROT SERPL-MCNC: 7.7 G/DL (ref 6.6–8.7)
SODIUM SERPL-SCNC: 140 MMOL/L (ref 136–145)
TOTAL PROTEIN, URINE: 9 MG/DL
TSH SERPL DL<=0.05 MIU/L-ACNC: 1.83 UIU/ML (ref 0.27–4.2)

## 2025-05-08 PROCEDURE — 84443 ASSAY THYROID STIM HORMONE: CPT

## 2025-05-08 PROCEDURE — 82570 ASSAY OF URINE CREATININE: CPT

## 2025-05-08 PROCEDURE — 80053 COMPREHEN METABOLIC PANEL: CPT

## 2025-05-08 PROCEDURE — 93306 TTE W/DOPPLER COMPLETE: CPT

## 2025-05-08 PROCEDURE — 36415 COLL VENOUS BLD VENIPUNCTURE: CPT

## 2025-05-08 PROCEDURE — 84156 ASSAY OF PROTEIN URINE: CPT

## 2025-05-14 ENCOUNTER — OFFICE VISIT (OUTPATIENT)
Age: 65
End: 2025-05-14
Payer: MEDICARE

## 2025-05-14 VITALS
WEIGHT: 293 LBS | DIASTOLIC BLOOD PRESSURE: 72 MMHG | BODY MASS INDEX: 47.09 KG/M2 | OXYGEN SATURATION: 96 % | SYSTOLIC BLOOD PRESSURE: 122 MMHG | HEIGHT: 66 IN | TEMPERATURE: 97.7 F | HEART RATE: 83 BPM

## 2025-05-14 DIAGNOSIS — M17.12 PRIMARY OSTEOARTHRITIS OF LEFT KNEE: ICD-10-CM

## 2025-05-14 DIAGNOSIS — M48.00 SPINAL STENOSIS, UNSPECIFIED SPINAL REGION: ICD-10-CM

## 2025-05-14 DIAGNOSIS — I87.2 LYMPHEDEMA DUE TO VENOUS INSUFFICIENCY: Primary | ICD-10-CM

## 2025-05-14 DIAGNOSIS — I89.0 LYMPHEDEMA DUE TO VENOUS INSUFFICIENCY: Primary | ICD-10-CM

## 2025-05-14 DIAGNOSIS — G81.94 LEFT HEMIPARESIS (HCC): ICD-10-CM

## 2025-05-14 PROCEDURE — 1036F TOBACCO NON-USER: CPT | Performed by: INTERNAL MEDICINE

## 2025-05-14 PROCEDURE — 3017F COLORECTAL CA SCREEN DOC REV: CPT | Performed by: INTERNAL MEDICINE

## 2025-05-14 PROCEDURE — 99213 OFFICE O/P EST LOW 20 MIN: CPT | Performed by: INTERNAL MEDICINE

## 2025-05-14 PROCEDURE — G8417 CALC BMI ABV UP PARAM F/U: HCPCS | Performed by: INTERNAL MEDICINE

## 2025-05-14 PROCEDURE — 1090F PRES/ABSN URINE INCON ASSESS: CPT | Performed by: INTERNAL MEDICINE

## 2025-05-14 PROCEDURE — 1123F ACP DISCUSS/DSCN MKR DOCD: CPT | Performed by: INTERNAL MEDICINE

## 2025-05-14 PROCEDURE — G8399 PT W/DXA RESULTS DOCUMENT: HCPCS | Performed by: INTERNAL MEDICINE

## 2025-05-14 PROCEDURE — 1159F MED LIST DOCD IN RCRD: CPT | Performed by: INTERNAL MEDICINE

## 2025-05-14 PROCEDURE — G8427 DOCREV CUR MEDS BY ELIG CLIN: HCPCS | Performed by: INTERNAL MEDICINE

## 2025-05-14 PROCEDURE — 99214 OFFICE O/P EST MOD 30 MIN: CPT | Performed by: INTERNAL MEDICINE

## 2025-05-14 RX ORDER — GABAPENTIN 600 MG/1
600 TABLET ORAL
COMMUNITY
Start: 2025-03-05

## 2025-05-14 ASSESSMENT — PATIENT HEALTH QUESTIONNAIRE - PHQ9
1. LITTLE INTEREST OR PLEASURE IN DOING THINGS: NOT AT ALL
SUM OF ALL RESPONSES TO PHQ QUESTIONS 1-9: 0
2. FEELING DOWN, DEPRESSED OR HOPELESS: NOT AT ALL

## 2025-05-14 ASSESSMENT — ENCOUNTER SYMPTOMS
RESPIRATORY NEGATIVE: 1
EYES NEGATIVE: 1
GASTROINTESTINAL NEGATIVE: 1
ALLERGIC/IMMUNOLOGIC NEGATIVE: 1

## 2025-05-14 NOTE — PROGRESS NOTES
..[facility]   Progress Note        Date of patient's visit: 5/14/2025  Patient's Name:  Ifeoma Martinez                   YOB: 1960        PCP:  Rebekah Elise MD    Ifeoma Martinez is a 65 y.o. female who presents for   Chief Complaint   Patient presents with    Congestive Heart Failure    Hypertension    Leg Swelling    Chills     Patient states she ha been feeling cold the past week,.    and follow up of chronic medical problems.  Patient Active Problem List   Diagnosis    Morbid obesity due to excess calories ([facility])    Hyperlipemia    Chronic pelvic pain in female    Spinal stenosis at L4-L5 level    Adjustment disorder with mixed anxiety and depressed mood    Anemia    Angioedema    Congenital pes planus    Failed back surgical syndrome    History of lumbar surgery    Hemiplegia affecting left nondominant side (HCC)    Primary osteoarthritis of left knee    Saphenofemoral venous reflux    Lymphedema due to venous insufficiency    PAD (peripheral artery disease)    Other vascular myelopathies ([facility])    Neurogenic claudication       HISTORY OF PRESENT ILLNESS:    History was obtained from the patient.     Here for a routine follow up. She reports some improvement in LE edema but she is reporting a hard time taking lasix daily. Would prefer every other day. She is reporting legs painful and she is having hard time walking long distances. She is following up soon with vascular surgery.   Patient here for evaluation for Power Mobility Device. I had a face to face encounter with this patient and my assessment included the following:     Patient reports their quality of life is adversely affected from decreased mobility and swelling in legs and they have a great deal of difficulty completing necessary functions in the home and outside due to difficulty with ambulation from the following medical conditions lymphedema, hemiplegia. These are prohibiting participation in activities

## 2025-05-15 ENCOUNTER — TELEPHONE (OUTPATIENT)
Age: 65
End: 2025-05-15

## 2025-05-15 ENCOUNTER — RESULTS FOLLOW-UP (OUTPATIENT)
Age: 65
End: 2025-05-15

## 2025-05-15 DIAGNOSIS — N30.00 ACUTE CYSTITIS WITHOUT HEMATURIA: Primary | ICD-10-CM

## 2025-05-15 NOTE — TELEPHONE ENCOUNTER
Ifeoma hoffman stating she forgot to mention this to you yesterday. She states that she is having increased urinary frequency, burning that has been going on for a few days and feels like a UTI and would like to know what would you like her to do?    Please Advise

## 2025-05-18 ENCOUNTER — HOSPITAL ENCOUNTER (EMERGENCY)
Age: 65
Discharge: HOME | End: 2025-05-18
Attending: EMERGENCY MEDICINE
Payer: MEDICARE

## 2025-05-18 VITALS
RESPIRATION RATE: 16 BRPM | TEMPERATURE: 98.3 F | DIASTOLIC BLOOD PRESSURE: 73 MMHG | SYSTOLIC BLOOD PRESSURE: 109 MMHG | OXYGEN SATURATION: 100 % | HEART RATE: 80 BPM

## 2025-05-18 DIAGNOSIS — N30.00 ACUTE CYSTITIS WITHOUT HEMATURIA: Primary | ICD-10-CM

## 2025-05-18 LAB
BACTERIA URNS QL MICRO: NORMAL
BILIRUB UR QL STRIP: NEGATIVE
CASTS #/AREA URNS LPF: NORMAL /LPF (ref 0–8)
CLARITY UR: CLEAR
COLOR UR: YELLOW
EPI CELLS #/AREA URNS HPF: NORMAL /HPF (ref 0–5)
GLUCOSE UR STRIP-MCNC: NEGATIVE MG/DL
HGB UR QL STRIP.AUTO: NEGATIVE
KETONES UR STRIP-MCNC: NEGATIVE MG/DL
LEUKOCYTE ESTERASE UR QL STRIP: ABNORMAL
NITRITE UR QL STRIP: NEGATIVE
PH UR STRIP: 5.5 [PH] (ref 5–8)
PROT UR STRIP-MCNC: NEGATIVE MG/DL
RBC #/AREA URNS HPF: NORMAL /HPF (ref 0–4)
SP GR UR STRIP: 1.02 (ref 1–1.03)
UROBILINOGEN UR STRIP-ACNC: NORMAL EU/DL (ref 0–1)
WBC #/AREA URNS HPF: NORMAL /HPF (ref 0–5)

## 2025-05-18 PROCEDURE — 87086 URINE CULTURE/COLONY COUNT: CPT

## 2025-05-18 PROCEDURE — 81001 URINALYSIS AUTO W/SCOPE: CPT

## 2025-05-18 PROCEDURE — 6370000000 HC RX 637 (ALT 250 FOR IP): Performed by: STUDENT IN AN ORGANIZED HEALTH CARE EDUCATION/TRAINING PROGRAM

## 2025-05-18 PROCEDURE — 86403 PARTICLE AGGLUT ANTBDY SCRN: CPT

## 2025-05-18 PROCEDURE — 99283 EMERGENCY DEPT VISIT LOW MDM: CPT | Performed by: EMERGENCY MEDICINE

## 2025-05-18 RX ORDER — SULFAMETHOXAZOLE AND TRIMETHOPRIM 800; 160 MG/1; MG/1
1 TABLET ORAL 2 TIMES DAILY
Qty: 10 TABLET | Refills: 0 | Status: SHIPPED | OUTPATIENT
Start: 2025-05-18 | End: 2025-05-23

## 2025-05-18 RX ORDER — SULFAMETHOXAZOLE AND TRIMETHOPRIM 800; 160 MG/1; MG/1
1 TABLET ORAL ONCE
Status: COMPLETED | OUTPATIENT
Start: 2025-05-18 | End: 2025-05-18

## 2025-05-18 RX ADMIN — SULFAMETHOXAZOLE AND TRIMETHOPRIM 1 TABLET: 800; 160 TABLET ORAL at 13:10

## 2025-05-18 NOTE — ED PROVIDER NOTES
[facility] EMERGENCY DEPARTMENT  Emergency Department Encounter  Emergency Medicine Resident     Pt Name:Ifeoma Martinez  MRN: [id number]  Birthdate 1960  Date of evaluation: 5/18/25  PCP:  Rebekah Elise MD  Note Started: 1:18 PM EDT      CHIEF COMPLAINT       Chief Complaint   Patient presents with    Dysuria       HISTORY OF PRESENT ILLNESS  (Location/Symptom, Timing/Onset, Context/Setting, Quality, Duration, Modifying Factors, Severity.)      Ifeoma Martinez is a 65 y.o. female who presents with concerned she may have a UTI.  Patient reports some burning with urination and frequent urination that is consistent with her recurrent UTIs.  She reports that she is pending for an evaluation by urology for possible bladder lift procedure as she has noted some worsening of the symptoms and more recurrent UTIs over the past 2 years.  She reports this feels very similar to her previous UTIs, denies nausea, vomiting, fever, chills, abdominal pain.    PAST MEDICAL / SURGICAL / SOCIAL / FAMILY HISTORY      has a past medical history of Acquired cystic kidney disease, Anemia, Arthritis, Asthma, Bilateral leg and foot pain, Degeneration of cervical intervertebral disc, Depression, Failed back syndrome, History of recurrent UTI (urinary tract infection), Hyperlipidemia, Hypertension, Lumbar disc herniation with radiculopathy, Obesity, Spinal stenosis, and Wound infection after surgery.     has a past surgical history that includes Foot surgery (Bilateral); Gallbladder surgery; Lumbar spine surgery (1986); Lumbar spine surgery (4/2012); Nerve Block (10/26/2015); Nerve Block (12/22/15); Spine surgery (07/24/2016); back surgery; Vein Surgery (Bilateral, 03/2017); Cystocopy (Bilateral, 05/20/2022); and Cystoscopy (Bilateral, 5/20/2022).    Social History     Socioeconomic History    Marital status: Single     Spouse name: Not on file    Number of children: Not on file    Years of education: Not on file    Highest

## 2025-05-18 NOTE — DISCHARGE INSTRUCTIONS
You were seen due to discomfort with urination.  You were found to have a urinary tract infection.  Please keep follow-up appointment with your urologist.  Please take the antibiotic as prescribed.    Please return to the ED if develop worsening pain, your symptoms do not improve, if you develop pain in your back, nausea, vomiting, fever, chills, blood in your urine or for any other concern.

## 2025-05-18 NOTE — ED PROVIDER NOTES
[facility]  Emergency Department  Faculty Attestation     I performed a history and physical examination of the patient and discussed management with the resident. I reviewed the resident’s note and agree with the documented findings and plan of care. Any areas of disagreement are noted on the chart. I was personally present for the key portions of any procedures. I have documented in the chart those procedures where I was not present during the key portions. I have reviewed the emergency nurses triage note. I agree with the chief complaint, past medical history, past surgical history, allergies, medications, social and family history as documented unless otherwise noted below.    For Physician Assistant/ Nurse Practitioner cases/documentation I have personally evaluated this patient and have completed at least one if not all key elements of the E/M (history, physical exam, and MDM). Additional findings are as noted.    Preliminary note started at 11:15 AM EDT    Primary Care Physician:  Rebekah Elise MD    Screenings:  [unfilled]    CHIEF COMPLAINT       Chief Complaint   Patient presents with    Dysuria       RECENT VITALS:   /73   Pulse 80   Temp 98.3 °F (36.8 °C) (Oral)   Resp 16   LMP 09/07/2007   SpO2 100%     LABS:  Labs Reviewed   URINALYSIS WITH REFLEX TO CULTURE       Radiology  No orders to display           Attending Physician Additional  Notes    Patient has dysuria and frequency, no flank pain nausea vomiting fever chills or sweats.  She has frequent UTIs and also has bladder prolapse.  On exam she is nontoxic afebrile vital signs normal.  Impression is probable UTI.  Plan is urinalysis, reflex culture, antimicrobials, follow-up with return precautions.            Keven Feldman MD, FACEP  Attending Emergency  Physician                Keven Feldman MD  05/18/25 2182

## 2025-05-19 LAB
MICROORGANISM SPEC CULT: ABNORMAL
SPECIMEN DESCRIPTION: ABNORMAL

## 2025-05-20 ENCOUNTER — TELEPHONE (OUTPATIENT)
Age: 65
End: 2025-05-20

## 2025-05-20 DIAGNOSIS — M54.16 CHRONIC LUMBAR RADICULOPATHY: ICD-10-CM

## 2025-05-20 DIAGNOSIS — I89.0 LYMPHEDEMA DUE TO VENOUS INSUFFICIENCY: ICD-10-CM

## 2025-05-20 DIAGNOSIS — G81.94 HEMIPLEGIA AFFECTING LEFT NONDOMINANT SIDE, UNSPECIFIED ETIOLOGY, UNSPECIFIED HEMIPLEGIA TYPE (HCC): Primary | ICD-10-CM

## 2025-05-20 DIAGNOSIS — M48.061 SPINAL STENOSIS AT L4-L5 LEVEL: ICD-10-CM

## 2025-05-20 DIAGNOSIS — I87.2 LYMPHEDEMA DUE TO VENOUS INSUFFICIENCY: ICD-10-CM

## 2025-05-20 NOTE — TELEPHONE ENCOUNTER
PC to pt to let her know [facility] will reach out to schedule an appt for evaluation. Pt to contact office if she has not heard anything in a week.

## 2025-05-20 NOTE — TELEPHONE ENCOUNTER
Pt was just here, order for scooter was placed. Cannot order a scooter as it limits them to what type of power mobility device she may qualify for. New order pended, please route back to me so I can send to [facility].

## 2025-05-30 ENCOUNTER — HOSPITAL ENCOUNTER (OUTPATIENT)
Age: 65
Setting detail: THERAPIES SERIES
Discharge: HOME | End: 2025-05-30
Payer: MEDICARE

## 2025-05-30 ENCOUNTER — HOSPITAL ENCOUNTER (OUTPATIENT)
Age: 65
Discharge: HOME | End: 2025-05-30
Payer: MEDICARE

## 2025-05-30 DIAGNOSIS — N30.00 ACUTE CYSTITIS WITHOUT HEMATURIA: ICD-10-CM

## 2025-05-30 LAB
BACTERIA URNS QL MICRO: NORMAL
BILIRUB UR QL STRIP: NEGATIVE
CASTS #/AREA URNS LPF: NORMAL /LPF (ref 0–8)
CLARITY UR: CLEAR
COLOR UR: YELLOW
EPI CELLS #/AREA URNS HPF: NORMAL /HPF (ref 0–5)
GLUCOSE UR STRIP-MCNC: NEGATIVE MG/DL
HGB UR QL STRIP.AUTO: NEGATIVE
KETONES UR STRIP-MCNC: NEGATIVE MG/DL
LEUKOCYTE ESTERASE UR QL STRIP: ABNORMAL
NITRITE UR QL STRIP: NEGATIVE
PH UR STRIP: 6 [PH] (ref 5–8)
PROT UR STRIP-MCNC: NEGATIVE MG/DL
RBC #/AREA URNS HPF: NORMAL /HPF (ref 0–4)
SP GR UR STRIP: 1.02 (ref 1–1.03)
UROBILINOGEN UR STRIP-ACNC: NORMAL EU/DL (ref 0–1)
WBC #/AREA URNS HPF: NORMAL /HPF (ref 0–5)

## 2025-05-30 PROCEDURE — 97161 PT EVAL LOW COMPLEX 20 MIN: CPT | Performed by: PHYSICAL THERAPIST

## 2025-05-30 PROCEDURE — 81001 URINALYSIS AUTO W/SCOPE: CPT

## 2025-05-30 NOTE — CONSULTS
[x] [facility]  Outpatient Rehabilitation &  Therapy  [address]  [phone]  F: [phone]        Physical Therapy Wheelchair  Evaluation    Date:  2025  Patient: Ifeoma Martinez  : 1960  MRN: [id number]  Physician: Dr. Rebekah Elise     Insurance: Medicare, [place] [employer]  Medical Diagnosis:   I89.0 (ICD-10-CM) - Lymphedema, not elsewhere classified   I87.2 (ICD-10-CM) - Venous insufficiency (chronic) (peripheral)   G81.94 (ICD-10-CM) - Hemiplegia, unspecified affecting left nondominant side (HCC)   M48.061 (ICD-10-CM) - Spinal stenosis, lumbar region without neurogenic claudication      Rehab Codes: Z74.0 impaired mobility   Date of symptom onset: 2025    Subjective:   Patient arrives to clinic, using hospital wheelchair for mobility. Patient is agreeable to wheelchair evaluation. [facility] rep, Valerie Dow, present for evaluation.     Patient has a history of multiple medical problems that impair mobility.  Lower extremities have gotten worse through the years.  Three spinals surgeries.  Arthritis of the spine - cervical to lumbar.  Numbness, tightness, weakness, and buckling of the legs have increased.  Lymphedema in the legs as well - for at least 2, possibly 3 years.      Falls History: Reports she has fallen in the last 3 months.  The most recent fall was because her legs gave out.   A different fall was because her rollator walker broke; was unable to get up off the floor.  Not really a fall - but her legs give out.    Per Note from PMR: ***     Patient stated Goals: To obtain a new power mobility device    Pain:  [x] Yes  [] No  Pain rating:  \"always over a 10\"/10   Location: Back, legs   Pain descriptors: Describes the pain as constant.  Not shooting, just constant.      Any recent issues with current WC or mobility devices?  Had a quad cane but had to stop as she does not have the balance for it any longer.  Uses the rollator for any time she is

## 2025-06-05 ENCOUNTER — OFFICE VISIT (OUTPATIENT)
Dept: VASCULAR SURGERY | Age: 65
End: 2025-06-05
Payer: MEDICARE

## 2025-06-05 VITALS
WEIGHT: 293 LBS | BODY MASS INDEX: 47.09 KG/M2 | RESPIRATION RATE: 18 BRPM | DIASTOLIC BLOOD PRESSURE: 92 MMHG | HEIGHT: 66 IN | SYSTOLIC BLOOD PRESSURE: 169 MMHG | OXYGEN SATURATION: 98 %

## 2025-06-05 DIAGNOSIS — R29.818 NEUROGENIC CLAUDICATION: Primary | ICD-10-CM

## 2025-06-05 DIAGNOSIS — I89.0 LYMPHEDEMA DUE TO VENOUS INSUFFICIENCY: ICD-10-CM

## 2025-06-05 DIAGNOSIS — I87.2 LYMPHEDEMA DUE TO VENOUS INSUFFICIENCY: ICD-10-CM

## 2025-06-05 PROCEDURE — G8417 CALC BMI ABV UP PARAM F/U: HCPCS | Performed by: SURGERY

## 2025-06-05 PROCEDURE — 1036F TOBACCO NON-USER: CPT | Performed by: SURGERY

## 2025-06-05 PROCEDURE — 1123F ACP DISCUSS/DSCN MKR DOCD: CPT | Performed by: SURGERY

## 2025-06-05 PROCEDURE — 3017F COLORECTAL CA SCREEN DOC REV: CPT | Performed by: SURGERY

## 2025-06-05 PROCEDURE — 99213 OFFICE O/P EST LOW 20 MIN: CPT | Performed by: SURGERY

## 2025-06-05 PROCEDURE — G8427 DOCREV CUR MEDS BY ELIG CLIN: HCPCS | Performed by: SURGERY

## 2025-06-05 PROCEDURE — 1126F AMNT PAIN NOTED NONE PRSNT: CPT | Performed by: SURGERY

## 2025-06-05 PROCEDURE — 1090F PRES/ABSN URINE INCON ASSESS: CPT | Performed by: SURGERY

## 2025-06-05 PROCEDURE — 1159F MED LIST DOCD IN RCRD: CPT | Performed by: SURGERY

## 2025-06-05 PROCEDURE — G8399 PT W/DXA RESULTS DOCUMENT: HCPCS | Performed by: SURGERY

## 2025-06-05 NOTE — PROGRESS NOTES
Division of Vascular Surgery        Follow Up      Chief Complaint:      Follow up    History of Present Illness:      Ifeoma Martinez is a 65 y.o. woman who presents follow up and surveillance of her leg swelling and peripheral arterial disease.  She is doing well, denies any pain or discomfort in her legs at rest or with ambulation.  She does not have any open wounds or sores on her feet.   Will get tourniquet tightness like feeling in her thighs, left more then right every so often, but otherwise has no complaints.  Her non-invasive testing was done and revealed no significant arterial insufficiency with god waveforms, but vessels were non-compressible.      (7/11/24) Ifeoma Martinez is a 64 y.o. woman who presents for her yearly surveillance of PAD and lower extremity issues.  She has been doing well for the most part.  Continues to have numbness/tingling/tightness left greater then right lower extremity.  Does not have much claudication symptoms, no cramping or rest pain.  She does not have any open wounds or sores on her feet.  She does have some swelling, mostly in her thighs, she does wear compression stockings daily.  When she lays in bed she gets the same tightness/tingling in her legs.       (5/25/23) Ifeoma Martinez is a 63 y.o. woman who presents for follow up regarding her lower extremity issues.   She has been compliant with compression stockings and has noticed significant improvement in her lower leg discomfort and swelling.  Has started to develop swelling in her upper thighs now.  She is awaiting to hear if lymphedema pumps are approved which would help with swelling int he upper parts of her legs.  She has basic pumps which she uses daily.  She has chronic back pain and issues, she has seen pain management as well as neurosurgeon locally.  She has follow up with her spine surgeon at [facility] for evaluation of needed further spine work in a few weeks.     (12/8/22) Ifeoma Martinez

## 2025-06-13 DIAGNOSIS — I10 HYPERTENSION, UNSPECIFIED TYPE: ICD-10-CM

## 2025-06-13 DIAGNOSIS — M54.16 CHRONIC LUMBAR RADICULOPATHY: ICD-10-CM

## 2025-06-13 RX ORDER — IBUPROFEN 600 MG/1
600 TABLET, FILM COATED ORAL EVERY 8 HOURS PRN
Qty: 30 TABLET | Refills: 0 | OUTPATIENT
Start: 2025-06-13

## 2025-06-13 RX ORDER — FUROSEMIDE 40 MG/1
40 TABLET ORAL DAILY
Qty: 30 TABLET | Refills: 0 | Status: SHIPPED | OUTPATIENT
Start: 2025-06-13

## 2025-06-13 NOTE — TELEPHONE ENCOUNTER
Ifeoma Martinze is calling to request a refill on the following medication(s):    Medication Request:  Requested Prescriptions     Pending Prescriptions Disp Refills    ibuprofen (ADVIL;MOTRIN) 600 MG tablet [Pharmacy Med Name: IBUPROFEN 600MG TABS] 30 tablet 0     Sig: TAKE ONE TABLET BY MOUTH EVERY 8 HOURS AS NEEDED FOR PAIN    furosemide (LASIX) 40 MG tablet [Pharmacy Med Name: FUROSEMIDE 40MG TABS] 30 tablet 0     Sig: TAKE ONE TABLET BY MOUTH ONCE A DAY       Last Visit Date (If Applicable):  5/14/2025    Next Visit Date:    Visit date not found

## 2025-06-17 ASSESSMENT — ENCOUNTER SYMPTOMS
ABDOMINAL PAIN: 0
SHORTNESS OF BREATH: 0
CHEST TIGHTNESS: 0
ALLERGIC/IMMUNOLOGIC NEGATIVE: 1
BACK PAIN: 1
COLOR CHANGE: 0

## 2025-07-01 ENCOUNTER — TELEPHONE (OUTPATIENT)
Age: 65
End: 2025-07-01

## 2025-07-01 NOTE — TELEPHONE ENCOUNTER
Patient was inquiring about her Ibuprofen. Patient states it was stopped prior to the Echo and she thought after the Echo came back Normal that she would be restarted on the medication. Patient states it did help with inflammation but if you think she needs to stay off she is ok with that as well. She just wanted clarification.    Please Advise

## 2025-07-02 NOTE — TELEPHONE ENCOUNTER
Patient has been informed. Writer tried to get the patient scheduled for her follow up appointment in August but the patient stated that she needs an afternoon appointment and there is no availability in the afternoon after 7/23/25. Patient states she will call in August to see if there have been any changes in availability with the schedule.

## 2025-08-12 ENCOUNTER — TELEPHONE (OUTPATIENT)
Age: 65
End: 2025-08-12

## 2025-08-14 ENCOUNTER — TELEPHONE (OUTPATIENT)
Age: 65
End: 2025-08-14

## 2025-08-26 ENCOUNTER — TELEMEDICINE (OUTPATIENT)
Age: 65
End: 2025-08-26
Payer: MEDICARE

## 2025-08-26 DIAGNOSIS — M48.061 SPINAL STENOSIS AT L4-L5 LEVEL: ICD-10-CM

## 2025-08-26 DIAGNOSIS — I10 ESSENTIAL HYPERTENSION: ICD-10-CM

## 2025-08-26 DIAGNOSIS — M96.1 FAILED BACK SURGICAL SYNDROME: Primary | ICD-10-CM

## 2025-08-26 DIAGNOSIS — I10 HYPERTENSION, UNSPECIFIED TYPE: ICD-10-CM

## 2025-08-26 PROCEDURE — 3017F COLORECTAL CA SCREEN DOC REV: CPT | Performed by: INTERNAL MEDICINE

## 2025-08-26 PROCEDURE — G8417 CALC BMI ABV UP PARAM F/U: HCPCS | Performed by: INTERNAL MEDICINE

## 2025-08-26 PROCEDURE — 1123F ACP DISCUSS/DSCN MKR DOCD: CPT | Performed by: INTERNAL MEDICINE

## 2025-08-26 PROCEDURE — 1090F PRES/ABSN URINE INCON ASSESS: CPT | Performed by: INTERNAL MEDICINE

## 2025-08-26 PROCEDURE — 1036F TOBACCO NON-USER: CPT | Performed by: INTERNAL MEDICINE

## 2025-08-26 PROCEDURE — 99214 OFFICE O/P EST MOD 30 MIN: CPT | Performed by: INTERNAL MEDICINE

## 2025-08-26 PROCEDURE — G8399 PT W/DXA RESULTS DOCUMENT: HCPCS | Performed by: INTERNAL MEDICINE

## 2025-08-26 PROCEDURE — G8427 DOCREV CUR MEDS BY ELIG CLIN: HCPCS | Performed by: INTERNAL MEDICINE

## 2025-08-26 PROCEDURE — 1159F MED LIST DOCD IN RCRD: CPT | Performed by: INTERNAL MEDICINE

## 2025-08-26 RX ORDER — BACLOFEN 5 MG/1
5 TABLET ORAL NIGHTLY PRN
Qty: 20 TABLET | Refills: 0 | Status: SHIPPED | OUTPATIENT
Start: 2025-08-26

## 2025-08-26 RX ORDER — HYDROCHLOROTHIAZIDE 50 MG/1
TABLET ORAL
Qty: 30 TABLET | Refills: 3 | Status: SHIPPED | OUTPATIENT
Start: 2025-08-26

## 2025-08-26 RX ORDER — FUROSEMIDE 40 MG/1
40 TABLET ORAL DAILY
Qty: 30 TABLET | Refills: 3 | Status: SHIPPED | OUTPATIENT
Start: 2025-08-26

## 2025-08-28 ENCOUNTER — APPOINTMENT (OUTPATIENT)
Dept: CT IMAGING | Age: 65
End: 2025-08-28
Payer: MEDICARE

## 2025-08-28 ENCOUNTER — HOSPITAL ENCOUNTER (EMERGENCY)
Age: 65
Discharge: HOME | End: 2025-08-28
Attending: EMERGENCY MEDICINE
Payer: MEDICARE

## 2025-08-28 ENCOUNTER — APPOINTMENT (OUTPATIENT)
Dept: GENERAL RADIOLOGY | Age: 65
End: 2025-08-28
Payer: MEDICARE

## 2025-08-28 VITALS
HEART RATE: 95 BPM | OXYGEN SATURATION: 99 % | TEMPERATURE: 97.3 F | RESPIRATION RATE: 18 BRPM | SYSTOLIC BLOOD PRESSURE: 178 MMHG | DIASTOLIC BLOOD PRESSURE: 85 MMHG

## 2025-08-28 DIAGNOSIS — M25.552 LEFT HIP PAIN: Primary | ICD-10-CM

## 2025-08-28 DIAGNOSIS — M25.562 LEFT KNEE PAIN, UNSPECIFIED CHRONICITY: ICD-10-CM
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
## 2025-08-28 PROCEDURE — 73552 X-RAY EXAM OF FEMUR 2/>: CPT

## 2025-08-28 PROCEDURE — 73562 X-RAY EXAM OF KNEE 3: CPT

## 2025-08-28 PROCEDURE — 70450 CT HEAD/BRAIN W/O DYE: CPT

## 2025-08-28 PROCEDURE — 6360000002 HC RX W HCPCS

## 2025-08-28 PROCEDURE — 96372 THER/PROPH/DIAG INJ SC/IM: CPT

## 2025-08-28 PROCEDURE — 73502 X-RAY EXAM HIP UNI 2-3 VIEWS: CPT

## 2025-08-28 PROCEDURE — 99284 EMERGENCY DEPT VISIT MOD MDM: CPT

## 2025-08-28 RX ORDER — ORPHENADRINE CITRATE 100 MG/1
100 TABLET ORAL 2 TIMES DAILY
Qty: 20 TABLET | Refills: 0 | Status: SHIPPED | OUTPATIENT
Start: 2025-08-28 | End: 2025-09-07

## 2025-08-28 RX ORDER — KETOROLAC TROMETHAMINE 30 MG/ML
30 INJECTION, SOLUTION INTRAMUSCULAR; INTRAVENOUS ONCE
Status: COMPLETED | OUTPATIENT
Start: 2025-08-28 | End: 2025-08-28

## 2025-08-28 RX ADMIN — KETOROLAC TROMETHAMINE 30 MG: 30 INJECTION, SOLUTION INTRAMUSCULAR at 17:35

## 2025-08-28 ASSESSMENT — PAIN - FUNCTIONAL ASSESSMENT: PAIN_FUNCTIONAL_ASSESSMENT: 0-10

## 2025-08-28 ASSESSMENT — PAIN SCALES - GENERAL: PAINLEVEL_OUTOF10: 10

## 2025-08-28 ASSESSMENT — PAIN DESCRIPTION - LOCATION: LOCATION: BACK;LEG

## 2025-08-28 ASSESSMENT — PAIN DESCRIPTION - ORIENTATION: ORIENTATION: LEFT

## 2025-08-28 ASSESSMENT — ENCOUNTER SYMPTOMS: BACK PAIN: 1

## (undated) DEVICE — GUIDEWIRE URO L150CM DIA0.035IN STIFF NIT HYDRPHLC STR TIP

## (undated) DEVICE — CATHETER URET 5FR L70CM TIP 8FR OPN END CONE TIP INJ HUB

## (undated) DEVICE — PACK PROCEDURE SURG CYSTO SVMMC LF

## (undated) DEVICE — DRAPE,REIN 53X77,STERILE: Brand: MEDLINE

## (undated) DEVICE — GLOVE ORANGE PI 7   MSG9070